# Patient Record
Sex: FEMALE | Race: WHITE | NOT HISPANIC OR LATINO | Employment: FULL TIME | URBAN - METROPOLITAN AREA
[De-identification: names, ages, dates, MRNs, and addresses within clinical notes are randomized per-mention and may not be internally consistent; named-entity substitution may affect disease eponyms.]

---

## 2017-09-25 ENCOUNTER — OFFICE VISIT (OUTPATIENT)
Dept: FAMILY MEDICINE | Facility: CLINIC | Age: 42
End: 2017-09-25
Payer: COMMERCIAL

## 2017-09-25 VITALS
HEART RATE: 80 BPM | DIASTOLIC BLOOD PRESSURE: 78 MMHG | BODY MASS INDEX: 30.48 KG/M2 | WEIGHT: 172 LBS | HEIGHT: 63 IN | TEMPERATURE: 97.5 F | SYSTOLIC BLOOD PRESSURE: 120 MMHG

## 2017-09-25 DIAGNOSIS — R87.810 CERVICAL HIGH RISK HPV (HUMAN PAPILLOMAVIRUS) TEST POSITIVE: ICD-10-CM

## 2017-09-25 DIAGNOSIS — Z12.4 SCREENING FOR CERVICAL CANCER: ICD-10-CM

## 2017-09-25 DIAGNOSIS — Z01.419 ENCOUNTER FOR GYNECOLOGICAL EXAMINATION WITHOUT ABNORMAL FINDING: Primary | ICD-10-CM

## 2017-09-25 DIAGNOSIS — Z23 NEED FOR PROPHYLACTIC VACCINATION AND INOCULATION AGAINST INFLUENZA: ICD-10-CM

## 2017-09-25 DIAGNOSIS — Z98.84 S/P GASTRIC BYPASS: ICD-10-CM

## 2017-09-25 PROCEDURE — 90471 IMMUNIZATION ADMIN: CPT | Performed by: FAMILY MEDICINE

## 2017-09-25 PROCEDURE — 90686 IIV4 VACC NO PRSV 0.5 ML IM: CPT | Performed by: FAMILY MEDICINE

## 2017-09-25 PROCEDURE — 99395 PREV VISIT EST AGE 18-39: CPT | Mod: 25 | Performed by: FAMILY MEDICINE

## 2017-09-25 PROCEDURE — 87624 HPV HI-RISK TYP POOLED RSLT: CPT | Performed by: FAMILY MEDICINE

## 2017-09-25 PROCEDURE — 88175 CYTOPATH C/V AUTO FLUID REDO: CPT | Performed by: FAMILY MEDICINE

## 2017-09-25 RX ORDER — UREA 10 %
500 LOTION (ML) TOPICAL DAILY
COMMUNITY

## 2017-09-25 RX ORDER — FERROUS SULFATE 325(65) MG
325 TABLET ORAL
COMMUNITY

## 2017-09-25 RX ORDER — LANOLIN ALCOHOL/MO/W.PET/CERES
1000 CREAM (GRAM) TOPICAL DAILY
COMMUNITY
End: 2017-09-25

## 2017-09-25 NOTE — PROGRESS NOTES
Injectable Influenza Immunization Documentation    1.  Is the person to be vaccinated sick today?   No    2. Does the person to be vaccinated have an allergy to a component   of the vaccine?   No    3. Has the person to be vaccinated ever had a serious reaction   to influenza vaccine in the past?   No    4. Has the person to be vaccinated ever had Guillain-Barré syndrome?   No    Form completed by Korina Perez [facility]

## 2017-09-25 NOTE — NURSING NOTE
"Initial /78  Pulse 80  Temp 97.5  F (36.4  C) (Tympanic)  Ht 5' 3\" (1.6 m)  Wt 172 lb (78 kg)  Breastfeeding? No  BMI 30.47 kg/m2 Estimated body mass index is 30.47 kg/(m^2) as calculated from the following:    Height as of this encounter: 5' 3\" (1.6 m).    Weight as of this encounter: 172 lb (78 kg). .      "

## 2017-09-25 NOTE — LETTER
[facility]  [address] [place] [id number]  [phone]        October 3, 2018    Bridgette Veras  [address]  [place] [address]              Dear Bridgette Veras    This is to remind you that your fasting lab is due.    You may call our office at [phone] to schedule an appointment.    Please disregard this notice if you have already had your labs drawn or made an appointment.        Sincerely,        Cecy Morillo DO

## 2017-09-25 NOTE — MR AVS SNAPSHOT
After Visit Summary   9/25/2017    Bridgette Veras    MRN: [id number]           Patient Information     Date Of Birth          1975        Visit Information        Provider Department      9/25/2017 10:40 AM Cecy Morillo DO [facility]        Today's Diagnoses     Encounter for gynecological examination without abnormal finding    -  1    Cervical high risk HPV (human papillomavirus) test positive        S/P gastric bypass        Screening for cervical cancer          Care Instructions    I'll let you know your pap results when available.    Please return fasting for your blood work related to your bypass surgery.  The orders are available for you.  You will need to be fasting for 12 hours (nothing but water) prior to your blood work.        Preventive Health Recommendations  Female Ages 26 - 39  Yearly exam:   See your health care provider every year in order to    Review health changes.     Discuss preventive care.      Review your medicines if you your doctor has prescribed any.    Until age 30: Get a Pap test every three years (more often if you have had an abnormal result).    After age 30: Talk to your doctor about whether you should have a Pap test every 3 years or have a Pap test with HPV screening every 5 years.   You do not need a Pap test if your uterus was removed (hysterectomy) and you have not had cancer.  You should be tested each year for STDs (sexually transmitted diseases), if you're at risk.   Talk to your provider about how often to have your cholesterol checked.  If you are at risk for diabetes, you should have a diabetes test (fasting glucose).  Shots: Get a flu shot each year. Get a tetanus shot every 10 years.   Nutrition:     Eat at least 5 servings of fruits and vegetables each day.    Eat whole-grain bread, whole-wheat pasta and brown rice instead of white grains and rice.    Talk to your provider about Calcium and Vitamin D.      Lifestyle    Exercise at least 150 minutes a week (30 minutes a day, 5 days of the week). This will help you control your weight and prevent disease.    Limit alcohol to one drink per day.    No smoking.     Wear sunscreen to prevent skin cancer.    See your dentist every six months for an exam and cleaning.            Follow-ups after your visit        Future tests that were ordered for you today     Open Future Orders        Priority Expected Expires Ordered    CBC with platelets Routine 9/26/2017 9/25/2018 9/25/2017    Comprehensive metabolic panel Routine 9/26/2017 9/25/2018 9/25/2017    Iron and iron binding capacity Routine 9/26/2017 9/25/2018 9/25/2017    Ferritin Routine 9/26/2017 9/25/2018 9/25/2017    Vitamin B12 Routine 9/26/2017 9/25/2018 9/25/2017    Vitamin D Deficiency Routine 9/26/2017 9/25/2018 9/25/2017    Parathyroid Hormone Intact Routine 9/26/2017 9/25/2018 9/25/2017    Vitamin B1 whole blood Routine 9/26/2017 9/25/2018 9/25/2017    Folate Routine 9/26/2017 9/25/2018 9/25/2017            Who to contact     Normal or non-critical lab and imaging results will be communicated to you by WellDochart, letter or phone within 4 business days after the clinic has received the results. If you do not hear from us within 7 days, please contact the clinic through HooftyMatcht or phone. If you have a critical or abnormal lab result, we will notify you by phone as soon as possible.  Submit refill requests through Stellar or call your pharmacy and they will forward the refill request to us. Please allow 3 business days for your refill to be completed.          If you need to speak with a  for additional information , please call: [phone]           Additional Information About Your Visit        Stellar Information     Stellar gives you secure access to your electronic health record. If you see a primary care provider, you can also send messages to your care team and make appointments. If you  "have questions, please call your primary care clinic.  If you do not have a primary care provider, please call [phone] and they will assist you.        Care EveryWhere ID     This is your Care EveryWhere ID. This could be used by other organizations to access your [place] medical records  [id number]        Your Vitals Were     Pulse Temperature Height Breastfeeding? BMI (Body Mass Index)       80 97.5  F (36.4  C) (Tympanic) 5' 3\" (1.6 m) No 30.47 kg/m2        Blood Pressure from Last 3 Encounters:   09/25/17 120/78   09/20/16 115/73   08/04/16 112/76    Weight from Last 3 Encounters:   09/25/17 172 lb (78 kg)   09/20/16 165 lb 6.4 oz (75 kg)   08/04/16 160 lb 8 oz (72.8 kg)              We Performed the Following     HPV High Risk Types DNA Cervical     Pap imaged thin layer diagnostic with HPV (select HPV order below)          Today's Medication Changes          These changes are accurate as of: 9/25/17 11:11 AM.  If you have any questions, ask your nurse or doctor.               These medicines have changed or have updated prescriptions.        Dose/Directions    cyanocolbalamin 500 MCG tablet   Commonly known as:  vitamin  B-12   This may have changed:  Another medication with the same name was removed. Continue taking this medication, and follow the directions you see here.   Changed by:  Cecy Morillo DO        Dose:  500 mcg   Take 500 mcg by mouth daily   Refills:  0                Primary Care Provider Office Phone # Fax #    Cecy Morillo [phone] [address] [place] [facility] [address]        Equal Access to Services     [facility] AH: Hadii andre doty hadasho Soomaali, waaxda luqadaha, qaybta kaalmada adeegyadaphne bateman. So [facility] [phone].    ATENCIÓN: Si habla español, tiene a guerrier disposición servicios gratuitos de asistencia lingüística. Llame al [phone].    We comply with applicable federal civil rights laws and [place] laws. " We do not discriminate on the basis of race, color, national origin, age, disability sex, sexual orientation or gender identity.            Thank you!     Thank you for choosing [facility]  for your care. Our goal is always to provide you with excellent care. Hearing back from our patients is one way we can continue to improve our services. Please take a few minutes to complete the written survey that you may receive in the mail after your visit with us. Thank you!             Your Updated Medication List - Protect others around you: Learn how to safely use, store and throw away your medicines at www.disposemymeds.org.          This list is accurate as of: 9/25/17 11:11 AM.  Always use your most recent med list.                   Brand Name Dispense Instructions for use Diagnosis    cyanocolbalamin 500 MCG tablet    vitamin  B-12     Take 500 mcg by mouth daily        ferrous sulfate 325 (65 FE) MG tablet    IRON     Take 325 mg by mouth daily (with breakfast)

## 2017-09-25 NOTE — PATIENT INSTRUCTIONS
I'll let you know your pap results when available.    Please return fasting for your blood work related to your bypass surgery.  The orders are available for you.  You will need to be fasting for 12 hours (nothing but water) prior to your blood work.        Preventive Health Recommendations  Female Ages 26 - 39  Yearly exam:   See your health care provider every year in order to    Review health changes.     Discuss preventive care.      Review your medicines if you your doctor has prescribed any.    Until age 30: Get a Pap test every three years (more often if you have had an abnormal result).    After age 30: Talk to your doctor about whether you should have a Pap test every 3 years or have a Pap test with HPV screening every 5 years.   You do not need a Pap test if your uterus was removed (hysterectomy) and you have not had cancer.  You should be tested each year for STDs (sexually transmitted diseases), if you're at risk.   Talk to your provider about how often to have your cholesterol checked.  If you are at risk for diabetes, you should have a diabetes test (fasting glucose).  Shots: Get a flu shot each year. Get a tetanus shot every 10 years.   Nutrition:     Eat at least 5 servings of fruits and vegetables each day.    Eat whole-grain bread, whole-wheat pasta and brown rice instead of white grains and rice.    Talk to your provider about Calcium and Vitamin D.     Lifestyle    Exercise at least 150 minutes a week (30 minutes a day, 5 days of the week). This will help you control your weight and prevent disease.    Limit alcohol to one drink per day.    No smoking.     Wear sunscreen to prevent skin cancer.    See your dentist every six months for an exam and cleaning.
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
## 2017-09-27 LAB
COPATH REPORT: NORMAL
PAP: NORMAL

## 2017-09-28 LAB
FINAL DIAGNOSIS: ABNORMAL
HPV HR 12 DNA CVX QL NAA+PROBE: NEGATIVE
HPV16 DNA SPEC QL NAA+PROBE: POSITIVE
HPV18 DNA SPEC QL NAA+PROBE: NEGATIVE
SPECIMEN DESCRIPTION: ABNORMAL

## 2018-01-20 ENCOUNTER — HEALTH MAINTENANCE LETTER (OUTPATIENT)
Age: 43
End: 2018-01-20

## 2018-05-27 ENCOUNTER — HEALTH MAINTENANCE LETTER (OUTPATIENT)
Age: 43
End: 2018-05-27

## 2018-07-23 ENCOUNTER — HEALTH MAINTENANCE LETTER (OUTPATIENT)
Age: 43
End: 2018-07-23

## 2018-10-24 ENCOUNTER — TELEPHONE (OUTPATIENT)
Dept: FAMILY MEDICINE | Facility: CLINIC | Age: 43
End: 2018-10-24

## 2018-10-24 NOTE — TELEPHONE ENCOUNTER
Pt is past due for f/u pap smear if declining recommended colposcopy.  Reminder letter was sent 03/21/18  Spoke with pt, states she will call the clinic to schedule.  If no reply and/or appt within 2 weeks (11/07/18) pt will be considered lost to pap tracking f/u.  Rosita Dale,    Pap Tracking

## 2018-11-27 ENCOUNTER — TELEPHONE (OUTPATIENT)
Dept: LAB | Facility: CLINIC | Age: 43
End: 2018-11-27

## 2018-11-27 NOTE — TELEPHONE ENCOUNTER
Letter sent to patient on 10/3/18 regarding overdue labs.  Please contact patient to come in for blood draw or cancel labs.  Thanks.

## 2018-12-03 ENCOUNTER — HEALTH MAINTENANCE LETTER (OUTPATIENT)
Age: 43
End: 2018-12-03

## 2019-02-15 ENCOUNTER — HEALTH MAINTENANCE LETTER (OUTPATIENT)
Age: 44
End: 2019-02-15

## 2019-11-06 ENCOUNTER — HEALTH MAINTENANCE LETTER (OUTPATIENT)
Age: 44
End: 2019-11-06

## 2020-11-29 ENCOUNTER — HEALTH MAINTENANCE LETTER (OUTPATIENT)
Age: 45
End: 2020-11-29

## 2021-04-20 ENCOUNTER — OFFICE VISIT (OUTPATIENT)
Dept: ORTHOPEDICS | Facility: CLINIC | Age: 46
End: 2021-04-20
Payer: COMMERCIAL

## 2021-04-20 ENCOUNTER — ANCILLARY PROCEDURE (OUTPATIENT)
Dept: GENERAL RADIOLOGY | Facility: CLINIC | Age: 46
End: 2021-04-20
Attending: FAMILY MEDICINE
Payer: COMMERCIAL

## 2021-04-20 VITALS
DIASTOLIC BLOOD PRESSURE: 79 MMHG | SYSTOLIC BLOOD PRESSURE: 110 MMHG | WEIGHT: 189.6 LBS | BODY MASS INDEX: 33.59 KG/M2 | HEART RATE: 87 BPM

## 2021-04-20 DIAGNOSIS — R07.81 RIB PAIN: Primary | ICD-10-CM

## 2021-04-20 DIAGNOSIS — R07.81 RIB PAIN: ICD-10-CM

## 2021-04-20 PROCEDURE — 71101 X-RAY EXAM UNILAT RIBS/CHEST: CPT | Mod: LT | Performed by: RADIOLOGY

## 2021-04-20 PROCEDURE — 99204 OFFICE O/P NEW MOD 45 MIN: CPT | Performed by: FAMILY MEDICINE

## 2021-04-20 RX ORDER — CYCLOBENZAPRINE HCL 10 MG
10 TABLET ORAL
Qty: 30 TABLET | Refills: 0 | Status: SHIPPED | OUTPATIENT
Start: 2021-04-20 | End: 2024-02-05

## 2021-04-20 RX ORDER — NABUMETONE 500 MG/1
500 TABLET, FILM COATED ORAL 2 TIMES DAILY
Qty: 30 TABLET | Refills: 0 | Status: SHIPPED | OUTPATIENT
Start: 2021-04-20 | End: 2022-03-30

## 2021-04-20 NOTE — PROGRESS NOTES
ASSESSMENT & PLAN  Bridgette was seen today for pain.    Diagnoses and all orders for this visit:    Rib pain  -     XR Ribs & Chest LT G/E 3vw; Future  -     cyclobenzaprine (FLEXERIL) 10 MG tablet; Take 1 tablet (10 mg) by mouth nightly as needed for muscle spasms  -     nabumetone (RELAFEN) 500 MG tablet; Take 1 tablet (500 mg) by mouth 2 times daily      Patient is a 45 year old female presenting for evaluation of   Chief Complaint   Patient presents with     Chest - Pain      # Left Axillary Rib Pain: Notable over the past several weeks pain over lateral ribs.  Acute pain on examination today with rib series showing possible lower rib fracture.  No concerning findings on heart and lung exam.  No red flag symptoms/signs on history or examination. Given this plan to treat as below.    Image Findings: possible left lower rib fracture non-displaced  Treatment: Limit coughing with suppressants, hydrated, allergy management, calcium supplementation with Vit D  Job: no restrictions  Medications/Injections: Relafen during the day can supplement with 1000 mg tylenol, Flexeril at night  Follow-up: 1-2 weeks if not improvement    Concerning signs/sx that would warrant urgent evaluation were discussed.  All questions were answered, patient understands and agrees with plan.      Return in about 2 weeks (around 5/4/2021).    -----    SUBJECTIVE  Bridgette Veras is a/an 45 year old Right handed female who is seen as a self referral, [facility] for evaluation of left rib pain. The patient is seen by themselves.    Onset: 3-4 week(s) ago. Reports insidious onset without acute precipitating event. Last night, 4/19/21, had a hard cough and felt a pop in left sided ribs.   Location of Pain: left sided rib   Rating of Pain at worst: 8/10  Rating of Pain Currently: 6/10  Worsened by: coughing, deep breaths  Better with: nothing   Treatments tried: ice and sleeping in recliner   Associated symptoms: no distal numbness or tingling; denies  swelling or warmth  Orthopedic history: NO  Relevant surgical history: NO  Past Medical History:   Diagnosis Date     Cervical high risk HPV (human papillomavirus) test positive 8/4/16    type 16     Chickenpox      Chlamydia trachomatis infection of unspecified site      POONAM 1 1998     Depressive disorder      Depressive disorder, not elsewhere classified      History of colposcopy with cervical biopsy 9/20/16    Bx - POONAM 1, [facility] - negative     Other abnormal heart sounds as child    Murmur - resolved     Polycystic ovaries     prior to gastic bypass in June, 2005, pt carried the diagnosis of PCOS/anovuolation     Streptococcus infection in conditions classified elsewhere and of unspecified site, group B 1998    In pregnancy     Social History     Socioeconomic History     Marital status:      Spouse name: Not on file     Number of children: 2     Years of education: Not on file     Highest education level: Not on file   Occupational History     Employer: UNEMPLOYED   Social Needs     Financial resource strain: Not on file     Food insecurity     Worry: Not on file     Inability: Not on file     Transportation needs     Medical: Not on file     Non-medical: Not on file   Tobacco Use     Smoking status: Current Every Day Smoker     Packs/day: 0.50     Types: Cigarettes     Smokeless tobacco: Never Used     Tobacco comment: smoking 20cig/day- down to 10cig/day with pregnancy   Substance and Sexual Activity     Alcohol use: Yes     Alcohol/week: 0.0 standard drinks     Comment: rarely- quit with pregnancy     Drug use: No     Sexual activity: Yes     Partners: Male     Birth control/protection: Surgical     Comment: Status post tubal ligation   Lifestyle     Physical activity     Days per week: Not on file     Minutes per session: Not on file     Stress: Not on file   Relationships     Social connections     Talks on phone: Not on file     Gets together: Not on file     Attends Rastafari service: Not on file      Active member of club or organization: Not on file     Attends meetings of clubs or organizations: Not on file     Relationship status: Not on file     Intimate partner violence     Fear of current or ex partner: Not on file     Emotionally abused: Not on file     Physically abused: Not on file     Forced sexual activity: Not on file   Other Topics Concern     Parent/sibling w/ CABG, MI or angioplasty before 65F 55M? No   Social History Narrative     Not on file         Patient's past medical, surgical, social, and family histories were reviewed today and no changes are noted.  No family history pertinent to the patient's problem today     REVIEW OF SYSTEMS:  10 point ROS is negative other than symptoms noted above in HPI, Past Medical History or as stated below  Constitutional: NEGATIVE for fever, chills, change in weight  Skin: NEGATIVE for worrisome rashes, moles or lesions  GI/: NEGATIVE for bowel or bladder changes  Neuro: NEGATIVE for weakness, dizziness or paresthesias    OBJECTIVE:  /79   Pulse 87   Wt 86 kg (189 lb 9.6 oz)   BMI 33.59 kg/m     General: healthy, alert and in no distress  HEENT: no scleral icterus or conjunctival erythema  Skin: no suspicious lesions or rash. No jaundice.  CV: regular rhythm no m/r/g  Resp: normal respiratory effort without conversational dyspnea, LCTAB  Psych: normal mood and affect  Gait: normal steady gait with appropriate coordination and balance  Neuro: normal light touch sensory exam of the bilateral upper extremities.    MSK:  LEFT SHOULDER  Inspection:    no swelling, bruising, discoloration, or obvious deformity or asymmetry  Palpation:  Significant TTP over left axilla around ribs 7-10  Active Range of Motion:   Grossly intact   Strength:   grossly intact     Independent visualization of the below image:  Recent Results (from the past 24 hour(s))   XR Ribs & Chest LT G/E 3vw    Narrative    XR RIBS & CHEST LT 3VW 4/20/2021 9:38 AM     HISTORY: Rib pain       Impression    IMPRESSION: No apparent left rib displaced fracture. The lungs appear  clear. No apparent pneumothorax or pleural effusion.    TASIA CÁRDENAS MD       I  ordered, visualized and reviewed these images with the patient  Possible lower rib fracture non-displaced         Stephan Flanagan MD [facility]

## 2021-04-20 NOTE — LETTER
4/20/2021         RE: Bridgette Veras  [address]  [place] [address]        Dear Colleague,    Thank you for referring your patient, Bridgette Veras, to the [facility] [place]. Please see a copy of my visit note below.    ASSESSMENT & PLAN  Bridgette was seen today for pain.    Diagnoses and all orders for this visit:    Rib pain  -     XR Ribs & Chest LT G/E 3vw; Future  -     cyclobenzaprine (FLEXERIL) 10 MG tablet; Take 1 tablet (10 mg) by mouth nightly as needed for muscle spasms  -     nabumetone (RELAFEN) 500 MG tablet; Take 1 tablet (500 mg) by mouth 2 times daily      Patient is a 45 year old female presenting for evaluation of   Chief Complaint   Patient presents with     Chest - Pain      # Left Axillary Rib Pain: Notable over the past several weeks pain over lateral ribs.  Acute pain on examination today with rib series showing possible lower rib fracture.  No concerning findings on heart and lung exam.  No red flag symptoms/signs on history or examination. Given this plan to treat as below.    Image Findings: possible left lower rib fracture non-displaced  Treatment: Limit coughing with suppressants, hydrated, allergy management, calcium supplementation with Vit D  Job: no restrictions  Medications/Injections: Relafen during the day can supplement with 1000 mg tylenol, Flexeril at night  Follow-up: 1-2 weeks if not improvement    Concerning signs/sx that would warrant urgent evaluation were discussed.  All questions were answered, patient understands and agrees with plan.      Return in about 2 weeks (around 5/4/2021).    -----    SUBJECTIVE  Bridgette Veras is a/an 45 year old Right handed female who is seen as a self referral, [facility] for evaluation of left rib pain. The patient is seen by themselves.    Onset: 3-4 week(s) ago. Reports insidious onset without acute precipitating event. Last night, 4/19/21, had a hard cough and felt a pop in left sided ribs.    Location of Pain: left sided rib   Rating of Pain at worst: 8/10  Rating of Pain Currently: 6/10  Worsened by: coughing, deep breaths  Better with: nothing   Treatments tried: ice and sleeping in recliner   Associated symptoms: no distal numbness or tingling; denies swelling or warmth  Orthopedic history: NO  Relevant surgical history: NO  Past Medical History:   Diagnosis Date     Cervical high risk HPV (human papillomavirus) test positive 8/4/16    type 16     Chickenpox      Chlamydia trachomatis infection of unspecified site      POONAM 1 1998     Depressive disorder      Depressive disorder, not elsewhere classified      History of colposcopy with cervical biopsy 9/20/16    Bx - POONAM 1, ECC - negative     Other abnormal heart sounds as child    Murmur - resolved     Polycystic ovaries     prior to gastic bypass in June, 2005, pt carried the diagnosis of PCOS/anovuolation     Streptococcus infection in conditions classified elsewhere and of unspecified site, group B 1998    In pregnancy     Social History     Socioeconomic History     Marital status:      Spouse name: Not on file     Number of children: 2     Years of education: Not on file     Highest education level: Not on file   Occupational History     Employer: UNEMPLOYED   Social Needs     Financial resource strain: Not on file     Food insecurity     Worry: Not on file     Inability: Not on file     Transportation needs     Medical: Not on file     Non-medical: Not on file   Tobacco Use     Smoking status: Current Every Day Smoker     Packs/day: 0.50     Types: Cigarettes     Smokeless tobacco: Never Used     Tobacco comment: smoking 20cig/day- down to 10cig/day with pregnancy   Substance and Sexual Activity     Alcohol use: Yes     Alcohol/week: 0.0 standard drinks     Comment: rarely- quit with pregnancy     Drug use: No     Sexual activity: Yes     Partners: Male     Birth control/protection: Surgical     Comment: Status post tubal ligation    Lifestyle     Physical activity     Days per week: Not on file     Minutes per session: Not on file     Stress: Not on file   Relationships     Social connections     Talks on phone: Not on file     Gets together: Not on file     Attends Spiritism service: Not on file     Active member of club or organization: Not on file     Attends meetings of clubs or organizations: Not on file     Relationship status: Not on file     Intimate partner violence     Fear of current or ex partner: Not on file     Emotionally abused: Not on file     Physically abused: Not on file     Forced sexual activity: Not on file   Other Topics Concern     Parent/sibling w/ CABG, MI or angioplasty before 65F 55M? No   Social History Narrative     Not on file         Patient's past medical, surgical, social, and family histories were reviewed today and no changes are noted.  No family history pertinent to the patient's problem today     REVIEW OF SYSTEMS:  10 point ROS is negative other than symptoms noted above in HPI, Past Medical History or as stated below  Constitutional: NEGATIVE for fever, chills, change in weight  Skin: NEGATIVE for worrisome rashes, moles or lesions  GI/: NEGATIVE for bowel or bladder changes  Neuro: NEGATIVE for weakness, dizziness or paresthesias    OBJECTIVE:  /79   Pulse 87   Wt 86 kg (189 lb 9.6 oz)   BMI 33.59 kg/m     General: healthy, alert and in no distress  HEENT: no scleral icterus or conjunctival erythema  Skin: no suspicious lesions or rash. No jaundice.  CV: regular rhythm no m/r/g  Resp: normal respiratory effort without conversational dyspnea, LCTAB  Psych: normal mood and affect  Gait: normal steady gait with appropriate coordination and balance  Neuro: normal light touch sensory exam of the bilateral upper extremities.    MSK:  LEFT SHOULDER  Inspection:    no swelling, bruising, discoloration, or obvious deformity or asymmetry  Palpation:  Significant TTP over left axilla around ribs  7-10  Active Range of Motion:   Grossly intact   Strength:   grossly intact     Independent visualization of the below image:  Recent Results (from the past 24 hour(s))   XR Ribs & Chest LT G/E 3vw    Narrative    XR RIBS & CHEST LT 3VW 4/20/2021 9:38 AM     HISTORY: Rib pain      Impression    IMPRESSION: No apparent left rib displaced fracture. The lungs appear  clear. No apparent pneumothorax or pleural effusion.    TASIA CÁRDENAS MD       I  ordered, visualized and reviewed these images with the patient  Possible lower rib fracture non-displaced         Stephan Flanagan MD [facility]        Again, thank you for allowing me to participate in the care of your patient.        Sincerely,        Stephan Flanagan MD

## 2021-04-20 NOTE — PATIENT INSTRUCTIONS
# Left Axillary Rib Pain: Notable over the past several weeks pain over lateral ribs.  Acute pain on examination today with rib series showing possible lower rib fracture.  No red flag symptoms/signs on history or examination. Given this plan to treat as below.    Image Findings: possible left lower rib fracture non-displaced  Treatment: Limit coughing with suppressants, hydrated, allergy management, calcium supplementation with Vit D  Job: no restrictions  Medications/Injections: Relafen during the day can supplement with 1000 mg tylenol, Flexeril at night  Follow-up: 1-2 weeks if not improvement    Please call [phone]   Ask for my team if you have any questions or concerns    It was great seeing you today!    Stephan Flanagan MD, CAQSM     Patient Education     Rib Contusion or Minor Fracture    A rib contusion is a bruise to one or more rib bones. It may cause pain, tenderness, swelling, and a purplish color to the skin. There may be a sharp pain with each breath. A rib contusion takes anywhere from a few days to a few weeks to heal. A minor rib fracture or break may cause the same symptoms as a rib contusion. The small crack may not be seen on a regular chest X-ray. Treatment for both problems is basically the same.  Home care    You may use over-the-counter pain medicine to control pain, unless another pain medicine was prescribed. If you have chronic liver or kidney disease or ever had a stomach ulcer or GI (gastrointestinal) bleeding, talk with your healthcare provider before using these medicines.    Rest. Don't lift anything heavy or do any activity that causes pain.    Apply an ice pack over the injured area for 15 to 20 minutes every 1 to 2 hours. You should do this for the first 24 to 48 hours. To make an ice pack, put ice cubes in a plastic bag that seals at the top. Wrap the bag in a clean, thin towel or cloth. Never put ice or an ice pack directly on the skin. Continue with ice packs as needed  for the relief of pain and swelling.    The first 3 to 4 weeks of healing will be the most painful. If your pain is not under control with the treatment given, call your healthcare provider. Sometimes a stronger pain medicine may be needed. A nerve block can be done in case of severe pain. It will numb the nerve between the ribs.  Follow-up care  Follow up with your healthcare provider, or as advised.  If X-rays were taken, you will be told of any new findings that may affect your care.  Call 911  Call 911 if you have:    Dizziness, weakness or fainting    Shortness of breath with or without chest discomfort    New or worsening pain  When to seek medical advice  Call your healthcare provider right away if any of these occur:    Fever of 100.4 F (38 C) or higher, or as directed by your healthcare provider    Chills    Stomach pain, vomiting  Nicole last reviewed this educational content on 6/1/2018 2000-2021 [employer]. All rights reserved. This information is not intended as a substitute for professional medical care. Always follow your healthcare professional's instructions.

## 2021-09-19 ENCOUNTER — HEALTH MAINTENANCE LETTER (OUTPATIENT)
Age: 46
End: 2021-09-19

## 2021-12-31 ENCOUNTER — IMMUNIZATION (OUTPATIENT)
Dept: NURSING | Facility: CLINIC | Age: 46
End: 2021-12-31
Payer: COMMERCIAL

## 2021-12-31 PROCEDURE — 0004A PR COVID VAC PFIZER DIL RECON 30 MCG/0.3 ML IM: CPT

## 2021-12-31 PROCEDURE — 91300 PR COVID VAC PFIZER DIL RECON 30 MCG/0.3 ML IM: CPT

## 2022-01-09 ENCOUNTER — HEALTH MAINTENANCE LETTER (OUTPATIENT)
Age: 47
End: 2022-01-09

## 2022-02-07 ENCOUNTER — OFFICE VISIT (OUTPATIENT)
Dept: FAMILY MEDICINE | Facility: CLINIC | Age: 47
End: 2022-02-07
Payer: COMMERCIAL

## 2022-02-07 VITALS
HEART RATE: 90 BPM | TEMPERATURE: 97.9 F | WEIGHT: 190.6 LBS | RESPIRATION RATE: 16 BRPM | SYSTOLIC BLOOD PRESSURE: 130 MMHG | HEIGHT: 63 IN | BODY MASS INDEX: 33.77 KG/M2 | OXYGEN SATURATION: 99 % | DIASTOLIC BLOOD PRESSURE: 76 MMHG

## 2022-02-07 DIAGNOSIS — G25.81 RESTLESS LEGS SYNDROME (RLS): ICD-10-CM

## 2022-02-07 DIAGNOSIS — R87.810 CERVICAL HIGH RISK HPV (HUMAN PAPILLOMAVIRUS) TEST POSITIVE: ICD-10-CM

## 2022-02-07 DIAGNOSIS — Z23 NEED FOR PROPHYLACTIC VACCINATION AND INOCULATION AGAINST INFLUENZA: ICD-10-CM

## 2022-02-07 DIAGNOSIS — Z12.4 SCREENING FOR CERVICAL CANCER: ICD-10-CM

## 2022-02-07 DIAGNOSIS — Z00.00 ROUTINE GENERAL MEDICAL EXAMINATION AT A HEALTH CARE FACILITY: Primary | ICD-10-CM

## 2022-02-07 DIAGNOSIS — Z72.0 TOBACCO ABUSE: ICD-10-CM

## 2022-02-07 DIAGNOSIS — Z98.84 S/P GASTRIC BYPASS: ICD-10-CM

## 2022-02-07 LAB
ALBUMIN SERPL-MCNC: 3.3 G/DL (ref 3.4–5)
ALP SERPL-CCNC: 112 U/L (ref 40–150)
ALT SERPL W P-5'-P-CCNC: 17 U/L (ref 0–50)
ANION GAP SERPL CALCULATED.3IONS-SCNC: 4 MMOL/L (ref 3–14)
AST SERPL W P-5'-P-CCNC: 14 U/L (ref 0–45)
BILIRUB SERPL-MCNC: 0.3 MG/DL (ref 0.2–1.3)
BUN SERPL-MCNC: 10 MG/DL (ref 7–30)
CALCIUM SERPL-MCNC: 8.5 MG/DL (ref 8.5–10.1)
CHLORIDE BLD-SCNC: 110 MMOL/L (ref 94–109)
CHOLEST SERPL-MCNC: 144 MG/DL
CO2 SERPL-SCNC: 24 MMOL/L (ref 20–32)
CREAT SERPL-MCNC: 0.69 MG/DL (ref 0.52–1.04)
DEPRECATED CALCIDIOL+CALCIFEROL SERPL-MC: 8 UG/L (ref 20–75)
ERYTHROCYTE [DISTWIDTH] IN BLOOD BY AUTOMATED COUNT: 21.8 % (ref 10–15)
FASTING STATUS PATIENT QL REPORTED: YES
FERRITIN SERPL-MCNC: 2 NG/ML (ref 12–150)
FOLATE SERPL-MCNC: 6.5 NG/ML
GFR SERPL CREATININE-BSD FRML MDRD: >90 ML/MIN/1.73M2
GLUCOSE BLD-MCNC: 90 MG/DL (ref 70–99)
HBA1C MFR BLD: 5.8 % (ref 0–5.6)
HCT VFR BLD AUTO: 28.3 % (ref 35–47)
HDLC SERPL-MCNC: 60 MG/DL
HGB BLD-MCNC: 7.4 G/DL (ref 11.7–15.7)
IRON SATN MFR SERPL: 2 % (ref 15–46)
IRON SERPL-MCNC: 10 UG/DL (ref 35–180)
LDLC SERPL CALC-MCNC: 72 MG/DL
MCH RBC QN AUTO: 15.9 PG (ref 26.5–33)
MCHC RBC AUTO-ENTMCNC: 26.1 G/DL (ref 31.5–36.5)
MCV RBC AUTO: 61 FL (ref 78–100)
NONHDLC SERPL-MCNC: 84 MG/DL
PLAT MORPH BLD: NORMAL
PLATELET # BLD AUTO: 364 10E3/UL (ref 150–450)
POTASSIUM BLD-SCNC: 4 MMOL/L (ref 3.4–5.3)
PROT SERPL-MCNC: 6.8 G/DL (ref 6.8–8.8)
PTH-INTACT SERPL-MCNC: 55 PG/ML (ref 18–80)
RBC # BLD AUTO: 4.66 10E6/UL (ref 3.8–5.2)
RBC MORPH BLD: NORMAL
SODIUM SERPL-SCNC: 138 MMOL/L (ref 133–144)
TIBC SERPL-MCNC: 456 UG/DL (ref 240–430)
TRIGL SERPL-MCNC: 59 MG/DL
VIT B12 SERPL-MCNC: 173 PG/ML (ref 193–986)
WBC # BLD AUTO: 6.7 10E3/UL (ref 4–11)

## 2022-02-07 PROCEDURE — 85027 COMPLETE CBC AUTOMATED: CPT | Performed by: FAMILY MEDICINE

## 2022-02-07 PROCEDURE — 82607 VITAMIN B-12: CPT | Performed by: FAMILY MEDICINE

## 2022-02-07 PROCEDURE — 84425 ASSAY OF VITAMIN B-1: CPT | Mod: 90 | Performed by: FAMILY MEDICINE

## 2022-02-07 PROCEDURE — 83036 HEMOGLOBIN GLYCOSYLATED A1C: CPT | Performed by: FAMILY MEDICINE

## 2022-02-07 PROCEDURE — 87624 HPV HI-RISK TYP POOLED RSLT: CPT | Performed by: FAMILY MEDICINE

## 2022-02-07 PROCEDURE — 82728 ASSAY OF FERRITIN: CPT | Performed by: FAMILY MEDICINE

## 2022-02-07 PROCEDURE — 80061 LIPID PANEL: CPT | Performed by: FAMILY MEDICINE

## 2022-02-07 PROCEDURE — 99213 OFFICE O/P EST LOW 20 MIN: CPT | Mod: 25 | Performed by: FAMILY MEDICINE

## 2022-02-07 PROCEDURE — 88175 CYTOPATH C/V AUTO FLUID REDO: CPT | Performed by: FAMILY MEDICINE

## 2022-02-07 PROCEDURE — 83970 ASSAY OF PARATHORMONE: CPT | Performed by: FAMILY MEDICINE

## 2022-02-07 PROCEDURE — 36415 COLL VENOUS BLD VENIPUNCTURE: CPT | Performed by: FAMILY MEDICINE

## 2022-02-07 PROCEDURE — 90686 IIV4 VACC NO PRSV 0.5 ML IM: CPT | Performed by: FAMILY MEDICINE

## 2022-02-07 PROCEDURE — 83550 IRON BINDING TEST: CPT | Performed by: FAMILY MEDICINE

## 2022-02-07 PROCEDURE — 82306 VITAMIN D 25 HYDROXY: CPT | Performed by: FAMILY MEDICINE

## 2022-02-07 PROCEDURE — 99000 SPECIMEN HANDLING OFFICE-LAB: CPT | Performed by: FAMILY MEDICINE

## 2022-02-07 PROCEDURE — 80053 COMPREHEN METABOLIC PANEL: CPT | Performed by: FAMILY MEDICINE

## 2022-02-07 PROCEDURE — 82746 ASSAY OF FOLIC ACID SERUM: CPT | Performed by: FAMILY MEDICINE

## 2022-02-07 PROCEDURE — 99396 PREV VISIT EST AGE 40-64: CPT | Mod: 25 | Performed by: FAMILY MEDICINE

## 2022-02-07 PROCEDURE — 90471 IMMUNIZATION ADMIN: CPT | Performed by: FAMILY MEDICINE

## 2022-02-07 RX ORDER — CYCLOBENZAPRINE HCL 10 MG
10 TABLET ORAL
Qty: 30 TABLET | Refills: 0 | Status: SHIPPED | OUTPATIENT
Start: 2022-02-07 | End: 2022-03-30

## 2022-02-07 ASSESSMENT — ENCOUNTER SYMPTOMS
SORE THROAT: 0
PARESTHESIAS: 1
HEADACHES: 1
COUGH: 0
BREAST MASS: 0
HEARTBURN: 0
DIARRHEA: 0
ARTHRALGIAS: 0
FREQUENCY: 0
EYE PAIN: 0
CONSTIPATION: 0
HEMATOCHEZIA: 0
ABDOMINAL PAIN: 0
DIZZINESS: 0
WEAKNESS: 0
FEVER: 0
SHORTNESS OF BREATH: 0
PALPITATIONS: 0
CHILLS: 0
MYALGIAS: 0
NERVOUS/ANXIOUS: 0
JOINT SWELLING: 0
HEMATURIA: 0
DYSURIA: 0
NAUSEA: 0

## 2022-02-07 ASSESSMENT — PAIN SCALES - GENERAL: PAINLEVEL: NO PAIN (0)

## 2022-02-07 ASSESSMENT — MIFFLIN-ST. JEOR: SCORE: 1483.69

## 2022-02-07 NOTE — PATIENT INSTRUCTIONS
I would recommend restarting an adult multivitamin plus B12 500mcg once daily and ferrous gluconate 325mg once daily    I'll let you know the rest of the results when available.    Preventive Health Recommendations  Female Ages 40 to 49    Yearly exam:     See your health care provider every year in order to  1. Review health changes.   2. Discuss preventive care.    3. Review your medicines if your doctor prescribed any.      Get a Pap test every three years (unless you have an abnormal result and your provider advises testing more often).      If you get Pap tests with HPV test, you only need to test every 5 years, unless you have an abnormal result. You do not need a Pap test if your uterus was removed (hysterectomy) and you have not had cancer.      You should be tested each year for STDs (sexually transmitted diseases), if you're at risk.     Ask your doctor if you should have a mammogram.      Have a colonoscopy (test for colon cancer) if someone in your family has had colon cancer or polyps before age 50.       Have a cholesterol test every 5 years.       Have a diabetes test (fasting glucose) after age 45. If you are at risk for diabetes, you should have this test every 3 years.    Shots: Get a flu shot each year. Get a tetanus shot every 10 years.     Nutrition:     Eat at least 5 servings of fruits and vegetables each day.    Eat whole-grain bread, whole-wheat pasta and brown rice instead of white grains and rice.    Get adequate Calcium and Vitamin D.      Lifestyle    Exercise at least 150 minutes a week (an average of 30 minutes a day, 5 days a week). This will help you control your weight and prevent disease.    Limit alcohol to one drink per day.    No smoking.     Wear sunscreen to prevent skin cancer.    See your dentist every six months for an exam and cleaning.
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
## 2022-02-09 LAB — VIT B1 PYROPHOSHATE BLD-SCNC: 100 NMOL/L

## 2022-02-10 LAB
BKR LAB AP GYN ADEQUACY: NORMAL
BKR LAB AP GYN INTERPRETATION: NORMAL
BKR LAB AP HPV REFLEX: NORMAL
BKR LAB AP LMP: NORMAL
BKR LAB AP PREVIOUS ABNL DX: NORMAL
BKR LAB AP PREVIOUS ABNORMAL: NORMAL
PATH REPORT.COMMENTS IMP SPEC: NORMAL
PATH REPORT.COMMENTS IMP SPEC: NORMAL
PATH REPORT.RELEVANT HX SPEC: NORMAL

## 2022-02-11 ENCOUNTER — PATIENT OUTREACH (OUTPATIENT)
Dept: FAMILY MEDICINE | Facility: CLINIC | Age: 47
End: 2022-02-11
Payer: COMMERCIAL

## 2022-02-11 ENCOUNTER — MYC MEDICAL ADVICE (OUTPATIENT)
Dept: FAMILY MEDICINE | Facility: CLINIC | Age: 47
End: 2022-02-11
Payer: COMMERCIAL

## 2022-02-11 DIAGNOSIS — E55.9 VITAMIN D DEFICIENCY: ICD-10-CM

## 2022-02-11 DIAGNOSIS — R87.810 CERVICAL HIGH RISK HPV (HUMAN PAPILLOMAVIRUS) TEST POSITIVE: ICD-10-CM

## 2022-02-11 DIAGNOSIS — E53.8 VITAMIN B12 DEFICIENCY (NON ANEMIC): ICD-10-CM

## 2022-02-11 DIAGNOSIS — D50.8 IRON DEFICIENCY ANEMIA SECONDARY TO INADEQUATE DIETARY IRON INTAKE: Primary | ICD-10-CM

## 2022-02-11 LAB
HUMAN PAPILLOMA VIRUS 16 DNA: POSITIVE
HUMAN PAPILLOMA VIRUS 18 DNA: NEGATIVE
HUMAN PAPILLOMA VIRUS FINAL DIAGNOSIS: ABNORMAL
HUMAN PAPILLOMA VIRUS OTHER HR: NEGATIVE

## 2022-03-30 ENCOUNTER — LAB (OUTPATIENT)
Dept: LAB | Facility: CLINIC | Age: 47
End: 2022-03-30
Payer: COMMERCIAL

## 2022-03-30 ENCOUNTER — OFFICE VISIT (OUTPATIENT)
Dept: FAMILY MEDICINE | Facility: CLINIC | Age: 47
End: 2022-03-30
Payer: COMMERCIAL

## 2022-03-30 VITALS
HEART RATE: 88 BPM | BODY MASS INDEX: 32.96 KG/M2 | SYSTOLIC BLOOD PRESSURE: 120 MMHG | TEMPERATURE: 98.2 F | OXYGEN SATURATION: 98 % | RESPIRATION RATE: 16 BRPM | WEIGHT: 186 LBS | DIASTOLIC BLOOD PRESSURE: 68 MMHG | HEIGHT: 63 IN

## 2022-03-30 DIAGNOSIS — D50.8 IRON DEFICIENCY ANEMIA SECONDARY TO INADEQUATE DIETARY IRON INTAKE: ICD-10-CM

## 2022-03-30 DIAGNOSIS — E53.8 VITAMIN B12 DEFICIENCY (NON ANEMIC): ICD-10-CM

## 2022-03-30 DIAGNOSIS — E55.9 VITAMIN D DEFICIENCY: ICD-10-CM

## 2022-03-30 DIAGNOSIS — R87.810 CERVICAL HIGH RISK HUMAN PAPILLOMAVIRUS (HPV) DNA TEST POSITIVE: Primary | ICD-10-CM

## 2022-03-30 LAB
ERYTHROCYTE [DISTWIDTH] IN BLOOD BY AUTOMATED COUNT: 23.6 % (ref 10–15)
HCG UR QL: NEGATIVE
HCT VFR BLD AUTO: 34.7 % (ref 35–47)
HGB BLD-MCNC: 9.1 G/DL (ref 11.7–15.7)
MCH RBC QN AUTO: 16.7 PG (ref 26.5–33)
MCHC RBC AUTO-ENTMCNC: 26.2 G/DL (ref 31.5–36.5)
MCV RBC AUTO: 64 FL (ref 78–100)
PLATELET # BLD AUTO: 399 10E3/UL (ref 150–450)
RBC # BLD AUTO: 5.44 10E6/UL (ref 3.8–5.2)
VIT B12 SERPL-MCNC: 292 PG/ML (ref 193–986)
WBC # BLD AUTO: 9.4 10E3/UL (ref 4–11)

## 2022-03-30 PROCEDURE — 36415 COLL VENOUS BLD VENIPUNCTURE: CPT

## 2022-03-30 PROCEDURE — 82607 VITAMIN B-12: CPT

## 2022-03-30 PROCEDURE — 81025 URINE PREGNANCY TEST: CPT | Performed by: FAMILY MEDICINE

## 2022-03-30 PROCEDURE — 88305 TISSUE EXAM BY PATHOLOGIST: CPT | Performed by: PATHOLOGY

## 2022-03-30 PROCEDURE — 57456 ENDOCERV CURETTAGE W/SCOPE: CPT | Performed by: FAMILY MEDICINE

## 2022-03-30 PROCEDURE — 99207 PR DROP WITH A PROCEDURE: CPT | Performed by: FAMILY MEDICINE

## 2022-03-30 PROCEDURE — 85027 COMPLETE CBC AUTOMATED: CPT

## 2022-03-30 PROCEDURE — 82306 VITAMIN D 25 HYDROXY: CPT

## 2022-03-30 RX ORDER — VARENICLINE TARTRATE 0.5 MG/1
TABLET, FILM COATED ORAL
Status: ON HOLD | COMMUNITY
Start: 2022-02-07 | End: 2022-09-08

## 2022-03-30 RX ORDER — VARENICLINE TARTRATE 1 MG/1
TABLET, FILM COATED ORAL
Status: ON HOLD | COMMUNITY
Start: 2022-02-07 | End: 2022-09-08

## 2022-03-31 LAB — DEPRECATED CALCIDIOL+CALCIFEROL SERPL-MC: 27 UG/L (ref 20–75)

## 2022-04-03 LAB
PATH REPORT.COMMENTS IMP SPEC: NORMAL
PATH REPORT.COMMENTS IMP SPEC: NORMAL
PATH REPORT.FINAL DX SPEC: NORMAL
PATH REPORT.GROSS SPEC: NORMAL
PATH REPORT.MICROSCOPIC SPEC OTHER STN: NORMAL
PATH REPORT.RELEVANT HX SPEC: NORMAL
PHOTO IMAGE: NORMAL

## 2022-04-25 ENCOUNTER — PATIENT OUTREACH (OUTPATIENT)
Dept: FAMILY MEDICINE | Facility: CLINIC | Age: 47
End: 2022-04-25
Payer: COMMERCIAL

## 2022-04-25 NOTE — TELEPHONE ENCOUNTER
3/30/22 [facility] E[facility] no endocervical tissue identified, small squamous atypia. Plan: cotest due 2/7/23 per provider

## 2022-09-07 ENCOUNTER — APPOINTMENT (OUTPATIENT)
Dept: CT IMAGING | Facility: CLINIC | Age: 47
End: 2022-09-07
Attending: EMERGENCY MEDICINE
Payer: COMMERCIAL

## 2022-09-07 ENCOUNTER — HOSPITAL ENCOUNTER (EMERGENCY)
Facility: CLINIC | Age: 47
Discharge: ANOTHER HOSPITAL | End: 2022-09-08
Attending: EMERGENCY MEDICINE | Admitting: EMERGENCY MEDICINE
Payer: COMMERCIAL

## 2022-09-07 DIAGNOSIS — K55.9 MESENTERIC ISCHEMIA (H): ICD-10-CM

## 2022-09-07 DIAGNOSIS — R11.2 NAUSEA VOMITING AND DIARRHEA: ICD-10-CM

## 2022-09-07 DIAGNOSIS — K55.069 MESENTERIC THROMBOSIS (H): Primary | ICD-10-CM

## 2022-09-07 DIAGNOSIS — R10.84 ABDOMINAL PAIN, GENERALIZED: ICD-10-CM

## 2022-09-07 DIAGNOSIS — R19.7 NAUSEA VOMITING AND DIARRHEA: ICD-10-CM

## 2022-09-07 LAB
ALBUMIN SERPL BCG-MCNC: 4.1 G/DL (ref 3.5–5.2)
ALBUMIN UR-MCNC: 30 MG/DL
ALP SERPL-CCNC: 135 U/L (ref 35–104)
ALT SERPL W P-5'-P-CCNC: 15 U/L (ref 10–35)
ANION GAP SERPL CALCULATED.3IONS-SCNC: 16 MMOL/L (ref 7–15)
APPEARANCE UR: CLEAR
AST SERPL W P-5'-P-CCNC: 37 U/L (ref 10–35)
BASOPHILS # BLD AUTO: 0.1 10E3/UL (ref 0–0.2)
BASOPHILS NFR BLD AUTO: 0 %
BILIRUB SERPL-MCNC: 0.2 MG/DL
BILIRUB UR QL STRIP: NEGATIVE
BUN SERPL-MCNC: 9.1 MG/DL (ref 6–20)
CALCIUM SERPL-MCNC: 9 MG/DL (ref 8.6–10)
CHLORIDE SERPL-SCNC: 101 MMOL/L (ref 98–107)
COLOR UR AUTO: YELLOW
CREAT SERPL-MCNC: 0.64 MG/DL (ref 0.51–0.95)
DEPRECATED HCO3 PLAS-SCNC: 20 MMOL/L (ref 22–29)
EOSINOPHIL # BLD AUTO: 0 10E3/UL (ref 0–0.7)
EOSINOPHIL NFR BLD AUTO: 0 %
ERYTHROCYTE [DISTWIDTH] IN BLOOD BY AUTOMATED COUNT: 21.6 % (ref 10–15)
GFR SERPL CREATININE-BSD FRML MDRD: >90 ML/MIN/1.73M2
GLUCOSE SERPL-MCNC: 194 MG/DL (ref 70–99)
GLUCOSE UR STRIP-MCNC: 1000 MG/DL
HCT VFR BLD AUTO: 33.1 % (ref 35–47)
HGB BLD-MCNC: 8.8 G/DL (ref 11.7–15.7)
HGB UR QL STRIP: ABNORMAL
HOLD SPECIMEN: NORMAL
IMM GRANULOCYTES # BLD: 0.1 10E3/UL
IMM GRANULOCYTES NFR BLD: 1 %
KETONES UR STRIP-MCNC: 80 MG/DL
LEUKOCYTE ESTERASE UR QL STRIP: NEGATIVE
LIPASE SERPL-CCNC: 20 U/L (ref 13–60)
LYMPHOCYTES # BLD AUTO: 1.5 10E3/UL (ref 0.8–5.3)
LYMPHOCYTES NFR BLD AUTO: 8 %
MCH RBC QN AUTO: 16 PG (ref 26.5–33)
MCHC RBC AUTO-ENTMCNC: 26.6 G/DL (ref 31.5–36.5)
MCV RBC AUTO: 60 FL (ref 78–100)
MONOCYTES # BLD AUTO: 0.6 10E3/UL (ref 0–1.3)
MONOCYTES NFR BLD AUTO: 3 %
MUCOUS THREADS #/AREA URNS LPF: PRESENT /LPF
NEUTROPHILS # BLD AUTO: 17.1 10E3/UL (ref 1.6–8.3)
NEUTROPHILS NFR BLD AUTO: 88 %
NITRATE UR QL: NEGATIVE
NRBC # BLD AUTO: 0 10E3/UL
NRBC BLD AUTO-RTO: 0 /100
PH UR STRIP: 7 [PH] (ref 5–7)
PLATELET # BLD AUTO: 355 10E3/UL (ref 150–450)
POTASSIUM SERPL-SCNC: 3.4 MMOL/L (ref 3.4–5.3)
PROT SERPL-MCNC: 7.2 G/DL (ref 6.4–8.3)
RADIOLOGIST FLAGS: ABNORMAL
RBC # BLD AUTO: 5.49 10E6/UL (ref 3.8–5.2)
RBC URINE: 2 /HPF
SODIUM SERPL-SCNC: 137 MMOL/L (ref 136–145)
SP GR UR STRIP: 1.02 (ref 1–1.03)
SQUAMOUS EPITHELIAL: 3 /HPF
UROBILINOGEN UR STRIP-MCNC: NORMAL MG/DL
WBC # BLD AUTO: 19.3 10E3/UL (ref 4–11)
WBC URINE: 1 /HPF

## 2022-09-07 PROCEDURE — 99291 CRITICAL CARE FIRST HOUR: CPT | Mod: 25 | Performed by: EMERGENCY MEDICINE

## 2022-09-07 PROCEDURE — C9803 HOPD COVID-19 SPEC COLLECT: HCPCS | Performed by: EMERGENCY MEDICINE

## 2022-09-07 PROCEDURE — 85025 COMPLETE CBC W/AUTO DIFF WBC: CPT | Performed by: EMERGENCY MEDICINE

## 2022-09-07 PROCEDURE — 96374 THER/PROPH/DIAG INJ IV PUSH: CPT | Mod: 59 | Performed by: EMERGENCY MEDICINE

## 2022-09-07 PROCEDURE — 36415 COLL VENOUS BLD VENIPUNCTURE: CPT | Performed by: EMERGENCY MEDICINE

## 2022-09-07 PROCEDURE — 250N000011 HC RX IP 250 OP 636: Performed by: EMERGENCY MEDICINE

## 2022-09-07 PROCEDURE — 80053 COMPREHEN METABOLIC PANEL: CPT | Performed by: EMERGENCY MEDICINE

## 2022-09-07 PROCEDURE — 96375 TX/PRO/DX INJ NEW DRUG ADDON: CPT | Performed by: EMERGENCY MEDICINE

## 2022-09-07 PROCEDURE — 99291 CRITICAL CARE FIRST HOUR: CPT | Mod: CS | Performed by: EMERGENCY MEDICINE

## 2022-09-07 PROCEDURE — 74177 CT ABD & PELVIS W/CONTRAST: CPT

## 2022-09-07 PROCEDURE — 86850 RBC ANTIBODY SCREEN: CPT | Performed by: SURGERY

## 2022-09-07 PROCEDURE — 258N000003 HC RX IP 258 OP 636: Performed by: EMERGENCY MEDICINE

## 2022-09-07 PROCEDURE — 83690 ASSAY OF LIPASE: CPT | Performed by: EMERGENCY MEDICINE

## 2022-09-07 PROCEDURE — 81001 URINALYSIS AUTO W/SCOPE: CPT | Performed by: EMERGENCY MEDICINE

## 2022-09-07 PROCEDURE — 250N000009 HC RX 250: Performed by: EMERGENCY MEDICINE

## 2022-09-07 RX ORDER — ONDANSETRON 2 MG/ML
4 INJECTION INTRAMUSCULAR; INTRAVENOUS ONCE
Status: COMPLETED | OUTPATIENT
Start: 2022-09-07 | End: 2022-09-07

## 2022-09-07 RX ORDER — HYDROMORPHONE HYDROCHLORIDE 1 MG/ML
0.3 INJECTION, SOLUTION INTRAMUSCULAR; INTRAVENOUS; SUBCUTANEOUS ONCE
Status: COMPLETED | OUTPATIENT
Start: 2022-09-08 | End: 2022-09-08

## 2022-09-07 RX ORDER — ONDANSETRON 2 MG/ML
4 INJECTION INTRAMUSCULAR; INTRAVENOUS ONCE
Status: COMPLETED | OUTPATIENT
Start: 2022-09-07 | End: 2022-09-08

## 2022-09-07 RX ORDER — SODIUM CHLORIDE 9 MG/ML
INJECTION, SOLUTION INTRAVENOUS CONTINUOUS
Status: DISCONTINUED | OUTPATIENT
Start: 2022-09-07 | End: 2022-09-08 | Stop reason: HOSPADM

## 2022-09-07 RX ORDER — IOPAMIDOL 755 MG/ML
83 INJECTION, SOLUTION INTRAVASCULAR ONCE
Status: COMPLETED | OUTPATIENT
Start: 2022-09-07 | End: 2022-09-07

## 2022-09-07 RX ORDER — HYDROMORPHONE HYDROCHLORIDE 1 MG/ML
0.3 INJECTION, SOLUTION INTRAMUSCULAR; INTRAVENOUS; SUBCUTANEOUS ONCE
Status: COMPLETED | OUTPATIENT
Start: 2022-09-07 | End: 2022-09-07

## 2022-09-07 RX ADMIN — SODIUM CHLORIDE 62 ML: 9 INJECTION, SOLUTION INTRAVENOUS at 22:55

## 2022-09-07 RX ADMIN — SODIUM CHLORIDE 1000 ML: 9 INJECTION, SOLUTION INTRAVENOUS at 21:58

## 2022-09-07 RX ADMIN — SODIUM CHLORIDE: 9 INJECTION, SOLUTION INTRAVENOUS at 22:02

## 2022-09-07 RX ADMIN — HYDROMORPHONE HYDROCHLORIDE 0.3 MG: 1 INJECTION, SOLUTION INTRAMUSCULAR; INTRAVENOUS; SUBCUTANEOUS at 21:59

## 2022-09-07 RX ADMIN — ONDANSETRON 4 MG: 2 INJECTION INTRAMUSCULAR; INTRAVENOUS at 20:19

## 2022-09-07 RX ADMIN — IOPAMIDOL 83 ML: 755 INJECTION, SOLUTION INTRAVENOUS at 22:55

## 2022-09-07 ASSESSMENT — ACTIVITIES OF DAILY LIVING (ADL)
ADLS_ACUITY_SCORE: 35
ADLS_ACUITY_SCORE: 35

## 2022-09-08 ENCOUNTER — ANESTHESIA (OUTPATIENT)
Dept: SURGERY | Facility: CLINIC | Age: 47
End: 2022-09-08
Payer: COMMERCIAL

## 2022-09-08 ENCOUNTER — APPOINTMENT (OUTPATIENT)
Dept: CT IMAGING | Facility: CLINIC | Age: 47
End: 2022-09-08
Attending: EMERGENCY MEDICINE
Payer: COMMERCIAL

## 2022-09-08 ENCOUNTER — HOSPITAL ENCOUNTER (INPATIENT)
Facility: CLINIC | Age: 47
LOS: 8 days | Discharge: HOME | End: 2022-09-16
Attending: SURGERY | Admitting: SURGERY
Payer: COMMERCIAL

## 2022-09-08 ENCOUNTER — APPOINTMENT (OUTPATIENT)
Dept: GENERAL RADIOLOGY | Facility: CLINIC | Age: 47
End: 2022-09-08
Attending: SURGERY
Payer: COMMERCIAL

## 2022-09-08 ENCOUNTER — APPOINTMENT (OUTPATIENT)
Dept: INTERVENTIONAL RADIOLOGY/VASCULAR | Facility: CLINIC | Age: 47
End: 2022-09-08
Attending: SURGERY
Payer: COMMERCIAL

## 2022-09-08 ENCOUNTER — ANESTHESIA EVENT (OUTPATIENT)
Dept: SURGERY | Facility: CLINIC | Age: 47
End: 2022-09-08
Payer: COMMERCIAL

## 2022-09-08 ENCOUNTER — APPOINTMENT (OUTPATIENT)
Dept: CARDIOLOGY | Facility: CLINIC | Age: 47
End: 2022-09-08
Payer: COMMERCIAL

## 2022-09-08 ENCOUNTER — APPOINTMENT (OUTPATIENT)
Dept: CT IMAGING | Facility: CLINIC | Age: 47
End: 2022-09-08
Payer: COMMERCIAL

## 2022-09-08 VITALS
RESPIRATION RATE: 24 BRPM | DIASTOLIC BLOOD PRESSURE: 64 MMHG | SYSTOLIC BLOOD PRESSURE: 142 MMHG | HEART RATE: 65 BPM | BODY MASS INDEX: 32.44 KG/M2 | TEMPERATURE: 97.5 F | OXYGEN SATURATION: 98 % | WEIGHT: 190 LBS | HEIGHT: 64 IN

## 2022-09-08 DIAGNOSIS — I74.10 AORTIC THROMBUS (H): ICD-10-CM

## 2022-09-08 DIAGNOSIS — K55.069 SUPERIOR MESENTERIC ARTERY THROMBOSIS (H): ICD-10-CM

## 2022-09-08 DIAGNOSIS — K55.069 MESENTERIC THROMBOSIS (H): ICD-10-CM

## 2022-09-08 LAB
ABO/RH(D): NORMAL
ALBUMIN SERPL BCG-MCNC: 3 G/DL (ref 3.5–5.2)
ALBUMIN SERPL BCG-MCNC: 3.7 G/DL (ref 3.5–5.2)
ALP SERPL-CCNC: 113 U/L (ref 35–104)
ALP SERPL-CCNC: 81 U/L (ref 35–104)
ALT SERPL W P-5'-P-CCNC: 10 U/L (ref 10–35)
ALT SERPL W P-5'-P-CCNC: 13 U/L (ref 10–35)
ANION GAP SERPL CALCULATED.3IONS-SCNC: 12 MMOL/L (ref 7–15)
ANION GAP SERPL CALCULATED.3IONS-SCNC: 15 MMOL/L (ref 7–15)
ANION GAP SERPL CALCULATED.3IONS-SCNC: 9 MMOL/L (ref 7–15)
ANTIBODY SCREEN: NEGATIVE
AST SERPL W P-5'-P-CCNC: 31 U/L (ref 10–35)
AST SERPL W P-5'-P-CCNC: 34 U/L (ref 10–35)
BASE EXCESS BLDA CALC-SCNC: -4.8 MMOL/L (ref -9.6–2)
BASE EXCESS BLDA CALC-SCNC: -5.3 MMOL/L (ref -9.6–2)
BASOPHILS # BLD AUTO: 0 10E3/UL (ref 0–0.2)
BASOPHILS NFR BLD AUTO: 0 %
BILIRUB SERPL-MCNC: 0.2 MG/DL
BILIRUB SERPL-MCNC: 0.3 MG/DL
BLD PROD TYP BPU: NORMAL
BLOOD COMPONENT TYPE: NORMAL
BUN SERPL-MCNC: 7.6 MG/DL (ref 6–20)
BUN SERPL-MCNC: 8 MG/DL (ref 6–20)
BUN SERPL-MCNC: 8.3 MG/DL (ref 6–20)
BUN SERPL-MCNC: 8.5 MG/DL (ref 6–20)
BUN SERPL-MCNC: 9.1 MG/DL (ref 6–20)
BURR CELLS BLD QL SMEAR: SLIGHT
CA-I BLD-MCNC: 4.5 MG/DL (ref 4.4–5.2)
CA-I BLD-MCNC: 4.5 MG/DL (ref 4.4–5.2)
CALCIUM SERPL-MCNC: 7.8 MG/DL (ref 8.6–10)
CALCIUM SERPL-MCNC: 8.2 MG/DL (ref 8.6–10)
CALCIUM SERPL-MCNC: 8.4 MG/DL (ref 8.6–10)
CHLORIDE SERPL-SCNC: 104 MMOL/L (ref 98–107)
CHLORIDE SERPL-SCNC: 105 MMOL/L (ref 98–107)
CHLORIDE SERPL-SCNC: 106 MMOL/L (ref 98–107)
CHLORIDE SERPL-SCNC: 106 MMOL/L (ref 98–107)
CHLORIDE SERPL-SCNC: 107 MMOL/L (ref 98–107)
CODING SYSTEM: NORMAL
CREAT SERPL-MCNC: 0.57 MG/DL (ref 0.51–0.95)
CREAT SERPL-MCNC: 0.58 MG/DL (ref 0.51–0.95)
CREAT SERPL-MCNC: 0.59 MG/DL (ref 0.51–0.95)
CREAT SERPL-MCNC: 0.6 MG/DL (ref 0.51–0.95)
CREAT SERPL-MCNC: 0.63 MG/DL (ref 0.51–0.95)
CROSSMATCH: NORMAL
DEPRECATED HCO3 PLAS-SCNC: 17 MMOL/L (ref 22–29)
DEPRECATED HCO3 PLAS-SCNC: 19 MMOL/L (ref 22–29)
DEPRECATED HCO3 PLAS-SCNC: 21 MMOL/L (ref 22–29)
DEPRECATED HCO3 PLAS-SCNC: 22 MMOL/L (ref 22–29)
DEPRECATED HCO3 PLAS-SCNC: 22 MMOL/L (ref 22–29)
EOSINOPHIL # BLD AUTO: 0 10E3/UL (ref 0–0.7)
EOSINOPHIL NFR BLD AUTO: 0 %
ERYTHROCYTE [DISTWIDTH] IN BLOOD BY AUTOMATED COUNT: 21.4 % (ref 10–15)
ERYTHROCYTE [DISTWIDTH] IN BLOOD BY AUTOMATED COUNT: 22.9 % (ref 10–15)
ERYTHROCYTE [DISTWIDTH] IN BLOOD BY AUTOMATED COUNT: 23.2 % (ref 10–15)
ERYTHROCYTE [DISTWIDTH] IN BLOOD BY AUTOMATED COUNT: 23.3 % (ref 10–15)
ERYTHROCYTE [DISTWIDTH] IN BLOOD BY AUTOMATED COUNT: 23.7 % (ref 10–15)
FIBRINOGEN PPP-MCNC: 448 MG/DL (ref 170–490)
GFR SERPL CREATININE-BSD FRML MDRD: >90 ML/MIN/1.73M2
GLUCOSE BLD-MCNC: 147 MG/DL (ref 70–99)
GLUCOSE BLD-MCNC: 153 MG/DL (ref 70–99)
GLUCOSE BLDC GLUCOMTR-MCNC: 186 MG/DL (ref 70–99)
GLUCOSE SERPL-MCNC: 138 MG/DL (ref 70–99)
GLUCOSE SERPL-MCNC: 149 MG/DL (ref 70–99)
GLUCOSE SERPL-MCNC: 149 MG/DL (ref 70–99)
GLUCOSE SERPL-MCNC: 165 MG/DL (ref 70–99)
GLUCOSE SERPL-MCNC: 166 MG/DL (ref 70–99)
HCO3 BLDA-SCNC: 21 MMOL/L (ref 21–28)
HCO3 BLDA-SCNC: 21 MMOL/L (ref 21–28)
HCT VFR BLD AUTO: 23.7 % (ref 35–47)
HCT VFR BLD AUTO: 26.7 % (ref 35–47)
HCT VFR BLD AUTO: 28.4 % (ref 35–47)
HCT VFR BLD AUTO: 30 % (ref 35–47)
HCT VFR BLD AUTO: 31.3 % (ref 35–47)
HGB BLD-MCNC: 6.7 G/DL (ref 11.7–15.7)
HGB BLD-MCNC: 7.3 G/DL (ref 11.7–15.7)
HGB BLD-MCNC: 7.6 G/DL (ref 11.7–15.7)
HGB BLD-MCNC: 7.8 G/DL (ref 11.7–15.7)
HGB BLD-MCNC: 8 G/DL (ref 11.7–15.7)
HGB BLD-MCNC: 8.5 G/DL (ref 11.7–15.7)
HGB BLD-MCNC: 8.6 G/DL (ref 11.7–15.7)
HOLD SPECIMEN: NORMAL
HOLD SPECIMEN: NORMAL
IMM GRANULOCYTES # BLD: 0.1 10E3/UL
IMM GRANULOCYTES # BLD: 0.2 10E3/UL
IMM GRANULOCYTES NFR BLD: 1 %
ISSUE DATE AND TIME: NORMAL
ISSUE DATE AND TIME: NORMAL
LACTATE BLD-SCNC: 0.6 MMOL/L
LACTATE BLD-SCNC: 0.8 MMOL/L
LACTATE SERPL-SCNC: 1 MMOL/L (ref 0.7–2)
LACTATE SERPL-SCNC: 1.1 MMOL/L (ref 0.7–2)
LACTATE SERPL-SCNC: 1.2 MMOL/L (ref 0.7–2)
LACTATE SERPL-SCNC: 1.4 MMOL/L (ref 0.7–2)
LACTATE SERPL-SCNC: 1.9 MMOL/L (ref 0.7–2)
LVEF ECHO: NORMAL
LYMPHOCYTES # BLD AUTO: 1.1 10E3/UL (ref 0.8–5.3)
LYMPHOCYTES # BLD AUTO: 1.2 10E3/UL (ref 0.8–5.3)
LYMPHOCYTES # BLD AUTO: 1.4 10E3/UL (ref 0.8–5.3)
LYMPHOCYTES # BLD AUTO: 1.8 10E3/UL (ref 0.8–5.3)
LYMPHOCYTES NFR BLD AUTO: 4 %
LYMPHOCYTES NFR BLD AUTO: 5 %
LYMPHOCYTES NFR BLD AUTO: 5 %
LYMPHOCYTES NFR BLD AUTO: 6 %
MCH RBC QN AUTO: 15.9 PG (ref 26.5–33)
MCH RBC QN AUTO: 16.9 PG (ref 26.5–33)
MCH RBC QN AUTO: 16.9 PG (ref 26.5–33)
MCH RBC QN AUTO: 17.2 PG (ref 26.5–33)
MCH RBC QN AUTO: 17.4 PG (ref 26.5–33)
MCHC RBC AUTO-ENTMCNC: 26 G/DL (ref 31.5–36.5)
MCHC RBC AUTO-ENTMCNC: 27.2 G/DL (ref 31.5–36.5)
MCHC RBC AUTO-ENTMCNC: 27.3 G/DL (ref 31.5–36.5)
MCHC RBC AUTO-ENTMCNC: 28.2 G/DL (ref 31.5–36.5)
MCHC RBC AUTO-ENTMCNC: 28.3 G/DL (ref 31.5–36.5)
MCV RBC AUTO: 61 FL (ref 78–100)
MCV RBC AUTO: 61 FL (ref 78–100)
MCV RBC AUTO: 62 FL (ref 78–100)
MONOCYTES # BLD AUTO: 0.6 10E3/UL (ref 0–1.3)
MONOCYTES # BLD AUTO: 1.1 10E3/UL (ref 0–1.3)
MONOCYTES # BLD AUTO: 1.1 10E3/UL (ref 0–1.3)
MONOCYTES # BLD AUTO: 1.4 10E3/UL (ref 0–1.3)
MONOCYTES NFR BLD AUTO: 3 %
MONOCYTES NFR BLD AUTO: 4 %
MONOCYTES NFR BLD AUTO: 4 %
MONOCYTES NFR BLD AUTO: 5 %
NEUTROPHILS # BLD AUTO: 19.2 10E3/UL (ref 1.6–8.3)
NEUTROPHILS # BLD AUTO: 26.2 10E3/UL (ref 1.6–8.3)
NEUTROPHILS # BLD AUTO: 26.5 10E3/UL (ref 1.6–8.3)
NEUTROPHILS # BLD AUTO: 28.6 10E3/UL (ref 1.6–8.3)
NEUTROPHILS NFR BLD AUTO: 88 %
NEUTROPHILS NFR BLD AUTO: 90 %
NEUTROPHILS NFR BLD AUTO: 91 %
NEUTROPHILS NFR BLD AUTO: 91 %
NRBC # BLD AUTO: 0 10E3/UL
NRBC BLD AUTO-RTO: 0 /100
O2/TOTAL GAS SETTING VFR VENT: 50 %
O2/TOTAL GAS SETTING VFR VENT: 52 %
PCO2 BLDA: 40 MM HG (ref 35–45)
PCO2 BLDA: 41 MM HG (ref 35–45)
PH BLDA: 7.31 [PH] (ref 7.35–7.45)
PH BLDA: 7.32 [PH] (ref 7.35–7.45)
PLAT MORPH BLD: ABNORMAL
PLATELET # BLD AUTO: 252 10E3/UL (ref 150–450)
PLATELET # BLD AUTO: 283 10E3/UL (ref 150–450)
PLATELET # BLD AUTO: 299 10E3/UL (ref 150–450)
PLATELET # BLD AUTO: 316 10E3/UL (ref 150–450)
PLATELET # BLD AUTO: 319 10E3/UL (ref 150–450)
PO2 BLDA: 102 MM HG (ref 80–105)
PO2 BLDA: 85 MM HG (ref 80–105)
POLYCHROMASIA BLD QL SMEAR: SLIGHT
POTASSIUM BLD-SCNC: 3.7 MMOL/L (ref 3.5–5)
POTASSIUM BLD-SCNC: 4 MMOL/L (ref 3.5–5)
POTASSIUM SERPL-SCNC: 3.6 MMOL/L (ref 3.4–5.3)
POTASSIUM SERPL-SCNC: 3.6 MMOL/L (ref 3.4–5.3)
POTASSIUM SERPL-SCNC: 3.7 MMOL/L (ref 3.4–5.3)
POTASSIUM SERPL-SCNC: 3.8 MMOL/L (ref 3.4–5.3)
POTASSIUM SERPL-SCNC: 3.9 MMOL/L (ref 3.4–5.3)
PROT SERPL-MCNC: 5.5 G/DL (ref 6.4–8.3)
PROT SERPL-MCNC: 6.4 G/DL (ref 6.4–8.3)
RADIOLOGIST FLAGS: ABNORMAL
RBC # BLD AUTO: 3.84 10E6/UL (ref 3.8–5.2)
RBC # BLD AUTO: 4.33 10E6/UL (ref 3.8–5.2)
RBC # BLD AUTO: 4.66 10E6/UL (ref 3.8–5.2)
RBC # BLD AUTO: 4.92 10E6/UL (ref 3.8–5.2)
RBC # BLD AUTO: 5.02 10E6/UL (ref 3.8–5.2)
RBC MORPH BLD: ABNORMAL
SARS-COV-2 RNA RESP QL NAA+PROBE: NEGATIVE
SODIUM BLD-SCNC: 138 MMOL/L (ref 133–144)
SODIUM BLD-SCNC: 139 MMOL/L (ref 133–144)
SODIUM SERPL-SCNC: 135 MMOL/L (ref 136–145)
SODIUM SERPL-SCNC: 136 MMOL/L (ref 136–145)
SODIUM SERPL-SCNC: 137 MMOL/L (ref 136–145)
SODIUM SERPL-SCNC: 137 MMOL/L (ref 136–145)
SODIUM SERPL-SCNC: 138 MMOL/L (ref 136–145)
SPECIMEN EXPIRATION DATE: NORMAL
TARGETS BLD QL SMEAR: SLIGHT
UFH PPP CHRO-ACNC: 1.03 IU/ML
UFH PPP CHRO-ACNC: >1.1 IU/ML
UNIT ABO/RH: NORMAL
UNIT NUMBER: NORMAL
UNIT STATUS: NORMAL
UNIT TYPE ISBT: 5100
WBC # BLD AUTO: 21 10E3/UL (ref 4–11)
WBC # BLD AUTO: 29.1 10E3/UL (ref 4–11)
WBC # BLD AUTO: 29.6 10E3/UL (ref 4–11)
WBC # BLD AUTO: 31.3 10E3/UL (ref 4–11)
WBC # BLD AUTO: 35 10E3/UL (ref 4–11)

## 2022-09-08 PROCEDURE — 999N000157 HC STATISTIC RCP TIME EA 10 MIN

## 2022-09-08 PROCEDURE — 84155 ASSAY OF PROTEIN SERUM: CPT

## 2022-09-08 PROCEDURE — 99285 EMERGENCY DEPT VISIT HI MDM: CPT | Performed by: EMERGENCY MEDICINE

## 2022-09-08 PROCEDURE — 255N000002 HC RX 255 OP 636: Performed by: INTERNAL MEDICINE

## 2022-09-08 PROCEDURE — C1887 CATHETER, GUIDING: HCPCS | Performed by: SURGERY

## 2022-09-08 PROCEDURE — 250N000011 HC RX IP 250 OP 636: Performed by: SURGERY

## 2022-09-08 PROCEDURE — 258N000003 HC RX IP 258 OP 636: Performed by: STUDENT IN AN ORGANIZED HEALTH CARE EDUCATION/TRAINING PROGRAM

## 2022-09-08 PROCEDURE — 370N000017 HC ANESTHESIA TECHNICAL FEE, PER MIN: Performed by: SURGERY

## 2022-09-08 PROCEDURE — 250N000009 HC RX 250: Performed by: EMERGENCY MEDICINE

## 2022-09-08 PROCEDURE — 74174 CTA ABD&PLVS W/CONTRAST: CPT | Mod: XE

## 2022-09-08 PROCEDURE — 84132 ASSAY OF SERUM POTASSIUM: CPT

## 2022-09-08 PROCEDURE — 96365 THER/PROPH/DIAG IV INF INIT: CPT | Performed by: EMERGENCY MEDICINE

## 2022-09-08 PROCEDURE — 71275 CT ANGIOGRAPHY CHEST: CPT

## 2022-09-08 PROCEDURE — 250N000011 HC RX IP 250 OP 636

## 2022-09-08 PROCEDURE — 250N000011 HC RX IP 250 OP 636: Performed by: EMERGENCY MEDICINE

## 2022-09-08 PROCEDURE — 86146 BETA-2 GLYCOPROTEIN ANTIBODY: CPT | Performed by: STUDENT IN AN ORGANIZED HEALTH CARE EDUCATION/TRAINING PROGRAM

## 2022-09-08 PROCEDURE — 99285 EMERGENCY DEPT VISIT HI MDM: CPT | Mod: 25 | Performed by: EMERGENCY MEDICINE

## 2022-09-08 PROCEDURE — 99221 1ST HOSP IP/OBS SF/LOW 40: CPT | Mod: GC | Performed by: STUDENT IN AN ORGANIZED HEALTH CARE EDUCATION/TRAINING PROGRAM

## 2022-09-08 PROCEDURE — 86923 COMPATIBILITY TEST ELECTRIC: CPT | Performed by: PHARMACIST

## 2022-09-08 PROCEDURE — 250N000009 HC RX 250: Performed by: STUDENT IN AN ORGANIZED HEALTH CARE EDUCATION/TRAINING PROGRAM

## 2022-09-08 PROCEDURE — 85027 COMPLETE CBC AUTOMATED: CPT | Performed by: STUDENT IN AN ORGANIZED HEALTH CARE EDUCATION/TRAINING PROGRAM

## 2022-09-08 PROCEDURE — 85520 HEPARIN ASSAY: CPT | Performed by: STUDENT IN AN ORGANIZED HEALTH CARE EDUCATION/TRAINING PROGRAM

## 2022-09-08 PROCEDURE — 74174 CTA ABD&PLVS W/CONTRAST: CPT

## 2022-09-08 PROCEDURE — 250N000011 HC RX IP 250 OP 636: Performed by: STUDENT IN AN ORGANIZED HEALTH CARE EDUCATION/TRAINING PROGRAM

## 2022-09-08 PROCEDURE — 87635 SARS-COV-2 COVID-19 AMP PRB: CPT | Performed by: EMERGENCY MEDICINE

## 2022-09-08 PROCEDURE — 85384 FIBRINOGEN ACTIVITY: CPT | Performed by: STUDENT IN AN ORGANIZED HEALTH CARE EDUCATION/TRAINING PROGRAM

## 2022-09-08 PROCEDURE — 250N000025 HC SEVOFLURANE, PER MIN: Performed by: SURGERY

## 2022-09-08 PROCEDURE — 360N000076 HC SURGERY LEVEL 3, PER MIN: Performed by: SURGERY

## 2022-09-08 PROCEDURE — 36415 COLL VENOUS BLD VENIPUNCTURE: CPT | Performed by: EMERGENCY MEDICINE

## 2022-09-08 PROCEDURE — C1769 GUIDE WIRE: HCPCS | Performed by: SURGERY

## 2022-09-08 PROCEDURE — 710N000009 HC RECOVERY PHASE 1, LEVEL 1, PER MIN: Performed by: SURGERY

## 2022-09-08 PROCEDURE — 272N000001 HC OR GENERAL SUPPLY STERILE: Performed by: SURGERY

## 2022-09-08 PROCEDURE — 85027 COMPLETE CBC AUTOMATED: CPT

## 2022-09-08 PROCEDURE — 96376 TX/PRO/DX INJ SAME DRUG ADON: CPT | Mod: 59 | Performed by: EMERGENCY MEDICINE

## 2022-09-08 PROCEDURE — 04C50ZZ EXTIRPATION OF MATTER FROM SUPERIOR MESENTERIC ARTERY, OPEN APPROACH: ICD-10-PCS | Performed by: SURGERY

## 2022-09-08 PROCEDURE — 99223 1ST HOSP IP/OBS HIGH 75: CPT | Performed by: SURGERY

## 2022-09-08 PROCEDURE — 37236 OPEN/PERQ PLACE STENT 1ST: CPT | Performed by: SURGERY

## 2022-09-08 PROCEDURE — 04V53DZ RESTRICTION OF SUPERIOR MESENTERIC ARTERY WITH INTRALUMINAL DEVICE, PERCUTANEOUS APPROACH: ICD-10-PCS | Performed by: SURGERY

## 2022-09-08 PROCEDURE — 85390 FIBRINOLYSINS SCREEN I&R: CPT | Mod: 26 | Performed by: PATHOLOGY

## 2022-09-08 PROCEDURE — 86147 CARDIOLIPIN ANTIBODY EA IG: CPT | Performed by: STUDENT IN AN ORGANIZED HEALTH CARE EDUCATION/TRAINING PROGRAM

## 2022-09-08 PROCEDURE — 86923 COMPATIBILITY TEST ELECTRIC: CPT

## 2022-09-08 PROCEDURE — 82330 ASSAY OF CALCIUM: CPT

## 2022-09-08 PROCEDURE — 34151 REMOVAL OF ARTERY CLOT: CPT | Mod: 51 | Performed by: SURGERY

## 2022-09-08 PROCEDURE — 86901 BLOOD TYPING SEROLOGIC RH(D): CPT | Performed by: SURGERY

## 2022-09-08 PROCEDURE — 85613 RUSSELL VIPER VENOM DILUTED: CPT | Performed by: STUDENT IN AN ORGANIZED HEALTH CARE EDUCATION/TRAINING PROGRAM

## 2022-09-08 PROCEDURE — 86923 COMPATIBILITY TEST ELECTRIC: CPT | Performed by: EMERGENCY MEDICINE

## 2022-09-08 PROCEDURE — 93306 TTE W/DOPPLER COMPLETE: CPT | Mod: 26 | Performed by: INTERNAL MEDICINE

## 2022-09-08 PROCEDURE — C1874 STENT, COATED/COV W/DEL SYS: HCPCS | Performed by: SURGERY

## 2022-09-08 PROCEDURE — 99254 IP/OBS CNSLTJ NEW/EST MOD 60: CPT | Mod: 57 | Performed by: SURGERY

## 2022-09-08 PROCEDURE — 82803 BLOOD GASES ANY COMBINATION: CPT

## 2022-09-08 PROCEDURE — 74174 CTA ABD&PLVS W/CONTRAST: CPT | Mod: 26 | Performed by: RADIOLOGY

## 2022-09-08 PROCEDURE — P9016 RBC LEUKOCYTES REDUCED: HCPCS | Performed by: EMERGENCY MEDICINE

## 2022-09-08 PROCEDURE — 96375 TX/PRO/DX INJ NEW DRUG ADDON: CPT | Mod: 59 | Performed by: EMERGENCY MEDICINE

## 2022-09-08 PROCEDURE — C1725 CATH, TRANSLUMIN NON-LASER: HCPCS | Performed by: SURGERY

## 2022-09-08 PROCEDURE — 999N000181 XR SURGERY CARM FLUORO GREATER THAN 5 MIN W STILLS: Mod: TC

## 2022-09-08 PROCEDURE — 84132 ASSAY OF SERUM POTASSIUM: CPT | Performed by: STUDENT IN AN ORGANIZED HEALTH CARE EDUCATION/TRAINING PROGRAM

## 2022-09-08 PROCEDURE — 83605 ASSAY OF LACTIC ACID: CPT | Performed by: EMERGENCY MEDICINE

## 2022-09-08 PROCEDURE — 250N000009 HC RX 250: Performed by: SURGERY

## 2022-09-08 PROCEDURE — C1894 INTRO/SHEATH, NON-LASER: HCPCS | Performed by: SURGERY

## 2022-09-08 PROCEDURE — 250N000013 HC RX MED GY IP 250 OP 250 PS 637: Performed by: STUDENT IN AN ORGANIZED HEALTH CARE EDUCATION/TRAINING PROGRAM

## 2022-09-08 PROCEDURE — 86923 COMPATIBILITY TEST ELECTRIC: CPT | Performed by: STUDENT IN AN ORGANIZED HEALTH CARE EDUCATION/TRAINING PROGRAM

## 2022-09-08 PROCEDURE — 200N000002 HC R&B ICU UMMC

## 2022-09-08 PROCEDURE — 85520 HEPARIN ASSAY: CPT | Performed by: SURGERY

## 2022-09-08 PROCEDURE — 84132 ASSAY OF SERUM POTASSIUM: CPT | Performed by: EMERGENCY MEDICINE

## 2022-09-08 PROCEDURE — 85025 COMPLETE CBC W/AUTO DIFF WBC: CPT | Performed by: EMERGENCY MEDICINE

## 2022-09-08 PROCEDURE — 85730 THROMBOPLASTIN TIME PARTIAL: CPT | Performed by: STUDENT IN AN ORGANIZED HEALTH CARE EDUCATION/TRAINING PROGRAM

## 2022-09-08 PROCEDURE — 94660 CPAP INITIATION&MGMT: CPT

## 2022-09-08 PROCEDURE — 83605 ASSAY OF LACTIC ACID: CPT | Performed by: STUDENT IN AN ORGANIZED HEALTH CARE EDUCATION/TRAINING PROGRAM

## 2022-09-08 PROCEDURE — 999N000208 ECHOCARDIOGRAM COMPLETE

## 2022-09-08 PROCEDURE — C1757 CATH, THROMBECTOMY/EMBOLECT: HCPCS | Performed by: SURGERY

## 2022-09-08 DEVICE — ICAST COVERED STENT, 9MMX38MMX120CM
Type: IMPLANTABLE DEVICE | Site: ABDOMEN | Status: FUNCTIONAL
Brand: ICAST

## 2022-09-08 RX ORDER — SODIUM CHLORIDE, SODIUM LACTATE, POTASSIUM CHLORIDE, CALCIUM CHLORIDE 600; 310; 30; 20 MG/100ML; MG/100ML; MG/100ML; MG/100ML
INJECTION, SOLUTION INTRAVENOUS CONTINUOUS PRN
Status: DISCONTINUED | OUTPATIENT
Start: 2022-09-08 | End: 2022-09-08

## 2022-09-08 RX ORDER — HEPARIN SODIUM 10000 [USP'U]/100ML
0-5000 INJECTION, SOLUTION INTRAVENOUS CONTINUOUS
Status: DISCONTINUED | OUTPATIENT
Start: 2022-09-08 | End: 2022-09-08 | Stop reason: HOSPADM

## 2022-09-08 RX ORDER — ONDANSETRON 2 MG/ML
4 INJECTION INTRAMUSCULAR; INTRAVENOUS EVERY 6 HOURS PRN
Status: DISCONTINUED | OUTPATIENT
Start: 2022-09-08 | End: 2022-09-16 | Stop reason: HOSPADM

## 2022-09-08 RX ORDER — HEPARIN SODIUM 10000 [USP'U]/100ML
0-5000 INJECTION, SOLUTION INTRAVENOUS CONTINUOUS
Status: DISCONTINUED | OUTPATIENT
Start: 2022-09-08 | End: 2022-09-15

## 2022-09-08 RX ORDER — SODIUM CHLORIDE, SODIUM LACTATE, POTASSIUM CHLORIDE, CALCIUM CHLORIDE 600; 310; 30; 20 MG/100ML; MG/100ML; MG/100ML; MG/100ML
INJECTION, SOLUTION INTRAVENOUS CONTINUOUS
Status: DISCONTINUED | OUTPATIENT
Start: 2022-09-08 | End: 2022-09-08 | Stop reason: HOSPADM

## 2022-09-08 RX ORDER — NALOXONE HYDROCHLORIDE 0.4 MG/ML
0.2 INJECTION, SOLUTION INTRAMUSCULAR; INTRAVENOUS; SUBCUTANEOUS
Status: DISCONTINUED | OUTPATIENT
Start: 2022-09-08 | End: 2022-09-16 | Stop reason: HOSPADM

## 2022-09-08 RX ORDER — PIPERACILLIN SODIUM, TAZOBACTAM SODIUM 3; .375 G/15ML; G/15ML
3.38 INJECTION, POWDER, LYOPHILIZED, FOR SOLUTION INTRAVENOUS EVERY 6 HOURS
Status: DISCONTINUED | OUTPATIENT
Start: 2022-09-09 | End: 2022-09-14

## 2022-09-08 RX ORDER — ONDANSETRON 2 MG/ML
INJECTION INTRAMUSCULAR; INTRAVENOUS PRN
Status: DISCONTINUED | OUTPATIENT
Start: 2022-09-08 | End: 2022-09-08

## 2022-09-08 RX ORDER — ONDANSETRON 4 MG/1
4 TABLET, ORALLY DISINTEGRATING ORAL EVERY 6 HOURS PRN
Status: DISCONTINUED | OUTPATIENT
Start: 2022-09-08 | End: 2022-09-16 | Stop reason: HOSPADM

## 2022-09-08 RX ORDER — OXYCODONE HYDROCHLORIDE 5 MG/1
5 TABLET ORAL EVERY 4 HOURS PRN
Status: DISCONTINUED | OUTPATIENT
Start: 2022-09-08 | End: 2022-09-08 | Stop reason: HOSPADM

## 2022-09-08 RX ORDER — ONDANSETRON 4 MG/1
4 TABLET, ORALLY DISINTEGRATING ORAL EVERY 30 MIN PRN
Status: DISCONTINUED | OUTPATIENT
Start: 2022-09-08 | End: 2022-09-08 | Stop reason: HOSPADM

## 2022-09-08 RX ORDER — ACETAMINOPHEN 325 MG/1
975 TABLET ORAL EVERY 8 HOURS
Status: DISCONTINUED | OUTPATIENT
Start: 2022-09-08 | End: 2022-09-08

## 2022-09-08 RX ORDER — AMOXICILLIN 250 MG
1 CAPSULE ORAL 2 TIMES DAILY
Status: DISCONTINUED | OUTPATIENT
Start: 2022-09-08 | End: 2022-09-11

## 2022-09-08 RX ORDER — METHYLPREDNISOLONE SODIUM SUCCINATE 125 MG/2ML
125 INJECTION, POWDER, LYOPHILIZED, FOR SOLUTION INTRAMUSCULAR; INTRAVENOUS
Status: DISCONTINUED | OUTPATIENT
Start: 2022-09-08 | End: 2022-09-10

## 2022-09-08 RX ORDER — LIDOCAINE HYDROCHLORIDE 20 MG/ML
INJECTION, SOLUTION INFILTRATION; PERINEURAL PRN
Status: DISCONTINUED | OUTPATIENT
Start: 2022-09-08 | End: 2022-09-08

## 2022-09-08 RX ORDER — FAMOTIDINE 20 MG/1
20 TABLET, FILM COATED ORAL 2 TIMES DAILY
Status: DISCONTINUED | OUTPATIENT
Start: 2022-09-08 | End: 2022-09-09

## 2022-09-08 RX ORDER — ONDANSETRON 2 MG/ML
4 INJECTION INTRAMUSCULAR; INTRAVENOUS EVERY 30 MIN PRN
Status: DISCONTINUED | OUTPATIENT
Start: 2022-09-08 | End: 2022-09-08 | Stop reason: HOSPADM

## 2022-09-08 RX ORDER — AMOXICILLIN 250 MG
2 CAPSULE ORAL 2 TIMES DAILY
Status: DISCONTINUED | OUTPATIENT
Start: 2022-09-08 | End: 2022-09-11

## 2022-09-08 RX ORDER — HEPARIN SODIUM 1000 [USP'U]/ML
INJECTION, SOLUTION INTRAVENOUS; SUBCUTANEOUS PRN
Status: DISCONTINUED | OUTPATIENT
Start: 2022-09-08 | End: 2022-09-08

## 2022-09-08 RX ORDER — HYDROMORPHONE HCL IN WATER/PF 6 MG/30 ML
0.2 PATIENT CONTROLLED ANALGESIA SYRINGE INTRAVENOUS
Status: DISCONTINUED | OUTPATIENT
Start: 2022-09-08 | End: 2022-09-08

## 2022-09-08 RX ORDER — FENTANYL CITRATE 50 UG/ML
INJECTION, SOLUTION INTRAMUSCULAR; INTRAVENOUS PRN
Status: DISCONTINUED | OUTPATIENT
Start: 2022-09-08 | End: 2022-09-08

## 2022-09-08 RX ORDER — NALOXONE HYDROCHLORIDE 0.4 MG/ML
0.4 INJECTION, SOLUTION INTRAMUSCULAR; INTRAVENOUS; SUBCUTANEOUS
Status: DISCONTINUED | OUTPATIENT
Start: 2022-09-08 | End: 2022-09-16 | Stop reason: HOSPADM

## 2022-09-08 RX ORDER — FENTANYL CITRATE 50 UG/ML
25 INJECTION, SOLUTION INTRAMUSCULAR; INTRAVENOUS EVERY 5 MIN PRN
Status: DISCONTINUED | OUTPATIENT
Start: 2022-09-08 | End: 2022-09-08 | Stop reason: HOSPADM

## 2022-09-08 RX ORDER — HYDROMORPHONE HCL IN WATER/PF 6 MG/30 ML
0.4 PATIENT CONTROLLED ANALGESIA SYRINGE INTRAVENOUS
Status: DISCONTINUED | OUTPATIENT
Start: 2022-09-08 | End: 2022-09-08

## 2022-09-08 RX ORDER — PROCHLORPERAZINE MALEATE 10 MG
10 TABLET ORAL EVERY 6 HOURS PRN
Status: DISCONTINUED | OUTPATIENT
Start: 2022-09-08 | End: 2022-09-16 | Stop reason: HOSPADM

## 2022-09-08 RX ORDER — DIPHENHYDRAMINE HYDROCHLORIDE 50 MG/ML
50 INJECTION INTRAMUSCULAR; INTRAVENOUS
Status: DISCONTINUED | OUTPATIENT
Start: 2022-09-08 | End: 2022-09-10

## 2022-09-08 RX ORDER — CEFAZOLIN SODIUM 1 G/3ML
INJECTION, POWDER, FOR SOLUTION INTRAMUSCULAR; INTRAVENOUS PRN
Status: DISCONTINUED | OUTPATIENT
Start: 2022-09-08 | End: 2022-09-08

## 2022-09-08 RX ORDER — LIDOCAINE 40 MG/G
CREAM TOPICAL
Status: DISCONTINUED | OUTPATIENT
Start: 2022-09-08 | End: 2022-09-16 | Stop reason: HOSPADM

## 2022-09-08 RX ORDER — METHOCARBAMOL 500 MG/1
500 TABLET, FILM COATED ORAL EVERY 6 HOURS PRN
Status: DISCONTINUED | OUTPATIENT
Start: 2022-09-08 | End: 2022-09-16 | Stop reason: HOSPADM

## 2022-09-08 RX ORDER — IOPAMIDOL 755 MG/ML
80 INJECTION, SOLUTION INTRAVASCULAR ONCE
Status: COMPLETED | OUTPATIENT
Start: 2022-09-08 | End: 2022-09-08

## 2022-09-08 RX ORDER — IOPAMIDOL 755 MG/ML
119 INJECTION, SOLUTION INTRAVASCULAR ONCE
Status: COMPLETED | OUTPATIENT
Start: 2022-09-08 | End: 2022-09-08

## 2022-09-08 RX ORDER — POLYETHYLENE GLYCOL 3350 17 G/17G
17 POWDER, FOR SOLUTION ORAL DAILY
Status: DISCONTINUED | OUTPATIENT
Start: 2022-09-08 | End: 2022-09-12

## 2022-09-08 RX ORDER — IBUPROFEN 200 MG
200-400 TABLET ORAL EVERY 4 HOURS PRN
Status: ON HOLD | COMMUNITY
End: 2022-09-16

## 2022-09-08 RX ORDER — SODIUM CHLORIDE, SODIUM LACTATE, POTASSIUM CHLORIDE, CALCIUM CHLORIDE 600; 310; 30; 20 MG/100ML; MG/100ML; MG/100ML; MG/100ML
INJECTION, SOLUTION INTRAVENOUS CONTINUOUS
Status: DISCONTINUED | OUTPATIENT
Start: 2022-09-08 | End: 2022-09-10

## 2022-09-08 RX ORDER — HYDROMORPHONE HCL IN WATER/PF 6 MG/30 ML
0.2 PATIENT CONTROLLED ANALGESIA SYRINGE INTRAVENOUS EVERY 5 MIN PRN
Status: DISCONTINUED | OUTPATIENT
Start: 2022-09-08 | End: 2022-09-08 | Stop reason: HOSPADM

## 2022-09-08 RX ORDER — DEXAMETHASONE SODIUM PHOSPHATE 4 MG/ML
INJECTION, SOLUTION INTRA-ARTICULAR; INTRALESIONAL; INTRAMUSCULAR; INTRAVENOUS; SOFT TISSUE PRN
Status: DISCONTINUED | OUTPATIENT
Start: 2022-09-08 | End: 2022-09-08

## 2022-09-08 RX ORDER — PROPOFOL 10 MG/ML
INJECTION, EMULSION INTRAVENOUS PRN
Status: DISCONTINUED | OUTPATIENT
Start: 2022-09-08 | End: 2022-09-08

## 2022-09-08 RX ORDER — PIPERACILLIN SODIUM, TAZOBACTAM SODIUM 3; .375 G/15ML; G/15ML
3.38 INJECTION, POWDER, LYOPHILIZED, FOR SOLUTION INTRAVENOUS EVERY 6 HOURS
Status: DISCONTINUED | OUTPATIENT
Start: 2022-09-08 | End: 2022-09-08

## 2022-09-08 RX ADMIN — DEXAMETHASONE SODIUM PHOSPHATE 10 MG: 4 INJECTION, SOLUTION INTRA-ARTICULAR; INTRALESIONAL; INTRAMUSCULAR; INTRAVENOUS; SOFT TISSUE at 03:32

## 2022-09-08 RX ADMIN — IOPAMIDOL 119 ML: 755 INJECTION, SOLUTION INTRAVENOUS at 17:23

## 2022-09-08 RX ADMIN — HYDROMORPHONE HYDROCHLORIDE 0.3 MG: 1 INJECTION, SOLUTION INTRAMUSCULAR; INTRAVENOUS; SUBCUTANEOUS at 04:41

## 2022-09-08 RX ADMIN — HUMAN ALBUMIN MICROSPHERES AND PERFLUTREN 5 ML: 10; .22 INJECTION, SOLUTION INTRAVENOUS at 15:51

## 2022-09-08 RX ADMIN — SODIUM CHLORIDE, POTASSIUM CHLORIDE, SODIUM LACTATE AND CALCIUM CHLORIDE 1000 ML: 600; 310; 30; 20 INJECTION, SOLUTION INTRAVENOUS at 19:00

## 2022-09-08 RX ADMIN — FENTANYL CITRATE 50 MCG: 50 INJECTION, SOLUTION INTRAMUSCULAR; INTRAVENOUS at 06:24

## 2022-09-08 RX ADMIN — HEPARIN SODIUM 1250 UNITS/HR: 10000 INJECTION, SOLUTION INTRAVENOUS at 14:07

## 2022-09-08 RX ADMIN — CEFAZOLIN 2 G: 1 INJECTION, POWDER, FOR SOLUTION INTRAMUSCULAR; INTRAVENOUS at 03:20

## 2022-09-08 RX ADMIN — SUGAMMADEX 200 MG: 100 INJECTION, SOLUTION INTRAVENOUS at 06:10

## 2022-09-08 RX ADMIN — FENTANYL CITRATE 100 MCG: 50 INJECTION, SOLUTION INTRAMUSCULAR; INTRAVENOUS at 03:42

## 2022-09-08 RX ADMIN — SODIUM CHLORIDE, POTASSIUM CHLORIDE, SODIUM LACTATE AND CALCIUM CHLORIDE: 600; 310; 30; 20 INJECTION, SOLUTION INTRAVENOUS at 16:57

## 2022-09-08 RX ADMIN — TAZOBACTAM SODIUM AND PIPERACILLIN SODIUM 3.38 G: 375; 3 INJECTION, SOLUTION INTRAVENOUS at 00:18

## 2022-09-08 RX ADMIN — HYDROMORPHONE HYDROCHLORIDE 0.4 MG: 0.2 INJECTION, SOLUTION INTRAMUSCULAR; INTRAVENOUS; SUBCUTANEOUS at 09:22

## 2022-09-08 RX ADMIN — IOPAMIDOL 80 ML: 755 INJECTION, SOLUTION INTRAVENOUS at 00:43

## 2022-09-08 RX ADMIN — ONDANSETRON 4 MG: 2 INJECTION INTRAMUSCULAR; INTRAVENOUS at 06:01

## 2022-09-08 RX ADMIN — PROPOFOL 160 MG: 10 INJECTION, EMULSION INTRAVENOUS at 03:09

## 2022-09-08 RX ADMIN — ONDANSETRON 4 MG: 2 INJECTION INTRAMUSCULAR; INTRAVENOUS at 00:06

## 2022-09-08 RX ADMIN — SODIUM CHLORIDE, SODIUM LACTATE, POTASSIUM CHLORIDE, CALCIUM CHLORIDE: 600; 310; 30; 20 INJECTION, SOLUTION INTRAVENOUS at 03:57

## 2022-09-08 RX ADMIN — FENTANYL CITRATE 100 MCG: 50 INJECTION, SOLUTION INTRAMUSCULAR; INTRAVENOUS at 03:09

## 2022-09-08 RX ADMIN — HEPARIN SODIUM 6000 UNITS: 1000 INJECTION INTRAVENOUS; SUBCUTANEOUS at 04:15

## 2022-09-08 RX ADMIN — SUCCINYLCHOLINE CHLORIDE 80 MG: 20 INJECTION, SOLUTION INTRAMUSCULAR; INTRAVENOUS; PARENTERAL at 03:09

## 2022-09-08 RX ADMIN — PHENYLEPHRINE HYDROCHLORIDE 100 MCG: 10 INJECTION INTRAVENOUS at 03:57

## 2022-09-08 RX ADMIN — IRON SUCROSE 200 MG: 20 INJECTION, SOLUTION INTRAVENOUS at 16:39

## 2022-09-08 RX ADMIN — SODIUM CHLORIDE, POTASSIUM CHLORIDE, SODIUM LACTATE AND CALCIUM CHLORIDE: 600; 310; 30; 20 INJECTION, SOLUTION INTRAVENOUS at 08:42

## 2022-09-08 RX ADMIN — PHENYLEPHRINE HYDROCHLORIDE 0.2 MCG/KG/MIN: 10 INJECTION INTRAVENOUS at 03:57

## 2022-09-08 RX ADMIN — HYDROMORPHONE HYDROCHLORIDE 0.2 MG: 0.2 INJECTION, SOLUTION INTRAMUSCULAR; INTRAVENOUS; SUBCUTANEOUS at 07:18

## 2022-09-08 RX ADMIN — Medication: at 16:53

## 2022-09-08 RX ADMIN — Medication 20 MG: at 04:22

## 2022-09-08 RX ADMIN — FENTANYL CITRATE 50 MCG: 50 INJECTION, SOLUTION INTRAMUSCULAR; INTRAVENOUS at 06:33

## 2022-09-08 RX ADMIN — Medication 30 MG: at 05:32

## 2022-09-08 RX ADMIN — FAMOTIDINE 20 MG: 20 TABLET ORAL at 19:46

## 2022-09-08 RX ADMIN — HYDROMORPHONE HYDROCHLORIDE 0.3 MG: 1 INJECTION, SOLUTION INTRAMUSCULAR; INTRAVENOUS; SUBCUTANEOUS at 00:05

## 2022-09-08 RX ADMIN — PIPERACILLIN AND TAZOBACTAM 3.38 G: 3; .375 INJECTION, POWDER, LYOPHILIZED, FOR SOLUTION INTRAVENOUS at 14:27

## 2022-09-08 RX ADMIN — FAMOTIDINE 20 MG: 10 INJECTION INTRAVENOUS at 08:50

## 2022-09-08 RX ADMIN — SODIUM CHLORIDE, POTASSIUM CHLORIDE, SODIUM LACTATE AND CALCIUM CHLORIDE: 600; 310; 30; 20 INJECTION, SOLUTION INTRAVENOUS at 03:05

## 2022-09-08 RX ADMIN — HYDROMORPHONE HYDROCHLORIDE 0.5 MG: 1 INJECTION, SOLUTION INTRAMUSCULAR; INTRAVENOUS; SUBCUTANEOUS at 06:00

## 2022-09-08 RX ADMIN — Medication: at 13:56

## 2022-09-08 RX ADMIN — MIDAZOLAM 2 MG: 1 INJECTION INTRAMUSCULAR; INTRAVENOUS at 03:09

## 2022-09-08 RX ADMIN — LIDOCAINE HYDROCHLORIDE 80 MG: 20 INJECTION, SOLUTION INFILTRATION; PERINEURAL at 03:09

## 2022-09-08 RX ADMIN — Medication 20 MG: at 03:39

## 2022-09-08 RX ADMIN — Medication 20 MG: at 04:01

## 2022-09-08 RX ADMIN — METHOCARBAMOL 500 MG: 500 TABLET ORAL at 09:51

## 2022-09-08 RX ADMIN — HEPARIN SODIUM 1550 UNITS/HR: 10000 INJECTION, SOLUTION INTRAVENOUS at 01:11

## 2022-09-08 RX ADMIN — HYDROMORPHONE HYDROCHLORIDE 0.2 MG: 1 INJECTION, SOLUTION INTRAMUSCULAR; INTRAVENOUS; SUBCUTANEOUS at 05:26

## 2022-09-08 RX ADMIN — SODIUM CHLORIDE 100 ML: 9 INJECTION, SOLUTION INTRAVENOUS at 00:43

## 2022-09-08 RX ADMIN — PIPERACILLIN AND TAZOBACTAM 3.38 G: 3; .375 INJECTION, POWDER, LYOPHILIZED, FOR SOLUTION INTRAVENOUS at 19:46

## 2022-09-08 RX ADMIN — POLYETHYLENE GLYCOL 3350 17 G: 17 POWDER, FOR SOLUTION ORAL at 14:27

## 2022-09-08 RX ADMIN — PIPERACILLIN AND TAZOBACTAM 3.38 G: 3; .375 INJECTION, POWDER, LYOPHILIZED, FOR SOLUTION INTRAVENOUS at 08:47

## 2022-09-08 RX ADMIN — HYDROMORPHONE HYDROCHLORIDE 0.4 MG: 0.2 INJECTION, SOLUTION INTRAMUSCULAR; INTRAVENOUS; SUBCUTANEOUS at 06:51

## 2022-09-08 RX ADMIN — Medication 20 MG: at 05:00

## 2022-09-08 RX ADMIN — Medication 30 MG: at 03:14

## 2022-09-08 ASSESSMENT — ACTIVITIES OF DAILY LIVING (ADL)
ADLS_ACUITY_SCORE: 26
ADLS_ACUITY_SCORE: 35
ADLS_ACUITY_SCORE: 35
DIFFICULTY_COMMUNICATING: NO
VISION_MANAGEMENT: GLASSES
CHANGE_IN_FUNCTIONAL_STATUS_SINCE_ONSET_OF_CURRENT_ILLNESS/INJURY: NO
ADLS_ACUITY_SCORE: 22
WEAR_GLASSES_OR_BLIND: YES
TOILETING_ISSUES: NO
DOING_ERRANDS_INDEPENDENTLY_DIFFICULTY: NO
ADLS_ACUITY_SCORE: 23
ADLS_ACUITY_SCORE: 35
CONCENTRATING,_REMEMBERING_OR_MAKING_DECISIONS_DIFFICULTY: NO
WALKING_OR_CLIMBING_STAIRS_DIFFICULTY: NO
FALL_HISTORY_WITHIN_LAST_SIX_MONTHS: NO
DIFFICULTY_EATING/SWALLOWING: NO
ADLS_ACUITY_SCORE: 26
ADLS_ACUITY_SCORE: 26
ADLS_ACUITY_SCORE: 22
ADLS_ACUITY_SCORE: 23
ADLS_ACUITY_SCORE: 37
HEARING_DIFFICULTY_OR_DEAF: NO
DRESSING/BATHING_DIFFICULTY: NO
ADLS_ACUITY_SCORE: 25

## 2022-09-08 ASSESSMENT — VISUAL ACUITY
OU: GLASSES

## 2022-09-08 ASSESSMENT — LIFESTYLE VARIABLES: TOBACCO_USE: 1

## 2022-09-08 NOTE — BRIEF OP NOTE
[facility]    Brief Operative Note    Pre-operative diagnosis: Mesenteric thrombosis (H) [K55.069]  Post-operative diagnosis Same as pre-operative diagnosis    Procedure: Procedure(s):  Exploratory LAPAROTOMY, Superior Mesenteric Artery Thrombectomy, Retrograde Superior Mesenteric Artery Stenting  ANGIOGRAM  Surgeon: Surgeon(s) and Role:  Panel 1:     * Balta Ernst MD - Primary  Panel 2:     * Balta Ernst MD - Primary     * Raquel Aly MD - Fellow - Assisting  Anesthesia: General   Estimated Blood Loss: 200 ml     Drains: None  Specimens: * No specimens in log *  Findings:   SMA thrombectomy, SMA stent placed to prevent embolization of clot from proximal aorta into SMA origin .  Complications: None.  Implants:   Implant Name Type Inv. Item Serial No.  Lot No. LRB No. Used Action   STENT ENDOPROSTH PTFE ICAST 7FR [id number] - [id number] Stent STENT ENDOPROSTH PTFE ICAST 7FR [id number] [employer]  N/A 1 Implanted         Continue high intensity heparin gtt  Hematology consult   NPO, MIVF  Continue IV zosyn x 24h  Preop and postop vascular exam: palpable bilateral femoral pulses, L palpable PT and DP. Monophasic DP ,PT and AT on R

## 2022-09-08 NOTE — ED TRIAGE NOTES
Arrives from [facility]. Mesenteric clot on CT. Had NVD today and abdo pain. Surgery at bedside.      Triage Assessment     Row Name 09/08/22 0213       Triage Assessment (Adult)    Airway WDL WDL       Respiratory WDL    Respiratory WDL WDL       Skin Circulation/Temperature WDL    Skin Circulation/Temperature WDL WDL       Cardiac WDL    Cardiac WDL WDL       Peripheral/Neurovascular WDL    Peripheral Neurovascular WDL WDL       Cognitive/Neuro/Behavioral WDL    Cognitive/Neuro/Behavioral WDL WDL

## 2022-09-08 NOTE — CONSULTS
Hematology  Consult Note   Date of Service: 09/08/2022    Patient: Bridgette Veras  MRN: [id number]  Admission Date: 9/8/2022  Hospital Day # 0    Reason for Consult: Aortic thrombus of unknown origin, hypercoagulability work-up and anticoagulant plan    Assessment:   Bridgette Veras is a 46 year old female with PMHx of pepper-en-Y gastric bypass surgery, depression, and RLS. Admitted to SICU (9/8/2022) post-operative (emergent ex-lap, SMA thrombectomy and retrograde SMA stenting) for aortic/SMA thrombus. Hematology consulted for hypercoagulability work-up and anticoagulant plan.    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 Has no history of cardiac disease (ASCVD or arrhythmia) although significant smoking history can be a risk factor. Her scenarion represent nonatherosclerotic and noncardioembolic arterial thrombosis.    There is no history suggestive of MPN or NPH, elevated WBC with neutrophil predominant likely acute stress response. No concern for DIC with normal platelet and fibrinogen. No suspicion for HIT given the timeline with heparin exposure. APS is in the differential and will send APS panel for evaluation, although no history of other thrombotic event to suggest.    Microcytic anemia  B12 deficiency  Patient has chronic microcytic anemia with iron panel (2/7/22) suggestive of iron-deficiency anemia (also has history of B12 deficiency) on supplements PTA. Recently with worsening anemia and microcytosis suggestive of in adequate iron storage.     Recommendations:  - APS panel (ordered for you)  - Venofer 200 mg IV daily for 5 days (ordered for you)  - Recommend to resume B12 supplement  - Discussed with patient on the importance of smoking cessation  - Will discuss with radiology to see if any primary vascular pathology seen on images that might explain this aortic thrombus  event    We will continue to follow this patient while hospitalized. Please do not hesitate to page with any questions or concerns.    Patient was seen and plan of care was discussed with attending physician Dr. Cogan.    Carrillo Pretty MD  Hematology/Medical Oncology (PGY-4)  P: [phone]  ---------------------------------------------------------------------------------------------------------------------------------------------------    History of Present Illness:    Bridgette Veras is a 46 year old female with PMHx of pepper-en-Y gastric bypass surgery, depression, and RLS. Admitted to SICU (9/8/2022) post-operative (emergent ex-lap, SMA thrombectomy and retrograde SMA stenting) for aortic/SMA thrombus. Hematology consulted for hypercoagulability work-up and anticoagulant plan.    Patient reports history of acute onset abdominal pain with nausea, vomiting, and diarrhea that led to this hospitalization. Currently complains of some diffuse abdominal pain and cramps. Denies leg pain but reports tinging in both feet sometimes. Denies personal or family history of bleeding or clotting disorders. Denies history of heart disease, arrhythmia or palpitation. Denies fever, night sweats, or unexplained weight loss. Has 2 children, denies history of miscarriages or other pregnancy-related complications besides gestational DM. Denies family history of cancer and blood disorders that she can recall. She is currently an active smoker with 15 pack-year (1 pack a day for 15 years) smoking history. No recent hospitalization or procedure.    Review of Systems:  A comprehensive ROS was performed and found to be negative or non-contributory with the exception of that noted in the HPI above.    Past Medical History:  Past Medical History:   Diagnosis Date     Cervical high risk HPV (human papillomavirus) test positive 8/4/16    type 16     Chickenpox      Chlamydia trachomatis infection of unspecified site      POONAM 1 1998      Depressive disorder      Depressive disorder, not elsewhere classified      History of colposcopy with cervical biopsy 16    Bx - POONAM 1, ECC - negative     Other abnormal heart sounds as child    Murmur - resolved     Polycystic ovaries     prior to gastic bypass in , pt carried the diagnosis of PCOS/anovuolation     Streptococcus infection in conditions classified elsewhere and of unspecified site, group B 1998    In pregnancy     Past Surgical History:  Past Surgical History:   Procedure Laterality Date      SECTION  ,       SECTION, TUBAL LIGATION, COMBINED  2013    Procedure: COMBINED  SECTION, TUBAL LIGATION;   Section, Tubal Ligation;  Surgeon: Paul Allen MD;  Location: [facility] OR     GASTRIC BYPASS  2005     GASTRIC BYPASS       HC REMOVAL GALLBLADDER  3/16/06    Dr. Inman @ [facility]     HC REPAIR ING HERNIA,5+Y/O,REDUCIBL  age 20 ()    [facility]     SURGICAL HISTORY OF -       PE tubes     SURGICAL HISTORY OF -   10/06    removal of pannus ans breast lift     TONSILLECTOMY       Z  DELIVERY ONLY  98    Failure to progress     Z NONSPECIFIC PROCEDURE  3/9/06    Laser ablation genital condyloma/ Bx labial lesion     [facility] RAD RESEC TONSIL/PILLARS       Social History:  Social History     Socioeconomic History     Marital status:      Number of children: 2   Occupational History     Employer: UNEMPLOYED   Tobacco Use     Smoking status: Current Every Day Smoker     Packs/day: 0.50     Types: Cigarettes     Smokeless tobacco: Never Used     Tobacco comment: smoking 20cig/day- down to 10cig/day with pregnancy   Vaping Use     Vaping Use: Never used   Substance and Sexual Activity     Alcohol use: Yes     Alcohol/week: 0.0 standard drinks     Comment: rarely- quit with pregnancy     Drug use: No     Sexual activity: Yes     Partners: Male     Birth control/protection: Surgical     Comment: Status post tubal  ligation   Other Topics Concern     Parent/sibling w/ CABG, MI or angioplasty before 65F 55M? No      Family History:  Family History   Problem Relation Age of Onset     Cerebrovascular Disease Paternal Grandfather      Diabetes Other      Cancer Maternal Grandmother         Lung     Depression Maternal Grandmother      Asthma Father      Gastrointestinal Disease Father         PSC     Alcohol/Drug Maternal Grandfather         alcohol     Cardiovascular Maternal Grandfather         MI     Respiratory Paternal Grandmother      Hypertension Mother      Depression Mother      Hypertension Brother      Depression Brother      C.A.D. No family hx of      Breast Cancer No family hx of      Cancer - colorectal No family hx of      Medications and allergies reviewed.    Physical Exam:    Blood pressure 134/69, pulse 83, temperature 98.6  F (37  C), temperature source Oral, resp. rate 18, weight 88.5 kg (195 lb 1.7 oz), SpO2 92 %, not currently breastfeeding.  General: Alert and cooperative, lying in bed, appears discomfort but not in acute distress  CV: RRR, no murmurs  Resp: CTAB, normal respiratory effort on ambient air  GI: Midline surgical site dressed with some serosanguinous drainage. Soft, diffuse mild tenderness to palpation, non-distended.  MSK: Warm and well-perfused, no edema  Skin: No rashes or petechiae  Neuro: Alert and oriented, interact appropriately throughout the conversation    Labs & Studies: I personally reviewed the following studies:  ROUTINE LABS (Last four results):  CBC  Recent Labs   Lab 09/08/22  0828 09/08/22  0550 09/08/22  0428 09/08/22  0221 09/07/22 2014   WBC 29.1*  --   --  21.0* 19.3*   RBC 5.02  --   --  4.92 5.49*   HGB 8.5* 8.6* 7.6* 7.8* 8.8*   HCT 31.3*  --   --  30.0* 33.1*   MCV 62*  --   --  61* 60*   MCH 16.9*  --   --  15.9* 16.0*   MCHC 27.2*  --   --  26.0* 26.6*   RDW 23.7*  --   --  21.4* 21.6*     --   --  319 355     INRNo lab results found in last 7 days.    Labs  and images are reviewed and discussed as pertinent in assessment and plan.

## 2022-09-08 NOTE — ANESTHESIA PROCEDURE NOTES
Airway         Procedure Start/Stop Times: 9/8/2022 3:11 AM  Staff -        Anesthesiologist:  Latesha Lopez MD       Performed By: anesthesiologistIndications and Patient Condition       Indications for airway management: jojo-procedural       Induction type:RSI       Mask difficulty assessment: 0 - not attempted    Final Airway Details       Final airway type: endotracheal airway       Successful airway: ETT - single  Endotracheal Airway Details        ETT size (mm): 8.0       Cuffed: yes       Successful intubation technique: direct laryngoscopy       DL Blade Type: MAC 4       Grade View of Cords: 1       Adjucts: stylet       Position: Right       Measured from: gums/teeth       Secured at (cm): 23       Bite block used: Soft    Post intubation assessment        Placement verified by: capnometry, equal breath sounds and chest rise        Number of attempts at approach: 1       Secured with: silk tape       Ease of procedure: easy       Dentition: Intact    Medication(s) Administered   Medication Administration Time: 9/8/2022 3:11 AM

## 2022-09-08 NOTE — ANESTHESIA POSTPROCEDURE EVALUATION
Patient: Bridgette Veras    Procedure: Procedure(s):  Exploratory LAPAROTOMY, Superior Mesenteric Artery Thrombectomy, Retrograde Superior Mesenteric Artery Stenting  ANGIOGRAM       Anesthesia Type:  General    Note:  Disposition: Admission; Inpatient   Postop Pain Control: Uneventful            Sign Out: Well controlled pain   PONV: No   Neuro/Psych: Uneventful            Sign Out: Acceptable/Baseline neuro status   Airway/Respiratory: Uneventful            Sign Out: Acceptable/Baseline resp. status   CV/Hemodynamics: Uneventful            Sign Out: Acceptable CV status; No obvious hypovolemia; No obvious fluid overload   Other NRE: NONE   DID A NON-ROUTINE EVENT OCCUR? No           Last vitals:  Vitals Value Taken Time   BP     Temp     Pulse     Resp     SpO2         Electronically Signed By: Latesha Lopez MD  September 8, 2022  6:31 AM

## 2022-09-08 NOTE — ED PROVIDER NOTES
History     Chief Complaint   Patient presents with     GI Problem     N V & loose stools     HPI  Bridgette Veras is a 46 year old female with past medical history significant for gastric bypass abdominal wall adhesions calculus of the gallbladder esophageal reflux PCOS who presents to the emergency department complaining of nausea vomiting diarrhea and abdominal pain.  States she ate some watermelon pretzels for breakfast and had sudden onset of diarrhea and vomiting around 3:00 she has vomited numerous times and had numerous bouts of loose watery stools does not think there is any blood in them.  Reports feeling lightheaded and dizzy and having severe cramping abdominal pain.  She does not think she has had a fever denies any chills not had any headache or visual changes.  She denies any neck pain.  She has not had any chest pain or shortness of breath.  She has not had bowel or bladder dysfunction.  She denies any focal numbness weakness any extremity.  Has not had any leg swelling.  No history of recent antibiotics.    Allergies:  Allergies   Allergen Reactions     Codeine Nausea and Vomiting     Darvocet [Acetaminophen] Nausea and Vomiting     Propoxyphene Nausea     Tylenol Nausea and Vomiting       Problem List:    Patient Active Problem List    Diagnosis Date Noted     S/P gastric bypass 09/25/2017     Priority: Medium     Cervical high risk HPV (human papillomavirus) test positive 08/04/2016     Priority: Medium     8/4/16 NIL Pap, + HR HPV 16. Plan colp  9/20/16 Energy Bx - POONAM 1, ECC - Negative. Plan cotest in 1 year.   9/25/17 NIL Pap, + HR HPV 16. Plan colp  11/8/18 Lost to follow-up for pap tracking  2/7/22 NIL pap, +HR HPV 16. Plan: colposcopy due before 5/7/22  3/30/22 Colpo ECC no endocervical tissue identified, small squamous atypia. Plan: cotest due 2/7/23 per provider       Encounter for sterilization 08/02/2013     Priority: Medium     Problem list name updated by automated process. Provider  to review       Adhesion of abdominal wall 01/20/2013     Priority: Medium     suspected by position of uterus in the first trimester       Health Care Home 01/08/2013     Priority: Medium     EMERGENCY CARE PLAN  April 16, 2013: No current Care Coordination follow up planned. Please refer if Care Coordination services are needed.    Presenting Problem Signs and Symptoms Treatment Plan   Questions or concerns   during clinic hours   I will call my clinic directly:  [address] [address]  [phone].   Questions or concerns outside clinic hours   I will call the 24 hour nurse line at   [phone] or [facility].   Need to schedule an appointment   I will call the 24 hour scheduling team at [phone] or my clinic directly at [phone].    Same day treatment     I will call my clinic first, nurse line if after hours, urgent care and express care if needed.   Clinic care coordination services (regular clinic hours)     I will call a clinic care coordinator directly:     Ty Liang RN  Mon, Tues, Fri - [phone]  Wed, Thurs - [phone]    Kath Montoya :    [phone]    Or call my clinic at [phone] and ask to speak with care coordination.   Crisis Services: Behavioral or Mental Health  [facility] 24 Hour Phone Line  [phone]    [facility] 24 Hour Crisis Services  [phone]    [facility] (Behavioral Health Providers) Network [phone]    [facility]   [phone]       Emergency treatment -- Immediately    [facility]All 911            [place] 08/22/2011     Priority: Medium     3/10/11-Pa Aranda MD, [facility] Department of Otolaryngology [phone].  Otalgia, suspect temporomandibular joint (TMJ).  Referral to TMJ clinic.  Maxillofacial CT scan for sinuses and TMJ is ordered at this visit.  Recommended trial of steroid due to severe discomfort. Rx for medrol dose pack.         CARDIOVASCULAR SCREENING; LDL GOAL  "LESS THAN 160 10/31/2010     Priority: Medium     Family history of liver disease 07/11/2010     Priority: Medium     PCOS (polycystic ovarian syndrome) 08/27/2008     Priority: Medium     Hypertrophic and atrophic condition of skin 10/18/2006     Priority: Medium     October 18, 2006  Gastric bypass June 8, 2005 at 257 pounds, Height 5' 4\" (BMI 44).  Now at BMI 25, 150 pounds. Stable at current weight for 9 months  Secondary pannus, and breast sagging post-surgery - scheduled for abdominoplasty and breast lift by plastic surgery  Problem list name updated by automated process. Provider to review       Esophageal reflux 07/17/2006     Priority: Medium     Viral warts 03/20/2006     Priority: Medium     3/2/06 Dr. Hamilton [facility]: First appeared 2005. Tried Aldara and cryotherapy- ineffective. 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 Plan: surgical laser therapy, possible biopsy.  3/9/06 Dr. Hamilton: Procedure: laser ablation to vulva and perianal condyloma/ Bx of rt labial lesion/condyloma.   Problem list name updated by automated process. Provider to review       Calculus of gallbladder 03/02/2006     Priority: Medium     3/29/06 Dr. Inman @ [facility] ([phone]): Pt doing well after cholecystectomy of 3/16/06.   5/24/06 Dr. Inman: Has had some recurrent epigastric discomfort. CT in ER was unremarkable. Imp: Likely irritation in stomach pouch. Plan: d/c Advil, start PPI.  If not better, consider EGD.   Problem list name updated by automated process. Provider to review       Headache 03/01/2005     Priority: Medium     Problem list name updated by automated process. Provider to review       HISTORY OF SMOKING 06/10/2003     Priority: Medium        Past Medical History:    Past Medical History:   Diagnosis Date     Cervical high risk HPV (human papillomavirus) test positive 8/4/16     Chickenpox      Chlamydia trachomatis infection of unspecified site      POONAM 1 1998 "     Depressive disorder      Depressive disorder, not elsewhere classified      History of colposcopy with cervical biopsy 16     Other abnormal heart sounds as child     Polycystic ovaries      Streptococcus infection in conditions classified elsewhere and of unspecified site, group B 1998       Past Surgical History:    Past Surgical History:   Procedure Laterality Date      SECTION  ,       SECTION, TUBAL LIGATION, COMBINED  2013    Procedure: COMBINED  SECTION, TUBAL LIGATION;   Section, Tubal Ligation;  Surgeon: Paul Allen MD;  Location: [facility] OR     GASTRIC BYPASS  2005     GASTRIC BYPASS       HC REMOVAL GALLBLADDER  3/16/06    Dr. Inman @ [facility]     HC REPAIR ING HERNIA,5+Y/O,REDUCIBL  age 20 ()    LIH     SURGICAL HISTORY OF -       PE tubes     SURGICAL HISTORY OF -   10/06    removal of pannus ans breast lift     TONSILLECTOMY       ZZC  DELIVERY ONLY  98    Failure to progress     ZZC NONSPECIFIC PROCEDURE  3/9/06    Laser ablation genital condyloma/ Bx labial lesion     ZZC RAD RESEC TONSIL/PILLARS         Family History:    Family History   Problem Relation Age of Onset     Cerebrovascular Disease Paternal Grandfather      Diabetes Other      Cancer Maternal Grandmother         Lung     Depression Maternal Grandmother      Asthma Father      Gastrointestinal Disease Father         PSC     Alcohol/Drug Maternal Grandfather         alcohol     Cardiovascular Maternal Grandfather         MI     Respiratory Paternal Grandmother      Hypertension Mother      Depression Mother      Hypertension Brother      Depression Brother      C.A.D. No family hx of      Breast Cancer No family hx of      Cancer - colorectal No family hx of        Social History:  Marital Status:   [2]  Social History     Tobacco Use     Smoking status: Current Every Day Smoker     Packs/day: 0.50     Types: Cigarettes     Smokeless  "tobacco: Never Used     Tobacco comment: smoking 20cig/day- down to 10cig/day with pregnancy   Vaping Use     Vaping Use: Never used   Substance Use Topics     Alcohol use: Yes     Alcohol/week: 0.0 standard drinks     Comment: rarely- quit with pregnancy     Drug use: No        Medications:    cyanocolbalamin (VITAMIN  B-12) 500 MCG tablet  cyclobenzaprine (FLEXERIL) 10 MG tablet  Ergocalciferol (VITAMIN D2 PO)  ferrous sulfate (IRON) 325 (65 FE) MG tablet  Multiple Vitamin (MULTIVITAMIN PO)  varenicline (CHANTIX) 0.5 MG tablet  varenicline (CHANTIX) 1 MG tablet          Review of Systems  All systems reviewed and other than pertinent positives negatives in HPI all other systems negative.  Physical Exam   BP: (!) 146/76  Pulse: 68  Temp: 97.5  F (36.4  C)  Resp: 20  Height: 162.6 cm (5' 4\")  Weight: 86.2 kg (190 lb)  SpO2: 100 %  Lying Orthostatic BP: 147/71 (Patient stated stomach hurts more when laind her flat)  Lying Orthostatic Pulse: 57 bpm  Standing Orthostatic BP: 143/72  Standing Orthostatic Pulse: 62 bpm      Physical Exam  Vitals and nursing note reviewed.   Constitutional:       Appearance: Normal appearance. She is ill-appearing. She is not toxic-appearing or diaphoretic.      Comments: Mild GI distress.   HENT:      Head: Normocephalic and atraumatic.      Nose: Nose normal.      Mouth/Throat:      Mouth: Mucous membranes are moist.      Pharynx: Oropharynx is clear.   Eyes:      Conjunctiva/sclera: Conjunctivae normal.   Cardiovascular:      Rate and Rhythm: Normal rate and regular rhythm.      Pulses: Normal pulses.      Heart sounds: Normal heart sounds. No murmur heard.  Pulmonary:      Effort: Pulmonary effort is normal.      Breath sounds: Normal breath sounds. No stridor. No wheezing or rhonchi.   Abdominal:      General: Abdomen is flat.      Palpations: Abdomen is soft.      Comments: Soft nondistended tender to palpation lower abdomen.  No guarding or rebound present bowel sounds slightly " hyperactive.  No flank pain present.   Musculoskeletal:         General: No tenderness, deformity or signs of injury. Normal range of motion.      Cervical back: Normal range of motion and neck supple.      Right lower leg: No edema.      Left lower leg: No edema.   Skin:     General: Skin is warm and dry.      Capillary Refill: Capillary refill takes less than 2 seconds.      Findings: No bruising or erythema.   Neurological:      Mental Status: She is alert and oriented to person, place, and time.      Sensory: No sensory deficit.      Motor: No weakness.      Coordination: Coordination normal.   Psychiatric:         Mood and Affect: Mood normal.         ED Course                 Procedures              Critical Care time:  was 30 minutes for this patient excluding procedures.  For management of mesenteric ischemia               Results for orders placed or performed during the hospital encounter of 09/07/22 (from the past 24 hour(s))   [place] Draw    Narrative    The following orders were created for panel order [place] Draw.  Procedure                               Abnormality         Status                     ---------                               -----------         ------                     Extra Blue Top Tube[[id number]]                              Final result               Extra Red Top Tube[[id number]]                               Final result               Extra Green Top (Lithium...[[id number]]                      Final result               Extra Purple Top Tube[[id number]]                            Final result               Extra Blood Bank Purple ...[[id number]]                                                   Please view results for these tests on the individual orders.   Extra Blue Top Tube   Result Value Ref Range    Hold Specimen JIC    Extra Red Top Tube   Result Value Ref Range    Hold Specimen JIC    Extra Green Top (Lithium Heparin) Tube   Result Value Ref Range    Hold Specimen JIC     Extra Purple Top Tube   Result Value Ref Range    Hold Specimen [facility]    CBC with platelets differential    Narrative    The following orders were created for panel order CBC with platelets differential.  Procedure                               Abnormality         Status                     ---------                               -----------         ------                     CBC with platelets and d...[[id number]]  Abnormal            Final result                 Please view results for these tests on the individual orders.   Comprehensive metabolic panel   Result Value Ref Range    Sodium 137 136 - 145 mmol/L    Potassium 3.4 3.4 - 5.3 mmol/L    Creatinine 0.64 0.51 - 0.95 mg/dL    Urea Nitrogen 9.1 6.0 - 20.0 mg/dL    Chloride 101 98 - 107 mmol/L    Carbon Dioxide (CO2) 20 (L) 22 - 29 mmol/L    Anion Gap 16 (H) 7 - 15 mmol/L    Glucose 194 (H) 70 - 99 mg/dL    Calcium 9.0 8.6 - 10.0 mg/dL    Protein Total 7.2 6.4 - 8.3 g/dL    Albumin 4.1 3.5 - 5.2 g/dL    Bilirubin Total 0.2 <=1.2 mg/dL    Alkaline Phosphatase 135 (H) 35 - 104 U/L    AST 37 (H) 10 - 35 U/L    ALT 15 10 - 35 U/L    GFR Estimate >90 >60 mL/min/1.73m2   Lipase   Result Value Ref Range    Lipase 20 13 - 60 U/L   CBC with platelets and differential   Result Value Ref Range    WBC Count 19.3 (H) 4.0 - 11.0 10e3/uL    RBC Count 5.49 (H) 3.80 - 5.20 10e6/uL    Hemoglobin 8.8 (L) 11.7 - 15.7 g/dL    Hematocrit 33.1 (L) 35.0 - 47.0 %    MCV 60 (L) 78 - 100 fL    MCH 16.0 (L) 26.5 - 33.0 pg    MCHC 26.6 (L) 31.5 - 36.5 g/dL    RDW 21.6 (H) 10.0 - 15.0 %    Platelet Count 355 150 - 450 10e3/uL    % Neutrophils 88 %    % Lymphocytes 8 %    % Monocytes 3 %    % Eosinophils 0 %    % Basophils 0 %    % Immature Granulocytes 1 %    NRBCs per 100 WBC 0 <1 /100    Absolute Neutrophils 17.1 (H) 1.6 - 8.3 10e3/uL    Absolute Lymphocytes 1.5 0.8 - 5.3 10e3/uL    Absolute Monocytes 0.6 0.0 - 1.3 10e3/uL    Absolute Eosinophils 0.0 0.0 - 0.7 10e3/uL    Absolute  Basophils 0.1 0.0 - 0.2 10e3/uL    Absolute Immature Granulocytes 0.1 <=0.4 10e3/uL    Absolute NRBCs 0.0 10e3/uL   UA with Microscopic reflex to Culture    Specimen: Urine, Clean Catch   Result Value Ref Range    Color Urine Yellow Colorless, Straw, Light Yellow, Yellow    Appearance Urine Clear Clear    Glucose Urine 1000  (A) Negative mg/dL    Bilirubin Urine Negative Negative    Ketones Urine 80  (A) Negative mg/dL    Specific Gravity Urine 1.020 1.003 - 1.035    Blood Urine Trace (A) Negative    pH Urine 7.0 5.0 - 7.0    Protein Albumin Urine 30  (A) Negative mg/dL    Urobilinogen Urine Normal Normal, 2.0 mg/dL    Nitrite Urine Negative Negative    Leukocyte Esterase Urine Negative Negative    Mucus Urine Present (A) None Seen /LPF    RBC Urine 2 <=2 /HPF    WBC Urine 1 <=5 /HPF    Squamous Epithelials Urine 3 (H) <=1 /HPF    Narrative    Urine Culture not indicated       Medications   ondansetron (ZOFRAN) injection 4 mg (has no administration in time range)   0.9% sodium chloride BOLUS (1,000 mLs Intravenous New Bag 9/7/22 2158)     Followed by   sodium chloride 0.9% infusion ( Intravenous New Bag 9/7/22 2202)   ondansetron (ZOFRAN) injection 4 mg (4 mg Intravenous Given 9/7/22 2019)   HYDROmorphone (PF) (DILAUDID) injection 0.3 mg (0.3 mg Intravenous Given 9/7/22 2159)   iopamidol (ISOVUE-370) solution 83 mL (83 mLs Intravenous Given 9/7/22 2255)   sodium chloride 0.9 % bag 500mL for CT scan flush use (62 mLs Intravenous Given 9/7/22 2255)     Results for orders placed or performed during the hospital encounter of 09/07/22   CT Abdomen Pelvis w Contrast     Value    Radiologist flags (Urgent)     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      Narrative    EXAM:  CT ABDOMEN PELVIS W CONTRAST  LOCATION: [facility]  DATE/TIME: 9/7/2022 11:07 PM    INDICATION: Epigastric pain nausea vomiting and diarrhea.  COMPARISON: None.  TECHNIQUE: CT scan of the abdomen and pelvis was performed following injection of IV contrast. Multiplanar reformats were obtained. Dose reduction techniques were used.  CONTRAST: 83mL isovue 370    FINDINGS:   LOWER CHEST: Scattered emphysematous changes are seen in the lung bases bilaterally.    HEPATOBILIARY: No significant mass or bile duct dilatation. Gallbladder is surgically absent. Scattered subcentimeter benign appearing cystic lesions are seen scattered throughout the liver, too small to characterize, favored to reflect benign hepatic   cyst    PANCREAS: No significant mass, duct dilatation, or inflammatory change.    SPLEEN: There are patchy areas of diminished enhancement seen scattered throughout the kidney, predominantly within the midportion and anterior tip. (Image 82, series 2).    ADRENAL GLANDS: No significant nodules.    KIDNEYS/BLADDER: No significant mass, stone, or hydronephrosis.    BOWEL: No obstruction or inflammatory change. Postsurgical changes from prior gastric bypass are again noted.The appendix is normal. No significant bowel wall thickening is noted. There is slightly decreased enhancement of a few loops of small bowel   within the right mid abdomen and pelvis.    LYMPH NODES: Increased number of subcentimeter lymph nodes are seen in the root of the mesentery inferiorly.    VASCULATURE: Irregular soft plaque is seen along the anterior aspect of the descending thoracic aorta and proximal ascending aorta with occlusion of the celiac artery at its origin. (Image 80, series 2; image 70, series 4)The distal branches of the   celiac artery are well opacified.  Additionally there is thrombus seen floating within the SMA proximally extending inferiorly from the celiac artery including thrombus. (Image 87,  series 2; image 71, series 4). The distal branches of the SMA are not   well opacified (image 144, series 2) with adjacent mesenteric stranding and subtle increased number of subcentimeter lymph nodes. DERICK is normal in appearance.    PELVIC ORGANS: No pelvic masses.    MUSCULOSKELETAL: Unremarkable.      Impression    IMPRESSION:   1.  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 There is irregular peripheral clot seen anteriorly within the descending thoracic aorta and superior aspect of   the abdominal aorta, this may reflect soft plaque although there is very little atherosclerotic disease seen throughout the rest of the aorta..  2.  Free floating thrombus seen in the SMA proximally which appears to extend inferiorly from the celiac artery. There is nonopacification of the distal branches of the SMA distally with adjacent mesenteric stranding concerning for occlusion given the   presence of thrombus proximally, although the study was not obtained as a CTA so evaluation is limited. There are a few loops of small bowel which have slightly decreased enhancement without bowel wall thickening seen in the right mid and lower abdomen,   which could reflect early changes of mesenteric ischemia.        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     Finding was identified on 9/7/2022 11:18 PM.     Dr Gongora was contacted by me on 9/7/2022 11:45 PM and verbalized understanding of the critical result.           Assessments & Plan (with Medical Decision Making) records were reviewed.  Labs were obtained.  Patient was given Zofran IV fluids and Dilaudid.  His white count  elevated 19.3 hemoglobin 8.8 platelet count 355 there is a left shift.  Comprehensive metabolic panel significant for carbon dioxide 20 anion gap is 16 glucose 194 alk phos rest 135 AST 37.  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  There is free-floating thrombus seen in the SMA proximally which appears to be extending inferiorly from the celiac artery.  There is nonopacification of the distal branches of the the SMA distally with adjacent mesenteric stranding concerning for occlusion given the presence of thrombus proximally dilated a few loops of small bowel which is slightly decreased in enhancement without some bowel wall thickening seen in the right mid and lower abdomen which could reflect early changes of mesenteric ischemia..  Due to these findings I discussed the case with Dr. Hamilton who recommended to talk to vascular surgery and get the patient transferred as soon as possible.  Discussed case with aortic team/ Dr. Ernst with vascular surgery at the [facility].  He recommended heparinizing patient and transferring down to the emergency department for evaluation.  He wanted a CTA chest abdomen pelvis prior to transfer.  Findings and plan were discussed with the patient and they are in agreement with transfer.  Discussed case with Dr. Connell at [facility] ER and although there are full he is in agreement with excepting the patient for transfer due to the emergent nature of this case.     I have reviewed the nursing notes.    I have reviewed the findings, diagnosis, plan and need for follow up  with the patient.       New Prescriptions    No medications on file       Final diagnoses:   Nausea vomiting and diarrhea   Abdominal pain, generalized   Mesenteric ischemia (H) - possible       9/7/2022   [facility] EMERGENCY DEPT     Herminio Gongora MD  09/08/22 1100

## 2022-09-08 NOTE — PLAN OF CARE
Admitted/transferred from: PACU  Reason for admission/transfer: Post-operative management post-thrombectomy  2 RN skin assessment: completed by Araceli Wan RN and April Sebastian RN   Result of skin assessment and interventions/actions: Abdominal incisional (primapore); coccyx (mepi); repositions self   Height, weight, drug calc weight: Complete  Patient belongings (see Flowsheet)  MDRO education added to care plan: NA  ?

## 2022-09-08 NOTE — PHARMACY-ADMISSION MEDICATION HISTORY
Admission Medication History Completed by Pharmacy    See [facility] Admission Navigator for allergy information, preferred outpatient pharmacy, prior to admission medications and immunization status.     Medication History Sources:     Patient, Surescripts     Changes made to PTA medication list (reason):    Added: Ibuprofen, Advil Cold & Sinus     Deleted: Chantix (has not started yet)     Changed: None    Prior to Admission medications    Medication Sig Last Dose Taking? Auth Provider Long Term End Date   cyanocolbalamin (VITAMIN  B-12) 500 MCG tablet Take 500 mcg by mouth daily   Yes Reported, Patient     cyclobenzaprine (FLEXERIL) 10 MG tablet Take 1 tablet (10 mg) by mouth nightly as needed for muscle spasms  at PRN, infrequent (still has tablets from February 2022) Yes Stephan Flanagan MD     Ergocalciferol (VITAMIN D2 PO)   Yes Reported, Patient     ferrous sulfate (IRON) 325 (65 FE) MG tablet Take 325 mg by mouth daily (with breakfast)   Yes Reported, Patient     ibuprofen (ADVIL/MOTRIN) 200 MG tablet Take 200-400 mg by mouth every 4 hours as needed (headache)  at PRN Yes Unknown, Entered By History     Multiple Vitamin (MULTIVITAMIN PO) Take 1 tablet by mouth daily  Yes Reported, Patient     Pseudoephedrine-Ibuprofen  MG TABS Take 1 capsule by mouth every 4 hours as needed  at PRN Yes Unknown, Entered By History         Date completed: 09/08/22    Medication history completed by: ROMELIA CANELA RPH

## 2022-09-08 NOTE — OP NOTE
Preoperative diagnosis: Acute mesenteric ischemia with suspected intestinal ischemia.    Postoperative diagnosis: Same.    Procedure:  1.  Exploratory laparotomy.  2.  Superior mesenteric artery thrombectomy.  3.  Superior mesenteric artery stent grafting using 9 mm x 38 mm [employer] iCast stent graft.    Surgeon: Balta Ernst M.D.   Assistant: Raquel Aly M.D.     Anesthesia: General.  Estimated blood loss: About 250 mL.  Complication: None.    Indication for procedure: This is a 46-year-old female who presented to [facility] with acute onset of abdominal pain.  This was associated with diarrhea as well as vomiting.  Imaging showed that there was thrombus in the descending thoracic aorta which is going into the celiac axis as well as the region of the superior mesenteric artery with free-floating thrombus in the superior mesenteric artery.  She also had leukocytosis and reported abdominal pain with movement.  In light of these findings we have decided to proceed with exploratory laparotomy to evaluate the small bowel as well as proceed with thrombectomy and to place a stent graft across the orifice of the superior mesenteric artery to prevent further thrombus extension.    Procedure details: Patient was identified and then taken to the operating room and placed in supine position.  General anesthesia was induced.  Preoperative intravenous antibiotics were administered.  The anterior abdominal wall was prepped and a sterile surgical field was created.  Preprocedure timeout was conducted.    A generous midline incision was created with a #10 blade going from xiphoid to just below the umbilicus.  It was deepened with electrocautery.  Fascia was divided.  Peritoneum was divided sharply and entered.  There were minimal adhesions from a previous operative intervention.  We first evaluated the abdominal viscera.  The entire length of the small bowel as well as large bowel appeared viable and healthy.  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  Stomach itself also appeared viable.  We reflected the root of the transverse mesocolon cephalad and identified the superior mesenteric artery.  It was dissected out for a length of 6 cm.  All the side branches and the inflow and outflow were placed in Vesseloops.  It did have pulsation in it.  5000 units of additional heparin was given.  Patient had been running on a drip of heparin as well.  After 5 minutes of circulation time the side branches as well as the proximal and distal Vesseloops were tightened.  A transverse arteriotomy was created with a #11 blade.  A #4 Inés was passed and we pulled back a combination of old and fresh thrombus and created robust inflow.  We made 1 additional pass with a #4 Inés and did not retrieve any further thrombus.  Other side branches and the major outflow had good backbleeding.  The arteriotomy was hence repaired with interrupted 6-0 Prolene sutures.  Suture line was hemostatic.  Adequate backbleeding and for bleeding were performed prior to completion of the suture line.    Then a stay suture of 6-0 Prolene was placed distal to the arteriotomy site.  This was done with 6-0 Prolene suture.  A micropuncture needle was placed in retrograde fashion followed by placement of a microwire under fluoroscopic guidance..  This was then converted to a 0.035 inch wire system.  A short 8 Lithuan[place] sheath was placed.  Retrograde arteriogram was performed which showed that there was still luminal irregularity at the orifice of the superior mesenteric artery.  We also noted that through collaterals from the superior mesenteric artery there was robust filling of the branches of the celiac axis.  A 0.035 Glidewire and glide catheter were advanced into the aorta and then directed more caudally.  A 9 mm x 38 mm iCast stent graft was deployed across the  superior mesenteric artery and going into the aorta.  Balloon was then inflated to 12 sybil.  Balloon was deflated and removed.  There was good apposition of the stent graft.  Retrograde arteriogram was performed and good flow was identified in the branches of the celiac axis as well as retrograde into the aorta.  Procedure was concluded.  We again examined the entire small or large bowel and found that to be healthy and without any ischemia.  The retroperitoneum over the exposure of the superior mesenteric artery was closed with running 3-0 Vicryl suture.    Abdominal fascia was approximated with oh looped PDS and skin was approximated with staples.    Counts of instruments, sponges and needle was noted to be correct.    Patient was awakened and extubated and taken to the intensive care unit in critical but stable condition.    I called her  and updated him on our progress.

## 2022-09-08 NOTE — ED NOTES
"Patient ate watermelon and pretzels for breakfast, sudden onset diarrhea and vomiting at 1500, has vomited numerous times with numerous bouts of diarrhea; patient states \"too many to count.\"  Patient reports being lightheaded and dizziness when getting up.  "

## 2022-09-08 NOTE — ANESTHESIA PROCEDURE NOTES
Arterial Line Procedure Note    Pre-Procedure   Staff -        Anesthesiologist:  Latesha Lopez MD       Resident/Fellow: Michael Fernández MD       Performed By: resident       Location: OR       Pre-Anesthestic Checklist: patient identified, IV checked, risks and benefits discussed, informed consent, monitors and equipment checked, pre-op evaluation and at physician/surgeon's request  Timeout:       Correct Patient: Yes        Correct Procedure: Yes        Correct Site: Yes        Correct Position: Yes   Line Placement:   This line was placed Post Induction  Procedure   Procedure: arterial line and new line       Laterality: right       Insertion Site: radial.  Sterile Prep        Standard elements of sterile barrier followed       Skin prep: Chloraprep  Insertion/Injection        Technique: Seldinger Technique and ultrasound guided        1. Ultrasound was used to evaluate the access site.       2. Artery evaluated via ultrasound for patency/adequacy.       3. Using real-time ultrasound the needle/catheter was observed entering the artery/vein.       5. The visualized structures were anatomically normal.       6. There were no apparent abnormal pathologic findings.       Catheter Type/Size: 20 G, 12 cm  Narrative         Secured by: suture       Tegaderm dressing used.       Complications: None apparent,        Arterial waveform: Yes        IBP within 10% of NIBP: Yes

## 2022-09-08 NOTE — ED PROVIDER NOTES
ED Provider Note  [facility]      History     Chief Complaint   Patient presents with     Abdominal Pain     HPI  Bridgette Veras is a 46 year old female who has a past medical history of gastric bypass, abdominal wall adhesions, GERD presenting with abdominal pain.  Started around 3 PM yesterday.  She experienced diarrhea and vomiting.  No black or blood in her vomit or stools.  She had multiple watery stools.  Denies fevers.  Denies chest pain or shortness of breath.  Denies leg pain or swelling.  Patient was transferred here emergently for vascular surgery consultation and likely trip to the operating room for an SMA and aortic thrombus.  Currently the patient's pain is 4 out of 10 after morphine.    Past Medical History  Past Medical History:   Diagnosis Date     Cervical high risk HPV (human papillomavirus) test positive 16    type 16     Chickenpox      Chlamydia trachomatis infection of unspecified site      POONAM 1      Depressive disorder      Depressive disorder, not elsewhere classified      History of colposcopy with cervical biopsy 16    Bx - POONAM 1, ECC - negative     Other abnormal heart sounds as child    Murmur - resolved     Polycystic ovaries     prior to gastic bypass in , pt carried the diagnosis of PCOS/anovuolation     Streptococcus infection in conditions classified elsewhere and of unspecified site, group B 1998    In pregnancy     Past Surgical History:   Procedure Laterality Date      SECTION  1998      SECTION, TUBAL LIGATION, COMBINED  2013    Procedure: COMBINED  SECTION, TUBAL LIGATION;   Section, Tubal Ligation;  Surgeon: Paul Allen MD;  Location: [place] OR     GASTRIC BYPASS  2005     GASTRIC BYPASS       HC REMOVAL GALLBLADDER  3/16/06    Dr. Inman @ [facility]     HC REPAIR ING HERNIA,5+Y/O,REDUCIBL  age 20 ()    [facility]     SURGICAL HISTORY OF -       PE tubes      SURGICAL HISTORY OF -   10/06    removal of pannus ans breast lift     TONSILLECTOMY       [facility]  DELIVERY ONLY  98    Failure to progress     [facility] NONSPECIFIC PROCEDURE  3/9/06    Laser ablation genital condyloma/ Bx labial lesion     [facility] RAD RESEC TONSIL/PILLARS       cyanocolbalamin (VITAMIN  B-12) 500 MCG tablet  cyclobenzaprine (FLEXERIL) 10 MG tablet  Ergocalciferol (VITAMIN D2 PO)  ferrous sulfate (IRON) 325 (65 FE) MG tablet  Multiple Vitamin (MULTIVITAMIN PO)  varenicline (CHANTIX) 0.5 MG tablet  varenicline (CHANTIX) 1 MG tablet      Allergies   Allergen Reactions     Codeine Nausea and Vomiting     Darvocet [Acetaminophen] Nausea and Vomiting     Propoxyphene Nausea     Tylenol Nausea and Vomiting     Family History  Family History   Problem Relation Age of Onset     Cerebrovascular Disease Paternal Grandfather      Diabetes Other      Cancer Maternal Grandmother         Lung     Depression Maternal Grandmother      Asthma Father      Gastrointestinal Disease Father         PSC     Alcohol/Drug Maternal Grandfather         alcohol     Cardiovascular Maternal Grandfather         MI     Respiratory Paternal Grandmother      Hypertension Mother      Depression Mother      Hypertension Brother      Depression Brother      C.A.D. No family hx of      Breast Cancer No family hx of      Cancer - colorectal No family hx of      Social History   Social History     Tobacco Use     Smoking status: Current Every Day Smoker     Packs/day: 0.50     Types: Cigarettes     Smokeless tobacco: Never Used     Tobacco comment: smoking 20cig/day- down to 10cig/day with pregnancy   Vaping Use     Vaping Use: Never used   Substance Use Topics     Alcohol use: Yes     Alcohol/week: 0.0 standard drinks     Comment: rarely- quit with pregnancy     Drug use: No      Past medical history, past surgical history, medications, allergies, family history, and social history were reviewed with the patient. No additional  pertinent items.       Review of Systems  A complete review of systems was performed with pertinent positives and negatives noted in the HPI, and all other systems negative.    Physical Exam   BP: (!) 148/74  Pulse: 60  Resp: 18  SpO2: 99 %  Physical Exam  Physical Exam   Constitutional: oriented to person, place, and time. appears well-developed and well-nourished.   HENT:   Head: Normocephalic and atraumatic.   Neck: Normal range of motion.   Pulmonary/Chest: Effort normal. No respiratory distress.   Cardiac: No murmurs, rubs, gallops. RRR.  Abdominal: Abdomen soft, mild tenderness palpation in the abdomen, nondistended. No rebound tenderness.  No peritonitis.  MSK: Long bones without deformity or evidence of trauma.  Radial pulses 2+ and symmetric.  Right pedal pulses difficult to palpate, they are able to get a Doppler signal.  Left pedal pulses normal.  Sensation intact over the extremities.  Neurological: alert and oriented to person, place, and time.   Skin: Skin is warm and dry.   Psychiatric:  normal mood and affect.  behavior is normal. Thought content normal.     ED Course      Procedures            Results for orders placed or performed during the hospital encounter of 09/08/22   [place] Draw     Status: None ()    Narrative    The following orders were created for panel order [place] Draw.  Procedure                               Abnormality         Status                     ---------                               -----------         ------                     Extra Red Top Tube[[id number]]                                                          Extra Green Top (Lithium...[[id number]]                                                 Extra Purple Top Tube[[id number]]                                                       Extra Blood Bank Purple ...[[id number]]                                                 Extra Green Top (Lithium...[[id number]]                                                   Please view  results for these tests on the individual orders.   [place] Draw     Status: None ()    Narrative    The following orders were created for panel order [place] Draw.  Procedure                               Abnormality         Status                     ---------                               -----------         ------                     Extra Red Top Tube[[id number]]                                                          Extra Green Top (Lithium...[[id number]]                                                 Extra Purple Top Tube[[id number]]                                                       Extra Blood Bank Purple ...[[id number]]                                                 Extra Green Top (Lithium...[[id number]]                                                   Please view results for these tests on the individual orders.   [place] Draw     Status: None ()    Narrative    The following orders were created for panel order [place] Draw.  Procedure                               Abnormality         Status                     ---------                               -----------         ------                     Extra Blue Top Tube[[id number]]                                                           Please view results for these tests on the individual orders.   [place] Draw     Status: None ()    Narrative    The following orders were created for panel order [place] Draw.  Procedure                               Abnormality         Status                     ---------                               -----------         ------                     Extra Blue Top Tube[[id number]]                                                           Please view results for these tests on the individual orders.   CBC with platelets differential     Status: None ()    Narrative    The following orders were created for panel order CBC with platelets differential.  Procedure                               Abnormality         Status                      ---------                               -----------         ------                     CBC with platelets and d...[[id number]]                                                   Please view results for these tests on the individual orders.   ABO/Rh type and screen     Status: None ()    Narrative    The following orders were created for panel order ABO/Rh type and screen.  Procedure                               Abnormality         Status                     ---------                               -----------         ------                     Adult Type and Screen[[id number]]                                                         Please view results for these tests on the individual orders.   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      Narrative    EXAM: CT ABDOMEN PELVIS W CONTRAST  LOCATION: [facility]  DATE/TIME: 9/7/2022 11:07 PM    INDICATION: Epigastric pain nausea vomiting and diarrhea.  COMPARISON: None.  TECHNIQUE: CT scan of the abdomen and pelvis was performed following injection of IV contrast. Multiplanar reformats were obtained. Dose reduction techniques were used.  CONTRAST: 83mL isovue 370    FINDINGS:   LOWER CHEST: Scattered emphysematous changes are seen in the lung bases bilaterally.    HEPATOBILIARY: No significant mass or bile duct dilatation. Gallbladder is surgically absent. Scattered subcentimeter benign appearing cystic lesions are seen scattered throughout the liver, too small to characterize, favored to reflect  benign hepatic   cyst    PANCREAS: No significant mass, duct dilatation, or inflammatory change.    SPLEEN: There are patchy areas of diminished enhancement seen scattered throughout the kidney, predominantly within the midportion and anterior tip. (Image 82, series 2).    ADRENAL GLANDS: No significant nodules.    KIDNEYS/BLADDER: No significant mass, stone, or hydronephrosis.    BOWEL: No obstruction or inflammatory change. Postsurgical changes from prior gastric bypass are again noted.The appendix is normal. No significant bowel wall thickening is noted. There is slightly decreased enhancement of a few loops of small bowel   within the right mid abdomen and pelvis.    LYMPH NODES: Increased number of subcentimeter lymph nodes are seen in the root of the mesentery inferiorly.    VASCULATURE: Irregular soft plaque is seen along the anterior aspect of the descending thoracic aorta and proximal ascending aorta with occlusion of the celiac artery at its origin. (Image 80, series 2; image 70, series 4)The distal branches of the   celiac artery are well opacified.  Additionally there is thrombus seen floating within the SMA proximally extending inferiorly from the celiac artery including thrombus. (Image 87, series 2; image 71, series 4). The distal branches of the SMA are not   well opacified (image 144, series 2) with adjacent mesenteric stranding and subtle increased number of subcentimeter lymph nodes. DERICK is normal in appearance.    PELVIC ORGANS: No pelvic masses.    MUSCULOSKELETAL: Unremarkable.      Impression    IMPRESSION:   1.  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 There is irregular peripheral clot seen anteriorly within the descending thoracic aorta and superior aspect of   the abdominal aorta, this may reflect soft plaque although there is very little atherosclerotic disease seen throughout the rest of  the aorta..  2.  Free floating thrombus seen in the SMA proximally which appears to extend inferiorly from the celiac artery. There is nonopacification of the distal branches of the SMA distally with adjacent mesenteric stranding concerning for occlusion given the   presence of thrombus proximally, although the study was not obtained as a CTA so evaluation is limited. There are a few loops of small bowel which have slightly decreased enhancement without bowel wall thickening seen in the right mid and lower abdomen,   which could reflect early changes of mesenteric ischemia.        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     Finding was identified on 9/7/2022 11:18 PM.     Dr Gongora was contacted by me on 9/7/2022 11:45 PM and verbalized understanding of the critical result.     CTA Chest Abdomen Pelvis w Contrast     Status: None    Narrative    EXAM: CTA CHEST ABDOMEN PELVIS W CONTRAST  LOCATION: [facility]  DATE/TIME: 9/8/2022 12:55 AM    INDICATION: Possible mesenteric ischemia  COMPARISON: CT from 09/07/2022.  TECHNIQUE: CT angiogram chest abdomen pelvis during arterial phase of injection of IV contrast. 2D and 3D MIP reconstructions were performed by the CT technologist. Dose reduction techniques were used.   CONTRAST: 80 mL Isovue 370    FINDINGS:   CT ANGIOGRAM CHEST, ABDOMEN, AND PELVIS: Again noted is irregular wall adherent thrombus along the ventral aspect of the descending thoracic aorta extending beyond the diaphragmatic hiatus and into the origins of the celiac and superior mesenteric   arteries. This occludes the origin of the celiac artery (though the common hepatic and splenic arteries enhance) and is  partially occlusive of the proximal SMA. There is also occlusion of multiple distal branch vessels of the superior mesenteric artery   involving both the jejunal and ileocolic territories as seen on coronal image 129. The bilateral renal arteries are patent. The inferior mesenteric artery is patent.    LUNGS AND PLEURA: Mild centrilobular emphysema. No focal airspace infiltrate.    MEDIASTINUM/AXILLAE: Heart size is normal. No pericardial effusion. No visualized ventricular thrombus. No central pulmonary embolism.    CORONARY ARTERY CALCIFICATION: None.    HEPATOBILIARY: Absent gallbladder. Redemonstrated subcentimeter hypodensities, incompletely characterized.    PANCREAS: Normal.    SPLEEN: Heterogeneous enhancement of the spleen may be partially attributable to arterial phase imaging, though a similar abnormality noted on the prior scan is more consistent with the presence of infarction.    ADRENAL GLANDS: Normal.    KIDNEYS/BLADDER: Normal.    BOWEL: Prior gastric bypass surgery. No mechanical bowel obstruction or free air. There is edema in the central mesentery.    LYMPH NODES: Normal.    PELVIC ORGANS: Normal.    MUSCULOSKELETAL: Normal.      Impression    IMPRESSION:  1.  Redemonstration of wall adherent thrombus along the ventral aspect of the mid and distal descending thoracic aorta and the proximal aspect of the abdominal aorta resulting in occlusion of the celiac trunk and nonocclusive thrombus in the proximal   superior mesenteric artery trunk.    2.  Occlusion of multiple jejunal and ileocolic branches of the SMA. Wall enhancement characteristics of the distal small bowel are better demonstrated on the prior CT which was obtained in portal venous phase of contrast, with appearance on that exam   remaining concerning for hypoperfusion/ischemia.    3.  The inferior mesenteric artery is patent.    4.  Findings consistent with splenic infarction.     [place] Draw     Status: None    Narrative    The  following orders were created for panel order [place] Draw.  Procedure                               Abnormality         Status                     ---------                               -----------         ------                     Extra Blue Top Tube[[id number]]                              Final result               Extra Red Top Tube[[id number]]                               Final result               Extra Green Top (Lithium...[[id number]]                      Final result               Extra Purple Top Tube[[id number]]                            Final result               Extra Blood Bank Purple ...[[id number]]                                                   Please view results for these tests on the individual orders.   Extra Blue Top Tube     Status: None   Result Value Ref Range    Hold Specimen JIC    Extra Red Top Tube     Status: None   Result Value Ref Range    Hold Specimen JIC    Extra Green Top (Lithium Heparin) Tube     Status: None   Result Value Ref Range    Hold Specimen JIC    Extra Purple Top Tube     Status: None   Result Value Ref Range    Hold Specimen JIC    Comprehensive metabolic panel     Status: Abnormal   Result Value Ref Range    Sodium 137 136 - 145 mmol/L    Potassium 3.4 3.4 - 5.3 mmol/L    Creatinine 0.64 0.51 - 0.95 mg/dL    Urea Nitrogen 9.1 6.0 - 20.0 mg/dL    Chloride 101 98 - 107 mmol/L    Carbon Dioxide (CO2) 20 (L) 22 - 29 mmol/L    Anion Gap 16 (H) 7 - 15 mmol/L    Glucose 194 (H) 70 - 99 mg/dL    Calcium 9.0 8.6 - 10.0 mg/dL    Protein Total 7.2 6.4 - 8.3 g/dL    Albumin 4.1 3.5 - 5.2 g/dL    Bilirubin Total 0.2 <=1.2 mg/dL    Alkaline Phosphatase 135 (H) 35 - 104 U/L    AST 37 (H) 10 - 35 U/L    ALT 15 10 - 35 U/L    GFR Estimate >90 >60 mL/min/1.73m2   Lipase     Status: Normal   Result Value Ref Range    Lipase 20 13 - 60 U/L   UA with Microscopic reflex to Culture     Status: Abnormal    Specimen: Urine, Clean Catch   Result Value Ref Range    Color Urine Yellow  Colorless, Straw, Light Yellow, Yellow    Appearance Urine Clear Clear    Glucose Urine 1000  (A) Negative mg/dL    Bilirubin Urine Negative Negative    Ketones Urine 80  (A) Negative mg/dL    Specific Gravity Urine 1.020 1.003 - 1.035    Blood Urine Trace (A) Negative    pH Urine 7.0 5.0 - 7.0    Protein Albumin Urine 30  (A) Negative mg/dL    Urobilinogen Urine Normal Normal, 2.0 mg/dL    Nitrite Urine Negative Negative    Leukocyte Esterase Urine Negative Negative    Mucus Urine Present (A) None Seen /LPF    RBC Urine 2 <=2 /HPF    WBC Urine 1 <=5 /HPF    Squamous Epithelials Urine 3 (H) <=1 /HPF    Narrative    Urine Culture not indicated   CBC with platelets and differential     Status: Abnormal   Result Value Ref Range    WBC Count 19.3 (H) 4.0 - 11.0 10e3/uL    RBC Count 5.49 (H) 3.80 - 5.20 10e6/uL    Hemoglobin 8.8 (L) 11.7 - 15.7 g/dL    Hematocrit 33.1 (L) 35.0 - 47.0 %    MCV 60 (L) 78 - 100 fL    MCH 16.0 (L) 26.5 - 33.0 pg    MCHC 26.6 (L) 31.5 - 36.5 g/dL    RDW 21.6 (H) 10.0 - 15.0 %    Platelet Count 355 150 - 450 10e3/uL    % Neutrophils 88 %    % Lymphocytes 8 %    % Monocytes 3 %    % Eosinophils 0 %    % Basophils 0 %    % Immature Granulocytes 1 %    NRBCs per 100 WBC 0 <1 /100    Absolute Neutrophils 17.1 (H) 1.6 - 8.3 10e3/uL    Absolute Lymphocytes 1.5 0.8 - 5.3 10e3/uL    Absolute Monocytes 0.6 0.0 - 1.3 10e3/uL    Absolute Eosinophils 0.0 0.0 - 0.7 10e3/uL    Absolute Basophils 0.1 0.0 - 0.2 10e3/uL    Absolute Immature Granulocytes 0.1 <=0.4 10e3/uL    Absolute NRBCs 0.0 10e3/uL   Asymptomatic COVID-19 Virus (Coronavirus) by PCR Nasopharyngeal     Status: Normal    Specimen: Nasopharyngeal; Swab   Result Value Ref Range    SARS CoV2 PCR Negative Negative    Narrative    Testing was performed using the abril  SARS-CoV-2 & Influenza A/B Assay on the abril  [employer]  System.  This test should be ordered for the detection of SARS-COV-2 in individuals who meet SARS-CoV-2 clinical and/or  epidemiological criteria. Test performance is unknown in asymptomatic patients.  This test is for in vitro diagnostic use under the FDA EUA for laboratories certified under CLIA to perform moderate and/or high complexity testing. This test has not been FDA cleared or approved.  A negative test does not rule out the presence of PCR inhibitors in the specimen or target RNA in concentration below the limit of detection for the assay. The possibility of a false negative should be considered if the patient's recent exposure or clinical presentation suggests COVID-19.  [facility] are certified under the Clinical Laboratory Improvement Amendments of 1988 (CLIA-88) as qualified to perform moderate and/or high complexity laboratory testing.   CBC with platelets differential     Status: Abnormal    Narrative    The following orders were created for panel order CBC with platelets differential.  Procedure                               Abnormality         Status                     ---------                               -----------         ------                     CBC with platelets and d...[[id number]]  Abnormal            Final result                 Please view results for these tests on the individual orders.   ABO/Rh type and screen     Status: None (In process)    Narrative    The following orders were created for panel order ABO/Rh type and screen.  Procedure                               Abnormality         Status                     ---------                               -----------         ------                     Adult Type and Screen[[id number]]                            In process                   Please view results for these tests on the individual orders.     Medications   heparin infusion 25,000 units in D5W 250 mL ANTICOAGULANT (has no administration in time range)        Assessments & Plan (with Medical Decision Making)   MDM  Patient presenting from outside hospital with SMA and  aortic thrombus.  Vascular surgery was present on arrival.  Patient is continued on heparin, shortly thereafter went to the operating room with vascular surgery for procedure.  Vitals remained stable here in the emergency department.    I have reviewed the nursing notes. I have reviewed the findings, diagnosis, plan and need for follow up with the patient.    New Prescriptions    No medications on file       Final diagnoses:   Superior mesenteric artery thrombosis (H)   Aortic thrombus (H)       --  Theodore Leiva  [facility] EMERGENCY DEPARTMENT  9/8/2022     Theodore Leiva MD  09/08/22 [id number]

## 2022-09-08 NOTE — CONSULTS
Vascular Surgery Consult    Bridgette Veras MRN# [id number]   YOB: 1975 Age: 46 year old      Date of Admission:  9/8/2022        Consult for:    Consulting physician/team: ER        Assessment:     46 year old female with PMH of depression and PSH of laparoscopic raj en y gastric bypass ( retrocolic/retrogastric Raj limb) and C section who presents with acute mesenteric ischemia . Adherent thrombus in descending /proximal abdominal aorta with occlusion of celiac artery with reconstitution of splenic and DEREK arteries and free floating thrombus in SMA. Abdominal exam with diffuse abdominal pain.         Plan:        OR emergently for ex lap, SMA embolectomy, SMA stenting, possible bowel resection, possible open abdomen    Continue high intensity heparin     Continue zosyn          History of Present Illness:    Bridgette Veras is a 46 year old female with PMH of depression and PSH of laparoscopic raj en y gastric bypass ( retrocolic/retrogastric Raj limb) and C section who presents with acute abdominal pain. Reports noticing abdominal pain last week but was manageable. Now over last 24 hrs has had severe abdominal pain, diarrhea and nausea/vomiting. Also reports noticing cramping in right leg over last month. Recent history of back pain but no chest pain. No h/o clotting disorders in self or family     CTA shows mural thrombus in mid descending thoracic aota. significant thrombus with occlusion of celiac origin and extension of thrombus into SMA. SMA clot is free floating with opacification of MCA and IPDA but distally ileocolic branches not opacified. Celiac and DEREK artery patent , backfilling from pancreaticoduodenal arteries via GDA. Wall enhancement concerning of bowel ischemia     Past Medical History:  Past Medical History:   Diagnosis Date     Cervical high risk HPV (human papillomavirus) test positive 8/4/16    type 16     Chickenpox      Chlamydia trachomatis infection of unspecified  site      POONAM 1      Depressive disorder      Depressive disorder, not elsewhere classified      History of colposcopy with cervical biopsy 16    Bx - POONAM 1, ECC - negative     Other abnormal heart sounds as child    Murmur - resolved     Polycystic ovaries     prior to gastic bypass in , pt carried the diagnosis of PCOS/anovuolation     Streptococcus infection in conditions classified elsewhere and of unspecified site, group B 1998    In pregnancy       Past Surgical History:  Past Surgical History:   Procedure Laterality Date      SECTION  ,       SECTION, TUBAL LIGATION, COMBINED  2013    Procedure: COMBINED  SECTION, TUBAL LIGATION;   Section, Tubal Ligation;  Surgeon: Paul Allen MD;  Location: [facility] OR     GASTRIC BYPASS  2005     GASTRIC BYPASS       HC REMOVAL GALLBLADDER  3/16/06    Dr. Inman @ [facility]     HC REPAIR ING HERNIA,5+Y/O,REDUCIBL  age 20 ()    LIH     SURGICAL HISTORY OF -       PE tubes     SURGICAL HISTORY OF -   10/06    removal of pannus ans breast lift     TONSILLECTOMY       [facility]  DELIVERY ONLY  98    Failure to progress     [facility] NONSPECIFIC PROCEDURE  3/9/06    Laser ablation genital condyloma/ Bx labial lesion     [facility] RAD RESEC TONSIL/PILLARS         Allergies:     Allergies   Allergen Reactions     Codeine Nausea and Vomiting     Darvocet [Acetaminophen] Nausea and Vomiting     Propoxyphene Nausea     Tylenol Nausea and Vomiting       Medications:  [COMPLETED] 0.9% sodium chloride BOLUS  [COMPLETED] heparin ANTICOAGULANT Loading dose for HIGH INTENSITY TREATMENT * Give BEFORE starting heparin infusion  [COMPLETED] HYDROmorphone (PF) (DILAUDID) injection 0.3 mg  [COMPLETED] HYDROmorphone (PF) (DILAUDID) injection 0.3 mg  [COMPLETED] iopamidol (ISOVUE-370) solution 80 mL  [COMPLETED] iopamidol (ISOVUE-370) solution 83 mL  [COMPLETED] ondansetron (ZOFRAN) injection 4 mg  [COMPLETED]  ondansetron (ZOFRAN) injection 4 mg  [COMPLETED] piperacillin-tazobactam (ZOSYN) infusion 3.375 g  [COMPLETED] sodium chloride 0.9 % bag 500mL for CT scan flush use  [COMPLETED] sodium chloride 0.9 % bag 500mL for CT scan flush use    cyanocolbalamin (VITAMIN  B-12) 500 MCG tablet, Take 500 mcg by mouth daily   cyclobenzaprine (FLEXERIL) 10 MG tablet, Take 1 tablet (10 mg) by mouth nightly as needed for muscle spasms (Patient not taking: Reported on 2/7/2022)  Ergocalciferol (VITAMIN D2 PO),   ferrous sulfate (IRON) 325 (65 FE) MG tablet, Take 325 mg by mouth daily (with breakfast)   Multiple Vitamin (MULTIVITAMIN PO),   varenicline (CHANTIX) 0.5 MG tablet,   varenicline (CHANTIX) 1 MG tablet,         Social History:  Social History     Socioeconomic History     Marital status:      Spouse name: Not on file     Number of children: 2     Years of education: Not on file     Highest education level: Not on file   Occupational History     Employer: UNEMPLOYED   Tobacco Use     Smoking status: Current Every Day Smoker     Packs/day: 0.50     Types: Cigarettes     Smokeless tobacco: Never Used     Tobacco comment: smoking 20cig/day- down to 10cig/day with pregnancy   Vaping Use     Vaping Use: Never used   Substance and Sexual Activity     Alcohol use: Yes     Alcohol/week: 0.0 standard drinks     Comment: rarely- quit with pregnancy     Drug use: No     Sexual activity: Yes     Partners: Male     Birth control/protection: Surgical     Comment: Status post tubal ligation   Other Topics Concern     Parent/sibling w/ CABG, MI or angioplasty before 65F 55M? No   Social History Narrative     Not on file     Social Determinants of Health     Financial Resource Strain: Not on file   Food Insecurity: Not on file   Transportation Needs: Not on file   Physical Activity: Not on file   Stress: Not on file   Social Connections: Not on file   Intimate Partner Violence: Not on file   Housing Stability: Not on file       Family  History:  Family History   Problem Relation Age of Onset     Cerebrovascular Disease Paternal Grandfather      Diabetes Other      Cancer Maternal Grandmother         Lung     Depression Maternal Grandmother      Asthma Father      Gastrointestinal Disease Father         PSC     Alcohol/Drug Maternal Grandfather         alcohol     Cardiovascular Maternal Grandfather         MI     Respiratory Paternal Grandmother      Hypertension Mother      Depression Mother      Hypertension Brother      Depression Brother      C.A.D. No family hx of      Breast Cancer No family hx of      Cancer - colorectal No family hx of        ROS:  The remainder of the complete ROS was negative unless noted in the HPI.    Exam:  BP (!) 148/74   Pulse 60   Resp 18   SpO2 99%   General:  age female Alert and oriented with appropriate responses to questions, in NAD.  HEENT: NC/AT, sclera anicteric, PERRL, EOMI, OP clear with MMM  Neck: Supple, no JVD or cervical LAD, full aROM.  Resp: clear to auscultation bilaterally, no crackles or wheezes  Cardiac: regular rate and rhythm, no murmur.  Abdomen: Soft, tender to palpation in all 4 quadrants .  Extremities: No LE edema, 5/5 strength, 2+ radial and dp pulses. On LLE. Monophasic AT and pT on RLE. 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      Narrative    EXAM: CT ABDOMEN PELVIS W CONTRAST  LOCATION: [facility]  DATE/TIME: 9/7/2022 11:07 PM    INDICATION: Epigastric pain nausea vomiting and diarrhea.  COMPARISON: None.  TECHNIQUE: CT scan of the abdomen and pelvis was performed following injection of IV contrast. Multiplanar reformats were obtained. Dose reduction techniques were used.  CONTRAST: 83mL isovue 370    FINDINGS:   LOWER CHEST: Scattered emphysematous changes are seen in the lung bases bilaterally.    HEPATOBILIARY: No significant mass or bile duct dilatation. Gallbladder is surgically absent. Scattered subcentimeter benign appearing cystic lesions are seen scattered throughout the liver, too small to characterize, favored to reflect benign hepatic   cyst    PANCREAS: No significant mass, duct dilatation, or inflammatory change.    SPLEEN: There are patchy areas of diminished enhancement seen scattered throughout the kidney, predominantly within the midportion and anterior tip. (Image 82, series 2).    ADRENAL GLANDS: No significant nodules.    KIDNEYS/BLADDER: No significant mass, stone, or hydronephrosis.    BOWEL: No obstruction or inflammatory change. Postsurgical changes from prior gastric bypass are again noted.The appendix is normal. No significant bowel wall thickening is noted. There is slightly decreased enhancement of a few loops of small bowel   within the right mid abdomen and pelvis.    LYMPH NODES: Increased number of subcentimeter lymph nodes are seen in the root of the mesentery inferiorly.    VASCULATURE: Irregular soft plaque is seen along the anterior aspect of the descending thoracic aorta and proximal ascending aorta with occlusion of the celiac artery at its origin. (Image 80, series 2; image 70, series 4)The distal branches of the    celiac artery are well opacified.  Additionally there is thrombus seen floating within the SMA proximally extending inferiorly from the celiac artery including thrombus. (Image 87, series 2; image 71, series 4). The distal branches of the SMA are not   well opacified (image 144, series 2) with adjacent mesenteric stranding and subtle increased number of subcentimeter lymph nodes. DERICK is normal in appearance.    PELVIC ORGANS: No pelvic masses.    MUSCULOSKELETAL: Unremarkable.      Impression    IMPRESSION:   1.  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 There is irregular peripheral clot seen anteriorly within the descending thoracic aorta and superior aspect of   the abdominal aorta, this may reflect soft plaque although there is very little atherosclerotic disease seen throughout the rest of the aorta..  2.  Free floating thrombus seen in the SMA proximally which appears to extend inferiorly from the celiac artery. There is nonopacification of the distal branches of the SMA distally with adjacent mesenteric stranding concerning for occlusion given the   presence of thrombus proximally, although the study was not obtained as a CTA so evaluation is limited. There are a few loops of small bowel which have slightly decreased enhancement without bowel wall thickening seen in the right mid and lower abdomen,   which could reflect early changes of mesenteric ischemia.        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     Finding was identified on 9/7/2022 11:18 PM.     Dr Gongora was contacted by me on 9/7/2022 11:45 PM and verbalized understanding of the  critical result.     CTA Chest Abdomen Pelvis w Contrast    Narrative    EXAM: CTA CHEST ABDOMEN PELVIS W CONTRAST  LOCATION: [facility]  DATE/TIME: 9/8/2022 12:55 AM    INDICATION: Possible mesenteric ischemia  COMPARISON: CT from 09/07/2022.  TECHNIQUE: CT angiogram chest abdomen pelvis during arterial phase of injection of IV contrast. 2D and 3D MIP reconstructions were performed by the CT technologist. Dose reduction techniques were used.   CONTRAST: 80 mL Isovue 370    FINDINGS:   CT ANGIOGRAM CHEST, ABDOMEN, AND PELVIS: Again noted is irregular wall adherent thrombus along the ventral aspect of the descending thoracic aorta extending beyond the diaphragmatic hiatus and into the origins of the celiac and superior mesenteric   arteries. This occludes the origin of the celiac artery (though the common hepatic and splenic arteries enhance) and is partially occlusive of the proximal SMA. There is also occlusion of multiple distal branch vessels of the superior mesenteric artery   involving both the jejunal and ileocolic territories as seen on coronal image 129. The bilateral renal arteries are patent. The inferior mesenteric artery is patent.    LUNGS AND PLEURA: Mild centrilobular emphysema. No focal airspace infiltrate.    MEDIASTINUM/AXILLAE: Heart size is normal. No pericardial effusion. No visualized ventricular thrombus. No central pulmonary embolism.    CORONARY ARTERY CALCIFICATION: None.    HEPATOBILIARY: Absent gallbladder. Redemonstrated subcentimeter hypodensities, incompletely characterized.    PANCREAS: Normal.    SPLEEN: Heterogeneous enhancement of the spleen may be partially attributable to arterial phase imaging, though a similar abnormality noted on the prior scan is more consistent with the presence of infarction.    ADRENAL GLANDS: Normal.    KIDNEYS/BLADDER: Normal.    BOWEL: Prior gastric bypass surgery. No mechanical bowel obstruction or free air. There is edema  in the central mesentery.    LYMPH NODES: Normal.    PELVIC ORGANS: Normal.    MUSCULOSKELETAL: Normal.      Impression    IMPRESSION:  1.  Redemonstration of wall adherent thrombus along the ventral aspect of the mid and distal descending thoracic aorta and the proximal aspect of the abdominal aorta resulting in occlusion of the celiac trunk and nonocclusive thrombus in the proximal   superior mesenteric artery trunk.    2.  Occlusion of multiple jejunal and ileocolic branches of the SMA. Wall enhancement characteristics of the distal small bowel are better demonstrated on the prior CT which was obtained in portal venous phase of contrast, with appearance on that exam   remaining concerning for hypoperfusion/ischemia.    3.  The inferior mesenteric artery is patent.    4.  Findings consistent with splenic infarction.         Assessment/ Plan: See above.     Raquel Aly MD     Fellow  Pager: [phone]    Pt reviewed with Dr. Ernst      Vascular surgery attending staff note: I have seen and examined the patient myself in the emergency department.  I have reviewed the imaging.  I agree with the documentation by our fellow, Dr. Aly. I have also discussed the case with the referring physician from [facility].  This is a 46-year-old female who presented with abdominal pain, emesis and diarrhea.  CT scan is concerning for thrombus in the descending thoracic aorta which is occluding the orifice of the celiac axis and there is free-floating thrombus in the origin of the superior mesenteric artery.  She also has leukocytosis and bicarbonate of 20.  I am quite clearly concern for thrombus progression into the superior mesenteric artery as well as bowel ischemia.  Patient has been heparinized and is being taken emergently to the operating room for superior mesenteric artery thrombectomy possible small bowel resection and stent grafting of the origin of the superior mesenteric artery.  I discussed  this in detail with the patient and explained all the risks to her.    MIRANDA HUYNH M.D.

## 2022-09-08 NOTE — ANESTHESIA PREPROCEDURE EVALUATION
Anesthesia Pre-Procedure Evaluation    Patient: Bridgette Veras   MRN: [id number] : 1975        Procedure : Procedure(s):  LAPAROTOMY  ANGIOGRAM          Past Medical History:   Diagnosis Date     Cervical high risk HPV (human papillomavirus) test positive 16    type 16     Chickenpox      Chlamydia trachomatis infection of unspecified site      POONAM 1      Depressive disorder      Depressive disorder, not elsewhere classified      History of colposcopy with cervical biopsy 16    Bx - POONAM 1, ECC - negative     Other abnormal heart sounds as child    Murmur - resolved     Polycystic ovaries     prior to gastic bypass in , pt carried the diagnosis of PCOS/anovuolation     Streptococcus infection in conditions classified elsewhere and of unspecified site, group B 1998    In pregnancy      Past Surgical History:   Procedure Laterality Date      SECTION  ,       SECTION, TUBAL LIGATION, COMBINED  2013    Procedure: COMBINED  SECTION, TUBAL LIGATION;   Section, Tubal Ligation;  Surgeon: Paul Allen MD;  Location: [facility] OR     GASTRIC BYPASS  2005     GASTRIC BYPASS       HC REMOVAL GALLBLADDER  3/16/06    Dr. Inman @ [facility]     HC REPAIR ING HERNIA,5+Y/O,REDUCIBL  age 20 ()    LIH     SURGICAL HISTORY OF -       PE tubes     SURGICAL HISTORY OF -   10/06    removal of pannus ans breast lift     TONSILLECTOMY       [facility]  DELIVERY ONLY  98    Failure to progress     [facility] NONSPECIFIC PROCEDURE  3/9/06    Laser ablation genital condyloma/ Bx labial lesion     [facility] RAD RESEC TONSIL/PILLARS        Allergies   Allergen Reactions     Codeine Nausea and Vomiting     Darvocet [Acetaminophen] Nausea and Vomiting     Propoxyphene Nausea     Tylenol Nausea and Vomiting      Social History     Tobacco Use     Smoking status: Current Every Day Smoker     Packs/day: 0.50     Types: Cigarettes     Smokeless tobacco:  Never Used     Tobacco comment: smoking 20cig/day- down to 10cig/day with pregnancy   Substance Use Topics     Alcohol use: Yes     Alcohol/week: 0.0 standard drinks     Comment: rarely- quit with pregnancy      Wt Readings from Last 1 Encounters:   09/07/22 86.2 kg (190 lb)        Anesthesia Evaluation   Pt has had prior anesthetic. Type: General.    No history of anesthetic complications       ROS/MED HX  ENT/Pulmonary:     (+) tobacco use (0.5 PPD), Current use,     Neurologic: Comment: RLS on cyclobenzaprine      Cardiovascular:  - neg cardiovascular ROS     METS/Exercise Tolerance:     Hematologic:       Musculoskeletal:  - neg musculoskeletal ROS     GI/Hepatic: Comment: S/p gastric bypass    (+) GERD,     Renal/Genitourinary:  - neg Renal ROS     Endo:     (+) Obesity,     Psychiatric/Substance Use:     (+) psychiatric history depression     Infectious Disease: Comment: COVID negative (9/8/22) - neg infectious disease ROS     Malignancy:  - neg malignancy ROS     Other:            Physical Exam    Airway        Mallampati: II   TM distance: > 3 FB   Neck ROM: full   Mouth opening: > 3 cm    Respiratory Devices and Support         Dental  no notable dental history         Cardiovascular             Pulmonary                   OUTSIDE LABS:  CBC:   Lab Results   Component Value Date    WBC 19.3 (H) 09/07/2022    WBC 9.4 03/30/2022    HGB 8.8 (L) 09/07/2022    HGB 9.1 (L) 03/30/2022    HCT 33.1 (L) 09/07/2022    HCT 34.7 (L) 03/30/2022     09/07/2022     03/30/2022     BMP:   Lab Results   Component Value Date     09/07/2022     02/07/2022    POTASSIUM 3.4 09/07/2022    POTASSIUM 4.0 02/07/2022    CHLORIDE 101 09/07/2022    CHLORIDE 110 (H) 02/07/2022    CO2 20 (L) 09/07/2022    CO2 24 02/07/2022    BUN 9.1 09/07/2022    BUN 10 02/07/2022    CR 0.64 09/07/2022    CR 0.69 02/07/2022     (H) 09/07/2022    GLC 90 02/07/2022     COAGS:   Lab Results   Component Value Date    INR 1.03  03/29/2005     POC:   Lab Results   Component Value Date    HCG Negative 03/30/2022     HEPATIC:   Lab Results   Component Value Date    ALBUMIN 4.1 09/07/2022    PROTTOTAL 7.2 09/07/2022    ALT 15 09/07/2022    AST 37 (H) 09/07/2022    ALKPHOS 135 (H) 09/07/2022    BILITOTAL 0.2 09/07/2022     OTHER:   Lab Results   Component Value Date    A1C 5.8 (H) 02/07/2022    MANNY 9.0 09/07/2022    LIPASE 20 09/07/2022    TSH 1.30 11/02/2010    SED 11 03/07/2011       Anesthesia Plan    ASA Status:  2, emergent    NPO Status:  ELEVATED Aspiration Risk/Unknown    Anesthesia Type: General.     - Airway: ETT   Induction: Intravenous, Propofol, RSI.   Maintenance: Balanced.   Techniques and Equipment:     - Lines/Monitors: 2nd IV, Arterial Line     - Blood: Blood in Room, T&C     Consents    Anesthesia Plan(s) and associated risks, benefits, and realistic alternatives discussed. Questions answered and patient/representative(s) expressed understanding.    - Discussed:     - Discussed with:  Patient      - Extended Intubation/Ventilatory Support Discussed: Yes.      - Patient is DNR/DNI Status: No    Use of blood products discussed: Yes.     - Discussed with: Patient.     - Consented: consented to blood products            Reason for refusal: other.     Postoperative Care    Pain management: IV analgesics.   PONV prophylaxis: Ondansetron (or other 5HT-3), Dexamethasone or Solumedrol     Comments:    Other Comments: 46F with thoracic aortic thrombus with total occlusion of the celiac trunk, partial occlusion of the SMA trunk with total occlusion of multiple of its branches. PCOS, s/p gastric bypass, GERD, MDD, active smoker (0.5 PPD), discussed general anesthesia, discussed blood product administration, discussed placement of arterial line and possibly central line, discussed potential for post-operative mechanical ventilation, questions sought and answered, patient agreeable to proceed.            Michael Fernández MD

## 2022-09-08 NOTE — ED TRIAGE NOTES
Sudden onset nausea vomiting and diarrhea about 1500 today was fine before that..     Triage Assessment     Row Name 09/07/22 1936       Triage Assessment (Adult)    Airway WDL WDL       Respiratory WDL    Respiratory WDL WDL       Skin Circulation/Temperature WDL    Skin Circulation/Temperature WDL WDL       Cardiac WDL    Cardiac WDL WDL       Peripheral/Neurovascular WDL    Peripheral Neurovascular WDL WDL       Cognitive/Neuro/Behavioral WDL    Cognitive/Neuro/Behavioral WDL WDL

## 2022-09-08 NOTE — SIGNIFICANT EVENT
SPIRITUAL HEALTH SERVICES Significant Event  [employer] [employer] [facility] [employer]OINTING  [facility] 4a    Pt anointed by Father Rommel Reed   Pager [phone]

## 2022-09-08 NOTE — ANESTHESIA CARE TRANSFER NOTE
Patient: Bridgette Veras    Procedure: Procedure(s):  Exploratory LAPAROTOMY, Superior Mesenteric Artery Thrombectomy, Retrograde Superior Mesenteric Artery Stenting       Diagnosis: Mesenteric thrombosis (H) [K55.069]  Diagnosis Additional Information: No value filed.    Anesthesia Type:   General     Note:    Oropharynx: oropharynx clear of all foreign objects  Level of Consciousness: awake  Oxygen Supplementation: face mask  Level of Supplemental Oxygen (L/min / FiO2): 6  Independent Airway: airway patency satisfactory and stable  Dentition: dentition unchanged  Vital Signs Stable: post-procedure vital signs reviewed and stable  Report to RN Given: handoff report given  Patient transferred to: PACU  Comments: VSS. Breathing spontaneously at a regular rate with adequate tidal volumes and maintaining O2 sats. Denies nausea, given 50 mcg fentanyl x2 for pain. No apparent complications from anesthesia.     Jalen Magdaleno MD   Anesthesia CA-2  Handoff Report: Identifed the Patient, Identified the Reponsible Provider, Reviewed the pertinent medical history, Discussed the surgical course, Reviewed Intra-OP anesthesia mangement and issues during anesthesia, Set expectations for post-procedure period and Allowed opportunity for questions and acknowledgement of understanding      Vitals:  Vitals Value Taken Time   BP     Temp     Pulse     Resp     SpO2         Electronically Signed By: Jalen Magdaleno MD  September 8, 2022  6:39 AM

## 2022-09-08 NOTE — H&P
SURGICAL ICU ADMISSION NOTE  09/08/2022      PRIMARY TEAM: Vascular Surgery  PRIMARY PHYSICIAN: Dr. Klever MD  REASON FOR CRITICAL CARE ADMISSION: Postoperative observation and management   ADMITTING PHYSICIAN: Dr. Slime DO  Date of Service (when I saw the patient): 09/08/2022    ASSESSMENT:  Bridgette eVras is a 46-year old woman with PMHx significant for GERD, RNY-gastric bypass, previous C-sections, and abdominal wall adhesions who was transferred from OSH d/2 SMA thrombus and concern for AMI. She is POD-0 s/p emergent ex-lap, SMA thrombectomy, retrograde SMA stenting 2/2. Postoperatively she was transferred to SICU for serial abdominal exams and close monitoring due to moderate residual thrombus and concern for re-occlusion.    PLAN:      Neurological:  # Acute postoperative pain   - Monitor neurological status. Delirium preventions and precautions  - Pain: PRN IV Dilaudid, PO Oxycodone, Robaxin     Pulmonary:   # Current, everyday smoker  - PRN nicotine patch   S/p extubation in PACU  - Supplemental O2, goal SpO2 > 92%.  - IS q15-30 minutes when awake.    Cardiovascular:    # Aortic/SMA thrombus s/p above procedure  - HI heparin gtt (see heme)    Gastroenterology/Nutrition:  # s/p ExLap  # pmh RYGB (2005)  # B12, iron deficiency d/2 RYBG  - NPO, ok for meds, no indication for parenteral nutrition  - No NG d/2 hx RYGB    Renal/Fluids/Electrolytes:   -  mIVF, q8h BMP per primary team  - Will cont to monitor I/Os  - Soto in, remove today    Endocrine:  # Stress hyperglycemia   - Sliding scale PRN for glucose management. Goal to keep BG< 180 for optimal wound healing     ID:  # Leukocytosis  - plan empiric zosyn x24h per primary team  - trend WBC     Heme:     # SMA Thrombus, s/p thrombectomy   Coagulopathy   - HI Heparin gtt, trend Heparin Xa levels   - Heme consult for etiology of hypercoagulable state  # Chronic iron-deficient anemia   - Hemoglobin 8.6, q8h Hgb and LA per primary team  -  Transfuse if hgb <7.0 or signs/symptoms of hypoperfusion.       MSK:   - encourage ambulation     General Cares/Prophylaxis:    DVT Prophylaxis: High-intensity Heparin gtt, SCDs  GI Prophylaxis: H2 Blocker  Restraints: Restraints for medical healing needed: NO    LDAs:  - PIVx2  - a-line - remove today  - Soto - remove today    Disposition:  - reassess for floor this afternoon    Patient seen, findings and plan discussed with surgical ICU staff, Dr. Moreno-DO Edgar.    Kasie Meeks MD  Gynecology Oncology Fellow  September 8, 2022 , 7:48 AM    - - - - - - - - - - - - - - - - - - - - - - - - - - - - - - - - - - - - - - - - - - - - - -   HISTORY PRESENTING ILLNESS: Bridgette Veras is a 46 year old female with PMH of depression and PSH of laparoscopic pepper en y gastric bypass (retrocolic/retrogastric Pepper limb) and C section who presents with acute abdominal pain. Reports noticing abdominal pain last week but was manageable. Now over last 24 hrs has had severe abdominal pain, diarrhea and nausea/vomiting. Also reports noticing cramping in right leg over last month. Recent history of back pain but no chest pain. No h/o clotting disorders in self or family      CTA shows mural thrombus in mid descending thoracic aota. significant thrombus with occlusion of celiac origin and extension of thrombus into SMA. SMA clot is free floating with opacification of MCA and IPDA but distally ileocolic branches not opacified. Celiac and DEREK artery patent , backfilling from pancreaticoduodenal arteries via GDA. Wall enhancement concerning of bowel ischemia.    She went to the OR for an exlap w/ SMA embolectomy and stenting possible bowel resection. She is admitted to the SICU post-op for serial monitoring d/2 concern for possible re-occlusion of SMA.      REVIEW OF SYSTEMS: 10 point ROS neg other than the symptoms noted above in the HPI.    PAST MEDICAL HISTORY:   Past Medical History:   Diagnosis Date     Cervical  high risk HPV (human papillomavirus) test positive 16    type 16     Chickenpox      Chlamydia trachomatis infection of unspecified site      POONAM 1      Depressive disorder      Depressive disorder, not elsewhere classified      History of colposcopy with cervical biopsy 16    Bx - POONAM 1, ECC - negative     Other abnormal heart sounds as child    Murmur - resolved     Polycystic ovaries     prior to gastic bypass in , pt carried the diagnosis of PCOS/anovuolation     Streptococcus infection in conditions classified elsewhere and of unspecified site, group B 1998    In pregnancy       SURGICAL HISTORY:   Past Surgical History:   Procedure Laterality Date      SECTION  1998      SECTION, TUBAL LIGATION, COMBINED  2013    Procedure: COMBINED  SECTION, TUBAL LIGATION;   Section, Tubal Ligation;  Surgeon: Paul Allen MD;  Location: [facility] OR     GASTRIC BYPASS  2005     GASTRIC BYPASS       HC REMOVAL GALLBLADDER  3/16/06    Dr. Inman @ [facility]     HC REPAIR ING HERNIA,5+Y/O,REDUCIBL  age 20 ()    [facility]     SURGICAL HISTORY OF -       PE tubes     SURGICAL HISTORY OF -   10/06    removal of pannus ans breast lift     TONSILLECTOMY       ZZC  DELIVERY ONLY  98    Failure to progress     ZZC NONSPECIFIC PROCEDURE  3/9/06    Laser ablation genital condyloma/ Bx labial lesion     ZZ RAD RESEC TONSIL/PILLARS         SOCIAL HISTORY:   Social History     Tobacco Use     Smoking status: Current Every Day Smoker     Packs/day: 0.50     Types: Cigarettes     Smokeless tobacco: Never Used     Tobacco comment: smoking 20cig/day- down to 10cig/day with pregnancy   Vaping Use     Vaping Use: Never used   Substance Use Topics     Alcohol use: Yes     Alcohol/week: 0.0 standard drinks     Comment: rarely- quit with pregnancy     Drug use: No       FAMILY HISTORY:   Family History   Problem Relation Age of Onset      Cerebrovascular Disease Paternal Grandfather      Diabetes Other      Cancer Maternal Grandmother         Lung     Depression Maternal Grandmother      Asthma Father      Gastrointestinal Disease Father         PSC     Alcohol/Drug Maternal Grandfather         alcohol     Cardiovascular Maternal Grandfather         MI     Respiratory Paternal Grandmother      Hypertension Mother      Depression Mother      Hypertension Brother      Depression Brother      C.A.D. No family hx of      Breast Cancer No family hx of      Cancer - colorectal No family hx of        ALLERGIES:   Allergies   Allergen Reactions     Codeine Nausea and Vomiting     Darvocet [Acetaminophen] Nausea and Vomiting     Propoxyphene Nausea     Tylenol Nausea and Vomiting       MEDICATIONS:  [COMPLETED] 0.9% sodium chloride BOLUS  [COMPLETED] heparin ANTICOAGULANT Loading dose for HIGH INTENSITY TREATMENT * Give BEFORE starting heparin infusion  [COMPLETED] HYDROmorphone (PF) (DILAUDID) injection 0.3 mg  [COMPLETED] HYDROmorphone (PF) (DILAUDID) injection 0.3 mg  [COMPLETED] iopamidol (ISOVUE-370) solution 80 mL  [COMPLETED] iopamidol (ISOVUE-370) solution 83 mL  [COMPLETED] ondansetron (ZOFRAN) injection 4 mg  [COMPLETED] ondansetron (ZOFRAN) injection 4 mg  [COMPLETED] piperacillin-tazobactam (ZOSYN) infusion 3.375 g  [COMPLETED] sodium chloride 0.9 % bag 500mL for CT scan flush use  [COMPLETED] sodium chloride 0.9 % bag 500mL for CT scan flush use    cyanocolbalamin (VITAMIN  B-12) 500 MCG tablet, Take 500 mcg by mouth daily   cyclobenzaprine (FLEXERIL) 10 MG tablet, Take 1 tablet (10 mg) by mouth nightly as needed for muscle spasms (Patient not taking: Reported on 2/7/2022)  Ergocalciferol (VITAMIN D2 PO),   ferrous sulfate (IRON) 325 (65 FE) MG tablet, Take 325 mg by mouth daily (with breakfast)   Multiple Vitamin (MULTIVITAMIN PO),   varenicline (CHANTIX) 0.5 MG tablet,   varenicline (CHANTIX) 1 MG tablet,         PHYSICAL EXAMINATION:  Temp:   [97.5  F (36.4  C)-99  F (37.2  C)] 98.7  F (37.1  C)  Pulse:  [56-92] 84  Resp:  [16-24] 16  BP: (137-176)/(64-97) 143/73  MAP:  [95 mmHg-107 mmHg] 95 mmHg  Arterial Line BP: (145-165)/(68-80) 145/68  SpO2:  [95 %-100 %] 95 %    General: sleepy, awakens to questions, nontoxic  HEENT: NC/Capno in place  Neck: trachea midline   Neuro: Oriented  Pulm/Resp: nonlabored breathing, clear lung sounds in anterior lung fields  CV: RRR on monitor, bilateral pedal pulses palpated  Abdomen: Soft, nondistended, diffusely tender with palpation, no rebound or guarding - supraumbilical VML incision with serosanguinous saturation of dressing, removed - staples in place, gauze mele along length of incision are saturated.  :  carrasco catheter in place with clear yellow urine  MSK/Extremities: no peripheral edema    LABS: Reviewed.   Arterial Blood Gases   Recent Labs   Lab 09/08/22 0550 09/08/22 0428   PH 7.32* 7.31*   PCO2 41 40   PO2 102 85   HCO3 21 21     Complete Blood Count   Recent Labs   Lab 09/08/22 0550 09/08/22 0428 09/08/22 0221 09/07/22 2014   WBC  --   --  21.0* 19.3*   HGB 8.6* 7.6* 7.8* 8.8*   PLT  --   --  319 355     Basic Metabolic Panel  Recent Labs   Lab 09/08/22  0809 09/08/22  0550 09/08/22 0428 09/08/22 0221 09/07/22 2014   NA  --  138 139 135* 137   POTASSIUM  --  4.0 3.7 3.9 3.4   CHLORIDE  --   --   --  104 101   CO2  --   --   --  19* 20*   BUN  --   --   --  8.5 9.1   CR  --   --   --  0.59 0.64   * 147* 153* 149* 194*     Liver Function Tests  Recent Labs   Lab 09/08/22 0221 09/07/22 2014   AST 34 37*   ALT 13 15   ALKPHOS 113* 135*   BILITOTAL 0.2 0.2   ALBUMIN 3.7 4.1     Pancreatic Enzymes  Recent Labs   Lab 09/07/22 2014   LIPASE 20     Coagulation Profile  No lab results found in last 7 days.    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      Narrative    EXAM: CT ABDOMEN PELVIS W CONTRAST  LOCATION: [facility]  DATE/TIME: 9/7/2022 11:07 PM    INDICATION: Epigastric pain nausea vomiting and diarrhea.  COMPARISON: None.  TECHNIQUE: CT scan of the abdomen and pelvis was performed following injection of IV contrast. Multiplanar reformats were obtained. Dose reduction techniques were used.  CONTRAST: 83mL isovue 370    FINDINGS:   LOWER CHEST: Scattered emphysematous changes are seen in the lung bases bilaterally.    HEPATOBILIARY: No significant mass or bile duct dilatation. Gallbladder is surgically absent. Scattered subcentimeter benign appearing cystic lesions are seen scattered throughout the liver, too small to characterize, favored to reflect benign hepatic   cyst    PANCREAS: No significant mass, duct dilatation, or inflammatory change.    SPLEEN: There are patchy areas of diminished enhancement seen scattered throughout the kidney, predominantly within the midportion and anterior tip. (Image 82, series 2).    ADRENAL GLANDS: No significant nodules.    KIDNEYS/BLADDER: No significant mass, stone, or hydronephrosis.    BOWEL: No obstruction or inflammatory change. Postsurgical changes from prior gastric bypass are again noted.The appendix is normal. No significant bowel wall thickening is noted. There is slightly decreased enhancement of a few loops of small bowel   within the right mid abdomen and pelvis.    LYMPH NODES: Increased number of subcentimeter lymph nodes are seen in the root of the mesentery inferiorly.    VASCULATURE: Irregular soft plaque is seen along the anterior aspect of the descending thoracic aorta and proximal ascending aorta with occlusion of the celiac artery  at its origin. (Image 80, series 2; image 70, series 4)The distal branches of the   celiac artery are well opacified.  Additionally there is thrombus seen floating within the SMA proximally extending inferiorly from the celiac artery including thrombus. (Image 87, series 2; image 71, series 4). The distal branches of the SMA are not   well opacified (image 144, series 2) with adjacent mesenteric stranding and subtle increased number of subcentimeter lymph nodes. DERICK is normal in appearance.    PELVIC ORGANS: No pelvic masses.    MUSCULOSKELETAL: Unremarkable.      Impression    IMPRESSION:   1.  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 There is irregular peripheral clot seen anteriorly within the descending thoracic aorta and superior aspect of   the abdominal aorta, this may reflect soft plaque although there is very little atherosclerotic disease seen throughout the rest of the aorta..  2.  Free floating thrombus seen in the SMA proximally which appears to extend inferiorly from the celiac artery. There is nonopacification of the distal branches of the SMA distally with adjacent mesenteric stranding concerning for occlusion given the   presence of thrombus proximally, although the study was not obtained as a CTA so evaluation is limited. There are a few loops of small bowel which have slightly decreased enhancement without bowel wall thickening seen in the right mid and lower abdomen,   which could reflect early changes of mesenteric ischemia.        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     Finding was identified on 9/7/2022 11:18 PM.     Dr Gongora  was contacted by me on 9/7/2022 11:45 PM and verbalized understanding of the critical result.     CTA Chest Abdomen Pelvis w Contrast    Narrative    EXAM: CTA CHEST ABDOMEN PELVIS W CONTRAST  LOCATION: [facility]  DATE/TIME: 9/8/2022 12:55 AM    INDICATION: Possible mesenteric ischemia  COMPARISON: CT from 09/07/2022.  TECHNIQUE: CT angiogram chest abdomen pelvis during arterial phase of injection of IV contrast. 2D and 3D MIP reconstructions were performed by the CT technologist. Dose reduction techniques were used.   CONTRAST: 80 mL Isovue 370    FINDINGS:   CT ANGIOGRAM CHEST, ABDOMEN, AND PELVIS: Again noted is irregular wall adherent thrombus along the ventral aspect of the descending thoracic aorta extending beyond the diaphragmatic hiatus and into the origins of the celiac and superior mesenteric   arteries. This occludes the origin of the celiac artery (though the common hepatic and splenic arteries enhance) and is partially occlusive of the proximal SMA. There is also occlusion of multiple distal branch vessels of the superior mesenteric artery   involving both the jejunal and ileocolic territories as seen on coronal image 129. The bilateral renal arteries are patent. The inferior mesenteric artery is patent.    LUNGS AND PLEURA: Mild centrilobular emphysema. No focal airspace infiltrate.    MEDIASTINUM/AXILLAE: Heart size is normal. No pericardial effusion. No visualized ventricular thrombus. No central pulmonary embolism.    CORONARY ARTERY CALCIFICATION: None.    HEPATOBILIARY: Absent gallbladder. Redemonstrated subcentimeter hypodensities, incompletely characterized.    PANCREAS: Normal.    SPLEEN: Heterogeneous enhancement of the spleen may be partially attributable to arterial phase imaging, though a similar abnormality noted on the prior scan is more consistent with the presence of infarction.    ADRENAL GLANDS: Normal.    KIDNEYS/BLADDER: Normal.    BOWEL: Prior gastric  bypass surgery. No mechanical bowel obstruction or free air. There is edema in the central mesentery.    LYMPH NODES: Normal.    PELVIC ORGANS: Normal.    MUSCULOSKELETAL: Normal.      Impression    IMPRESSION:  1.  Redemonstration of wall adherent thrombus along the ventral aspect of the mid and distal descending thoracic aorta and the proximal aspect of the abdominal aorta resulting in occlusion of the celiac trunk and nonocclusive thrombus in the proximal   superior mesenteric artery trunk.    2.  Occlusion of multiple jejunal and ileocolic branches of the SMA. Wall enhancement characteristics of the distal small bowel are better demonstrated on the prior CT which was obtained in portal venous phase of contrast, with appearance on that exam   remaining concerning for hypoperfusion/ischemia.    3.  The inferior mesenteric artery is patent.    4.  Findings consistent with splenic infarction.     XR Surgery ROGER G/T 5 Min Fluoro w Stills    Narrative    This exam was marked as non-reportable because it will not be read by a   radiologist or a [facility] non-radiologist provider.

## 2022-09-08 NOTE — PROGRESS NOTES
Vascular Surgery Attending Progress Note    I have seen and examined this patient with the housestaff and reviewed the preoperative and intraoperative imaging studies. Pain fairly well controlled, will benefit from PCA. Decision not to proceed with a second look procedure was made at the time of operation this morning. The aortic thrombus is extensive and should be treated with anticoagulation for at least 8 weeks; coverage with TEVAR would be extensive and risk spinal ischemia, in any event might not be a good choice in this 47 YO patient. I agree with plans for high-intensity anticoagulation, request echocardiogram, and close observation.    Nathan Barber MD

## 2022-09-08 NOTE — PLAN OF CARE
Major Shift Events: Patient remained stable throughout the shift after arrival from PACU and has floor orders. Patient is neurologically intact. HR sinus rhythm to tach 70-120s with stable BP throughout the day. Remains on heparin drip at 1050 with non-therapeutic anti-Xa throughout the day which dose was stopped and adjusted per protocol. Remained on NC @2L throughout the day. Patient remains NPO with sips with meds. No complaints of nausea. Voiding without complication, no gas or bowel movement yet. Patient complained of pain rated 7-8/10 throughout the day which was not managed with Dilaudid IV push or PCA. Changed to Fentanyl PCA this evening and CTA ordered to determine cause of uncontrolled pain. Hematoma forming requiring abdominal binder and close monitoring. Abdominal incision oozing throughout the day requiring frequent dressing changes. Provider packed quick clot in incision without further complications this afternoon.     Plan: Monitor closely for increased abdominal pain, change in labs or vitals, and possibly return to OR if change in status.     For vital signs and complete assessments, please see documentation flowsheets.

## 2022-09-08 NOTE — ED NOTES
Patient  aware patient will be transferring to [facility] for surgery tonight.  Heparin infusion initiated, report given to ems.  Reports called to Abigail STOVER at [facility] emergency Department.

## 2022-09-09 ENCOUNTER — ANESTHESIA EVENT (OUTPATIENT)
Dept: SURGERY | Facility: CLINIC | Age: 47
End: 2022-09-09
Payer: COMMERCIAL

## 2022-09-09 ENCOUNTER — ANESTHESIA (OUTPATIENT)
Dept: SURGERY | Facility: CLINIC | Age: 47
End: 2022-09-09
Payer: COMMERCIAL

## 2022-09-09 LAB
ABO/RH(D): NORMAL
ANION GAP SERPL CALCULATED.3IONS-SCNC: 14 MMOL/L (ref 7–15)
ANTIBODY SCREEN: NEGATIVE
APTT PPP: 60 SECONDS (ref 22–38)
B2 GLYCOPROT1 IGG SERPL IA-ACNC: 1 U/ML
B2 GLYCOPROT1 IGM SERPL IA-ACNC: 6 U/ML
BASE EXCESS BLDA CALC-SCNC: 1 MMOL/L (ref -9–1.8)
BASOPHILS # BLD AUTO: 0 10E3/UL (ref 0–0.2)
BASOPHILS NFR BLD AUTO: 0 %
BLD PROD TYP BPU: NORMAL
BLOOD COMPONENT TYPE: NORMAL
BUN SERPL-MCNC: 7.3 MG/DL (ref 6–20)
CALCIUM SERPL-MCNC: 8.4 MG/DL (ref 8.6–10)
CARDIOLIPIN IGG SER IA-ACNC: <2 GPL-U/ML
CARDIOLIPIN IGG SER IA-ACNC: NEGATIVE
CARDIOLIPIN IGM SER IA-ACNC: 5.8 MPL-U/ML
CARDIOLIPIN IGM SER IA-ACNC: NEGATIVE
CHLORIDE SERPL-SCNC: 106 MMOL/L (ref 98–107)
CODING SYSTEM: NORMAL
CREAT SERPL-MCNC: 0.6 MG/DL (ref 0.51–0.95)
CROSSMATCH: NORMAL
DEPRECATED HCO3 PLAS-SCNC: 20 MMOL/L (ref 22–29)
DRVVT CONFIRM NORMALIZED RATIO: 0.87
DRVVT SCREEN MIX RATIO: 0.95
DRVVT SCREEN RATIO: 1.09
EOSINOPHIL # BLD AUTO: 0 10E3/UL (ref 0–0.7)
EOSINOPHIL NFR BLD AUTO: 0 %
ERYTHROCYTE [DISTWIDTH] IN BLOOD BY AUTOMATED COUNT: 25.4 % (ref 10–15)
ERYTHROCYTE [DISTWIDTH] IN BLOOD BY AUTOMATED COUNT: 26.5 % (ref 10–15)
ERYTHROCYTE [DISTWIDTH] IN BLOOD BY AUTOMATED COUNT: 26.6 % (ref 10–15)
FIBRINOGEN PPP-MCNC: 501 MG/DL (ref 170–490)
GFR SERPL CREATININE-BSD FRML MDRD: >90 ML/MIN/1.73M2
GLUCOSE SERPL-MCNC: 120 MG/DL (ref 70–99)
HCO3 BLD-SCNC: 25 MMOL/L (ref 21–28)
HCT VFR BLD AUTO: 23.2 % (ref 35–47)
HCT VFR BLD AUTO: 25.9 % (ref 35–47)
HCT VFR BLD AUTO: 34.2 % (ref 35–47)
HEPZYMED PTT 1:2 MIX: 43 SECONDS (ref 31–45)
HEPZYMED PTT RATIO: 1.22
HEPZYMED PTT-LA: 45 SECONDS (ref 31–45)
HEPZYMED THROMBIN TIME: 16.4 SECONDS (ref 15.7–21.7)
HGB BLD-MCNC: 6.6 G/DL (ref 11.7–15.7)
HGB BLD-MCNC: 7.6 G/DL (ref 11.7–15.7)
HGB BLD-MCNC: 9.6 G/DL (ref 11.7–15.7)
IMM GRANULOCYTES # BLD: 0.2 10E3/UL
IMM GRANULOCYTES NFR BLD: 1 %
INR PPP: 1.49 (ref 0.85–1.15)
INR PPP: 1.7 (ref 0.85–1.15)
ISSUE DATE AND TIME: NORMAL
LA PPP-IMP: NEGATIVE
LACTATE SERPL-SCNC: 0.9 MMOL/L (ref 0.7–2)
LACTATE SERPL-SCNC: 1.1 MMOL/L (ref 0.7–2)
LACTATE SERPL-SCNC: 2.1 MMOL/L (ref 0.7–2)
LUPUS INTERPRETATION: ABNORMAL
LYMPHOCYTES # BLD AUTO: 2.1 10E3/UL (ref 0.8–5.3)
LYMPHOCYTES NFR BLD AUTO: 8 %
MCH RBC QN AUTO: 18.5 PG (ref 26.5–33)
MCH RBC QN AUTO: 18.5 PG (ref 26.5–33)
MCH RBC QN AUTO: 19.8 PG (ref 26.5–33)
MCHC RBC AUTO-ENTMCNC: 28.1 G/DL (ref 31.5–36.5)
MCHC RBC AUTO-ENTMCNC: 28.4 G/DL (ref 31.5–36.5)
MCHC RBC AUTO-ENTMCNC: 29.3 G/DL (ref 31.5–36.5)
MCV RBC AUTO: 65 FL (ref 78–100)
MCV RBC AUTO: 66 FL (ref 78–100)
MCV RBC AUTO: 68 FL (ref 78–100)
MONOCYTES # BLD AUTO: 1.5 10E3/UL (ref 0–1.3)
MONOCYTES NFR BLD AUTO: 5 %
NEUTROPHILS # BLD AUTO: 24.3 10E3/UL (ref 1.6–8.3)
NEUTROPHILS NFR BLD AUTO: 86 %
NRBC # BLD AUTO: 0.1 10E3/UL
NRBC BLD AUTO-RTO: 0 /100
O2/TOTAL GAS SETTING VFR VENT: 80 %
PATIENT PTT-LA: 149 SECONDS (ref 31–45)
PCO2 BLD: 37 MM HG (ref 35–45)
PH BLD: 7.44 [PH] (ref 7.35–7.45)
PLATELET # BLD AUTO: 185 10E3/UL (ref 150–450)
PLATELET # BLD AUTO: 205 10E3/UL (ref 150–450)
PLATELET # BLD AUTO: 228 10E3/UL (ref 150–450)
PLATELET NEUTRALIZATION: -5 SECONDS
PO2 BLD: 65 MM HG (ref 80–105)
POTASSIUM SERPL-SCNC: 3.8 MMOL/L (ref 3.4–5.3)
RBC # BLD AUTO: 3.57 10E6/UL (ref 3.8–5.2)
RBC # BLD AUTO: 3.83 10E6/UL (ref 3.8–5.2)
RBC # BLD AUTO: 5.19 10E6/UL (ref 3.8–5.2)
SODIUM SERPL-SCNC: 140 MMOL/L (ref 136–145)
SPECIMEN EXPIRATION DATE: NORMAL
THROMBIN TIME: >60 SECONDS (ref 13–19)
UFH PPP CHRO-ACNC: 0.12 IU/ML
UFH PPP CHRO-ACNC: 0.68 IU/ML
UFH PPP CHRO-ACNC: <0.1 IU/ML
UNIT ABO/RH: NORMAL
UNIT NUMBER: NORMAL
UNIT STATUS: NORMAL
UNIT TYPE ISBT: 5100
UNIT TYPE ISBT: 9500
WBC # BLD AUTO: 24 10E3/UL (ref 4–11)
WBC # BLD AUTO: 28.2 10E3/UL (ref 4–11)
WBC # BLD AUTO: 28.2 10E3/UL (ref 4–11)

## 2022-09-09 PROCEDURE — 82947 ASSAY GLUCOSE BLOOD QUANT: CPT | Performed by: STUDENT IN AN ORGANIZED HEALTH CARE EDUCATION/TRAINING PROGRAM

## 2022-09-09 PROCEDURE — 85025 COMPLETE CBC W/AUTO DIFF WBC: CPT | Performed by: STUDENT IN AN ORGANIZED HEALTH CARE EDUCATION/TRAINING PROGRAM

## 2022-09-09 PROCEDURE — 83605 ASSAY OF LACTIC ACID: CPT | Performed by: STUDENT IN AN ORGANIZED HEALTH CARE EDUCATION/TRAINING PROGRAM

## 2022-09-09 PROCEDURE — 0WCG0ZZ EXTIRPATION OF MATTER FROM PERITONEAL CAVITY, OPEN APPROACH: ICD-10-PCS | Performed by: SURGERY

## 2022-09-09 PROCEDURE — 258N000003 HC RX IP 258 OP 636: Performed by: STUDENT IN AN ORGANIZED HEALTH CARE EDUCATION/TRAINING PROGRAM

## 2022-09-09 PROCEDURE — 85384 FIBRINOGEN ACTIVITY: CPT | Performed by: STUDENT IN AN ORGANIZED HEALTH CARE EDUCATION/TRAINING PROGRAM

## 2022-09-09 PROCEDURE — 250N000025 HC SEVOFLURANE, PER MIN: Performed by: SURGERY

## 2022-09-09 PROCEDURE — 370N000017 HC ANESTHESIA TECHNICAL FEE, PER MIN: Performed by: SURGERY

## 2022-09-09 PROCEDURE — 85610 PROTHROMBIN TIME: CPT | Performed by: STUDENT IN AN ORGANIZED HEALTH CARE EDUCATION/TRAINING PROGRAM

## 2022-09-09 PROCEDURE — 250N000013 HC RX MED GY IP 250 OP 250 PS 637: Performed by: PHARMACIST

## 2022-09-09 PROCEDURE — 99233 SBSQ HOSP IP/OBS HIGH 50: CPT | Performed by: SURGERY

## 2022-09-09 PROCEDURE — 2894A VOIDCORRECT: CPT | Mod: 57 | Performed by: SURGERY

## 2022-09-09 PROCEDURE — 250N000009 HC RX 250: Performed by: ANESTHESIOLOGY

## 2022-09-09 PROCEDURE — 999N000157 HC STATISTIC RCP TIME EA 10 MIN

## 2022-09-09 PROCEDURE — 250N000011 HC RX IP 250 OP 636: Performed by: SURGERY

## 2022-09-09 PROCEDURE — 360N000076 HC SURGERY LEVEL 3, PER MIN: Performed by: SURGERY

## 2022-09-09 PROCEDURE — 85520 HEPARIN ASSAY: CPT | Performed by: STUDENT IN AN ORGANIZED HEALTH CARE EDUCATION/TRAINING PROGRAM

## 2022-09-09 PROCEDURE — 85730 THROMBOPLASTIN TIME PARTIAL: CPT | Performed by: STUDENT IN AN ORGANIZED HEALTH CARE EDUCATION/TRAINING PROGRAM

## 2022-09-09 PROCEDURE — 36415 COLL VENOUS BLD VENIPUNCTURE: CPT | Performed by: STUDENT IN AN ORGANIZED HEALTH CARE EDUCATION/TRAINING PROGRAM

## 2022-09-09 PROCEDURE — 999N000248 HC STATISTIC IV INSERT WITH US BY RN

## 2022-09-09 PROCEDURE — 250N000011 HC RX IP 250 OP 636: Performed by: ANESTHESIOLOGY

## 2022-09-09 PROCEDURE — 250N000009 HC RX 250: Performed by: STUDENT IN AN ORGANIZED HEALTH CARE EDUCATION/TRAINING PROGRAM

## 2022-09-09 PROCEDURE — 250N000011 HC RX IP 250 OP 636: Performed by: STUDENT IN AN ORGANIZED HEALTH CARE EDUCATION/TRAINING PROGRAM

## 2022-09-09 PROCEDURE — 85027 COMPLETE CBC AUTOMATED: CPT | Performed by: STUDENT IN AN ORGANIZED HEALTH CARE EDUCATION/TRAINING PROGRAM

## 2022-09-09 PROCEDURE — 200N000002 HC R&B ICU UMMC

## 2022-09-09 PROCEDURE — 94660 CPAP INITIATION&MGMT: CPT

## 2022-09-09 PROCEDURE — P9016 RBC LEUKOCYTES REDUCED: HCPCS | Performed by: STUDENT IN AN ORGANIZED HEALTH CARE EDUCATION/TRAINING PROGRAM

## 2022-09-09 PROCEDURE — 85520 HEPARIN ASSAY: CPT | Performed by: SURGERY

## 2022-09-09 PROCEDURE — 258N000003 HC RX IP 258 OP 636: Performed by: ANESTHESIOLOGY

## 2022-09-09 PROCEDURE — 272N000001 HC OR GENERAL SUPPLY STERILE: Performed by: SURGERY

## 2022-09-09 PROCEDURE — P9016 RBC LEUKOCYTES REDUCED: HCPCS

## 2022-09-09 PROCEDURE — 35840 EXPLORE ABDOMINAL VESSELS: CPT | Mod: 62 | Performed by: SURGERY

## 2022-09-09 PROCEDURE — 82803 BLOOD GASES ANY COMBINATION: CPT | Performed by: STUDENT IN AN ORGANIZED HEALTH CARE EDUCATION/TRAINING PROGRAM

## 2022-09-09 PROCEDURE — 80051 ELECTROLYTE PANEL: CPT | Performed by: STUDENT IN AN ORGANIZED HEALTH CARE EDUCATION/TRAINING PROGRAM

## 2022-09-09 PROCEDURE — 999N000015 HC STATISTIC ARTERIAL MONITORING DAILY

## 2022-09-09 RX ORDER — HYDROMORPHONE HYDROCHLORIDE 1 MG/ML
0.2 INJECTION, SOLUTION INTRAMUSCULAR; INTRAVENOUS; SUBCUTANEOUS EVERY 5 MIN PRN
Status: CANCELLED | OUTPATIENT
Start: 2022-09-09

## 2022-09-09 RX ORDER — SODIUM CHLORIDE, SODIUM LACTATE, POTASSIUM CHLORIDE, CALCIUM CHLORIDE 600; 310; 30; 20 MG/100ML; MG/100ML; MG/100ML; MG/100ML
INJECTION, SOLUTION INTRAVENOUS CONTINUOUS PRN
Status: DISCONTINUED | OUTPATIENT
Start: 2022-09-09 | End: 2022-09-09

## 2022-09-09 RX ORDER — POTASSIUM CHLORIDE 7.45 MG/ML
10 INJECTION INTRAVENOUS
Status: DISCONTINUED | OUTPATIENT
Start: 2022-09-09 | End: 2022-09-09

## 2022-09-09 RX ORDER — FENTANYL CITRATE 50 UG/ML
25 INJECTION, SOLUTION INTRAMUSCULAR; INTRAVENOUS EVERY 5 MIN PRN
Status: CANCELLED | OUTPATIENT
Start: 2022-09-09

## 2022-09-09 RX ORDER — METHOCARBAMOL 100 MG/ML
500 INJECTION, SOLUTION INTRAMUSCULAR; INTRAVENOUS EVERY 8 HOURS
Status: DISPENSED | OUTPATIENT
Start: 2022-09-09 | End: 2022-09-12

## 2022-09-09 RX ORDER — FENTANYL CITRATE 50 UG/ML
INJECTION, SOLUTION INTRAMUSCULAR; INTRAVENOUS PRN
Status: DISCONTINUED | OUTPATIENT
Start: 2022-09-09 | End: 2022-09-09

## 2022-09-09 RX ORDER — ACETAMINOPHEN 325 MG/1
975 TABLET ORAL EVERY 8 HOURS
Status: DISCONTINUED | OUTPATIENT
Start: 2022-09-09 | End: 2022-09-11

## 2022-09-09 RX ORDER — OXYCODONE HYDROCHLORIDE 5 MG/1
5 TABLET ORAL EVERY 4 HOURS PRN
Status: CANCELLED | OUTPATIENT
Start: 2022-09-09

## 2022-09-09 RX ORDER — ONDANSETRON 4 MG/1
4 TABLET, ORALLY DISINTEGRATING ORAL EVERY 30 MIN PRN
Status: CANCELLED | OUTPATIENT
Start: 2022-09-09

## 2022-09-09 RX ORDER — LIDOCAINE HYDROCHLORIDE 20 MG/ML
INJECTION, SOLUTION INFILTRATION; PERINEURAL PRN
Status: DISCONTINUED | OUTPATIENT
Start: 2022-09-09 | End: 2022-09-09

## 2022-09-09 RX ORDER — ONDANSETRON 2 MG/ML
4 INJECTION INTRAMUSCULAR; INTRAVENOUS EVERY 30 MIN PRN
Status: CANCELLED | OUTPATIENT
Start: 2022-09-09

## 2022-09-09 RX ORDER — POTASSIUM CHLORIDE 750 MG/1
20 TABLET, EXTENDED RELEASE ORAL ONCE
Status: COMPLETED | OUTPATIENT
Start: 2022-09-09 | End: 2022-09-09

## 2022-09-09 RX ORDER — DEXAMETHASONE SODIUM PHOSPHATE 4 MG/ML
INJECTION, SOLUTION INTRA-ARTICULAR; INTRALESIONAL; INTRAMUSCULAR; INTRAVENOUS; SOFT TISSUE PRN
Status: DISCONTINUED | OUTPATIENT
Start: 2022-09-09 | End: 2022-09-09

## 2022-09-09 RX ORDER — PROPOFOL 10 MG/ML
INJECTION, EMULSION INTRAVENOUS PRN
Status: DISCONTINUED | OUTPATIENT
Start: 2022-09-09 | End: 2022-09-09

## 2022-09-09 RX ORDER — SODIUM CHLORIDE, SODIUM LACTATE, POTASSIUM CHLORIDE, CALCIUM CHLORIDE 600; 310; 30; 20 MG/100ML; MG/100ML; MG/100ML; MG/100ML
INJECTION, SOLUTION INTRAVENOUS CONTINUOUS
Status: CANCELLED | OUTPATIENT
Start: 2022-09-09

## 2022-09-09 RX ORDER — ACETAMINOPHEN 325 MG/1
975 TABLET ORAL EVERY 8 HOURS PRN
Status: DISCONTINUED | OUTPATIENT
Start: 2022-09-09 | End: 2022-09-09

## 2022-09-09 RX ADMIN — Medication 50 MG: at 09:05

## 2022-09-09 RX ADMIN — IRON SUCROSE 200 MG: 20 INJECTION, SOLUTION INTRAVENOUS at 16:24

## 2022-09-09 RX ADMIN — Medication 10 MG: at 09:28

## 2022-09-09 RX ADMIN — FENTANYL CITRATE 50 MCG: 50 INJECTION, SOLUTION INTRAMUSCULAR; INTRAVENOUS at 10:58

## 2022-09-09 RX ADMIN — FENTANYL CITRATE 50 MCG: 50 INJECTION, SOLUTION INTRAMUSCULAR; INTRAVENOUS at 10:39

## 2022-09-09 RX ADMIN — Medication 10 MG: at 09:51

## 2022-09-09 RX ADMIN — DEXAMETHASONE SODIUM PHOSPHATE 4 MG: 4 INJECTION, SOLUTION INTRA-ARTICULAR; INTRALESIONAL; INTRAMUSCULAR; INTRAVENOUS; SOFT TISSUE at 10:32

## 2022-09-09 RX ADMIN — KETAMINE HYDROCHLORIDE 15 MG/HR: 100 INJECTION, SOLUTION, CONCENTRATE INTRAMUSCULAR; INTRAVENOUS at 16:51

## 2022-09-09 RX ADMIN — SODIUM CHLORIDE, POTASSIUM CHLORIDE, SODIUM LACTATE AND CALCIUM CHLORIDE: 600; 310; 30; 20 INJECTION, SOLUTION INTRAVENOUS at 00:20

## 2022-09-09 RX ADMIN — PIPERACILLIN AND TAZOBACTAM 3.38 G: 3; .375 INJECTION, POWDER, LYOPHILIZED, FOR SOLUTION INTRAVENOUS at 08:35

## 2022-09-09 RX ADMIN — PHENYLEPHRINE HYDROCHLORIDE 100 MCG: 10 INJECTION INTRAVENOUS at 09:18

## 2022-09-09 RX ADMIN — DEXAMETHASONE SODIUM PHOSPHATE 4 MG: 4 INJECTION, SOLUTION INTRA-ARTICULAR; INTRALESIONAL; INTRAMUSCULAR; INTRAVENOUS; SOFT TISSUE at 10:11

## 2022-09-09 RX ADMIN — PROPOFOL 120 MG: 10 INJECTION, EMULSION INTRAVENOUS at 09:05

## 2022-09-09 RX ADMIN — SODIUM CHLORIDE, POTASSIUM CHLORIDE, SODIUM LACTATE AND CALCIUM CHLORIDE: 600; 310; 30; 20 INJECTION, SOLUTION INTRAVENOUS at 20:13

## 2022-09-09 RX ADMIN — FENTANYL CITRATE 50 MCG: 50 INJECTION, SOLUTION INTRAMUSCULAR; INTRAVENOUS at 09:42

## 2022-09-09 RX ADMIN — POTASSIUM CHLORIDE 20 MEQ: 750 TABLET, EXTENDED RELEASE ORAL at 03:29

## 2022-09-09 RX ADMIN — HYDROMORPHONE HYDROCHLORIDE 0.5 MG: 1 INJECTION, SOLUTION INTRAMUSCULAR; INTRAVENOUS; SUBCUTANEOUS at 10:11

## 2022-09-09 RX ADMIN — Medication 10 MG: at 10:15

## 2022-09-09 RX ADMIN — PIPERACILLIN AND TAZOBACTAM 3.38 G: 3; .375 INJECTION, POWDER, LYOPHILIZED, FOR SOLUTION INTRAVENOUS at 01:50

## 2022-09-09 RX ADMIN — LIDOCAINE HYDROCHLORIDE 100 MG: 20 INJECTION, SOLUTION INFILTRATION; PERINEURAL at 09:05

## 2022-09-09 RX ADMIN — PIPERACILLIN AND TAZOBACTAM 3.38 G: 3; .375 INJECTION, POWDER, LYOPHILIZED, FOR SOLUTION INTRAVENOUS at 20:13

## 2022-09-09 RX ADMIN — FENTANYL CITRATE 50 MCG: 50 INJECTION, SOLUTION INTRAMUSCULAR; INTRAVENOUS at 09:05

## 2022-09-09 RX ADMIN — METHOCARBAMOL 500 MG: 100 INJECTION, SOLUTION INTRAMUSCULAR; INTRAVENOUS at 16:39

## 2022-09-09 RX ADMIN — FAMOTIDINE 20 MG: 10 INJECTION INTRAVENOUS at 08:35

## 2022-09-09 RX ADMIN — PIPERACILLIN AND TAZOBACTAM 3.38 G: 3; .375 INJECTION, POWDER, LYOPHILIZED, FOR SOLUTION INTRAVENOUS at 15:10

## 2022-09-09 RX ADMIN — FENTANYL CITRATE 50 MCG: 50 INJECTION, SOLUTION INTRAMUSCULAR; INTRAVENOUS at 10:43

## 2022-09-09 RX ADMIN — SODIUM CHLORIDE, POTASSIUM CHLORIDE, SODIUM LACTATE AND CALCIUM CHLORIDE: 600; 310; 30; 20 INJECTION, SOLUTION INTRAVENOUS at 08:55

## 2022-09-09 RX ADMIN — FENTANYL CITRATE 50 MCG: 50 INJECTION, SOLUTION INTRAMUSCULAR; INTRAVENOUS at 09:00

## 2022-09-09 RX ADMIN — SUGAMMADEX 200 MG: 100 INJECTION, SOLUTION INTRAVENOUS at 10:35

## 2022-09-09 RX ADMIN — PHENYLEPHRINE HYDROCHLORIDE 100 MCG: 10 INJECTION INTRAVENOUS at 09:04

## 2022-09-09 ASSESSMENT — ACTIVITIES OF DAILY LIVING (ADL)
ADLS_ACUITY_SCORE: 25

## 2022-09-09 NOTE — PLAN OF CARE
ICU End of Shift Summary. See flowsheets for vital signs and detailed assessment.    Changes this shift: A&O x4. Experiencing ongoing abdominal pain and cramping managed with fentanyl PCA. Up to commode x2 but too painful, limiting movement at this time d/t abdominal incision pain. Sinus/ST, normo/hypertensive. Increased O2 needs this shift likely d/t shallow respirations r/t pain. Back and forth between oxymask 12L and BIPAP 60-70% FiO2. No BM this shift, hypoactive BS. Voided x3 this shift. 1 unit PRBCs given for Hgb 6.7. Some extra drainage noted from abdominal incision. Abdominal binder remains in place. Heparin gtt stopped until further notice.     K+ replaced this shift.     Plan: Continue to monitor for bleeding.

## 2022-09-09 NOTE — ANESTHESIA CARE TRANSFER NOTE
Patient: Bridgette Veras    Procedure: Procedure(s):  EXPLORATORY LAPAROTOMY, EVACUATION OF RECTUS SHEATH HEMATOMA, ABDOMINAL WASHOUT       Diagnosis: Abdominal pain [R10.9]  Diagnosis Additional Information: No value filed.    Anesthesia Type:   No value filed.     Note:    Oropharynx: oropharynx clear of all foreign objects and spontaneously breathing  Level of Consciousness: awake  Oxygen Supplementation: face mask  Level of Supplemental Oxygen (L/min / FiO2): 10  Independent Airway: airway patency satisfactory and stable  Dentition: dentition unchanged  Vital Signs Stable: post-procedure vital signs reviewed and stable  Report to RN Given: handoff report given  Patient transferred to: ICU    ICU Handoff: Call for PAUSE to initiate/utilize ICU HANDOFF, Identified Patient, Identified Responsible Provider, Reviewed the Pertinent Medical History, Discussed Surgical Course, Reviewed Intra-OP Anesthesia Management and Issues during Anesthesia, Set Expectations for Post Procedure Period and Allowed Opportunity for Questions and Acknowledgement of Understanding      Vitals:  Vitals Value Taken Time   /52    Temp     Pulse 107    Resp 12    SpO2 93        Electronically Signed By: LOUIE Reyes CRNA  September 9, 2022  11:03 AM

## 2022-09-09 NOTE — OP NOTE
"Date of procedure: September 9, 2022    Preop Dx: Abdominal pain, rectus sheath heamtom    Postop Dx: 1. Rectus sheath hematoma; 2. Ischemic segment of ileum      Procedure: Reopening of recent laparotomy incision, exploratory laparotomy, abdominal washout, evacuation of rectus sheath hematoma     Surgeon: MARY GRACE Barber MD     Assistant: Raymond Pan MD    Intraoperative consultant: Tariq Lancaster MD     Anesthesia: general     Complications: None     EBL: 50 cc    Specimens: none     Indications: This 46 year old female underwent SMA thrombectomy and stent placement approximately 30 hours ago. The patient was anticoagulated postoperatively due to extensive associated thrombus in the descending and upper abdominal aorta. The patient developed worsening abdominal pain in the ICU; a CTA demonstrated a widely patent stent but a large right rectus hematoma with blush. The anticoagulation was stopped and the patient was observed overnight with improved symptoms. This morning the patient had worsening pain and a slightly elevated lactate level despite aggressive volume repletion. The ileocolic branch of the SMA was known to be occluded. Due to concern for ischemic bowel, the patient was scheduled for an emergency \"second look\" operation.    Findings:  1. Extensive intraperitoneal clots due to ruptured rectus sheath hematoma required extensive washout.  2. Inferior epigastric artery and branches were ligated or clipped  3. A 10 cm segment of mid-ileum was thickened, non-peristaltic and appeared slightly dusky. No Doppler signals were obtained on the mesenteric border of this area. Dr. Lancaster of emergency general surgery evaluated the segment and agreed that the bowel could be observed. Will plan for another laparotomy to evaluate the bowel segment tomorrow.     Description of operation: The patient was brought to the operating room and placed in the supine position. After a satisfactory level of general anesthesia, the " patient's abdomen and groins were prepped and draped in the usual sterile fashion. A time out was called. The midline abdominal incision was re-opened, and a large amount of clot and old blood were removed from the abdominal cavity. The right rectus sheath hematoma had ruptured through the posterior fascia. The inferior epigastric artery was isolated and occluded with 3-0 silk ties. Several additional branches were occluded with hemoclips. Large adherent clots were evacuated from the peritoneal cavity and the abdomen was irrigated copiously with saline. The small bowel and colon were then inspected. There was a 10 cm segment iof mid-ileum that was somewhat dusky in appearance, thickened and non-peristaltic. No Doppler signals were audible on the mesenteric border or in the mesentery of this segment. The involved bowel was wrapped in moist towels and appeared less dusky after several minutes of observation. Dr. Lancaster from emergency general surgery was kind enough to come to the OR to evaluate the bowel segment, and we agreed with the plan for another exploratory laparotomy to evaluate the bowel in about 24 hours. The remaining small intestine, colon, and Raj-Y segments appeared viable. At this time, a temporary abdominal closure wa performed with an AbThera device. The patient was awakened and extubated in the OR, then transported to the ICU in satisfactory condition. I was present, scrubbed and supervised all key and critical portions of this case.      MARY GRACE Barber MD  Professor of Surgery  Division of Vascular Surgery

## 2022-09-09 NOTE — PROGRESS NOTES
SURGERY CROSS-COVER NOTE  09/08/2022 9:03 PM     Patient: Bridgette Veras  MRN: [id number]    Evaluating patient with serial abdominal exams. Patient is s/p embolectomy and stenting of SMA. Has a right rectus sheath hematoma on CTA.   Pain is unchanged from previous. Patient now on CPAP.    Temp:  [98.1  F (36.7  C)-99  F (37.2  C)] 98.1  F (36.7  C)  Pulse:  [] 101  Resp:  [16-24] 18  BP: (134-155)/(64-85) 134/69  MAP:  [83 mmHg-107 mmHg] 90 mmHg  Arterial Line BP: (121-165)/(63-84) 126/70  SpO2:  [90 %-100 %] 94 %     General: NAD, lying in bed, appears comfortable  Abd: tender to palaption over RUQ and LUQ with guarding. No guarding over LLQ. 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     Abdominal exam unchanged from prior. Continue to monitor. Plan to re-examine in 2 hrs.    Azra Leahy MD, PharmD  General Surgery, PGY1  Pager [phone]

## 2022-09-09 NOTE — ANESTHESIA PREPROCEDURE EVALUATION
Anesthesia Pre-Procedure Evaluation    Patient: Bridgette Veras   MRN: [id number] : 1975        Procedure : Procedure(s):  LAPAROTOMY, SMALL BOWEL RESECTION          Past Medical History:   Diagnosis Date     Cervical high risk HPV (human papillomavirus) test positive 16    type 16     Chickenpox      Chlamydia trachomatis infection of unspecified site      POONAM 1      Depressive disorder      Depressive disorder, not elsewhere classified      History of colposcopy with cervical biopsy 16    Bx - POONAM 1, ECC - negative     Other abnormal heart sounds as child    Murmur - resolved     Polycystic ovaries     prior to gastic bypass in , pt carried the diagnosis of PCOS/anovuolation     Streptococcus infection in conditions classified elsewhere and of unspecified site, group B 1998    In pregnancy      Past Surgical History:   Procedure Laterality Date      SECTION  ,       SECTION, TUBAL LIGATION, COMBINED  2013    Procedure: COMBINED  SECTION, TUBAL LIGATION;   Section, Tubal Ligation;  Surgeon: Paul Allen MD;  Location: [facility] OR     GASTRIC BYPASS  2005     GASTRIC BYPASS       HC REMOVAL GALLBLADDER  3/16/06    Dr. Inman @ [facility]     HC REPAIR ING HERNIA,5+Y/O,REDUCIBL  age 20 ()    LIH     SURGICAL HISTORY OF -       PE tubes     SURGICAL HISTORY OF -   10/06    removal of pannus ans breast lift     TONSILLECTOMY       Z  DELIVERY ONLY  98    Failure to progress     [facility] NONSPECIFIC PROCEDURE  3/9/06    Laser ablation genital condyloma/ Bx labial lesion     Z RAD RESEC TONSIL/PILLARS        Allergies   Allergen Reactions     Codeine Nausea and Vomiting     Darvocet [Acetaminophen] Nausea and Vomiting     Propoxyphene Nausea     Tylenol Nausea and Vomiting      Social History     Tobacco Use     Smoking status: Current Every Day Smoker     Packs/day: 0.50     Types: Cigarettes     Smokeless  tobacco: Never Used     Tobacco comment: smoking 20cig/day- down to 10cig/day with pregnancy   Substance Use Topics     Alcohol use: Yes     Alcohol/week: 0.0 standard drinks     Comment: rarely- quit with pregnancy      Wt Readings from Last 1 Encounters:   09/09/22 92.4 kg (203 lb 11.3 oz)              OUTSIDE LABS:  CBC:   Lab Results   Component Value Date    WBC 28.2 (H) 09/09/2022    WBC 29.6 (H) 09/08/2022    HGB 9.6 (L) 09/09/2022    HGB 6.7 (LL) 09/08/2022    HCT 34.2 (L) 09/09/2022    HCT 23.7 (L) 09/08/2022     09/09/2022     09/08/2022     BMP:   Lab Results   Component Value Date     09/09/2022     09/08/2022    POTASSIUM 3.8 09/09/2022    POTASSIUM 3.6 09/08/2022    CHLORIDE 106 09/09/2022    CHLORIDE 105 09/08/2022    CO2 20 (L) 09/09/2022    CO2 22 09/08/2022    BUN 7.3 09/09/2022    BUN 8.3 09/08/2022    CR 0.60 09/09/2022    CR 0.60 09/08/2022     (H) 09/09/2022     (H) 09/08/2022     COAGS:   Lab Results   Component Value Date    INR 1.03 03/29/2005    FIBR 448 09/08/2022     POC:   Lab Results   Component Value Date    HCG Negative 03/30/2022     HEPATIC:   Lab Results   Component Value Date    ALBUMIN 3.0 (L) 09/08/2022    PROTTOTAL 5.5 (L) 09/08/2022    ALT 10 09/08/2022    AST 31 09/08/2022    ALKPHOS 81 09/08/2022    BILITOTAL 0.3 09/08/2022     OTHER:   Lab Results   Component Value Date    PH 7.32 (L) 09/08/2022    LACT 2.1 (H) 09/09/2022    A1C 5.8 (H) 02/07/2022    MANNY 8.4 (L) 09/09/2022    LIPASE 20 09/07/2022    TSH 1.30 11/02/2010    SED 11 03/07/2011       Anesthesia Plan    ASA Status:  4, emergent       Anesthesia Type: General.     - Airway: ETT   Induction: Intravenous.   Maintenance: Balanced.   Techniques and Equipment:     - Lines/Monitors: 2nd IV, Arterial Line     Consents            Postoperative Care    Pain management: IV analgesics.   PONV prophylaxis: Ondansetron (or other 5HT-3), Dexamethasone or Solumedrol     Comments:                 Mike Rosario MD

## 2022-09-09 NOTE — PROGRESS NOTES
Vascular Surgery Note    Reports continued abdominal pain. Hb 6.6 , 1 U pRBCS given Continue heparin gtt     Seen with Dr. Elisabeth Aly MD  Fellow

## 2022-09-09 NOTE — PROGRESS NOTES
SURGICAL ICU PROGRESS NOTE  09/09/2022      CO-MORBIDITIES:   Mesenteric thrombosis (H)  Superior mesenteric artery thrombosis (H)  Aortic thrombus (H)    ASSESSMENT: Bridgette Veras is a 46-year old woman with PMHx significant for GERD, RNY-gastric bypass, previous C-sections, and abdominal wall adhesions who was transferred from OSH d/2 SMA thrombus and concern for AMI. She is POD-1 s/p emergent ex-lap, SMA thrombectomy, retrograde SMA stenting 2/2. Postoperatively she was transferred to SICU for serial abdominal exams and close monitoring due to moderate residual thrombus and concern for re-occlusion. CTA with right rectus sheath hematoma and ischemic enteritis/colitis due to persistent thrombus in ileocolic artery. Now returned from OR (9/9) for exploratory laparotomy, exploration of abdomen, washout and evacuation of rectus sheath hematoma.    TODAY'S PROGRESS/PLANS:   - s/p above procedure, open abdomen with Abthera in place  - restart HI heparin gtt   - trend CBC, LA    PLAN:  Neurological:  # Acute postoperative pain   - Monitor neurological status. Delirium preventions and precautions  - s/p extubation post-procedure, no ongoing sedation  - Pain: Fentanyl PCA (10mcg/q10min), Robaxin                 Pulmonary:   # Current, everyday smoker  - PRN nicotine patch, pt declining as of now   S/p extubation in PACU  - HF vs BiPaP overnight for increasing O2 requirement, goal SpO2 >92%  - PRN blood gas if continues to require BiPaP after immediate postop period  - IS q15-30 minutes when awake.     Cardiovascular:    Heme:  # Aortic/SMA thrombus s/p above initial procedure (9/8) and takeback (9/9)  # Chronic iron-deficient anemia   # Acute blood loss anemia  - Hemoglobin trending down 8.6>>6.7 > 1u pRBC> 9.6, d/2 known right rectus sheath hematoma, now s/p vessel ligation and evacuation of clot  - Heparin gtt restarted postoperatively per primary team  - LA 2.1, will continue to trend  - trend Heparin Xa  levels  - Transfuse if hgb <7.0 or signs/symptoms of hypoperfusion.   - s/p Heme consult for hypocoagulable state    Gastroenterology/Nutrition:  # persistent distal SMA occlusion with enteritis/colitis  # s/p ExLap (9/8), reexploration (9/9) and open abdomen with abthera in place  # [facility] RYGB (2005)  # B12, iron deficiency d/2 RYBG  - NPO  - Portion of distal ileum with questionable viability, abdomen left open, trend LA and abdominal exams  - Plan OR takeback with ACS 9/10  - No NG d/2 hx RYGB     Renal/Fluids/Electrolytes:   -  mIVF  - Will cont to monitor I/Os, adequate UOP last shift  - Carrasco in place     Endocrine:  # Risk for stress hyperglycemia   - Sliding scale PRN for glucose management, no need since surgery. Goal to keep BG< 180 for optimal wound healing      ID:  # Leukocytosis  - Continue empiric zosyn per primary team, readdress postprocedure 9/10  - trend WBC      MSK:   - Right rectus sheath hematoma, s/p evacuation.   - Plan PT/OT postop     General Cares/Prophylaxis:    DVT Prophylaxis: High-intensity Heparin gtt, SCDs  GI Prophylaxis: H2 Blocker  Restraints: Restraints for medical healing needed: NO     LDAs:  - PIVx3  - carrasco  - abthera     Disposition:  - To remain in SICU for continued monitoring in the setting of ischemic enteritis and open abdomen.       ====================================    SUBJECTIVE:   Patient is sleep since returning to the OR this morning, just received fentanyl bolus from Anesthesia. Abdominal pain controlled at this time.    OBJECTIVE:   1. VITAL SIGNS:   Temp:  [98.1  F (36.7  C)-99.3  F (37.4  C)] 99.2  F (37.3  C)  Pulse:  [] 111  Resp:  [16-30] 22  BP: (123-156)/() 128/57  MAP:  [83 mmHg-293 mmHg] 86 mmHg  Arterial Line BP: (121-294)/() 131/61  FiO2 (%):  [60 %-70 %] 70 %  SpO2:  [90 %-100 %] 90 %  FiO2 (%): (S) 70 %  Resp: 22      2. INTAKE/ OUTPUT:   I/O last 3 completed shifts:  In: 4262.12 [I.V.:3962.12]  Out: 1325 [Urine:1325]      0.74-0.80cc/kg/hr for last two shifts  Abthera output minimal since return to OR    3. PHYSICAL EXAMINATION:   General: no acute distress, resting comfortably  Neuro: A&Ox3  Resp: nonlabored breathing, mildly tachypnic, lungs clear to auscultation biltarally  CV: Tachycardia, regular rhythm  Abdomen: Soft, diffusely tender to palpation, voluntary guarding. Abthera wound vac in place, minimal serosanguinous output in tubing.   Incisions: Abthera in place.  Extremities: warm and well perfused    4. INVESTIGATIONS:   Arterial Blood Gases   Recent Labs   Lab 22  0550 22  0428   PH 7.32* 7.31*   PCO2 41 40   PO2 102 85   HCO3 21 21     Complete Blood Count   Recent Labs   Lab 22  0622  1404   WBC 28.2* 29.6* 35.0* 31.3*   HGB 9.6* 6.7* 7.3* 8.0*    252 316 299     Basic Metabolic Panel  Recent Labs   Lab 22  0622  1404    136 138 137   POTASSIUM 3.8 3.6 3.6 3.7   CHLORIDE 106 105 106 107   CO2 20* 22 17* 21*   BUN 7.3 8.3 9.1 7.6   CR 0.60 0.60 0.63 0.58   * 138* 165* 149*     Liver Function Tests  Recent Labs   Lab 22  0221 22   AST 31 34 37*   ALT 10 13 15   ALKPHOS 81 113* 135*   BILITOTAL 0.3 0.2 0.2   ALBUMIN 3.0* 3.7 4.1     Pancreatic Enzymes  Recent Labs   Lab 22   LIPASE 20     Coagulation Profile  No lab results found in last 7 days.  Lactate  Invalid input(s): LACTATE    5. RADIOLOGY:   Recent Results (from the past 24 hour(s))   Echo Complete   Result Value    LVEF  60-65%    Narrative    [id number]  [id number]  [id number]  [id number]^POOL^[facility],[place]  Echocardiography Laboratory  [address] [place] [id number]     Name: ABDON DRISCOLL  MRN: [id number]  : 1975  Study Date: 2022 01:32 PM  Age: 46 yrs  Gender: Female  Patient [facility]: [facility]  Reason For Study: Arterial  Embolism and Thrombosis  Ordering Physician: IRIS LANZA  Referring Physician: KAL VALDOVINOS  Performed By: Milvia Hanks     BSA: 2.0 m2  Height: 67 in  Weight: 195 lb  HR: 95  ______________________________________________________________________________  Procedure  Complete Portable Echo Adult. Contrast Lumason. Patient was given 5 ml mixture  of 3 ml Optison and 6 ml saline. 4 ml wasted.  ______________________________________________________________________________  Interpretation Summary  No intracardiac thrombus identified.  Left ventricular size, wall motion and function are normal. The ejection  fraction is 60-65%.  Global right ventricular function is normal.  There is no prior study for direct comparison.  ______________________________________________________________________________  Left Ventricle  Left ventricular size, wall motion and function are normal. The ejection  fraction is 60-65%. Left ventricular diastolic function is normal.     Right Ventricle  The right ventricle is normal size. Global right ventricular function is  normal.     Atria  The left atrium appears normal.     Mitral Valve  The mitral valve is normal.     Aortic Valve  Aortic valve is normal in structure and function. The aortic valve is  tricuspid.     Tricuspid Valve  The tricuspid valve is normal. The peak velocity of the tricuspid regurgitant  jet is not obtainable. Pulmonary artery systolic pressure cannot be assessed.     Pulmonic Valve  The pulmonic valve is normal.     Vessels  Normal diameter aortic root and proximal ascending aorta. The inferior vena  cava cannot be assessed.     Pericardium  No pericardial effusion is present.     Compared to Previous Study  There is no prior study for direct comparison.  ______________________________________________________________________________  MMode/2D Measurements & Calculations     IVSd: 0.90 cm  LVIDd: 4.4 cm  LVIDs: 2.7 cm  LVPWd: 1.1 cm  FS: 39.6 %  LV  mass(C)d: 143.5 grams  LV mass(C)dI: 71.8 grams/m2  asc Aorta Diam: 2.7 cm  LVOT diam: 1.9 cm  LVOT area: 2.9 cm2  LA Volume Index (BP): 11.7 ml/m2  RWT: 0.48     Doppler Measurements & Calculations  MV E max matias: 66.1 cm/sec  MV A max matias: 68.6 cm/sec  MV E/A: 0.96  MV dec slope: 378.2 cm/sec2  MV dec time: 0.17 sec  Ao V2 max: 157.2 cm/sec  Ao max P.9 mmHg  Ao V2 mean: 95.5 cm/sec  Ao mean P.4 mmHg  Ao V2 VTI: 21.8 cm  RONAL(I,D): 2.9 cm2  RONAL(V,D): 2.5 cm2  LV V1 max P.2 mmHg  LV V1 max: 134.3 cm/sec  LV V1 VTI: 21.5 cm  SV(LVOT): 62.3 ml  SI(LVOT): 31.1 ml/m2  AV Matias Ratio (DI): 0.85  RONAL Index (cm2/m2): 1.4  E/E' av.6  Lateral E/e': 10.5  Medial E/e': 12.7     ______________________________________________________________________________  Report approved by: Keira Mora 2022 04:16 PM         CTA Abdomen Pelvis with Contrast   Result Value    Radiologist flags See impression #1 and #2 (Urgent)    Narrative    Exam: Computed tomographic angiography of the abdomen and pelvis  without and with contrast, including 3D reformations dated 2022  5:36 PM    Clinical information:  s/p SMA embolectomy, please eval patnecy  mesenteric vessels    Technique: Helical scans through the abdomen and pelvis obtained  before the administration of intravenous contrast media and following  the injection of contrast media  in the arterial phase. Source images  reviewed as well as 3D and multi-planar reconstructions.    Contrast: iopamidol (ISOVUE-370) solution 119 mL    DLP: 1358 mGy*cm    Comparison study: Chest abdomen and pelvis CTA 2022    Findings:      Partially occlusive noncalcified thrombus within the descending  thoracic aorta extending through the diaphragmatic hiatus into the  proximal abdominal aorta and extending into the occluded celiac artery  with distal reconstitution at the bifurcation of the splenic and  common hepatic arteries which appear patent. The proper hepatic,  left,  and right hepatic arteries appear widely patent. The  pancreaticoduodenal artery is patent. Superior mesenteric arterial  stent is widely patent. Occlusion of the ileocolic branch of the  superior mesenteric artery (series 5, image 359) with occlusion  extending distally into multiple branch vessels. Additional occlusion  involving a superior mesenteric artery branch and the mid left  hemiabdomen (series 5, image 390). Diminished arterial enhancement  involving multiple loops of he distal small bowel in the right lower  quadrant with associated bowel wall thickening. Questionable  hypoenhancement of the cecum and ascending colon to the hepatic  flexure without significant colonic wall thickening. No definitive  evidence for perforation although there is scattered intra-abdominal  free air which is favored to be postoperative rather than due to bowel  wall perforation. The inferior mesenteric artery is patent at the  origin with diminutive opacification distally, likely due to contrast  bolus timing.    Right rectus sheath hematoma with evidence for active bleeding  measuring 4.3 x 5.3 cm (series 5, image 438). Additional loculated  fluid collection adjacent to a loop of small bowel in the mid abdomen  measuring 2.5 x 4.3 cm with layering hyperdense fluid possibly  representing a mesenteric hematoma without evidence for active  bleeding (series 5, image 409).    No suspicious hepatic lesion. Multiple subcentimeter hypodensities  throughout the liver likely representing benign hepatic cysts.  Unchanged multifocal splenic infarcts. No evidence for active  hemorrhage. Pancreas is within normal limits. No focal adrenal nodule.  The renal arteries are widely patent without intraluminal thrombus or  significant stenosis. No suspicious renal lesion. No evidence for  renal infarct. No renal stone. No hydronephrosis. Layering contrast  within the urinary bladder consistent with the earlier same day  angiogram. The  uterus and both ovaries are within normal limits. No  abdominal or pelvic lymphadenopathy. Small volume of layering simple  attenuating fluid in the pelvis.     No suspicious sclerotic or lucent osseous lesion.      Impression    Impression:    1. Right rectus sheath hematoma with evidence for active bleeding as  detailed above.  2. New widely patent superior mesenteric arterial origin stent with  distal occlusive thrombus in the ileocolic artery and occlusive distal  branch vessel off of the superior mesenteric artery in the left  hemiabdomen as detailed above. These occlusions result in decreased  enhancement with associated bowel wall thickening of multiple loops of  distal small bowel and proximal colon concerning for bowel ischemia.  No definitive evidence for perforation although this is difficult to  entirely exclude given the multifocal foci of intra-abdominal free air  (given history of patient's recent surgery).  3. Possible mesenteric hematoma adjacent to a loop of small bowel in  the mid abdomen without evidence for active bleeding.  4. Unchanged splenic infarct.  5. Unchanged noncalcified thrombus within the descending thoracic  aorta and abdominal aorta as detailed above.    [Urgent Result: See impression #1 and #2]    Finding was identified on 9/8/2022 5:58 PM.     Dr. Pan was contacted by Dr. Villanueva at 9/8/2022 6:00 PM and  verbalized understanding of the urgent finding.      I have personally reviewed the examination and initial interpretation  and I agree with the findings.    RYNE DUARTE MD         SYSTEM ID:  [id number]       =========================================      Patient seen, findings and plan discussed with surgical ICU staff Dr. Palencia  - - - - - - - - - - - - - - - - - -  Kasie Meeks MD   Gynecology Oncology Fellow  September 9, 2022 , 7:40 AM      SICU [facility] *[id number]

## 2022-09-09 NOTE — PROGRESS NOTES
SURGERY CROSS-COVER NOTE  09/08/2022 11:14 PM     Patient: Bridgette Veras  MRN: [id number]    Evaluating patient with serial abdominal exams. Patient is s/p embolectomy and stenting of SMA. Has a right rectus sheath hematoma on CTA.   Pain is unchanged from previous. Remains on CPAP. Abdominal binder now in place.     Temp:  [98.1  F (36.7  C)-99  F (37.2  C)] 98.5  F (36.9  C)  Pulse:  [] 96  Resp:  [16-27] 27  BP: (132-149)/(64-94) 132/94  MAP:  [83 mmHg-132 mmHg] 132 mmHg  Arterial Line BP: (121-165)/() 142/123  FiO2 (%):  [60 %] 60 %  SpO2:  [90 %-100 %] 95 %     General: NAD, lying in bed, appears comfortable  Abd: tender to palaption over RUQ and LUQ with guarding. No guarding over LLQ. 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     Hgb 6.7: Will hold heparin gtt for now. Will transfuse 1unit PRBC. Consent on file.   Abdominal exam unchanged from prior. Continue to monitor. Plan to re-examine in 2 hrs.    Azra Leahy MD, PharmD  General Surgery, PGY1  Pager [phone]

## 2022-09-09 NOTE — PROGRESS NOTES
SURGERY CROSS-COVER NOTE  09/09/2022 3:02 AM     Patient: Bridgette Veras  MRN: [id number]    Evaluating patient with serial abdominal exams. Patient is s/p embolectomy and stenting of SMA. Has a right rectus sheath hematoma on CTA.   Pain is unchanged from previous Now on oxymask. Abdominal binder remains in place.     Temp:  [98.1  F (36.7  C)-99.3  F (37.4  C)] 99.3  F (37.4  C)  Pulse:  [] 90  Resp:  [16-27] 23  BP: (132-156)/(67-94) 154/80  MAP:  [83 mmHg-293 mmHg] 293 mmHg  Arterial Line BP: (121-294)/() 294/294  FiO2 (%):  [60 %-70 %] 70 %  SpO2:  [90 %-100 %] 96 %     General: NAD, sleeping in bed, appears comfortable  Abd: tender to palaption over RUQ and LUQ with guarding. No guarding over LLQ. 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     Hgb 6.7: Continue to hold heparin gtt for now. Blood transfusion currently infusing. Will plan to recheck hgb with AM labs 1-2 hr after infusion complete. If hgb >7, will plan to resume heparin gtt.   Abdominal exam unchanged from prior. Continue to monitor. Plan to re-examine in 2 hrs.    Azra Leahy MD, PharmD  General Surgery, PGY1  Pager [phone]

## 2022-09-09 NOTE — PROGRESS NOTES
Vascular Surgery Note    Reviewed CTA performed for continued intermittent cramping pain. HR upto 120s now down to 100s. Pain well controlled with fentanyl PCA. CTA shows right rectus tabatha hematoma with small extrav in muscle likely from fascial closure. Unchanged thickening of remnant stomach/excluded stomach. No pneumatosis or PV gas . Decreased enhancement in ileocolic distribution (ascending colon and distal small bowel). Distal ileocolic branches has residual thrombus but SMA and SMA stent widely patent.     On exam, she in tender to palaption over RUQ and LUQ with guarding. No guarding over LLQ. 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     - Hold heparin for 1 hr, restart at same rate   - Hb 7.3 from 8, HR low 100s. Will plan on abdominal binder and serial abdominal exams. Recheck lactate   - Will notify general surgery in case patient needs to go back for exploration.     D/w staff on call Dr. Radha Aly MD  Fellow

## 2022-09-09 NOTE — PROGRESS NOTES
SURGERY CROSS-COVER NOTE  09/09/2022 5:02 AM     Patient: Bridgette Veras  MRN: [id number]    Evaluating patient with serial abdominal exams. Patient is s/p embolectomy and stenting of SMA. Has a right rectus sheath hematoma on CTA.   Pain is unchanged from previous. Remains on oxymask. Abdominal binder remains in place.     Temp:  [98.1  F (36.7  C)-99.3  F (37.4  C)] 98.7  F (37.1  C)  Pulse:  [] 87  Resp:  [16-28] 22  BP: (132-156)/(67-94) 132/69  MAP:  [83 mmHg-293 mmHg] 293 mmHg  Arterial Line BP: (121-294)/() 294/294  FiO2 (%):  [60 %-70 %] 70 %  SpO2:  [90 %-100 %] 94 %     General: NAD, sleeping in bed, appears comfortable  Abd: tender to palaption over RUQ and LUQ with guarding. No guarding over LLQ. 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     Acute anemia: Blood transfusion complete. Awaiting recheck of hgb. RN called lab to have them come draw now. If hgb >7, will plan to resume heparin gtt.   Abdominal exam unchanged from prior. Continue to monitor. Will hand off to vascular team at 0600 and they will continue abdominal exams.     Azra Leahy MD, PharmD  General Surgery, PGY1  Pager [phone]

## 2022-09-09 NOTE — PROGRESS NOTES
SURGERY CROSS-COVER NOTE  09/09/2022 1:00 AM     Patient: Bridgette Veras  MRN: [id number]    Evaluating patient with serial abdominal exams. Patient is s/p embolectomy and stenting of SMA. Has a right rectus sheath hematoma on CTA.   Pain is unchanged from previous. Remains on CPAP. Abdominal binder remains in place.     Temp:  [98.1  F (36.7  C)-99  F (37.2  C)] 98.2  F (36.8  C)  Pulse:  [] 83  Resp:  [16-27] 24  BP: (132-149)/(64-94) 144/82  MAP:  [83 mmHg-140 mmHg] 140 mmHg  Arterial Line BP: (121-173)/() 173/119  FiO2 (%):  [60 %-70 %] 70 %  SpO2:  [90 %-100 %] 100 %     General: NAD, sleeping in bed, appears comfortable  Abd: tender to palaption over RUQ and LUQ with guarding (though less guarding from previous exam). No guarding over LLQ. 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     Hgb 6.7: Continue to hold heparin gtt for now. Still waiting on blood to come up as patient  Did not have a T&S on file.   Abdominal exam unchanged from prior. Continue to monitor. Plan to re-examine in 2 hrs.    Azra Leahy MD, PharmD  General Surgery, PGY1  Pager [phone]

## 2022-09-09 NOTE — OP NOTE
Procedure Date: 09/09/2022    PREOPERATIVE DIAGNOSIS:  Ischemic bowel.    POSTOPERATIVE DIAGNOSIS:  Ischemic bowel.    PROCEDURE:  Laparotomy and exploration of the abdomen.  (NB this was an intra-operative consultation and conducted in combination with the Vascular Surgery Service.)    STAFF SURGEON:  Tariq Lancaster MD, PhD    RESIDENT SURGEONS:  Gabriel Schrader MD    CLINICAL HISTORY:  Bridgette Veras, a 46-year-old female, presents with vascular compromise of the GI tract.  The patient underwent previous vascular surgical reconstitution. At this point, the patient had a right rectus sheath hematoma and concern regarding ongoing ischemia of the small bowel and right colon.  Due to this, the General Surgery Service was consulted for intraoperative consultation regarding the viability of the small bowel and ascending colon.  Open abdominal surgery was underway.  The Vascular Surgery Service was scrubbed in.      PROCEDURE: I scrubbed in at that point with our General Surgery resident and examined the small intestine.  An approximately 100 cm segment of small intestine was particularly concerning and moderately edematous.  The remainder of the bowel was peristaltic; however, this approximately 100 cm segment of small bowel was not peristaltic and there was some difficulty in obtaining Doppler signals at the mesenteric border of this segment small intestine and the distal ileum no. Necrosis was noted, and the bowel was pink.  There were no serosal abnormalities noted.  At this point, because of the concern and compromise of the bowel ischemia, a decision was made to place a temporary abdominal dressing, which was performed.  The omentum was laid on top of the bowel to protect it  An ABThera dressing was applied. The plan at this point is for repeat exploration and inspection of the small intestine for viability within the next 24 hours.  The Vascular Surgery Service completed the abdominal exploration and  closure.  General Surgery Service scrubbed out. Patient will be cared for intensive care and return to the operating room in the next 24 hours for reexamination and inspection of the small intestine prior to closure of fascia.    Tariq Lancaster MD, PhD      D: 2022   T: 2022   MT: [id number]    Name:     ABDON DRISCOLL  MRN:      [id number]        Account:        [id number]   :      1975           Procedure Date: 2022     Document: [id number]

## 2022-09-09 NOTE — PROGRESS NOTES
Vascular Surgery Note    S: Reports improvement in cramping pain overnight. Overall feels a little better. ABle to ambulate to urinate. Hb 6.7 , 1 U pRBCs given. At rest HR 90s, 100s on exertion     O  BP (!) 129/102   Pulse 101   Temp 98.7  F (37.1  C) (Axillary)   Resp 25   Wt 92.4 kg (203 lb 11.3 oz)   SpO2 95%   BMI 34.97 kg/m    Gen: Aox 3  Resp: NLB  Extremities: L DP and PT palpable and monophasic AT and PT   Abd: soft andf mildl tender to palpation in LLQ and LUQ (improved than yest), bruisng over R portion of incision ( tender to palpation with voluntary guarding)    Labs  Lact 1.5-2.1 overnight    ml overnight   Hb pending       A/P    POD 1 s/p embolectomy and stenting of SMA. 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 CLose surveillance with low threshold to RTOR     Will re-evaluate in a couple of hours. Keep NPO    Raquel Aly MD  Fellow

## 2022-09-09 NOTE — PLAN OF CARE
Neuro: A&Ox4, PERRLA, equal strength in all extremities  Cardiac: SR-Sinus tachy. HR from 80s-120's (w/ pain/movement/coughing). SBP between 120s-140s. Tmax: 99.3  Respiratory: Increased O2 needs (15 LPM via oxymask) after return from OR, improving this evening, will wean O2 as able. Respirations shallow d/t pain. LS diminished with expiratory wheezes d/t pt having pain when attempting to cough.  GI: Hypoactive bowel sounds, NPO. Wound vac applied to open abdominal incision, approx 300 mL serosanguinous output after return from OR.   : Carrasco in place. Adequate UOP  LDAs: PIVx3, wound vac, carrasco, arterial line  Integ: abdominal incision  Labs: Hgb: 6.6 (1 unit PRBCs given--> recheck at 2000)  Tests/Procedures: OR  Gtts: LR at 100 mL/hr; heparin gtt at 1350 units/hr, fentanyl PCA pump, ketamine gtt at 15 mg/hr,     For vital signs and complete assessments, please see documentation flowsheets    Plan: Continue heparin gtt, return to OR tomorrow to have abdominal incision closed, pain control    Problem: Bleeding (Surgery Nonspecified)  Goal: Absence of Bleeding  Outcome: Ongoing, Progressing  Intervention: Monitor and Manage Bleeding  Flowsheets  Taken 9/9/2022 1754  Bleeding Management:    blood products administered    dressing monitored    Problem: Pain (Surgery Nonspecified)  Goal: Acceptable Pain Control  Outcome: Ongoing, Not Progressing  Intervention: Prevent or Manage Pain  Flowsheets  Taken 9/9/2022 1754  Pain Management Interventions: (abdominal binder per pt request)    medication (see MAR)    MD notified (comment)    emotional support    care clustered    quiet environment facilitated    pillow support provided    rest    other (see comments)  Diversional Activities: (visitors)    smartphone    other (see comments)    Goal Outcome Evaluation: Reviewed with patient, spouse, and patients mother

## 2022-09-09 NOTE — ANESTHESIA PROCEDURE NOTES
Airway       Patient location during procedure: OR       Procedure Start/Stop Times: 9/9/2022 9:08 AM  Staff -        CRNA: Lori Mcneil APRN CRNA       Performed By: CRNA  Consent for Airway        Urgency: elective  Indications and Patient Condition       Indications for airway management: jojo-procedural       Induction type:intravenous       Mask difficulty assessment: 1 - vent by mask    Final Airway Details       Final airway type: endotracheal airway       Successful airway: ETT - single  Endotracheal Airway Details        ETT size (mm): 7.0       Cuffed: yes       Successful intubation technique: direct laryngoscopy       DL Blade Type: Wang 2       Grade View of Cords: 1       Adjucts: stylet       Measured from: gums/teeth       Secured at (cm): 22    Post intubation assessment        Placement verified by: capnometry, equal breath sounds and chest rise        Number of attempts at approach: 1       Ease of procedure: easy       Dentition: Intact    Medication(s) Administered   Medication Administration Time: 9/9/2022 9:08 AM

## 2022-09-09 NOTE — PRE-PROCEDURE
Vascular Surgery Attending [place] Note    I have seen and examined this patient this morning. The abdominal examination shows increased tenderness on the left and tenderness with a mass effect in the right lower quadrant. The patient has had an increasing O2 requirement and a slightly elevated lactate level this morning. WBC is persistently elevated, and the last Hgb is 9.6. Considering the worsening abdominal exam, the right rectus sheath hematoma with blush, and the known occluded ileocolic artery, I feel that we should proceed with a second-look operation. Dr. Lancaster's acute care surgery team has been notified that possible bowel resection may be necessary. Risks and procedure discussed by Dr. Pan and informed consent has been obtained.    Nathan Barber MD

## 2022-09-09 NOTE — PROGRESS NOTES
"Brief SICU note    Notified by RN of new, growing mass on right of incision. Saw patient, who is complaining of severe pain, attempts twice as high as allowed for PCA in past two hours. There is a large, 5x15cm firm mass on the right side of the incision consistent with hematoma. Vascular team to continue heparin gtt due to arterial clot burden and enteric ischemia as risk of ongoing ischemia outweighs risk of bleeding from known rectus sheath hematoma. Abthera with minimal output. Vitals wnl at this time, UOP adequate.    Hgb 9.7 > 6.6, receiving 1u pRBC currently, will end ~1630, plan repeat CBC/LA after transfusion    Will get AntiXa, coags, LA now  Start ketamine for additional pain control  Notified primary team, await call    O2 requirements have improved since return from OR this morning, on 15L oxymask, with O2 94-96% while I was in the room, no need for high flow or Bipap at this time. Will continue to monitor.    Vital signs:  Temp: 98.8  F (37.1  C) Temp src: Oral BP: 120/67 Pulse: 86   Resp: 22 SpO2: 99 % O2 Device: Oxymask Oxygen Delivery: 15 LPM   Weight: 92.4 kg (203 lb 11.3 oz)  Estimated body mass index is 34.97 kg/m  as calculated from the following:    Height as of 9/7/22: 1.626 m (5' 4\").    Weight as of this encounter: 92.4 kg (203 lb 11.3 oz).    Discussed with Dr Tiffanie Hernández.    Kasie Meeks MD  Gynecology Oncology Fellow  September 9, 2022 , 3:25 PM      Discussed with Vascular at bedside, their exam from OR this morning is unchanged. Plan to continue heparin gtt at this time, will reevaluate labs post transfusion.    Kasie Meeks MD  Gynecology Oncology Fellow  September 9, 2022 , 3:53 PM    "

## 2022-09-09 NOTE — PROGRESS NOTES
Vascular Surgery Note    Seen around 350 pm. HRs 90s while sleeping, low 100s when talking. Abthera with some mild swelling over R lateral aspect, minimally changed since OR. Hb 6.6, 1 U pRBCS given. Continue hep gtt. Discussed with patient and family that we wull continue heparin gtt despite a a drop in Hb  given thrombus burden in aorta and that fact that she most likely did not have a 3 point drop in Hb (AM Hb was 6.7, got 1 U and repeat Hb was 9.6, likely erroneus). Moreover hemodynamically has been the same (HRs 90s when sleeping, low 100s when awake) and SBPs in 140s. Continue current cares. Plan for RTOR in AM with GS    D/w Dr. Elisabeth Aly MD  Fellow

## 2022-09-09 NOTE — PROVIDER NOTIFICATION
SICU notified about increased R sided abdominal swelling and firmness upon palpation. SICU came to bedside to assess. Pt reporting inadequate pain control, IV robaxin and Ketamine gtt ordered.

## 2022-09-10 ENCOUNTER — APPOINTMENT (OUTPATIENT)
Dept: GENERAL RADIOLOGY | Facility: CLINIC | Age: 47
End: 2022-09-10
Attending: SURGERY
Payer: COMMERCIAL

## 2022-09-10 ENCOUNTER — ANESTHESIA (OUTPATIENT)
Dept: SURGERY | Facility: CLINIC | Age: 47
End: 2022-09-10
Payer: COMMERCIAL

## 2022-09-10 LAB
ANION GAP SERPL CALCULATED.3IONS-SCNC: 9 MMOL/L (ref 7–15)
BUN SERPL-MCNC: 4.6 MG/DL (ref 6–20)
CALCIUM SERPL-MCNC: 7.3 MG/DL (ref 8.6–10)
CHLORIDE SERPL-SCNC: 112 MMOL/L (ref 98–107)
CREAT SERPL-MCNC: 0.47 MG/DL (ref 0.51–0.95)
DEPRECATED HCO3 PLAS-SCNC: 20 MMOL/L (ref 22–29)
ERYTHROCYTE [DISTWIDTH] IN BLOOD BY AUTOMATED COUNT: 26.9 % (ref 10–15)
GFR SERPL CREATININE-BSD FRML MDRD: >90 ML/MIN/1.73M2
GLUCOSE BLDC GLUCOMTR-MCNC: 106 MG/DL (ref 70–99)
GLUCOSE SERPL-MCNC: 85 MG/DL (ref 70–99)
HCT VFR BLD AUTO: 27.1 % (ref 35–47)
HGB BLD-MCNC: 7.8 G/DL (ref 11.7–15.7)
MAGNESIUM SERPL-MCNC: 1.8 MG/DL (ref 1.7–2.3)
MCH RBC QN AUTO: 19.8 PG (ref 26.5–33)
MCHC RBC AUTO-ENTMCNC: 28.8 G/DL (ref 31.5–36.5)
MCV RBC AUTO: 69 FL (ref 78–100)
PHOSPHATE SERPL-MCNC: 2.1 MG/DL (ref 2.5–4.5)
PLATELET # BLD AUTO: 219 10E3/UL (ref 150–450)
POTASSIUM SERPL-SCNC: 3.3 MMOL/L (ref 3.4–5.3)
POTASSIUM SERPL-SCNC: 3.3 MMOL/L (ref 3.4–5.3)
POTASSIUM SERPL-SCNC: 3.8 MMOL/L (ref 3.4–5.3)
RBC # BLD AUTO: 3.94 10E6/UL (ref 3.8–5.2)
SODIUM SERPL-SCNC: 141 MMOL/L (ref 136–145)
TRIGL SERPL-MCNC: 125 MG/DL
UFH PPP CHRO-ACNC: 0.31 IU/ML
UFH PPP CHRO-ACNC: 0.59 IU/ML
WBC # BLD AUTO: 27.3 10E3/UL (ref 4–11)

## 2022-09-10 PROCEDURE — 250N000011 HC RX IP 250 OP 636: Performed by: SURGERY

## 2022-09-10 PROCEDURE — 84132 ASSAY OF SERUM POTASSIUM: CPT | Performed by: SURGERY

## 2022-09-10 PROCEDURE — 250N000011 HC RX IP 250 OP 636: Performed by: STUDENT IN AN ORGANIZED HEALTH CARE EDUCATION/TRAINING PROGRAM

## 2022-09-10 PROCEDURE — 36415 COLL VENOUS BLD VENIPUNCTURE: CPT | Performed by: PHYSICIAN ASSISTANT

## 2022-09-10 PROCEDURE — 3E0436Z INTRODUCTION OF NUTRITIONAL SUBSTANCE INTO CENTRAL VEIN, PERCUTANEOUS APPROACH: ICD-10-PCS | Performed by: SURGERY

## 2022-09-10 PROCEDURE — 370N000017 HC ANESTHESIA TECHNICAL FEE, PER MIN: Performed by: SURGERY

## 2022-09-10 PROCEDURE — 36569 INSJ PICC 5 YR+ W/O IMAGING: CPT

## 2022-09-10 PROCEDURE — 258N000003 HC RX IP 258 OP 636: Performed by: STUDENT IN AN ORGANIZED HEALTH CARE EDUCATION/TRAINING PROGRAM

## 2022-09-10 PROCEDURE — 250N000011 HC RX IP 250 OP 636: Performed by: NURSE ANESTHETIST, CERTIFIED REGISTERED

## 2022-09-10 PROCEDURE — 360N000076 HC SURGERY LEVEL 3, PER MIN: Performed by: SURGERY

## 2022-09-10 PROCEDURE — 0DBB0ZZ EXCISION OF ILEUM, OPEN APPROACH: ICD-10-PCS | Performed by: SURGERY

## 2022-09-10 PROCEDURE — 250N000009 HC RX 250: Performed by: NURSE ANESTHETIST, CERTIFIED REGISTERED

## 2022-09-10 PROCEDURE — P9016 RBC LEUKOCYTES REDUCED: HCPCS | Performed by: STUDENT IN AN ORGANIZED HEALTH CARE EDUCATION/TRAINING PROGRAM

## 2022-09-10 PROCEDURE — 250N000009 HC RX 250: Performed by: SURGERY

## 2022-09-10 PROCEDURE — 258N000003 HC RX IP 258 OP 636: Performed by: NURSE ANESTHETIST, CERTIFIED REGISTERED

## 2022-09-10 PROCEDURE — 85027 COMPLETE CBC AUTOMATED: CPT | Performed by: PHYSICIAN ASSISTANT

## 2022-09-10 PROCEDURE — 272N000001 HC OR GENERAL SUPPLY STERILE: Performed by: SURGERY

## 2022-09-10 PROCEDURE — 74018 RADEX ABDOMEN 1 VIEW: CPT | Mod: 26 | Performed by: STUDENT IN AN ORGANIZED HEALTH CARE EDUCATION/TRAINING PROGRAM

## 2022-09-10 PROCEDURE — 99024 POSTOP FOLLOW-UP VISIT: CPT | Mod: GC | Performed by: SURGERY

## 2022-09-10 PROCEDURE — 85520 HEPARIN ASSAY: CPT | Performed by: SURGERY

## 2022-09-10 PROCEDURE — 99291 CRITICAL CARE FIRST HOUR: CPT | Mod: 24 | Performed by: PHYSICIAN ASSISTANT

## 2022-09-10 PROCEDURE — 83735 ASSAY OF MAGNESIUM: CPT | Performed by: SURGERY

## 2022-09-10 PROCEDURE — 250N000009 HC RX 250: Performed by: STUDENT IN AN ORGANIZED HEALTH CARE EDUCATION/TRAINING PROGRAM

## 2022-09-10 PROCEDURE — 999N000015 HC STATISTIC ARTERIAL MONITORING DAILY

## 2022-09-10 PROCEDURE — 84478 ASSAY OF TRIGLYCERIDES: CPT

## 2022-09-10 PROCEDURE — 250N000013 HC RX MED GY IP 250 OP 250 PS 637: Performed by: STUDENT IN AN ORGANIZED HEALTH CARE EDUCATION/TRAINING PROGRAM

## 2022-09-10 PROCEDURE — 44120 REMOVAL OF SMALL INTESTINE: CPT | Mod: 58 | Performed by: SURGERY

## 2022-09-10 PROCEDURE — 999N000063 XR ABDOMEN PORT 1 VIEW

## 2022-09-10 PROCEDURE — 999N000128 HC STATISTIC PERIPHERAL IV START W/O US GUIDANCE

## 2022-09-10 PROCEDURE — 250N000013 HC RX MED GY IP 250 OP 250 PS 637: Performed by: NURSE ANESTHETIST, CERTIFIED REGISTERED

## 2022-09-10 PROCEDURE — 250N000025 HC SEVOFLURANE, PER MIN: Performed by: SURGERY

## 2022-09-10 PROCEDURE — 80048 BASIC METABOLIC PNL TOTAL CA: CPT | Performed by: PHYSICIAN ASSISTANT

## 2022-09-10 PROCEDURE — 272N000451 HC KIT SHRLOCK 5FR POWER PICC DOUBLE LUMEN

## 2022-09-10 PROCEDURE — 88307 TISSUE EXAM BY PATHOLOGIST: CPT | Mod: 26 | Performed by: PATHOLOGY

## 2022-09-10 PROCEDURE — 250N000013 HC RX MED GY IP 250 OP 250 PS 637: Performed by: SURGERY

## 2022-09-10 PROCEDURE — 88307 TISSUE EXAM BY PATHOLOGIST: CPT | Mod: TC | Performed by: SURGERY

## 2022-09-10 PROCEDURE — 250N000011 HC RX IP 250 OP 636: Performed by: PHYSICIAN ASSISTANT

## 2022-09-10 PROCEDURE — 85520 HEPARIN ASSAY: CPT | Performed by: PHYSICIAN ASSISTANT

## 2022-09-10 PROCEDURE — 258N000003 HC RX IP 258 OP 636: Performed by: SURGERY

## 2022-09-10 PROCEDURE — 200N000002 HC R&B ICU UMMC

## 2022-09-10 PROCEDURE — 250N000009 HC RX 250

## 2022-09-10 PROCEDURE — 84100 ASSAY OF PHOSPHORUS: CPT | Performed by: SURGERY

## 2022-09-10 PROCEDURE — 999N000157 HC STATISTIC RCP TIME EA 10 MIN

## 2022-09-10 RX ORDER — DEXAMETHASONE SODIUM PHOSPHATE 4 MG/ML
INJECTION, SOLUTION INTRA-ARTICULAR; INTRALESIONAL; INTRAMUSCULAR; INTRAVENOUS; SOFT TISSUE PRN
Status: DISCONTINUED | OUTPATIENT
Start: 2022-09-10 | End: 2022-09-10

## 2022-09-10 RX ORDER — LIDOCAINE HYDROCHLORIDE 20 MG/ML
INJECTION, SOLUTION INFILTRATION; PERINEURAL PRN
Status: DISCONTINUED | OUTPATIENT
Start: 2022-09-10 | End: 2022-09-10

## 2022-09-10 RX ORDER — LIDOCAINE 40 MG/G
CREAM TOPICAL
Status: ACTIVE | OUTPATIENT
Start: 2022-09-10 | End: 2022-09-13

## 2022-09-10 RX ORDER — SODIUM CHLORIDE, SODIUM LACTATE, POTASSIUM CHLORIDE, CALCIUM CHLORIDE 600; 310; 30; 20 MG/100ML; MG/100ML; MG/100ML; MG/100ML
INJECTION, SOLUTION INTRAVENOUS CONTINUOUS
Status: CANCELLED | OUTPATIENT
Start: 2022-09-10

## 2022-09-10 RX ORDER — HEPARIN SODIUM,PORCINE 10 UNIT/ML
5-20 VIAL (ML) INTRAVENOUS
Status: DISCONTINUED | OUTPATIENT
Start: 2022-09-10 | End: 2022-09-16 | Stop reason: HOSPADM

## 2022-09-10 RX ORDER — DEXTROSE MONOHYDRATE 100 MG/ML
INJECTION, SOLUTION INTRAVENOUS CONTINUOUS PRN
Status: DISCONTINUED | OUTPATIENT
Start: 2022-09-10 | End: 2022-09-16 | Stop reason: HOSPADM

## 2022-09-10 RX ORDER — HYDROMORPHONE HYDROCHLORIDE 1 MG/ML
0.2 INJECTION, SOLUTION INTRAMUSCULAR; INTRAVENOUS; SUBCUTANEOUS EVERY 5 MIN PRN
Status: CANCELLED | OUTPATIENT
Start: 2022-09-10

## 2022-09-10 RX ORDER — FENTANYL CITRATE 50 UG/ML
INJECTION, SOLUTION INTRAMUSCULAR; INTRAVENOUS PRN
Status: DISCONTINUED | OUTPATIENT
Start: 2022-09-10 | End: 2022-09-10

## 2022-09-10 RX ORDER — POTASSIUM CHLORIDE 750 MG/1
40 TABLET, EXTENDED RELEASE ORAL ONCE
Status: COMPLETED | OUTPATIENT
Start: 2022-09-10 | End: 2022-09-10

## 2022-09-10 RX ORDER — FENTANYL CITRATE 50 UG/ML
25 INJECTION, SOLUTION INTRAMUSCULAR; INTRAVENOUS EVERY 5 MIN PRN
Status: CANCELLED | OUTPATIENT
Start: 2022-09-10

## 2022-09-10 RX ORDER — PROPOFOL 10 MG/ML
INJECTION, EMULSION INTRAVENOUS CONTINUOUS PRN
Status: DISCONTINUED | OUTPATIENT
Start: 2022-09-10 | End: 2022-09-10

## 2022-09-10 RX ORDER — LIDOCAINE 40 MG/G
CREAM TOPICAL
Status: DISCONTINUED | OUTPATIENT
Start: 2022-09-10 | End: 2022-09-16 | Stop reason: HOSPADM

## 2022-09-10 RX ORDER — INDOCYANINE GREEN AND WATER 25 MG
KIT INJECTION PRN
Status: DISCONTINUED | OUTPATIENT
Start: 2022-09-10 | End: 2022-09-10 | Stop reason: HOSPADM

## 2022-09-10 RX ORDER — HEPARIN SODIUM,PORCINE 10 UNIT/ML
5-20 VIAL (ML) INTRAVENOUS EVERY 24 HOURS
Status: DISCONTINUED | OUTPATIENT
Start: 2022-09-10 | End: 2022-09-16 | Stop reason: HOSPADM

## 2022-09-10 RX ORDER — KETAMINE HYDROCHLORIDE 10 MG/ML
INJECTION INTRAMUSCULAR; INTRAVENOUS PRN
Status: DISCONTINUED | OUTPATIENT
Start: 2022-09-10 | End: 2022-09-10

## 2022-09-10 RX ORDER — SODIUM CHLORIDE, SODIUM LACTATE, POTASSIUM CHLORIDE, CALCIUM CHLORIDE 600; 310; 30; 20 MG/100ML; MG/100ML; MG/100ML; MG/100ML
INJECTION, SOLUTION INTRAVENOUS CONTINUOUS PRN
Status: DISCONTINUED | OUTPATIENT
Start: 2022-09-10 | End: 2022-09-10

## 2022-09-10 RX ORDER — ONDANSETRON 2 MG/ML
4 INJECTION INTRAMUSCULAR; INTRAVENOUS EVERY 30 MIN PRN
Status: CANCELLED | OUTPATIENT
Start: 2022-09-10

## 2022-09-10 RX ORDER — INDOCYANINE GREEN AND WATER 25 MG
KIT INJECTION PRN
Status: DISCONTINUED | OUTPATIENT
Start: 2022-09-10 | End: 2022-09-10

## 2022-09-10 RX ORDER — POTASSIUM CHLORIDE 7.45 MG/ML
10 INJECTION INTRAVENOUS
Status: COMPLETED | OUTPATIENT
Start: 2022-09-10 | End: 2022-09-10

## 2022-09-10 RX ORDER — OXYCODONE HYDROCHLORIDE 5 MG/1
5 TABLET ORAL EVERY 4 HOURS PRN
Status: CANCELLED | OUTPATIENT
Start: 2022-09-10

## 2022-09-10 RX ORDER — PROPOFOL 10 MG/ML
INJECTION, EMULSION INTRAVENOUS PRN
Status: DISCONTINUED | OUTPATIENT
Start: 2022-09-10 | End: 2022-09-10

## 2022-09-10 RX ORDER — ALBUTEROL SULFATE 90 UG/1
AEROSOL, METERED RESPIRATORY (INHALATION) PRN
Status: DISCONTINUED | OUTPATIENT
Start: 2022-09-10 | End: 2022-09-10

## 2022-09-10 RX ORDER — ONDANSETRON 4 MG/1
4 TABLET, ORALLY DISINTEGRATING ORAL EVERY 30 MIN PRN
Status: CANCELLED | OUTPATIENT
Start: 2022-09-10

## 2022-09-10 RX ORDER — INDOCYANINE GREEN AND WATER 25 MG
2.5 KIT INJECTION ONCE
Status: CANCELLED | OUTPATIENT
Start: 2022-09-10 | End: 2022-09-10

## 2022-09-10 RX ORDER — ONDANSETRON 2 MG/ML
INJECTION INTRAMUSCULAR; INTRAVENOUS PRN
Status: DISCONTINUED | OUTPATIENT
Start: 2022-09-10 | End: 2022-09-10

## 2022-09-10 RX ADMIN — DEXAMETHASONE SODIUM PHOSPHATE 4 MG: 4 INJECTION, SOLUTION INTRA-ARTICULAR; INTRALESIONAL; INTRAMUSCULAR; INTRAVENOUS; SOFT TISSUE at 11:15

## 2022-09-10 RX ADMIN — MIDAZOLAM 1 MG: 1 INJECTION INTRAMUSCULAR; INTRAVENOUS at 11:38

## 2022-09-10 RX ADMIN — POTASSIUM CHLORIDE 10 MEQ: 7.46 INJECTION, SOLUTION INTRAVENOUS at 16:59

## 2022-09-10 RX ADMIN — LIDOCAINE HYDROCHLORIDE 100 MG: 20 INJECTION, SOLUTION INFILTRATION; PERINEURAL at 10:59

## 2022-09-10 RX ADMIN — HYDROMORPHONE HYDROCHLORIDE 0.5 MG: 1 INJECTION, SOLUTION INTRAMUSCULAR; INTRAVENOUS; SUBCUTANEOUS at 12:41

## 2022-09-10 RX ADMIN — PROPOFOL 50 MCG/KG/MIN: 10 INJECTION, EMULSION INTRAVENOUS at 12:42

## 2022-09-10 RX ADMIN — PHENYLEPHRINE HYDROCHLORIDE 100 MCG: 10 INJECTION INTRAVENOUS at 12:25

## 2022-09-10 RX ADMIN — ALBUTEROL SULFATE 6 PUFF: 108 INHALANT RESPIRATORY (INHALATION) at 12:27

## 2022-09-10 RX ADMIN — KETAMINE HYDROCHLORIDE 15 MG/HR: 100 INJECTION, SOLUTION, CONCENTRATE INTRAMUSCULAR; INTRAVENOUS at 16:23

## 2022-09-10 RX ADMIN — I.V. FAT EMULSION 250 ML: 20 EMULSION INTRAVENOUS at 19:51

## 2022-09-10 RX ADMIN — Medication 20 MG: at 11:36

## 2022-09-10 RX ADMIN — METHOCARBAMOL 500 MG: 100 INJECTION, SOLUTION INTRAMUSCULAR; INTRAVENOUS at 00:01

## 2022-09-10 RX ADMIN — MAGNESIUM SULFATE HEPTAHYDRATE: 500 INJECTION, SOLUTION INTRAMUSCULAR; INTRAVENOUS at 19:51

## 2022-09-10 RX ADMIN — Medication 20 MG: at 12:04

## 2022-09-10 RX ADMIN — PIPERACILLIN AND TAZOBACTAM 3.38 G: 3; .375 INJECTION, POWDER, LYOPHILIZED, FOR SOLUTION INTRAVENOUS at 22:22

## 2022-09-10 RX ADMIN — HEPARIN SODIUM 1350 UNITS/HR: 10000 INJECTION, SOLUTION INTRAVENOUS at 00:02

## 2022-09-10 RX ADMIN — SUGAMMADEX 200 MG: 100 INJECTION, SOLUTION INTRAVENOUS at 13:00

## 2022-09-10 RX ADMIN — FENTANYL CITRATE 50 MCG: 50 INJECTION, SOLUTION INTRAMUSCULAR; INTRAVENOUS at 10:59

## 2022-09-10 RX ADMIN — Medication 10 MG: at 12:26

## 2022-09-10 RX ADMIN — Medication 10 MG: at 11:38

## 2022-09-10 RX ADMIN — PIPERACILLIN AND TAZOBACTAM 3.38 G: 3; .375 INJECTION, POWDER, LYOPHILIZED, FOR SOLUTION INTRAVENOUS at 15:31

## 2022-09-10 RX ADMIN — ONDANSETRON 4 MG: 2 INJECTION INTRAMUSCULAR; INTRAVENOUS at 08:07

## 2022-09-10 RX ADMIN — METHOCARBAMOL 500 MG: 100 INJECTION, SOLUTION INTRAMUSCULAR; INTRAVENOUS at 15:32

## 2022-09-10 RX ADMIN — MIDAZOLAM 1 MG: 1 INJECTION INTRAMUSCULAR; INTRAVENOUS at 12:26

## 2022-09-10 RX ADMIN — SODIUM CHLORIDE, POTASSIUM CHLORIDE, SODIUM LACTATE AND CALCIUM CHLORIDE: 600; 310; 30; 20 INJECTION, SOLUTION INTRAVENOUS at 20:29

## 2022-09-10 RX ADMIN — Medication 50 MG: at 10:59

## 2022-09-10 RX ADMIN — PHENYLEPHRINE HYDROCHLORIDE 100 MCG: 10 INJECTION INTRAVENOUS at 11:04

## 2022-09-10 RX ADMIN — INDOCYANINE GREEN AND WATER 25 MG: KIT at 12:07

## 2022-09-10 RX ADMIN — FENTANYL CITRATE 50 MCG: 50 INJECTION, SOLUTION INTRAMUSCULAR; INTRAVENOUS at 11:31

## 2022-09-10 RX ADMIN — PIPERACILLIN AND TAZOBACTAM 3.38 G: 3; .375 INJECTION, POWDER, LYOPHILIZED, FOR SOLUTION INTRAVENOUS at 08:12

## 2022-09-10 RX ADMIN — METHOCARBAMOL 500 MG: 100 INJECTION, SOLUTION INTRAMUSCULAR; INTRAVENOUS at 07:59

## 2022-09-10 RX ADMIN — SODIUM CHLORIDE, POTASSIUM CHLORIDE, SODIUM LACTATE AND CALCIUM CHLORIDE: 600; 310; 30; 20 INJECTION, SOLUTION INTRAVENOUS at 06:23

## 2022-09-10 RX ADMIN — SODIUM CHLORIDE, POTASSIUM CHLORIDE, SODIUM LACTATE AND CALCIUM CHLORIDE: 600; 310; 30; 20 INJECTION, SOLUTION INTRAVENOUS at 10:47

## 2022-09-10 RX ADMIN — HYDROMORPHONE HYDROCHLORIDE 0.5 MG: 1 INJECTION, SOLUTION INTRAMUSCULAR; INTRAVENOUS; SUBCUTANEOUS at 11:37

## 2022-09-10 RX ADMIN — POTASSIUM CHLORIDE 10 MEQ: 7.46 INJECTION, SOLUTION INTRAVENOUS at 15:51

## 2022-09-10 RX ADMIN — ONDANSETRON 4 MG: 2 INJECTION INTRAMUSCULAR; INTRAVENOUS at 13:00

## 2022-09-10 RX ADMIN — SENNOSIDES AND DOCUSATE SODIUM 2 TABLET: 8.6; 5 TABLET ORAL at 07:59

## 2022-09-10 RX ADMIN — KETAMINE HYDROCHLORIDE 15 MG/HR: 100 INJECTION, SOLUTION, CONCENTRATE INTRAMUSCULAR; INTRAVENOUS at 06:37

## 2022-09-10 RX ADMIN — KETAMINE HYDROCHLORIDE 15 MG/HR: 100 INJECTION, SOLUTION, CONCENTRATE INTRAMUSCULAR; INTRAVENOUS at 22:22

## 2022-09-10 RX ADMIN — PROPOFOL 200 MG: 10 INJECTION, EMULSION INTRAVENOUS at 10:59

## 2022-09-10 RX ADMIN — KETAMINE HYDROCHLORIDE 15 MG/HR: 100 INJECTION, SOLUTION, CONCENTRATE INTRAMUSCULAR; INTRAVENOUS at 00:03

## 2022-09-10 RX ADMIN — LIDOCAINE HYDROCHLORIDE ANHYDROUS 3 ML: 10 INJECTION, SOLUTION INFILTRATION at 15:18

## 2022-09-10 RX ADMIN — FENTANYL CITRATE 100 MCG: 50 INJECTION, SOLUTION INTRAMUSCULAR; INTRAVENOUS at 10:54

## 2022-09-10 RX ADMIN — POTASSIUM CHLORIDE 40 MEQ: 750 TABLET, EXTENDED RELEASE ORAL at 05:59

## 2022-09-10 RX ADMIN — PIPERACILLIN AND TAZOBACTAM 3.38 G: 3; .375 INJECTION, POWDER, LYOPHILIZED, FOR SOLUTION INTRAVENOUS at 03:34

## 2022-09-10 RX ADMIN — HEPARIN SODIUM 1350 UNITS/HR: 10000 INJECTION, SOLUTION INTRAVENOUS at 20:29

## 2022-09-10 ASSESSMENT — ACTIVITIES OF DAILY LIVING (ADL)
ADLS_ACUITY_SCORE: 25

## 2022-09-10 ASSESSMENT — LIFESTYLE VARIABLES: TOBACCO_USE: 1

## 2022-09-10 NOTE — OP NOTE
Procedure Date: 09/10/2022    PREOPERATIVE DIAGNOSIS:  Ischemic bowel.    POSTOPERATIVE DIAGNOSIS:  Ischemic bowel.    PROCEDURE:  Segmental small bowel resection.    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  The patient has been cared for by the Vascular Surgery Service and all revascularization that was possible has been achieved.  A stent has been placed and the patient has been watched with an open abdomen.  The patient and the patient's family understood the risks and benefits of surgery including the potential for bowel resection, potential for hernia recurrence, hernia issues ongoing bowel ischemia as well as all the complications of the anastomosis including leakage, bleeding and infection.  The patient and the family also understood the risk of the general anesthetic, including myocardial infarction, pulmonary emboli, stroke, even death in the operating room.  The patient understood these risks and wished to proceed.    DESCRIPTION OF PROCEDURE:  Bridgette Traore was taken to the main operating room under general endotracheal anesthesia.  The temporary abdominal dressing was removed.  The patient was prepped and draped in sterile fashion.  Following appropriate timeout procedure to assure patient identification and procedure, the abdominal laparotomy was carefully explored.  The small intestine was noted to have no arterial signal and approximately 50 cm segment of small bowel.  The vascular surgery staff and residents as well as the residents on the General Surgery Service and myself were present to observe decision was made for resection due to the ischemic nature of the small intestine.  Following the decision, the WILLIAM stapler was used to divide and staple the small intestine proximally and distally.  The mesentery was taken down  with the Impact LigaSure.  The bowel was passed off to Pathology.  Attention was then directed to bringing the loops of bowel next to each with no tension.  Enterotomies were made at the staple lines and a common channel with a 75 WILLIAM stapler was made.  This was inspected carefully to eliminate the potential for bleeding as the patient requires heparinization after surgery.  The common channel was carefully inspected.  There was no bleeding present.  At this point, following creation of the common channel with the Endo-WILLIAM stapler, a TA stapler was used to close the enterotomies.  Excellent staple line was in place.  A small amount of bleeding and oozing were present at the staple line and the staple lines were then oversewn with interrupted 3-0 silk as well as 4-0 PDS suture.  The bowel anastomosis was widely patent.  The mesenteric defect was closed with running 4-0 PDS suture.  Abdominal cavity was irrigated with sterile normal saline.  The omentum was laid over the anastomosis and the area of the right rectus sheath was closed at the site of the previous rectus sheath hematoma.  Excellent hemostasis was also noted at the rectus sheath.  The fascia at the midline was then closed with running 0 PDS suture and interrupted and the subcutaneous tissue was irrigated with sterile normal saline, and the skin edges were then reapproximated with surgical clips and a sterile dry dressing was applied.  The patient tolerated the procedure well.  Needle and sponge counts were correct x 2.  The patient was returned to postanesthesia recovery in good condition.    Tariq Lancaster MD        D: 09/10/2022   T: 09/10/2022   MT: [id number]    Name:     ABDON DRISCOLL  MRN:      [id number]        Account:        [id number]   :      1975           Procedure Date: 09/10/2022     Document: [id number]

## 2022-09-10 NOTE — ANESTHESIA CARE TRANSFER NOTE
Patient: Bridgette Veras    Procedure: Procedure(s):  LAPAROTOMY, FACIAL CLOSURE       Diagnosis: Open wound of abdomen [S31.109A]  Diagnosis Additional Information: No value filed.    Anesthesia Type:   No value filed.     Note:    Oropharynx: spontaneously breathing  Level of Consciousness: awake and drowsy  Oxygen Supplementation: face mask    Independent Airway: airway patency satisfactory and stable  Dentition: dentition unchanged  Vital Signs Stable: post-procedure vital signs reviewed and stable  Report to RN Given: handoff report given  Patient transferred to: PACU    Handoff Report: Identifed the Patient, Identified the Reponsible Provider, Reviewed the pertinent medical history, Discussed the surgical course, Reviewed Intra-OP anesthesia mangement and issues during anesthesia, Set expectations for post-procedure period and Allowed opportunity for questions and acknowledgement of understanding      Vitals:  Vitals Value Taken Time   BP     Temp     Pulse     Resp     SpO2         Electronically Signed By: LOUIE Rascon CRNA  September 10, 2022  1:18 PM

## 2022-09-10 NOTE — PROCEDURES
[facility]    Double Lumen PICC Placement    Date/Time: 9/10/2022 3:11 PM  Performed by: Dahlia Rankin RN  Authorized by: Winnie Ball MD   Indications: TPN.      UNIVERSAL PROTOCOL   Site Marked: Yes  Prior Images Obtained and Reviewed:  Yes  Required items: Required blood products, implants, devices and special equipment available    Patient identity confirmed:  Verbally with patient and arm band  NA - No sedation, light sedation, or local anesthesia  Confirmation Checklist:  Patient's identity using two indicators, relevant allergies, procedure was appropriate and matched the consent or emergent situation and correct equipment/implants were available  Time out: Immediately prior to the procedure a time out was called    Universal Protocol: the Joint Commission Universal Protocol was followed    Preparation: Patient was prepped and draped in usual sterile fashion       ANESTHESIA    Anesthesia: See MAR for details  Local Anesthetic:  Lidocaine 1% without epinephrine  Anesthetic Total (mL):  3      SEDATION    Patient Sedated: No        Preparation: skin prepped with ChloraPrep  Skin prep agent: skin prep agent completely dried prior to procedure  Sterile barriers: maximum sterile barriers were used: cap, mask, sterile gown, sterile gloves, and large sterile sheet  Hand hygiene: hand hygiene performed prior to central venous catheter insertion  Type of line used: Power PICC  Catheter type: double lumen  Lumen type: non-valved  Catheter size: 5 Fr  Brand: Bard  Lot number: [id number]  Placement method: venipuncture, MST, ultrasound and tip navigation system  Number of attempts: 1  Difficulty threading catheter: no  Successful placement: yes  Orientation: right    Location: basilic vein (vein diameter- 0.43cm)  Arm circumference: adults 10 cm  Extremity circumference: 32  Visible catheter length: 1  Total catheter length: 39  Dressing and securement: alcohol  impregnated caps, blood cleaned with CHG, chlorhexidine disc applied, site cleansed, statlock, sterile dressing applied and transparent dressing  Post procedure assessment: blood return through all ports, free fluid flow and placement verified by 3CG technology  PROCEDURE   Patient Tolerance:  Patient tolerated the procedure well with no immediate complicationsDescribe Procedure: PICC placement verified by Mirego 3CG tip confirmation system. PICC okay to use.

## 2022-09-10 NOTE — OR NURSING
Patient came down with an open belly and wound vac. Dr. Marlow, Radiologist confirmed that there is no retained item/foreign body inside her belly through an Xray image after closure.

## 2022-09-10 NOTE — PLAN OF CARE
Major Shift Events:      Continues to have pain with the open abd; fentanyl PCA and ketamine infusion. Refuses positioning d/t pain. Heparin held this AM per SICU team for scheduled return to OR for washout and potential closure of abd.     For vital signs and complete assessments, please see documentation flowsheets.     Problem: Pain Acute  Goal: Acceptable Pain Control and Functional Ability  Intervention: Develop Pain Management Plan  Recent Flowsheet Documentation  Taken 9/10/2022 0400 by Maurilio De La Torre, RN  Pain Management Interventions:   medication (see MAR)   MD notified (comment)   emotional support   care clustered   quiet environment facilitated   pillow support provided   rest   other (see comments)  Taken 9/10/2022 0000 by Maurilio De La Torre, RN  Pain Management Interventions:   medication (see MAR)   MD notified (comment)   emotional support   care clustered   quiet environment facilitated   pillow support provided   rest   other (see comments)  Taken 9/9/2022 2000 by Maurilio De La Torre, RN  Pain Management Interventions:   medication (see MAR)   MD notified (comment)   emotional support   care clustered   quiet environment facilitated   pillow support provided   rest   other (see comments)

## 2022-09-10 NOTE — PROGRESS NOTES
SURGICAL ICU PROGRESS NOTE  09/10/2022      Date of Service (when I saw the patient): 09/10/2022    ASSESSMENT:  Bridgette Veras is a 46-year old woman with PMHx significant for GERD, RNY-gastric bypass, previous C-sections, and abdominal wall adhesions who was transferred from OSH d/2 SMA thrombus and concern for Acute mesenteric ischemia  Now s/p   s/p emergent ex-lap, SMA thrombectomy, retrograde SMA stenting on 9/8/22/ Postoperatively she was transferred to SICU for serial abdominal exams and close monitoring due to moderate residual thrombus and concern for re-occlusion. CTA 9/8 with right rectus sheath hematoma and ischemic enteritis/colitis due to persistent thrombus in ileocolic artery. Now returned from OR (9/9) for exploratory laparotomy, exploration of abdomen, washout and evacuation of rectus sheath hematoma.    CHANGES and MAJOR THINGS TODAY:   OR with GVS for abdominal closures-- OR @ 10 am   Continue with heparin gtt   Reassess post OR   Bedrest for now while with open abdomen   Stop remaining IV Venofer doses per discussion with Heme team.       PLAN:  Neurological:  # Acute postoperative pain   # Depression-- no meds on PTA list   - Delirium preventions and precautions  - s/p extubation post-procedure, no ongoing sedation  - Pain:    - Fentanyl PCA (10mcg/q10min)   - Ketamine 15 mg/hr    - Robaxin IV q 8 hours   - scheduled tylenol for pain              Pulmonary:   # Current, everyday smoker  # Hypoxia respirator failure,  S/p extubation in PACU  - PRN nicotine patch, pt declining as of now   - BiPaP overnight for increasing O2 requirement, goal SpO2 >92%. Weaned to oxy plus 15LPM   - IS q15-30 minutes when awake.     Cardiovascular:    # no acute issues   -  Remove non-functional arterial line     Renal/fluid electrolytes   # hypokalemia   - electrolyte replacement in place   - cont with MIVF for now while PO intake     Gastroenterology/Nutrition:  # persistent distal SMA occlusion with  enteritis/colitis  # s/p ExLap (9/8), reexploration (9/9) and open abdomen with abthera in place  # s/p RYGB (2005)  # B12, iron deficiency d/2 RYBG  # elevated liver enzymes, now resolved   - NPO for now, diet when approved by ACS team   - OR 9/9 Portion of distal ileum with questionable viability, abdomen left open, trend LA and abdominal exams  - Plan OR takeback with ACS 9/10  - No NG d/2 hx RYGB  - Bowel regimen      Renal/Fluids/Electrolytes:   # Hypokalemia   - replacement protocol of potassium   -  mIVF  - Carrasco in place for now, remove when able to get OOB.      Endocrine:  # Concern for  stress hyperglycemia   - PRN for glucose checks and management, Goal to keep BG< 180 for optimal wound healing   - sliding scale insulin if needed      ID:  # Leukocytosis  - Continue empiric zosyn per primary team, readdress postprocedure 9/10  - trend WBC, currently afebrile. Monitor .     Heme:  # Aortic/SMA thrombus s/p above initial procedure (9/8) and takeback (9/9)  # Acute blood loss anemia   # rectus sheath hematoma   - Transfuse if hgb <7.0 or signs/symptoms of hypoperfusion.   - Heparin gtt restarted postoperatively per primary team  - trend Heparin Xa levels      # Chronic iron-deficient anemia   # microcytic anemia   # B12 deficiency   - appreciate hematology consult and recommendations    - will defer long term AC to  Heme and Vascular surgery. hypercoag eval in place at this time.   - continue with B12 and IV iron supplementation      MSK:   #  Right rectus sheath hematoma s/p Evacuation of hematoma 9/9 now s/p vessel ligation   #  S/p weakness and deconditioning of critical illness   -  PT/OT consult post OR   - abdominal binder for comfort      General Cares/Prophylaxis:    DVT Prophylaxis: High-intensity Heparin gtt, SCDs  GI Prophylaxis: H2 Blocker  Restraints: Restraints for medical healing needed: NO     LDAs:  - PIVx3  - carrasco  - abthera     Disposition:  - SICU for now     Time spent on this  Encounter   Billing:  I spent 35 minutes bedside and on the inpatient unit today managing the critical care of Bridgette Veras in relation to the issues listed in this note.      Jonathon Yanes PA-C    ====================================  INTERVAL HISTORY:  Course reviewed. No acute events overnight. Pain controlled improved. Denies visual or auditory hallucinations. Denies chest pain or SOB or fevers. Plan for OR for takeback at 10am.     OBJECTIVE:   1. VITAL SIGNS:   Temp:  [98.3  F (36.8  C)-99.3  F (37.4  C)] 98.5  F (36.9  C)  Pulse:  [] 76  Resp:  [14-22] 14  BP: (120-144)/(57-82) 140/66  MAP:  [82 mmHg-161 mmHg] 149 mmHg  Arterial Line BP: (109-171)/() 159/145  SpO2:  [90 %-100 %] 97 %  FiO2 (%): (S) 70 %  Resp: 14      2. INTAKE/ OUTPUT:   I/O last 3 completed shifts:  In: 3870 [I.V.:3595]  Out: 4580 [Urine:4180; Drains:350; Blood:50]    3. PHYSICAL EXAMINATION:  General: laying in bed, NAD.  HEENT: pupils 3mm and reactive. OP clear   Neuro: A&Ox3- able to tell me place/location/reason for hospitalization appropriately, NAD. Follow simple commands.   Pulm/Resp: Clear breath sounds bilaterally without rhonchi, crackles or wheezes, some splinting noted due to abdominal pain.   CV: RRR, S1/S2   Abdomen: abdominal binder present, Abthera suction intact, thin serosanguinous drainage in tubing reservoir abdomen appropriately TTP.   : (+) carrasco catheter in place, urine yellow and clear  MSK/Extremities: no peripheral edema, moving all extremities, peripheral pulses intact, calves soft and compressible, extremities well perfused, 2+     4. INVESTIGATIONS:   Arterial Blood Gases   Recent Labs   Lab 09/09/22  1246 09/08/22  0550 09/08/22  0428   PH 7.44 7.32* 7.31*   PCO2 37 41 40   PO2 65* 102 85   HCO3 25 21 21     Complete Blood Count   Recent Labs   Lab 09/09/22  2151 09/09/22  1239 09/09/22  0626 09/08/22  2155   WBC 28.2* 24.0* 28.2* 29.6*   HGB 7.6* 6.6* 9.6* 6.7*    228 185 252      Basic Metabolic Panel  Recent Labs   Lab 09/10/22  0336 09/09/22  0626 09/08/22  2155 09/08/22  1825 09/08/22  1404   NA  --  140 136 138 137   POTASSIUM 3.3* 3.8 3.6 3.6 3.7   CHLORIDE  --  106 105 106 107   CO2  --  20* 22 17* 21*   BUN  --  7.3 8.3 9.1 7.6   CR  --  0.60 0.60 0.63 0.58   GLC  --  120* 138* 165* 149*     Liver Function Tests  Recent Labs   Lab 09/09/22  1524 09/08/22  1825 09/08/22  1404 09/08/22  0221 09/07/22 2014   AST  --  31  --  34 37*   ALT  --  10  --  13 15   ALKPHOS  --  81  --  113* 135*   BILITOTAL  --  0.3  --  0.2 0.2   ALBUMIN  --  3.0*  --  3.7 4.1   INR 1.70*  --  1.49*  --   --      Pancreatic Enzymes  Recent Labs   Lab 09/07/22 2014   LIPASE 20     Coagulation Profile  Recent Labs   Lab 09/09/22  1524 09/08/22  1404   INR 1.70* 1.49*   PTT 60*  --          5. RADIOLOGY:   No results found for this or any previous visit (from the past 24 hour(s)).    =========================================

## 2022-09-10 NOTE — ANESTHESIA POSTPROCEDURE EVALUATION
Patient: Bridgette Veras    Procedure: Procedure(s):  LAPAROTOMY, SMALL BOWEL RESECTION, INCISIONAL WOUND CLOSURE       Anesthesia Type:  General    Note:  Disposition: Admission; ICU            ICU Sign Out: Anesthesiologist/ICU physician sign out WAS performed   Postop Pain Control: Uneventful            Sign Out: Well controlled pain   PONV: No   Neuro/Psych: Uneventful            Sign Out: Acceptable/Baseline neuro status   Airway/Respiratory: Uneventful            Sign Out: Acceptable/Baseline resp. status   CV/Hemodynamics: Uneventful            Sign Out: Acceptable CV status   Other NRE: NONE   DID A NON-ROUTINE EVENT OCCUR? No           Last vitals:  Vitals Value Taken Time   /74 09/10/22 1520   Temp 36.7  C (98.1  F) 09/10/22 1330   Pulse 79 09/10/22 1527   Resp 15 09/10/22 1330   SpO2 96 % 09/10/22 1527   Vitals shown include unvalidated device data.    Electronically Signed By: Christopher J. Behrens, MD  September 10, 2022  3:28 PM

## 2022-09-10 NOTE — PROGRESS NOTES
Vascular Surgery Note    Please continue heparin gtt . Ok to stop when patient being transferred to OR. Hb 7.7 after 1 U pRBCS (lucia appropriately ), HR 70s-80s and SBPs 150s. AM labs pending       Raquel Aly MD  Fellow

## 2022-09-10 NOTE — BRIEF OP NOTE
[facility]    Brief Operative Note    Pre-operative diagnosis: Open wound of abdomen [S31.109A]  Post-operative diagnosis Small bowel ischemia    Procedure: Procedure(s):  LAPAROTOMY, FACIAL CLOSURE  Surgeon: Surgeon(s) and Role:     * Tariq Lancaster MD - Primary     * Lino Tyler MD - Resident - Assisting     * Raquel Aly MD - Fellow - Assisting  Anesthesia: General   Estimated Blood Loss: Less than 50 ml    Drains: None  Specimens:   ID Type Source Tests Collected by Time Destination   1 : small bowel Tissue Other SURGICAL PATHOLOGY EXAM Tariq Lancaster MD 9/10/2022 12:41 PM      Findings:   50cm ileum resected. Primary anastomosis..  Complications: None.  Implants: * No implants in log *

## 2022-09-10 NOTE — PLAN OF CARE
Neuro: A&Ox4, PERRLA, equal strength in all extremities  Cardiac: NSR. HR between 70s-low 100's. SBP between 110s-140s. Tmax: 98.3  Respiratory: Respirations shallow d/t pain. LS diminished with intermittent expiratory wheezes d/t pt having pain when attempting to cough. Desaturated to high 80s when trying to wean O2 down from 15 LPM via oxymask. Pt educated on use of IS & using q1h  GI: Hypoactive bowel sounds. Abdominal binder in place to help w/ pain. Soft but tender upon palpation.  : Carrasco remains in place. Adequate UOP.  LDAs: PIV x3, double lumen PICC, carrasco  Integ: Abdominal incision- small amount of dried drainage- marked  Labs: Potassium recheck at 3.8, replaced w/ 20 mEq KCl this afternoon.   Tests/Procedures: Went to OR for abdominal closure, returned to 4A around 1315  Gtts: LR at 100 mL/hr; heparin gtt at 1350 units/hr, fentanyl PCA pump, ketamine gtt at 15 mg/hr    For vital signs and complete assessments, please see documentation flowsheets    Plan: Start TPN, wean O2, pain management    Problem: Respiratory Compromise (Surgery Nonspecified)  Goal: Effective Oxygenation and Ventilation  Outcome: Ongoing, Not Progressing  Intervention: Optimize Oxygenation and Ventilation  Flowsheets  Taken 9/10/2022 1726  Airway/Ventilation Management:    airway patency maintained    pulmonary hygiene promoted  Head of Bed (HOB) Positioning: HOB at 30 degrees    Problem: Pain (Surgery Nonspecified)  Goal: Acceptable Pain Control  Outcome: Ongoing, Progressing  Intervention: Prevent or Manage Pain  Recent Flowsheet Documentation  Taken 9/10/2022 1600 by Gila Groves, RN  Pain Management Interventions:    care clustered    distraction    emotional support    pain pump in use    rest  Diversional Activities: (visitors)    smartphone    other (see comments)     Goal Outcome Evaluation: Reviewed with patient and spouse (Dennys)

## 2022-09-10 NOTE — PLAN OF CARE
Goal Outcome Evaluation:  Problem: Oral Intake Inadequate  Goal: Improved Oral Intake  Outcome: Unable to Meet, Plan Revised - initiate PN

## 2022-09-10 NOTE — PROGRESS NOTES
Vascular Surgery Note    S: Reports improvement in cramping pain overnight. Hb stable 7.8 vs 7.7.     O  /67   Pulse 75   Temp 98.6  F (37  C) (Oral)   Resp 16   Wt 91.2 kg (201 lb)   SpO2 96%   BMI 34.50 kg/m    Gen: Aox 3  Resp: NLB  Extremities: L DP and PT palpable and monophasic AT and PT   Abd: soft andf mildl tender to palpation in LLQ and LUQ (improved than yest), bruisng over R portion of incision ( tender to palpation with voluntary guarding)    Labs  Lact WNL   ml last shift   Hb stable       A/P    POD 2  s/p embolectomy and stenting of SMA. 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 RTOR on 9/9 for ex lap, drainage of rectus sheath hematoma with findings of possible ischemia of  one segment of ileum , Abthera placed.     - Plan for RTOR today for evaluation of bowel and possible fascial closure   - IVF@100  - Continue zosyn   - Please continue heparin gtt. Ok to hold when on way to OR        Raquel Aly MD  Fellow

## 2022-09-10 NOTE — ANESTHESIA PREPROCEDURE EVALUATION
Anesthesia Pre-Procedure Evaluation    Patient: Bridgette Veras   MRN: [id number] : 1975        Procedure : Procedure(s):  LAPAROTOMY, FACIAL CLOSURE          Past Medical History:   Diagnosis Date     Cervical high risk HPV (human papillomavirus) test positive 16    type 16     Chickenpox      Chlamydia trachomatis infection of unspecified site      POONAM 1      Depressive disorder      Depressive disorder, not elsewhere classified      History of colposcopy with cervical biopsy 16    Bx - POONAM 1, ECC - negative     Other abnormal heart sounds as child    Murmur - resolved     Polycystic ovaries     prior to gastic bypass in , pt carried the diagnosis of PCOS/anovuolation     Streptococcus infection in conditions classified elsewhere and of unspecified site, group B 1998    In pregnancy      Past Surgical History:   Procedure Laterality Date      SECTION  ,       SECTION, TUBAL LIGATION, COMBINED  2013    Procedure: COMBINED  SECTION, TUBAL LIGATION;   Section, Tubal Ligation;  Surgeon: Paul Allen MD;  Location: [facility] OR     GASTRIC BYPASS  2005     GASTRIC BYPASS       HC REMOVAL GALLBLADDER  3/16/06    Dr. Inman @ [facility]     HC REPAIR ING HERNIA,5+Y/O,REDUCIBL  age 20 ()    LIH     SURGICAL HISTORY OF -       PE tubes     SURGICAL HISTORY OF -   10/06    removal of pannus ans breast lift     TONSILLECTOMY       [facility]  DELIVERY ONLY  98    Failure to progress     [facility] NONSPECIFIC PROCEDURE  3/9/06    Laser ablation genital condyloma/ Bx labial lesion     Z RAD RESEC TONSIL/PILLARS        Allergies   Allergen Reactions     Codeine Nausea and Vomiting     Darvocet [Acetaminophen] Nausea and Vomiting     Propoxyphene Nausea     Tylenol Nausea and Vomiting      Social History     Tobacco Use     Smoking status: Current Every Day Smoker     Packs/day: 0.50     Types: Cigarettes     Smokeless  tobacco: Never Used     Tobacco comment: smoking 20cig/day- down to 10cig/day with pregnancy   Substance Use Topics     Alcohol use: Yes     Alcohol/week: 0.0 standard drinks     Comment: rarely- quit with pregnancy      Wt Readings from Last 1 Encounters:   09/10/22 91.2 kg (201 lb)        Anesthesia Evaluation   Pt has had prior anesthetic. Type: General.    No history of anesthetic complications       ROS/MED HX  ENT/Pulmonary:     (+) tobacco use (0.5 PPD), Current use,     Neurologic: Comment: RLS on cyclobenzaprine      Cardiovascular:  - neg cardiovascular ROS     METS/Exercise Tolerance:     Hematologic: Comments: Acute anemia in current hospitalizatyion     (+) anemia,     Musculoskeletal:  - neg musculoskeletal ROS     GI/Hepatic: Comment: S/p gastric bypass    (+) GERD,     Renal/Genitourinary:  - neg Renal ROS     Endo:     (+) Obesity,     Psychiatric/Substance Use:     (+) psychiatric history depression     Infectious Disease: Comment: COVID negative (9/8/22) - neg infectious disease ROS     Malignancy:  - neg malignancy ROS     Other:               OUTSIDE LABS:  CBC:   Lab Results   Component Value Date    WBC 28.2 (H) 09/09/2022    WBC 24.0 (H) 09/09/2022    HGB 7.6 (L) 09/09/2022    HGB 6.6 (LL) 09/09/2022    HCT 25.9 (L) 09/09/2022    HCT 23.2 (L) 09/09/2022     09/09/2022     09/09/2022     BMP:   Lab Results   Component Value Date     09/09/2022     09/08/2022    POTASSIUM 3.3 (L) 09/10/2022    POTASSIUM 3.8 09/09/2022    CHLORIDE 106 09/09/2022    CHLORIDE 105 09/08/2022    CO2 20 (L) 09/09/2022    CO2 22 09/08/2022    BUN 7.3 09/09/2022    BUN 8.3 09/08/2022    CR 0.60 09/09/2022    CR 0.60 09/08/2022     (H) 09/09/2022     (H) 09/08/2022     COAGS:   Lab Results   Component Value Date    PTT 60 (H) 09/09/2022    INR 1.70 (H) 09/09/2022    FIBR 501 (H) 09/09/2022     POC:   Lab Results   Component Value Date    HCG Negative 03/30/2022     HEPATIC:   Lab  Results   Component Value Date    ALBUMIN 3.0 (L) 09/08/2022    PROTTOTAL 5.5 (L) 09/08/2022    ALT 10 09/08/2022    AST 31 09/08/2022    ALKPHOS 81 09/08/2022    BILITOTAL 0.3 09/08/2022     OTHER:   Lab Results   Component Value Date    PH 7.44 09/09/2022    LACT 0.9 09/09/2022    A1C 5.8 (H) 02/07/2022    MANNY 8.4 (L) 09/09/2022    LIPASE 20 09/07/2022    TSH 1.30 11/02/2010    SED 11 03/07/2011       Anesthesia Plan    ASA Status:  3   NPO Status:  NPO Appropriate    Anesthesia Type: General.     - Airway: ETT   Induction: Intravenous.   Maintenance: Inhalation.        Consents    Anesthesia Plan(s) and associated risks, benefits, and realistic alternatives discussed. Questions answered and patient/representative(s) expressed understanding.    - Discussed:     - Discussed with:  Patient      - Extended Intubation/Ventilatory Support Discussed: Yes.      - Patient is DNR/DNI Status: No    Use of blood products discussed: Yes.     - Discussed with: Patient.     Postoperative Care    Pain management: IV analgesics.   PONV prophylaxis: Ondansetron (or other 5HT-3), Dexamethasone or Solumedrol     Comments:                Sharon Lay MD

## 2022-09-10 NOTE — PROGRESS NOTES
Vascular Surgery Note    Postoperative plan     - discontinue IVF  - Extubate as able   - Continue heparin gtt (high intensity), restart at whatever rate it was at   - Zosyn x 4 days from now   - Plan for PICC and TPN   - NPO except ice chips for comfort       Raquel Aly MD  Fellow

## 2022-09-10 NOTE — PROGRESS NOTES
CLINICAL NUTRITION SERVICES - ASSESSMENT NOTE     Nutrition Prescription    RECOMMENDATIONS FOR MDs/PROVIDERS TO ORDER:  - Total daily fluids/adjustments to PN total volume per MD   - Electrolyte replacement per protocol as indicated    - Rec B12 as highlighted below in the setting of h/o gastric bypass, deficiency, and resection involving ileum     Micronutrient recs with Raj-en-y Gastric Bypass in long-term/post-discharge or per bariatrics:    Multivitamin/minerals: adult dose 2 times daily     Iron: 45-60 mg elemental daily (18-36 mg daily if low risk) - may partly or fully be covered in multivitamin     Calcium Citrate containing vitamin D: 500 mg 3 times daily or 600 mg 2 times daily    Vitamin B12: sublingual form of at least 500 mcg daily or injection of 1000 mcg monthly    Malnutrition Status:    - Patient does not meet two of the established criteria necessary for diagnosing malnutrition but is at risk for malnutrition    Recommendations already ordered by Registered Dietitian (RD)  - Plan for PICC/central line placement   - CPN, goal volume 1440 ml/day with 175 gm GOAL Dex daily (595 kcal),120 g AA (480 kcal) and 250 mL of 20% IV lipids daily = 1575 kcals/day (25 kcal/kg/day), 1.9 g PRO/kg/day, GIR with 32% kcals from fat. Micro/Rx: Per PharmD   - INITIATE @ 125 gm DEX and advance to goal as tolerated   - Total daily volume per MD/PharmD - PharmD may adjust volume as indicated   - Baseline TG --> monitor TG and LFTs as indicated    Future/Additional Recommendations:  - PN initiation via central line; lytes/advancement; TG/LFT monitoring   - GI status   - Weight trends      REASON FOR ASSESSMENT  Bridgette Veras is a/an 46 year old female assessed by the dietitian for Pharmacy/Nutrition to Start and Manage PN    Medical History: Acute mesenteric ischemia with suspected intestinal ischemia. PMHx significant for GERD, RNY-gastric bypass, previous C-sections, and abdominal wall adhesions who was transferred  "from OSH d/2 SMA thrombus and concern for AMI. She s/p emergent ex-lap, SMA thrombectomy, retrograde SMA stenting 2/2. Postoperatively she was transferred to SICU for serial abdominal exams and close monitoring due to moderate residual thrombus and concern for re-occlusion.    NUTRITION HISTORY  She was experienced diarrhea and vomiting. Pt with h/o bypass in 2005.  Per pt, decreased PO intake PTA with decreased appetite \"I was eating a lot of watermelon\"   Pt denies any significant weight loss -- had gained some of the weight she lost recently.   Pt takes B12 and MVI, iron, and D at home.   Usual meal pattern is small, frequent meals     On home med list:  cyanocolbalamin (VITAMIN  B-12) 500 MCG tablet  Ergocalciferol (VITAMIN D2 PO)  ferrous sulfate (IRON) 325 (65 FE) MG tablet  Multiple Vitamin (MULTIVITAMIN PO)    CURRENT NUTRITION ORDERS  Diet: NPO    LABS  Labs reviewed  K+ 3.3 (L)  Creatinine: 0.47 (L)  Glucose: 153, 147, 186  Positive urinary ketones   Lactic Acid: WNL  LFTs: WNL    Thiamine: 100 (WNL) 2/7/22  B12: 292 (WNL) 3/30/22  Vitamin D: 27 (low normal) 3/30/22  Folate: 6.5 (WNL) 2/7/22    MEDICATIONS  Medications reviewed  Miralax  Senokot  Lactated Ringers IVF @ 125 ml (130 mEq Na+) @ 100 ml/hr     ANTHROPOMETRICS  Height: 0 cm (Data Unavailable)   Ht Readings from Last 2 Encounters:   09/07/22 1.626 m (5' 4\")   03/30/22 1.6 m (5' 3\")   64\"   Most Recent Weight: 91.2 kg (201 lb)    IBW: 55 kg  BMI: Obesity Grade I BMI 30-34.9  Weight History:   Wt Readings from Last 8 Encounters:   09/10/22 91.2 kg (201 lb)   09/07/22 86.2 kg (190 lb)   03/30/22 84.4 kg (186 lb)   02/07/22 86.5 kg (190 lb 9.6 oz)   04/20/21 86 kg (189 lb 9.6 oz)   09/25/17 78 kg (172 lb)   09/20/16 75 kg (165 lb 6.4 oz)   08/04/16 72.8 kg (160 lb 8 oz)   No recent weight loss noted per history above     Dosing Weight: 64 kg (adjusted weight admit/driest weight of 89 kg and IBW of 55 kg)     ASSESSED NUTRITION NEEDS  Estimated Energy " Needs: 5997-4169 kcals/day (25 - 30 kcals/kg)  Justification: Maintenance vs lower end for post-bypass   Estimated Protein Needs:  grams protein/day (1.5 - 2 grams of pro/kg)  Justification: Increased needs  Estimated Fluid Needs: (1 mL/kcal)   Justification: Maintenance and Per provider pending fluid status    PHYSICAL FINDINGS  See malnutrition section below.   No obvious s/s of malnutrition or micronutrient deficiency     MALNUTRITION  % Intake: < 75% for > 7 days (moderate)  % Weight Loss: None noted  Subcutaneous Fat Loss: None observed  Muscle Loss: None observed  Open abd. Did not assess abd region   Fluid Accumulation/Edema: None noted  Malnutrition Diagnosis: Patient does not meet two of the established criteria necessary for diagnosing malnutrition but is at risk for malnutrition    NUTRITION DIAGNOSIS  Inadequate oral intake related to inability to take oral PO/GI status as evidenced by NPO, currently meeting 0% nutrition needs, and requires nutrition support to meet 100% nutrition needs.       INTERVENTIONS  Implementation  Nutrition Education: Provided education on role of RD; NFPE; nutrition POC/PN    Parenteral Nutrition/IV Fluids - recs made and change order sent to PharmD; monitor accordingly    Goals  Total avg nutritional intake to meet a minimum of 25 kcal/kg and 1.5 g PRO/kg daily (per dosing wt 64 kg).     Monitoring/Evaluation  Progress toward goals will be monitored and evaluated per protocol.    Ben Ram RD, LD, [facility]  Neuro ICU  Pager: [phone]    [address] SICU RD pager: [phone]  Ascom: [id number]  Weekend/Holiday RD pager: [phone]

## 2022-09-10 NOTE — PROGRESS NOTES
Hematology note    Chart reviewed.  No transfusion need overnight. OR today, 50 cm ileum resected due to ischemia.    APS panel negative.    - Continue heparin per primary team. Likely will finalize the plan for anticoagulant in the next 2-3 days once no further procedure is anticipated.  - Discontinue IV iron (patient received 2 doses and 2 pRBC transfusion)    Hematology will continue to follow.    Carrillo Pretty MD  Hematology/Medical Oncology (PGY-4)  P: [phone]

## 2022-09-11 ENCOUNTER — APPOINTMENT (OUTPATIENT)
Dept: OCCUPATIONAL THERAPY | Facility: CLINIC | Age: 47
End: 2022-09-11
Attending: PHYSICIAN ASSISTANT
Payer: COMMERCIAL

## 2022-09-11 ENCOUNTER — APPOINTMENT (OUTPATIENT)
Dept: GENERAL RADIOLOGY | Facility: CLINIC | Age: 47
End: 2022-09-11
Payer: COMMERCIAL

## 2022-09-11 LAB
ALBUMIN SERPL BCG-MCNC: 2.6 G/DL (ref 3.5–5.2)
ALP SERPL-CCNC: 62 U/L (ref 35–104)
ALT SERPL W P-5'-P-CCNC: 10 U/L (ref 10–35)
ANION GAP SERPL CALCULATED.3IONS-SCNC: 5 MMOL/L (ref 7–15)
AST SERPL W P-5'-P-CCNC: 25 U/L (ref 10–35)
BILIRUB DIRECT SERPL-MCNC: <0.2 MG/DL (ref 0–0.3)
BILIRUB SERPL-MCNC: 0.2 MG/DL
BUN SERPL-MCNC: 10.2 MG/DL (ref 6–20)
CALCIUM SERPL-MCNC: 8.3 MG/DL (ref 8.6–10)
CHLORIDE SERPL-SCNC: 105 MMOL/L (ref 98–107)
CREAT SERPL-MCNC: 0.53 MG/DL (ref 0.51–0.95)
DEPRECATED HCO3 PLAS-SCNC: 29 MMOL/L (ref 22–29)
ERYTHROCYTE [DISTWIDTH] IN BLOOD BY AUTOMATED COUNT: 27.9 % (ref 10–15)
GFR SERPL CREATININE-BSD FRML MDRD: >90 ML/MIN/1.73M2
GLUCOSE BLDC GLUCOMTR-MCNC: 118 MG/DL (ref 70–99)
GLUCOSE BLDC GLUCOMTR-MCNC: 133 MG/DL (ref 70–99)
GLUCOSE BLDC GLUCOMTR-MCNC: 143 MG/DL (ref 70–99)
GLUCOSE SERPL-MCNC: 120 MG/DL (ref 70–99)
HCT VFR BLD AUTO: 25.7 % (ref 35–47)
HGB BLD-MCNC: 7.2 G/DL (ref 11.7–15.7)
INR PPP: 1.25 (ref 0.85–1.15)
MAGNESIUM SERPL-MCNC: 1.8 MG/DL (ref 1.7–2.3)
MCH RBC QN AUTO: 19.9 PG (ref 26.5–33)
MCHC RBC AUTO-ENTMCNC: 28 G/DL (ref 31.5–36.5)
MCV RBC AUTO: 71 FL (ref 78–100)
PHOSPHATE SERPL-MCNC: 2.1 MG/DL (ref 2.5–4.5)
PLATELET # BLD AUTO: 212 10E3/UL (ref 150–450)
POTASSIUM SERPL-SCNC: 3.7 MMOL/L (ref 3.4–5.3)
PROT SERPL-MCNC: 5.2 G/DL (ref 6.4–8.3)
RBC # BLD AUTO: 3.61 10E6/UL (ref 3.8–5.2)
SODIUM SERPL-SCNC: 139 MMOL/L (ref 136–145)
UFH PPP CHRO-ACNC: 0.28 IU/ML
UFH PPP CHRO-ACNC: 0.38 IU/ML
UFH PPP CHRO-ACNC: 0.48 IU/ML
WBC # BLD AUTO: 21.7 10E3/UL (ref 4–11)

## 2022-09-11 PROCEDURE — 97165 OT EVAL LOW COMPLEX 30 MIN: CPT | Mod: GO | Performed by: OCCUPATIONAL THERAPIST

## 2022-09-11 PROCEDURE — 84134 ASSAY OF PREALBUMIN: CPT | Performed by: SURGERY

## 2022-09-11 PROCEDURE — 250N000011 HC RX IP 250 OP 636: Performed by: STUDENT IN AN ORGANIZED HEALTH CARE EDUCATION/TRAINING PROGRAM

## 2022-09-11 PROCEDURE — 97530 THERAPEUTIC ACTIVITIES: CPT | Mod: GO | Performed by: OCCUPATIONAL THERAPIST

## 2022-09-11 PROCEDURE — 85610 PROTHROMBIN TIME: CPT | Performed by: SURGERY

## 2022-09-11 PROCEDURE — 85520 HEPARIN ASSAY: CPT | Performed by: SURGERY

## 2022-09-11 PROCEDURE — 84100 ASSAY OF PHOSPHORUS: CPT | Performed by: PHYSICIAN ASSISTANT

## 2022-09-11 PROCEDURE — 250N000013 HC RX MED GY IP 250 OP 250 PS 637: Performed by: STUDENT IN AN ORGANIZED HEALTH CARE EDUCATION/TRAINING PROGRAM

## 2022-09-11 PROCEDURE — 999N000147 HC STATISTIC PT IP EVAL DEFER: Performed by: PHYSICAL THERAPIST

## 2022-09-11 PROCEDURE — 71045 X-RAY EXAM CHEST 1 VIEW: CPT

## 2022-09-11 PROCEDURE — 250N000011 HC RX IP 250 OP 636: Performed by: SURGERY

## 2022-09-11 PROCEDURE — 85520 HEPARIN ASSAY: CPT | Performed by: PHYSICIAN ASSISTANT

## 2022-09-11 PROCEDURE — 85014 HEMATOCRIT: CPT | Performed by: PHYSICIAN ASSISTANT

## 2022-09-11 PROCEDURE — 250N000009 HC RX 250: Performed by: SURGERY

## 2022-09-11 PROCEDURE — 83735 ASSAY OF MAGNESIUM: CPT | Performed by: PHYSICIAN ASSISTANT

## 2022-09-11 PROCEDURE — 71045 X-RAY EXAM CHEST 1 VIEW: CPT | Mod: 26 | Performed by: RADIOLOGY

## 2022-09-11 PROCEDURE — 82248 BILIRUBIN DIRECT: CPT | Performed by: SURGERY

## 2022-09-11 PROCEDURE — 97110 THERAPEUTIC EXERCISES: CPT | Mod: GO | Performed by: OCCUPATIONAL THERAPIST

## 2022-09-11 PROCEDURE — 80053 COMPREHEN METABOLIC PANEL: CPT | Performed by: SURGERY

## 2022-09-11 PROCEDURE — 97535 SELF CARE MNGMENT TRAINING: CPT | Mod: GO | Performed by: OCCUPATIONAL THERAPIST

## 2022-09-11 PROCEDURE — C9113 INJ PANTOPRAZOLE SODIUM, VIA: HCPCS | Performed by: STUDENT IN AN ORGANIZED HEALTH CARE EDUCATION/TRAINING PROGRAM

## 2022-09-11 PROCEDURE — 258N000003 HC RX IP 258 OP 636: Performed by: SURGERY

## 2022-09-11 PROCEDURE — 200N000002 HC R&B ICU UMMC

## 2022-09-11 RX ORDER — CLOPIDOGREL BISULFATE 75 MG/1
75 TABLET ORAL DAILY
Status: DISCONTINUED | OUTPATIENT
Start: 2022-09-11 | End: 2022-09-16 | Stop reason: HOSPADM

## 2022-09-11 RX ORDER — ACETAMINOPHEN 325 MG/1
975 TABLET ORAL EVERY 8 HOURS PRN
Status: DISCONTINUED | OUTPATIENT
Start: 2022-09-11 | End: 2022-09-16 | Stop reason: HOSPADM

## 2022-09-11 RX ORDER — POTASSIUM CHLORIDE 29.8 MG/ML
20 INJECTION INTRAVENOUS ONCE
Status: COMPLETED | OUTPATIENT
Start: 2022-09-11 | End: 2022-09-11

## 2022-09-11 RX ADMIN — POTASSIUM CHLORIDE 20 MEQ: 29.8 INJECTION, SOLUTION INTRAVENOUS at 06:06

## 2022-09-11 RX ADMIN — I.V. FAT EMULSION 250 ML: 20 EMULSION INTRAVENOUS at 20:23

## 2022-09-11 RX ADMIN — PIPERACILLIN AND TAZOBACTAM 3.38 G: 3; .375 INJECTION, POWDER, LYOPHILIZED, FOR SOLUTION INTRAVENOUS at 15:33

## 2022-09-11 RX ADMIN — KETAMINE HYDROCHLORIDE 15 MG/HR: 100 INJECTION, SOLUTION, CONCENTRATE INTRAMUSCULAR; INTRAVENOUS at 23:44

## 2022-09-11 RX ADMIN — KETAMINE HYDROCHLORIDE 15 MG/HR: 100 INJECTION, SOLUTION, CONCENTRATE INTRAMUSCULAR; INTRAVENOUS at 11:49

## 2022-09-11 RX ADMIN — PIPERACILLIN AND TAZOBACTAM 3.38 G: 3; .375 INJECTION, POWDER, LYOPHILIZED, FOR SOLUTION INTRAVENOUS at 10:33

## 2022-09-11 RX ADMIN — HEPARIN SODIUM 1500 UNITS/HR: 10000 INJECTION, SOLUTION INTRAVENOUS at 12:35

## 2022-09-11 RX ADMIN — PIPERACILLIN AND TAZOBACTAM 3.38 G: 3; .375 INJECTION, POWDER, LYOPHILIZED, FOR SOLUTION INTRAVENOUS at 22:55

## 2022-09-11 RX ADMIN — PANTOPRAZOLE SODIUM 40 MG: 40 INJECTION, POWDER, FOR SOLUTION INTRAVENOUS at 10:33

## 2022-09-11 RX ADMIN — PIPERACILLIN AND TAZOBACTAM 3.38 G: 3; .375 INJECTION, POWDER, LYOPHILIZED, FOR SOLUTION INTRAVENOUS at 04:35

## 2022-09-11 RX ADMIN — METHOCARBAMOL 500 MG: 100 INJECTION, SOLUTION INTRAMUSCULAR; INTRAVENOUS at 15:48

## 2022-09-11 RX ADMIN — METHOCARBAMOL 500 MG: 100 INJECTION, SOLUTION INTRAMUSCULAR; INTRAVENOUS at 23:24

## 2022-09-11 RX ADMIN — POTASSIUM PHOSPHATE, MONOBASIC POTASSIUM PHOSPHATE, DIBASIC 15 MMOL: 224; 236 INJECTION, SOLUTION, CONCENTRATE INTRAVENOUS at 06:49

## 2022-09-11 RX ADMIN — CLOPIDOGREL BISULFATE 75 MG: 75 TABLET ORAL at 10:33

## 2022-09-11 RX ADMIN — METHOCARBAMOL 500 MG: 100 INJECTION, SOLUTION INTRAMUSCULAR; INTRAVENOUS at 07:46

## 2022-09-11 RX ADMIN — MAGNESIUM SULFATE HEPTAHYDRATE: 500 INJECTION, SOLUTION INTRAMUSCULAR; INTRAVENOUS at 20:23

## 2022-09-11 ASSESSMENT — ACTIVITIES OF DAILY LIVING (ADL)
ADLS_ACUITY_SCORE: 25

## 2022-09-11 NOTE — PROGRESS NOTES
SURGICAL ICU PROGRESS NOTE  09/11/2022      Date of Service (when I saw the patient): 09/11/2022    ASSESSMENT:  Bridgette Veras is a 46-year old woman with PMHx significant for GERD, RNY-gastric bypass, previous C-sections, and abdominal wall adhesions who was transferred from OSH d/2 SMA thrombus and concern for Acute mesenteric ischemia  Now s/p emergent ex-lap, SMA thrombectomy, retrograde SMA stenting on 9/8/22/ and ex-lap exploration of abdomen, washout and evacuation of rectus sheath hematoma on 9/9/2022. Postoperatively she was transferred to SICU for serial abdominal exams and close monitoring due to moderate residual thrombus and concern for re-occlusion. CTA 9/8 with right rectus sheath hematoma and ischemic enteritis/colitis due to persistent thrombus in ileocolic artery. Now returned from OR (9/10) for segmental small bowel resection.      CHANGES and MAJOR THINGS TODAY:   - Transition to Pure Wick.      PLAN:  Neurological:  # Acute postoperative pain   # Depression-- no meds on PTA list   - Delirium preventions and precautions  - s/p extubation post-procedure, no ongoing sedation  - Pain:    - Fentanyl PCA (10mcg/q10min) - 240 mcg   - Ketamine 15 mg/hr - x 4    - Robaxin IV q 8 hours   - scheduled tylenol for pain           - Currently complaining of pain 4/10     Pulmonary:   # Current, everyday smoker  # Hypoxia respirator failure,   - PRN nicotine patch, pt declining as of now   - Oxygen mask @ 15LP. Saturating @100%   - IS q15-30 minutes when awake.     Cardiovascular:    # no acute issues   -  Remove non-functional arterial line \  - -133, DBP 57-67    Renal/fluid electrolytes   # hypokalemia   - electrolyte high replacement protocol in place   - Na 139, K 3.7, Phosphorus 2.1, Mg 1.8  - Yesterday: In 3663.2, UOP 3580 cc, Drains 300, blood 50. I/O -266.8  - Overnight: UOP: 1450 ml; In 1109. I/O -340.87  - Transition to Pure Wick.    Gastroenterology/Nutrition:  # persistent distal SMA  occlusion with enteritis/colitis  # s/p ExLap (9/8), reexploration (9/9) and open abdomen with abthera in place previously   # s/p RYGB (2005)  # B12, iron deficiency d/2 RYBG  # elevated liver enzymes, now resolved   - NPO for now, diet when approved by ACS team   - TPN  - POD 1 from small bowel resection on 9/10  - No NG d/2 hx RYGB        Endocrine:  # Concern for  stress hyperglycemia   - PRN for glucose checks and management, Goal to keep BG< 180 for optimal wound healing   - sliding scale insulin if needed   - Glucose 120 (no insulin needed)     ID:  # Leukocytosis  - Continue empiric zosyn per primary team  - Temp 98-98.2  - WBC (9/11 - 28.2 -> 27.3 -> 21.7), currently afebrile.    Heme:  # Aortic/SMA thrombus s/p above initial procedure (9/8) and takeback (9/9)  # Acute blood loss anemia   # rectus sheath hematoma   - Transfuse if hgb <7.0 or signs/symptoms of hypoperfusion.   - Heparin gtt restarted postoperatively per primary team. Bolus dose @ 0605 2750 units  - trend Heparin Xa levels  - Hb 7.2, Plt 212,000.  - INR 1.25, Unfractionated Anti Xa 0.59->0.31-> 0.28 (0.25-0.5 for low intensity dosing)      # Chronic iron-deficient anemia   # microcytic anemia   # B12 deficiency   - appreciate hematology consult and recommendations    - will defer long term AC to  Heme and Vascular surgery. hypercoag eval in place at this time.   - continue with B12 and IV iron supplementation      MSK:   #  Right rectus sheath hematoma s/p Evacuation of hematoma 9/9 now s/p vessel ligation   #  S/p weakness and deconditioning of critical illness   -  PT/OT consult post OR   - abdominal binder for comfort      General Cares/Prophylaxis:    DVT Prophylaxis: High-intensity Heparin gtt, SCDs  GI Prophylaxis: H2 Blocker  Restraints: Restraints for medical healing needed: NO     LDAs:  - PIVx3  - Double lumen PICC  - carrasco     Disposition:  - SICU for now       Montrell Guerrero MD  [facility]OD[facility] Preliminary Resident  Department  of Surgery  ====================================  INTERVAL HISTORY:  Course reviewed. No acute events overnight. Pain controlled improved. Denies visual or auditory hallucinations. Denies chest pain or SOB or fevers. Plan for OR for takeback at 10am.     OBJECTIVE:   1. VITAL SIGNS:   Temp:  [98  F (36.7  C)-98.6  F (37  C)] 98.2  F (36.8  C)  Pulse:  [] 76  Resp:  [14-18] 16  BP: (111-147)/() 129/69  SpO2:  [85 %-100 %] 100 %  Resp: 16      2. INTAKE/ OUTPUT:   I/O last 3 completed shifts:  In: 3763.2 [I.V.:3589.48]  Out: 3930 [Urine:3580; Drains:300; Blood:50]    3. PHYSICAL EXAMINATION:  General: laying in bed, NAD.  HEENT: pupils 3mm and reactive. OP clear   Neuro: A&Ox3- able to tell me place/location/reason for hospitalization appropriately, NAD. Follow simple commands.   Pulm/Resp: Clear breath sounds bilaterally without rhonchi, crackles or wheezes, some splinting noted due to abdominal pain.   CV: RRR, S1/S2   Abdomen: abdominal binder present, Abthera suction intact, thin serosanguinous drainage in tubing reservoir abdomen appropriately TTP.   : (+) carrasco catheter in place, urine yellow and clear  MSK/Extremities: no peripheral edema, moving all extremities, peripheral pulses intact, calves soft and compressible, extremities well perfused, 2+     4. INVESTIGATIONS:   Arterial Blood Gases   Recent Labs   Lab 09/09/22  1246 09/08/22  0550 09/08/22  0428   PH 7.44 7.32* 7.31*   PCO2 37 41 40   PO2 65* 102 85   HCO3 25 21 21     Complete Blood Count   Recent Labs   Lab 09/11/22  0418 09/10/22  0935 09/09/22  2151 09/09/22  1239   WBC 21.7* 27.3* 28.2* 24.0*   HGB 7.2* 7.8* 7.6* 6.6*    219 205 228     Basic Metabolic Panel  Recent Labs   Lab 09/11/22  0418 09/10/22  1856 09/10/22  0935 09/10/22  0336 09/09/22  0626 09/08/22  2155     --   --  141 140 136   POTASSIUM 3.7  --  3.8 3.3*  3.3* 3.8 3.6   CHLORIDE 105  --   --  112* 106 105   CO2 29  --   --  20* 20* 22   BUN 10.2  --    --  4.6* 7.3 8.3   CR 0.53  --   --  0.47* 0.60 0.60   * 106*  --  85 120* 138*     Liver Function Tests  Recent Labs   Lab 09/11/22  0418 09/09/22  1524 09/08/22  1825 09/08/22  1404 09/08/22  0221 09/07/22 2014   AST 25  --  31  --  34 37*   ALT 10  --  10  --  13 15   ALKPHOS 62  --  81  --  113* 135*   BILITOTAL 0.2  --  0.3  --  0.2 0.2   ALBUMIN 2.6*  --  3.0*  --  3.7 4.1   INR 1.25* 1.70*  --  1.49*  --   --      Pancreatic Enzymes  Recent Labs   Lab 09/07/22 2014   LIPASE 20     Coagulation Profile  Recent Labs   Lab 09/11/22 0418 09/09/22  1524 09/08/22  1404   INR 1.25* 1.70* 1.49*   PTT  --  60*  --          5. RADIOLOGY:   Recent Results (from the past 24 hour(s))   XR Abdomen Port 1 View    Narrative    Exam: XR ABDOMEN PORT 1 VIEW, 9/10/2022 12:58 PM    Indication: lost/missing item    Comparison: 9/8/2022    Findings: 3 portable supine radiographs of the abdomen were reviewed.  Left abdomen has not been completely included in the field-of-view.  Multiple presumed surgical clips project over the right upper, left  upper and right lower quadrant. Partial visualization of the  endotracheal tube. Vascular stent projecting over the central upper  abdomen also seen on CT 9/8/2022. No radiopaque instrument  demonstrated urinary catheter projects over the pelvis.      Impression    Impression: No radiopaque instrument identified in the field of view  of provided 3 frontal projection radiographs.    Findings were communicated with operating room staff Gerry STOVER via  telephone at 1:05 PM on 9/10/2022 by MARY GRACE Marlow DO.      I have personally reviewed the examination and initial interpretation  and I agree with the findings.    DC ROUSE MD         SYSTEM ID:  [id number]   XR Chest Port 1 View    Impression    RESIDENT PRELIMINARY INTERPRETATION  IMPRESSION:   1. New right arm PICC tip in the low SVC.  2. Low lung volumes with similar perihilar and bibasilar opacities  most consistent with  atelectasis. Superimposed infection is not  excluded.       =========================================

## 2022-09-11 NOTE — PLAN OF CARE
PT evaluation cx and deferred. OT completed evaluation, functional mobility limited primarily by pain, patient demonstrated ambulation with SBA with no acute PT intervention indicated to address balance/gait. OT will continue to follow and address ambulation from endurance standpoint.  Will complete PT order.

## 2022-09-11 NOTE — PROGRESS NOTES
Vascular Surgery Note    S: Cramping pain has improved, pain controleld with PCA and ketamine. Hb 7.2 this AM. Adequate UOP. O2 requirements decreasing     O  /64   Pulse 87   Temp 98.7  F (37.1  C) (Oral)   Resp 16   Wt 91.4 kg (201 lb 9.6 oz)   SpO2 92%   BMI 34.60 kg/m    Gen: Aox 3  Resp: NLB  Extremities warm and well perfused   Abd: soft and mildly tender aroudn incisions, no guarding . Dressing intact     Labs  Lact WNL  UOP 1.45 L last shift  Hb 7.3 vs 7.6 yest  WBC 21.7 (trending down)  Plts 212       A/P    POD3  s/p embolectomy and stenting of SMA. 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 RTOR on 9/9 for ex lap, drainage of rectus sheath hematoma with findings of possible ischemia of  one segment of ileum , Abthera placed. 9/10 ex lap, SB resection and primary anastomosis and closure of fascia    - NPO, TPN  - TKO IVF  - Continue zosyn x 4days postop   - Please continue heparin gtt.   - PPI  - PCA and ketamine for pain control   - Will start plavix   - No bowel meds   - Ok to transfer to floor  - Soto out if able to get upt to urinal       Raquel Aly MD  Fellow

## 2022-09-11 NOTE — PROGRESS NOTES
"   09/11/22 0900   Quick Adds   Type of Visit Initial Occupational Therapy Evaluation   Living Environment   People in Home significant other;child(smooth), dependent;parent(s)   Current Living Arrangements house   Home Accessibility stairs to enter home;stairs within home   Number of Stairs, Main Entrance 4   Stair Railings, Main Entrance none   Number of Stairs, Within Home, Primary   (8 up to bedoom doesnt needs to use 8 stairs down to basement)   Stair Railings, Within Home, Primary railings safe and in good condition  (one side)   Transportation Anticipated car, drives self;family or friend will provide   Self-Care   Usual Activity Tolerance good   Current Activity Tolerance moderate   Regular Exercise Yes   Activity/Exercise Type walking;swimming   Exercise Amount/Frequency   (\"maybe once a week, before COVID 4-5 times a week\")   Equipment Currently Used at Home none   Fall history within last six months no   Instrumental Activities of Daily Living (IADL)   IADL Comments pt was ind, works in finance   General Information   Onset of Illness/Injury or Date of Surgery 09/10/22   Referring Physician Her-Jonathon Barakat PA-C   Additional Occupational Profile Info/Pertinent History of Current Problem Bridgette Veras is a 46-year old woman with PMHx significant for GERD, RNY-gastric bypass, previous C-sections, and abdominal wall adhesions who was transferred from OSH d/2 SMA thrombus and concern for Acute mesenteric ischemia  Now s/p emergent ex-lap, SMA thrombectomy, retrograde SMA stenting on 9/8/22/ and ex-lap exploration of abdomen, washout and evacuation of rectus sheath hematoma on 9/9/2022. Postoperatively she was transferred to SICU for serial abdominal exams and close monitoring due to moderate residual thrombus and concern for re-occlusion. CTA 9/8 with right rectus sheath hematoma and ischemic enteritis/colitis due to persistent thrombus in ileocolic artery. Now returned from OR (9/10) for segmental small " bowel resection.   Existing Precautions/Restrictions abdominal;oxygen therapy device and L/min  (10L oxymask)   Left Upper Extremity (Weight-bearing Status)   (10#)   Right Upper Extremity (Weight-bearing Status)   (10#)   Cognitive Status Examination   Orientation Status orientation to person, place and time   Cognitive Status Comments no acute changes noted   Visual Perception   Impact of Vision Impairment on Function (Vision) wears glasses, no acute changes noted   Range of Motion Comprehensive   Comment, General Range of Motion WFL   Strength Comprehensive (MMT)   Comment, General Manual Muscle Testing (MMT) Assessment overall deconditioned   Bed Mobility   Comment (Bed Mobility) edcuated required to adhere to precautions and complete safely   Transfers   Transfer Comments mod A   Balance   Balance Comments CGA STS   Lower Body Dressing Assessment/Training   Comment, (Lower Body Dressing) total A donning socks EOB 2/2 pain   Clinical Impression   Criteria for Skilled Therapeutic Interventions Met (OT) Yes, treatment indicated   OT Diagnosis decreased ind in I/ADLS   OT Problem List-Impairments impacting ADL problems related to;activity tolerance impaired;mobility;strength;pain;post-surgical precautions   ADL comments/analysis decreased ind in I/ADLS   Assessment of Occupational Performance 1-3 Performance Deficits   Planned Therapy Interventions (OT) ADL retraining;IADL retraining;bed mobility training;strengthening;transfer training;home program guidelines;progressive activity/exercise   Clinical Decision Making Complexity (OT) low complexity   Risk & Benefits of therapy have been explained evaluation/treatment results reviewed;care plan/treatment goals reviewed;patient;mother   OT Discharge Planning   OT Discharge Recommendation (DC Rec) home with assist   OT Rationale for DC Rec anticipate that once pt is medically appropraite for discharge will be ok to discharge home w/ assist prn from family   Total  Evaluation Time (Minutes)   Total Evaluation Time (Minutes) 10   OT Goals   Therapy Frequency (OT) Daily   OT Predicted Duration/Target Date for Goal Attainment 09/25/22   OT Goals Lower Body Dressing;Bed Mobility;Transfers;Toilet Transfer/Toileting;OT Goal 1   OT: Lower Body Dressing Modified independent   OT: Bed Mobility Modified independent   OT: Transfer Modified independent   OT: Toilet Transfer/Toileting Modified independent   OT: Goal 1 pt will ascend/descend 8 stairs mod I to increase ind in ADLS

## 2022-09-11 NOTE — PLAN OF CARE
Major Shift Events:      Started TPN and lipids; unable to wean O2 d/t pain with respirations. Rested comfortably between cares and is able to shift weight in bed more easily d/t better pain management.    For vital signs and complete assessments, please see documentation flowsheets.     Problem: Pain Acute  Goal: Acceptable Pain Control and Functional Ability  Outcome: Ongoing, Progressing  Intervention: Develop Pain Management Plan  Recent Flowsheet Documentation  Taken 9/11/2022 0400 by Maurilio De La Torre, RN  Pain Management Interventions:   care clustered   distraction   emotional support   pain pump in use   rest

## 2022-09-11 NOTE — PLAN OF CARE
Neuro: A&Ox4. PERRLA. Equal strength in all extremities.  Cardiac: NSR. HR 70s-90s. SBP 110s-130s. Afebrile.  Respiratory: Weak (d/t pain) but productive cough. Oxygen weaned down to 3-5 LPM via oxymask, per pt request, d/t not liking the way the NC feels in her nose. Frequently using IS on own.  GI: Faint bowel sounds in LUQ. Remains strict NPO w/ exception for plavix medication and ice chips for comfort. TPN infusing. Abdomen tender, but soft.   : Soto removed at 1100, voiding spontaneously and frequently (q1h) on own. Adequate UOP.  LDAs: PIVx1, R double lumen PICC  Integ: Abdominal incision remains covered w/ primapore. Small amount of drainage towards bottom of dressing. Surgery to do first dressing change on 9/12.  Labs: 1130 Antix-xa therapeutic at .48, 1800 antix-xa therapeutic at .38, next recheck tomorrow AM  Gtts: Heparin at 1500 units/hr, Ketamine at 15mg/hr, Fentanyl PCA 10 mcg q10 min, TPN at 60 mL/hr  Misc: Mom at bedside for majority of day, then son visited this evening.     For vital signs and complete assessments, please see documentation flowsheets    Plan: Transfer to floor when bed becomes available, pain control, pulmonary hygiene, improve mobility.    Problem: Respiratory Compromise (Surgery Nonspecified)  Goal: Effective Oxygenation and Ventilation  Outcome: Ongoing, Progressing  Intervention: Optimize Oxygenation and Ventilation  Flowsheets  Taken 9/11/2022 1627  Airway/Ventilation Management:    airway patency maintained    pulmonary hygiene promoted  Head of Bed (HOB) Positioning: (Up in chair from [id number], frequently getting up to commode and weight shifting)    HOB at 30-45 degrees    other (see comments)    Problem: Pain (Surgery Nonspecified)  Goal: Acceptable Pain Control  Outcome: Ongoing, Progressing  Intervention: Prevent or Manage Pain  Recent Flowsheet Documentation  Taken 9/11/2022 1600 by Gila Groves, RN  Pain Management Interventions:    medication (see MAR)     emotional support    distraction    pain pump in use    repositioned    rest  Diversional Activities: (visitors)    smartphone    other (see comments)      Goal Outcome Evaluation: Reviewed with patient and patients mother

## 2022-09-11 NOTE — PROGRESS NOTES
General Surgery Note    Uneventful past night, pain improving. No flatus or stool yet.    /59 (BP Location: Left arm)   Pulse 85   Temp 98.3  F (36.8  C) (Oral)   Resp 15   Wt 91.4 kg (201 lb 9.6 oz)   SpO2 96%   BMI 34.60 kg/m    NAD, NLB, WWP  Abd soft, nondistended. Appropriately tender. Incision dressing CDI, mild staining.     Labs reviewed.     A/P:  46F presenting with acute mesenteric ischemia s/p the followin/8 ex lap SMA thrombectomy and stent graft (Dr. [facility])   ex lap evacuation of rectus sheath hematoma, Abthera (Dr. [facility], Dr. Lopez)  9/10 ex lap, small bowel resection, abd closure (Dr. Lopez)    - Continue NPO until bowel function returning  - Agree with TPN  - OK for anticoagulation per Vascular Surgery team  - No bowel meds until stool function  - Agree with TTF per Vascular    Discussed with Dr. Lancaster.    Lino Tyler MD  PGY7 General Surgery

## 2022-09-12 ENCOUNTER — APPOINTMENT (OUTPATIENT)
Dept: OCCUPATIONAL THERAPY | Facility: CLINIC | Age: 47
End: 2022-09-12
Payer: COMMERCIAL

## 2022-09-12 LAB
ABO/RH(D): NORMAL
ALBUMIN SERPL BCG-MCNC: 2.7 G/DL (ref 3.5–5.2)
ALP SERPL-CCNC: 76 U/L (ref 35–104)
ALT SERPL W P-5'-P-CCNC: 22 U/L (ref 10–35)
ANION GAP SERPL CALCULATED.3IONS-SCNC: 8 MMOL/L (ref 7–15)
ANTIBODY SCREEN: NEGATIVE
AST SERPL W P-5'-P-CCNC: 40 U/L (ref 10–35)
BILIRUB SERPL-MCNC: 0.3 MG/DL
BUN SERPL-MCNC: 14.1 MG/DL (ref 6–20)
CALCIUM SERPL-MCNC: 8.5 MG/DL (ref 8.6–10)
CHLORIDE SERPL-SCNC: 107 MMOL/L (ref 98–107)
CREAT SERPL-MCNC: 0.52 MG/DL (ref 0.51–0.95)
DEPRECATED HCO3 PLAS-SCNC: 25 MMOL/L (ref 22–29)
ERYTHROCYTE [DISTWIDTH] IN BLOOD BY AUTOMATED COUNT: 30.3 % (ref 10–15)
GFR SERPL CREATININE-BSD FRML MDRD: >90 ML/MIN/1.73M2
GLUCOSE BLDC GLUCOMTR-MCNC: 115 MG/DL (ref 70–99)
GLUCOSE BLDC GLUCOMTR-MCNC: 116 MG/DL (ref 70–99)
GLUCOSE BLDC GLUCOMTR-MCNC: 120 MG/DL (ref 70–99)
GLUCOSE SERPL-MCNC: 114 MG/DL (ref 70–99)
HCT VFR BLD AUTO: 26.8 % (ref 35–47)
HGB BLD-MCNC: 7.6 G/DL (ref 11.7–15.7)
INR PPP: 1.13 (ref 0.85–1.15)
MAGNESIUM SERPL-MCNC: 1.7 MG/DL (ref 1.7–2.3)
MCH RBC QN AUTO: 20.4 PG (ref 26.5–33)
MCHC RBC AUTO-ENTMCNC: 28.4 G/DL (ref 31.5–36.5)
MCV RBC AUTO: 72 FL (ref 78–100)
PHOSPHATE SERPL-MCNC: 2.4 MG/DL (ref 2.5–4.5)
PLATELET # BLD AUTO: 248 10E3/UL (ref 150–450)
POTASSIUM SERPL-SCNC: 3.4 MMOL/L (ref 3.4–5.3)
POTASSIUM SERPL-SCNC: 3.6 MMOL/L (ref 3.4–5.3)
PREALB SERPL IA-MCNC: 6 MG/DL (ref 15–45)
PREALB SERPL IA-MCNC: 9 MG/DL (ref 15–45)
PROT SERPL-MCNC: 5.5 G/DL (ref 6.4–8.3)
RBC # BLD AUTO: 3.72 10E6/UL (ref 3.8–5.2)
SODIUM SERPL-SCNC: 140 MMOL/L (ref 136–145)
SPECIMEN EXPIRATION DATE: NORMAL
UFH PPP CHRO-ACNC: 0.33 IU/ML
WBC # BLD AUTO: 15.4 10E3/UL (ref 4–11)

## 2022-09-12 PROCEDURE — 84132 ASSAY OF SERUM POTASSIUM: CPT | Performed by: SURGERY

## 2022-09-12 PROCEDURE — 120N000003 HC R&B IMCU UMMC

## 2022-09-12 PROCEDURE — 84100 ASSAY OF PHOSPHORUS: CPT | Performed by: STUDENT IN AN ORGANIZED HEALTH CARE EDUCATION/TRAINING PROGRAM

## 2022-09-12 PROCEDURE — 83735 ASSAY OF MAGNESIUM: CPT | Performed by: STUDENT IN AN ORGANIZED HEALTH CARE EDUCATION/TRAINING PROGRAM

## 2022-09-12 PROCEDURE — 250N000011 HC RX IP 250 OP 636: Performed by: STUDENT IN AN ORGANIZED HEALTH CARE EDUCATION/TRAINING PROGRAM

## 2022-09-12 PROCEDURE — 86901 BLOOD TYPING SEROLOGIC RH(D): CPT

## 2022-09-12 PROCEDURE — C9113 INJ PANTOPRAZOLE SODIUM, VIA: HCPCS | Performed by: STUDENT IN AN ORGANIZED HEALTH CARE EDUCATION/TRAINING PROGRAM

## 2022-09-12 PROCEDURE — 80053 COMPREHEN METABOLIC PANEL: CPT | Performed by: SURGERY

## 2022-09-12 PROCEDURE — 250N000009 HC RX 250

## 2022-09-12 PROCEDURE — 85027 COMPLETE CBC AUTOMATED: CPT | Performed by: STUDENT IN AN ORGANIZED HEALTH CARE EDUCATION/TRAINING PROGRAM

## 2022-09-12 PROCEDURE — 250N000009 HC RX 250: Performed by: SURGERY

## 2022-09-12 PROCEDURE — 250N000013 HC RX MED GY IP 250 OP 250 PS 637: Performed by: STUDENT IN AN ORGANIZED HEALTH CARE EDUCATION/TRAINING PROGRAM

## 2022-09-12 PROCEDURE — 258N000003 HC RX IP 258 OP 636: Performed by: SURGERY

## 2022-09-12 PROCEDURE — 258N000003 HC RX IP 258 OP 636

## 2022-09-12 PROCEDURE — 250N000011 HC RX IP 250 OP 636: Performed by: SURGERY

## 2022-09-12 PROCEDURE — 258N000003 HC RX IP 258 OP 636: Performed by: STUDENT IN AN ORGANIZED HEALTH CARE EDUCATION/TRAINING PROGRAM

## 2022-09-12 PROCEDURE — 84134 ASSAY OF PREALBUMIN: CPT | Performed by: SURGERY

## 2022-09-12 PROCEDURE — 250N000011 HC RX IP 250 OP 636

## 2022-09-12 PROCEDURE — 85520 HEPARIN ASSAY: CPT | Performed by: PHYSICIAN ASSISTANT

## 2022-09-12 PROCEDURE — 97535 SELF CARE MNGMENT TRAINING: CPT | Mod: GO | Performed by: OCCUPATIONAL THERAPIST

## 2022-09-12 PROCEDURE — 85610 PROTHROMBIN TIME: CPT | Performed by: SURGERY

## 2022-09-12 RX ORDER — METHYLPREDNISOLONE SODIUM SUCCINATE 125 MG/2ML
125 INJECTION, POWDER, LYOPHILIZED, FOR SOLUTION INTRAMUSCULAR; INTRAVENOUS
Status: DISCONTINUED | OUTPATIENT
Start: 2022-09-12 | End: 2022-09-16 | Stop reason: HOSPADM

## 2022-09-12 RX ORDER — POTASSIUM CHLORIDE 7.45 MG/ML
10 INJECTION INTRAVENOUS
Status: COMPLETED | OUTPATIENT
Start: 2022-09-12 | End: 2022-09-12

## 2022-09-12 RX ORDER — POTASSIUM CHLORIDE 29.8 MG/ML
20 INJECTION INTRAVENOUS
Status: COMPLETED | OUTPATIENT
Start: 2022-09-12 | End: 2022-09-12

## 2022-09-12 RX ORDER — DIPHENHYDRAMINE HYDROCHLORIDE 50 MG/ML
50 INJECTION INTRAMUSCULAR; INTRAVENOUS
Status: DISCONTINUED | OUTPATIENT
Start: 2022-09-12 | End: 2022-09-16 | Stop reason: HOSPADM

## 2022-09-12 RX ADMIN — Medication 5 ML: at 15:15

## 2022-09-12 RX ADMIN — HEPARIN SODIUM 1500 UNITS/HR: 10000 INJECTION, SOLUTION INTRAVENOUS at 21:12

## 2022-09-12 RX ADMIN — PIPERACILLIN AND TAZOBACTAM 3.38 G: 3; .375 INJECTION, POWDER, LYOPHILIZED, FOR SOLUTION INTRAVENOUS at 15:15

## 2022-09-12 RX ADMIN — IRON SUCROSE 200 MG: 20 INJECTION, SOLUTION INTRAVENOUS at 11:08

## 2022-09-12 RX ADMIN — POTASSIUM PHOSPHATE, MONOBASIC POTASSIUM PHOSPHATE, DIBASIC 15 MMOL: 224; 236 INJECTION, SOLUTION, CONCENTRATE INTRAVENOUS at 09:04

## 2022-09-12 RX ADMIN — CLOPIDOGREL BISULFATE 75 MG: 75 TABLET ORAL at 07:57

## 2022-09-12 RX ADMIN — PANTOPRAZOLE SODIUM 40 MG: 40 INJECTION, POWDER, FOR SOLUTION INTRAVENOUS at 07:58

## 2022-09-12 RX ADMIN — PIPERACILLIN AND TAZOBACTAM 3.38 G: 3; .375 INJECTION, POWDER, LYOPHILIZED, FOR SOLUTION INTRAVENOUS at 22:23

## 2022-09-12 RX ADMIN — PIPERACILLIN AND TAZOBACTAM 3.38 G: 3; .375 INJECTION, POWDER, LYOPHILIZED, FOR SOLUTION INTRAVENOUS at 09:43

## 2022-09-12 RX ADMIN — KETAMINE HYDROCHLORIDE 7.5 MG/HR: 100 INJECTION, SOLUTION, CONCENTRATE INTRAMUSCULAR; INTRAVENOUS at 15:30

## 2022-09-12 RX ADMIN — HEPARIN SODIUM 1500 UNITS/HR: 10000 INJECTION, SOLUTION INTRAVENOUS at 04:26

## 2022-09-12 RX ADMIN — MAGNESIUM SULFATE HEPTAHYDRATE: 500 INJECTION, SOLUTION INTRAMUSCULAR; INTRAVENOUS at 20:47

## 2022-09-12 RX ADMIN — I.V. FAT EMULSION 250 ML: 20 EMULSION INTRAVENOUS at 20:48

## 2022-09-12 RX ADMIN — POTASSIUM CHLORIDE 20 MEQ: 29.8 INJECTION, SOLUTION INTRAVENOUS at 06:51

## 2022-09-12 RX ADMIN — POTASSIUM CHLORIDE 20 MEQ: 29.8 INJECTION, SOLUTION INTRAVENOUS at 06:09

## 2022-09-12 RX ADMIN — POTASSIUM CHLORIDE 10 MEQ: 7.46 INJECTION, SOLUTION INTRAVENOUS at 16:47

## 2022-09-12 RX ADMIN — POTASSIUM CHLORIDE 10 MEQ: 7.46 INJECTION, SOLUTION INTRAVENOUS at 18:10

## 2022-09-12 RX ADMIN — METHOCARBAMOL 500 MG: 100 INJECTION, SOLUTION INTRAMUSCULAR; INTRAVENOUS at 08:54

## 2022-09-12 RX ADMIN — PIPERACILLIN AND TAZOBACTAM 3.38 G: 3; .375 INJECTION, POWDER, LYOPHILIZED, FOR SOLUTION INTRAVENOUS at 04:26

## 2022-09-12 ASSESSMENT — ACTIVITIES OF DAILY LIVING (ADL)
ADLS_ACUITY_SCORE: 25
DEPENDENT_IADLS:: INDEPENDENT
ADLS_ACUITY_SCORE: 25

## 2022-09-12 NOTE — PROGRESS NOTES
Transfer  Transferred from:  Via: wc  Reason for transfer: Pt appropriate for 6B- improved/worsened patient condition  Family: Aware of transfer  Belongings: Received with pt  Chart: Received with pt  Medications: Meds received from old unit with pt  Code Status verified on armband: yes  2 RN Skin Assessment Completed By: Kianna Bright rec completed: yes/no  Bed surface reassessed with algorithm and charted: yes  New bed surface ordered: yes  Suction/Ambu bag/Flowmeter at bedside: yes    Report received from:   Pt status: stable

## 2022-09-12 NOTE — PLAN OF CARE
Goal Outcome Evaluation:    Plan of Care Reviewed With: patient, spouse             Patient arrived on floor around 1630. She was able to ambulate to bed from wheelchair. Minimal complaints of pain 3/10 and stated pain regime sufficient for coverage. Patient alert and oriented x4, VSS. Titrated to RA. Son present on transfer. Midline incision CDI w/ abd dressing coverage. Ketamine running continuously, TPN and potassium running at time of transfer. Fentanyl PCA as needed. Bowel sounds minimal. No other concerns.

## 2022-09-12 NOTE — PROGRESS NOTES
Vascular Surgery Note    S: Pain much improved this am. Adequately controlled with PCA and ketamine.  Adequate UOP. O2 requirements decreasing, now on 3L.  Not yet passing gas, does report eructations.     O  /69 (BP Location: Left arm)   Pulse 75   Temp 98.5  F (36.9  C) (Axillary)   Resp 16   Wt 90.3 kg (199 lb)   SpO2 98%   BMI 34.16 kg/m    Gen: Aox 3  Resp: NLB  Abd: soft and mildly tender around incisions, no guarding. Incision clean, dry, in tact.   Extremities warm and well perfused, palpable DP L foot.  R foot with monophasic DP, triphasic PT.     Labs    Hb 7.6  (7.2)   WBC 15.4 (21)   Plts 248    A/P    POD4  s/p embolectomy and stenting of SMA. 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 RTOR on 9/9 for ex lap, drainage of rectus sheath hematoma with findings of possible ischemia of  one segment of ileum , Abthera placed. 9/10 ex lap, SB resection and primary anastomosis and closure of fascia. Doing well postoperatively, AROBF, pain well controlled, weaning down.    - NPO, TPN  - TKO IVF  - Continue zosyn x 4days postop end date 9/14/22  - Please continue heparin gtt.   - PPI  - PCA for pain control, decreasing ketamine drip today to 7.5 mg/hr per discussion with pharmacy  - Continue plavix 75  - Appreciate hematology consult, will follow up recs  - No bowel meds   - Ok to transfer to floor    Raymond Pan MD   Vascular Surgery PGY2

## 2022-09-12 NOTE — PROGRESS NOTES
General Surgery Progress Note  2022    Subjective:  NAEON. No flatus or stool yet. Patient had no questions or concerns.    Objective:  /69 (BP Location: Left arm)   Pulse 75   Temp 98.5  F (36.9  C) (Axillary)   Resp 16   Wt 90.3 kg (199 lb)   SpO2 98%   BMI 34.16 kg/m    Physical Exam:  Gen: NAD, resting comfortably  HEENT: EOMI, Sclera Anicteric, Hearing intact  Neuro: AO, no focal deficits  Psych: Affect appropriate, Behavior appropriate, Cooperative  CV: Normocardic, Slightly hypertensive  Pulm: NWOB on 3L  ABD: Soft, NT, ND  MSK: MAEx4  Wounds: Dressings CDI    A/P:  46F presenting with acute mesenteric ischemia s/p the followin/8 ex lap SMA thrombectomy and stent graft (Dr. [facility])   ex lap evacuation of rectus sheath hematoma, Abthera (Dr. [facility], Dr. Lopez). 9/10 ex lap, small bowel resection, abd closure (Dr. Lopez).    - Continue NPO until bowel function returning  - No bowel meds until stool function  - Continue TPN  - OOB and ambulating  - Anticoagulation per vascular    Discussed with chief resident, who discussed with staff.    Please page resident on call with questions,   Jeremy Shafer MD   General Surgery Resident

## 2022-09-12 NOTE — PLAN OF CARE
Major Shift Events:  Patient transferred to . Completely neurologically intact. Afebrile. SR. Normotensive. NPO with ice chips. Adequate urine output. Standby assist to the commode. Walked with PT around unit. No BM this shift.  TNP running at 60mL/hr. Heparin running at 1500 units/hr. Ketamine running at 7.5mg/hr. PCA Fentanyl pump in place. Shift total performed at 1544. Phos and potassium replaced. Double lumen PICC on R, PIV with heparin gtt running on L.     Sharon Obando RN on 9/12/2022 at 5:40 PM

## 2022-09-12 NOTE — PROGRESS NOTES
Hematology Note    Called and discussed plan with vascular surgery team.    Bridgette Veras is a 46 year old female with PMHx of pepper-en-Y gastric bypass surgery, depression, and RLS. Admitted to SICU (9/8/2022) post-operative (emergent ex-lap, SMA thrombectomy and retrograde SMA stenting) for aortic/SMA thrombus. Her hospital course is complicated by rectus sheath hematoma and short-segment ischemia of ileum s/p resection.    Chart reviewed. VSS with stable cell counts. Did not require any transfusion over the weekend.  APS panel were all negative.  Currently on clopidogrel and high-intensity heparin.    Recommendations:  Agree with the plan for antiplatelet and anticoagulant for a treatment of unexplained arterial thrombosis. Initial plan is for 3 months with repeat imaging to evaluate thrombosis burden at that time.  - Can switch high-intensity heparin to apixaban (10 mg BID for 7 days then 5 mg BID) when medically ready (no issues with bleeding or further plan for procedure etc) per primary team  - We will arrange follow-up at around that time (3 months), with Dr.Cogan to further discuss thrombophilia work-up and long-tem plan for anticoagulant at that time.  - Resume Venofer to complete a total of 5 doses (ordered for you)    Hematology will sign-off. Please feel free to page/call if any Qs.    Carrillo Pretty MD  Hematology/Medical Oncology (PGY-4)  P: [phone]

## 2022-09-12 NOTE — CONSULTS
Care Management Initial Consult    General Information  Assessment completed with: VM-chart review,    Type of CM/SW Visit: Initial Assessment    Primary Care Provider verified and updated as needed: Yes   Readmission within the last 30 days: no previous admission in last 30 days   Return Category: Progression of disease     Advance Care Planning: Advance Care Planning Reviewed: no concerns identified          Communication Assessment  Patient's communication style: spoken language (English or Bilingual)    Hearing Difficulty or Deaf: no   Wear Glasses or Blind: yes    Cognitive  Cognitive/Neuro/Behavioral: WDL  Level of Consciousness: alert  Arousal Level: opens eyes spontaneously  Orientation: oriented x 4  Mood/Behavior: calm, cooperative  Best Language: 0 - No aphasia  Speech: logical, spontaneous, clear    Living Environment:   People in home: child(smooth), dependent, parent(s), spouse     Current living Arrangements: house      Able to return to prior arrangements: yes       Family/Social Support:  Care provided by: self, spouse/significant other, parent(s)  Provides care for: child(smooth)  Marital Status:             Description of Support System: Supportive, Involved    Support Assessment: Adequate family and caregiver support    Current Resources:   Patient receiving home care services: No     Community Resources: None  Equipment currently used at home: none  Supplies currently used at home: None    Employment/Financial:  Employment Status: employed full-time        Financial Concerns: No concerns identified   Referral to Financial Worker: No       Lifestyle & Psychosocial Needs:  Social Determinants of Health     Tobacco Use: High Risk     Smoking Tobacco Use: Current Every Day Smoker     Smokeless Tobacco Use: Never Used   Alcohol Use: Not on file   Financial Resource Strain: Not on file   Food Insecurity: Not on file   Transportation Needs: Not on file   Physical Activity: Not on file   Stress:  Not on file   Social Connections: Not on file   Intimate Partner Violence: Not on file   Depression: Not at risk     PHQ-2 Score: 0   Housing Stability: Not on file       Functional Status:  Prior to admission patient needed assistance:   Dependent ADLs:: Independent  Dependent IADLs:: Independent       Mental Health Status:  Mental Health Status: No Current Concerns       Chemical Dependency Status:  Chemical Dependency Status: No Current Concerns             Values/Beliefs:  Spiritual, Cultural Beliefs, Buddhism Practices, Values that affect care: yes               Additional Information:  Patient lives with her  and children in a home. She has parents that are local who also provide help. She has been independent at baseline and does not receive services. RNCC/SW will continue to follow for needs.     Vero Corey RN, BSN  RN Care Coordinator   [facility]  [phone]         Vero Corey RN

## 2022-09-12 NOTE — PLAN OF CARE
Major Shift Events:      Awaits transfer to floor pending room availability; weaned oxygen to 3lpm oxy mask. Remains NPO following bowel resection aside from ice chips and only plavix. TPN and lipids infusing. Pain management improving.    For vital signs and complete assessments, please see documentation flowsheets.     Problem: Plan of Care - These are the overarching goals to be used throughout the patient stay.    Goal: Plan of Care Review/Shift Note  Description: The Plan of Care Review/Shift note should be completed every shift.  The Outcome Evaluation is a brief statement about your assessment that the patient is improving, declining, or no change.  This information will be displayed automatically on your shift note.  Outcome: Ongoing, Progressing

## 2022-09-13 ENCOUNTER — APPOINTMENT (OUTPATIENT)
Dept: OCCUPATIONAL THERAPY | Facility: CLINIC | Age: 47
End: 2022-09-13
Payer: COMMERCIAL

## 2022-09-13 LAB
ANION GAP SERPL CALCULATED.3IONS-SCNC: 13 MMOL/L (ref 7–15)
BUN SERPL-MCNC: 16.6 MG/DL (ref 6–20)
CALCIUM SERPL-MCNC: 8.8 MG/DL (ref 8.6–10)
CHLORIDE SERPL-SCNC: 106 MMOL/L (ref 98–107)
CREAT SERPL-MCNC: 0.58 MG/DL (ref 0.51–0.95)
DEPRECATED HCO3 PLAS-SCNC: 20 MMOL/L (ref 22–29)
ERYTHROCYTE [DISTWIDTH] IN BLOOD BY AUTOMATED COUNT: 31.9 % (ref 10–15)
GFR SERPL CREATININE-BSD FRML MDRD: >90 ML/MIN/1.73M2
GLUCOSE BLDC GLUCOMTR-MCNC: 116 MG/DL (ref 70–99)
GLUCOSE BLDC GLUCOMTR-MCNC: 119 MG/DL (ref 70–99)
GLUCOSE BLDC GLUCOMTR-MCNC: 170 MG/DL (ref 70–99)
GLUCOSE SERPL-MCNC: 105 MG/DL (ref 70–99)
HCT VFR BLD AUTO: 29.3 % (ref 35–47)
HGB BLD-MCNC: 8.2 G/DL (ref 11.7–15.7)
MAGNESIUM SERPL-MCNC: 1.7 MG/DL (ref 1.7–2.3)
MCH RBC QN AUTO: 20.6 PG (ref 26.5–33)
MCHC RBC AUTO-ENTMCNC: 28 G/DL (ref 31.5–36.5)
MCV RBC AUTO: 74 FL (ref 78–100)
PHOSPHATE SERPL-MCNC: 4.2 MG/DL (ref 2.5–4.5)
PLATELET # BLD AUTO: 253 10E3/UL (ref 150–450)
POTASSIUM SERPL-SCNC: 3.3 MMOL/L (ref 3.4–5.3)
POTASSIUM SERPL-SCNC: 3.7 MMOL/L (ref 3.4–5.3)
RBC # BLD AUTO: 3.98 10E6/UL (ref 3.8–5.2)
SODIUM SERPL-SCNC: 139 MMOL/L (ref 136–145)
UFH PPP CHRO-ACNC: 0.28 IU/ML
UFH PPP CHRO-ACNC: 0.52 IU/ML
UFH PPP CHRO-ACNC: 0.63 IU/ML
WBC # BLD AUTO: 18.2 10E3/UL (ref 4–11)

## 2022-09-13 PROCEDURE — 250N000011 HC RX IP 250 OP 636: Performed by: STUDENT IN AN ORGANIZED HEALTH CARE EDUCATION/TRAINING PROGRAM

## 2022-09-13 PROCEDURE — 85520 HEPARIN ASSAY: CPT | Performed by: STUDENT IN AN ORGANIZED HEALTH CARE EDUCATION/TRAINING PROGRAM

## 2022-09-13 PROCEDURE — 36592 COLLECT BLOOD FROM PICC: CPT | Performed by: SURGERY

## 2022-09-13 PROCEDURE — 97535 SELF CARE MNGMENT TRAINING: CPT | Mod: GO

## 2022-09-13 PROCEDURE — 250N000009 HC RX 250: Performed by: SURGERY

## 2022-09-13 PROCEDURE — 85018 HEMOGLOBIN: CPT | Performed by: STUDENT IN AN ORGANIZED HEALTH CARE EDUCATION/TRAINING PROGRAM

## 2022-09-13 PROCEDURE — 85520 HEPARIN ASSAY: CPT | Performed by: SURGERY

## 2022-09-13 PROCEDURE — 120N000003 HC R&B IMCU UMMC

## 2022-09-13 PROCEDURE — C9113 INJ PANTOPRAZOLE SODIUM, VIA: HCPCS | Performed by: STUDENT IN AN ORGANIZED HEALTH CARE EDUCATION/TRAINING PROGRAM

## 2022-09-13 PROCEDURE — 36592 COLLECT BLOOD FROM PICC: CPT | Performed by: STUDENT IN AN ORGANIZED HEALTH CARE EDUCATION/TRAINING PROGRAM

## 2022-09-13 PROCEDURE — 80048 BASIC METABOLIC PNL TOTAL CA: CPT | Performed by: STUDENT IN AN ORGANIZED HEALTH CARE EDUCATION/TRAINING PROGRAM

## 2022-09-13 PROCEDURE — 250N000013 HC RX MED GY IP 250 OP 250 PS 637: Performed by: SURGERY

## 2022-09-13 PROCEDURE — 84132 ASSAY OF SERUM POTASSIUM: CPT | Performed by: SURGERY

## 2022-09-13 PROCEDURE — 250N000013 HC RX MED GY IP 250 OP 250 PS 637: Performed by: STUDENT IN AN ORGANIZED HEALTH CARE EDUCATION/TRAINING PROGRAM

## 2022-09-13 PROCEDURE — 258N000003 HC RX IP 258 OP 636: Performed by: STUDENT IN AN ORGANIZED HEALTH CARE EDUCATION/TRAINING PROGRAM

## 2022-09-13 PROCEDURE — 84100 ASSAY OF PHOSPHORUS: CPT | Performed by: STUDENT IN AN ORGANIZED HEALTH CARE EDUCATION/TRAINING PROGRAM

## 2022-09-13 PROCEDURE — 83735 ASSAY OF MAGNESIUM: CPT | Performed by: STUDENT IN AN ORGANIZED HEALTH CARE EDUCATION/TRAINING PROGRAM

## 2022-09-13 RX ORDER — POTASSIUM CHLORIDE 750 MG/1
20 TABLET, EXTENDED RELEASE ORAL ONCE
Status: COMPLETED | OUTPATIENT
Start: 2022-09-13 | End: 2022-09-13

## 2022-09-13 RX ORDER — POTASSIUM CHLORIDE 750 MG/1
40 TABLET, EXTENDED RELEASE ORAL ONCE
Status: COMPLETED | OUTPATIENT
Start: 2022-09-13 | End: 2022-09-13

## 2022-09-13 RX ADMIN — POTASSIUM CHLORIDE 40 MEQ: 750 TABLET, EXTENDED RELEASE ORAL at 08:02

## 2022-09-13 RX ADMIN — I.V. FAT EMULSION 250 ML: 20 EMULSION INTRAVENOUS at 20:33

## 2022-09-13 RX ADMIN — CLOPIDOGREL BISULFATE 75 MG: 75 TABLET ORAL at 08:02

## 2022-09-13 RX ADMIN — PIPERACILLIN AND TAZOBACTAM 3.38 G: 3; .375 INJECTION, POWDER, LYOPHILIZED, FOR SOLUTION INTRAVENOUS at 16:01

## 2022-09-13 RX ADMIN — MAGNESIUM SULFATE HEPTAHYDRATE: 500 INJECTION, SOLUTION INTRAMUSCULAR; INTRAVENOUS at 20:32

## 2022-09-13 RX ADMIN — PIPERACILLIN AND TAZOBACTAM 3.38 G: 3; .375 INJECTION, POWDER, LYOPHILIZED, FOR SOLUTION INTRAVENOUS at 10:52

## 2022-09-13 RX ADMIN — PIPERACILLIN AND TAZOBACTAM 3.38 G: 3; .375 INJECTION, POWDER, LYOPHILIZED, FOR SOLUTION INTRAVENOUS at 22:03

## 2022-09-13 RX ADMIN — PANTOPRAZOLE SODIUM 40 MG: 40 INJECTION, POWDER, FOR SOLUTION INTRAVENOUS at 08:02

## 2022-09-13 RX ADMIN — HEPARIN SODIUM 1650 UNITS/HR: 10000 INJECTION, SOLUTION INTRAVENOUS at 11:14

## 2022-09-13 RX ADMIN — IRON SUCROSE 200 MG: 20 INJECTION, SOLUTION INTRAVENOUS at 11:43

## 2022-09-13 RX ADMIN — POTASSIUM CHLORIDE 20 MEQ: 750 TABLET, EXTENDED RELEASE ORAL at 16:00

## 2022-09-13 RX ADMIN — PIPERACILLIN AND TAZOBACTAM 3.38 G: 3; .375 INJECTION, POWDER, LYOPHILIZED, FOR SOLUTION INTRAVENOUS at 04:17

## 2022-09-13 ASSESSMENT — ACTIVITIES OF DAILY LIVING (ADL)
ADLS_ACUITY_SCORE: 25
ADLS_ACUITY_SCORE: 23
ADLS_ACUITY_SCORE: 25
ADLS_ACUITY_SCORE: 23
ADLS_ACUITY_SCORE: 25
ADLS_ACUITY_SCORE: 23

## 2022-09-13 NOTE — PLAN OF CARE
Neuro: A&Ox4.   Cardiac: SR. VSS.   Respiratory: Sating >92 on RA.  GI/: Adequate urine output. BM X3  Diet/appetite: NPO with ice chips. TPN running at 60ml/hr  Activity:  Assist of 1. Pt able to ambulate to and use bathroom  Pain: At acceptable level on current regimen.   Skin: No new deficits noted. Abdominal dressing CDI.  LDA's: L PIV infusing heparin at 15ml/hr, R PICC double lumen infusing TPN, lipids, ketamine. PCA pump.    Plan: Heparin dosage changed from 1500 units to 1650 units this shift due to Anti Xa Level of 0.28. Continue with plan of care. Notify provider of any new changes.

## 2022-09-13 NOTE — ANESTHESIA POSTPROCEDURE EVALUATION
Patient: Bridgette Veras    Procedure: Procedure(s):  EXPLORATORY LAPAROTOMY, EVACUATION OF RECTUS SHEATH HEMATOMA, ABDOMINAL WASHOUT       Anesthesia Type:  General    Note:  Disposition: Admission   Postop Pain Control: Uneventful            Sign Out: Well controlled pain   PONV: No   Neuro/Psych: Uneventful            Sign Out: Acceptable/Baseline neuro status   Airway/Respiratory: Uneventful            Sign Out: Acceptable/Baseline resp. status   CV/Hemodynamics: Uneventful            Sign Out: Acceptable CV status; No obvious hypovolemia; No obvious fluid overload   Other NRE:    DID A NON-ROUTINE EVENT OCCUR?            Last vitals:  Vitals Value Taken Time   /75 09/13/22 1522   Temp 36.7  C (98.1  F) 09/13/22 1522   Pulse 90 09/13/22 1522   Resp 20 09/13/22 1522   SpO2 96 % 09/13/22 1616   Vitals shown include unvalidated device data.    Electronically Signed By: Mike Rosario MD  September 13, 2022  4:17 PM  
4 = No assist / stand by assistance

## 2022-09-13 NOTE — PLAN OF CARE
Neuro: A&Ox4.   Cardiac: SR. VSS, on tele    Respiratory: Sating 92 on RA.  GI/: Adequate urine output. Urine frequency, binta/pink coloring. BM X1  Diet/appetite: Tolerating clear liquid diet. TPN 60ml/hr & Q6 BS  Activity:  Stand-by assist, up to chair/bathroom. Goal is walking 4XQD per rehab/pt.  Pain: At acceptable level on current regimen. PCA pump Fentanyl (10mcg, max dose 60mcg per hour)   Skin: No new deficits noted. Abdominal incision is stapled and approximated. Minor bruising around incision.   LDA's: R PICC double lumen (both infusing), L PIV (infusing), TPN, Heparin     Plan: Continue with POC. Notify primary team with changes. Goal of walking 4XQD. Recheck K+ lab to check for supplement needs

## 2022-09-13 NOTE — PROGRESS NOTES
VASCULAR SURGERY PROGRESS NOTE     S: No acute issues. Passing gas and had BMs. Pain better controlled. No respiratory distress. Wt down to baseline, off O2 this AM     O  /59 (BP Location: Left arm)   Pulse 81   Temp 97.6  F (36.4  C) (Oral)   Resp 16   Wt 84.8 kg (186 lb 15.2 oz)   SpO2 96%   BMI 32.09 kg/m    Gen: Aox 3  Resp: NLB RA  Abd: soft, non distended, incision C.D with mild ecchymosis   Extremities: Warm well perfused    Labs  Hb 8.2  WBC 18   Plts>200    A/P  POD5  s/p embolectomy and stenting of SMA. 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 RTOR on 9/9 for ex lap, drainage of rectus sheath hematoma with findings of possible ischemia of  one segment of ileum , Abthera placed. 9/10 ex lap, SB resection and primary anastomosis     - CLD, TPN  - Continue zosyn x 4days postop (last day tomorrow). Rise in WBC but no clinical concern for infection, Recheck CBC in AM tomorrow   - Please continue heparin gtt.   - PPI  - PCA . discontinue ketamine   - Continue plavix    - No bowel meds   - SCDs        Raquel Aly MD  Fellow

## 2022-09-13 NOTE — PLAN OF CARE
Neuro: A&Ox4.   Cardiac: Afebrile, VSS. SR   Respiratory: RA, denies SOB  GI/: Voiding spontaneously, hematuria. 1 BM this shift.  Diet/appetite: Tolerating clear liquid diet. TPN running at 60mL/hr, lipids running at 20.8mL/hr. Q6 BG checks. Denies nausea.  Activity: Up with standby assist    Pain: Denies. Fentanyl PCA pump, 10mcg doses, max 60mcg per hour  Skin: No new deficits noted.  Lines: R DL PICC, L PIV. Heparin running at 1650 units/hr.   Replacements: Potassium replaced    Plan: Continue with current POC. Report changes to primary team.

## 2022-09-13 NOTE — PROGRESS NOTES
General Surgery Progress Note  2022    Subjective:  NAEON. Having BMs. No nausea. Patient had no questions or concerns.    Objective:  /63   Pulse 83   Temp 97.7  F (36.5  C) (Oral)   Resp 18   Wt 84.8 kg (186 lb 15.2 oz)   SpO2 95%   BMI 32.09 kg/m    Physical Exam:  Gen: NAD, resting comfortably  HEENT: EOMI, Sclera Anicteric, Hearing intact  Neuro: AO, no focal deficits  Psych: Affect appropriate, Behavior appropriate, Cooperative  CV: Normocardic, normotensive  Pulm: NWOB on RA  ABD: Soft, NT, ND  MSK: MAEx4  Wounds: Dressings CDI    A/P:  46F presenting with acute mesenteric ischemia s/p the followin/8 ex lap SMA thrombectomy and stent graft (Dr. [facility])   ex lap evacuation of rectus sheath hematoma, Abthera (Dr. [facility], Dr. Lopez). 9/10 ex lap, small bowel resection, abd closure (Dr. Lancaster[facility]).    - CLD for today  --> If patient does well, can advance diet to regular tomorrow  - OOB and ambulating  - Bowel regimen as needed  - Anticoagulation per vascular    Anticipate signing off tomorrow if patient does well with diet today.   Discussed with chief resident, who discussed with staff.    Please page resident on call with questions,   Jeremy Shafer MD   General Surgery Resident

## 2022-09-14 ENCOUNTER — APPOINTMENT (OUTPATIENT)
Dept: OCCUPATIONAL THERAPY | Facility: CLINIC | Age: 47
End: 2022-09-14
Payer: COMMERCIAL

## 2022-09-14 ENCOUNTER — APPOINTMENT (OUTPATIENT)
Dept: GENERAL RADIOLOGY | Facility: CLINIC | Age: 47
End: 2022-09-14
Payer: COMMERCIAL

## 2022-09-14 LAB
ALBUMIN UR-MCNC: NEGATIVE MG/DL
ANION GAP SERPL CALCULATED.3IONS-SCNC: 10 MMOL/L (ref 7–15)
APPEARANCE UR: CLEAR
BILIRUB UR QL STRIP: NEGATIVE
BUN SERPL-MCNC: 15.4 MG/DL (ref 6–20)
C DIFF TOX B STL QL: NEGATIVE
CALCIUM SERPL-MCNC: 9 MG/DL (ref 8.6–10)
CHLORIDE SERPL-SCNC: 108 MMOL/L (ref 98–107)
COLOR UR AUTO: ABNORMAL
CREAT SERPL-MCNC: 0.6 MG/DL (ref 0.51–0.95)
DEPRECATED HCO3 PLAS-SCNC: 21 MMOL/L (ref 22–29)
ERYTHROCYTE [DISTWIDTH] IN BLOOD BY AUTOMATED COUNT: 33.5 % (ref 10–15)
GFR SERPL CREATININE-BSD FRML MDRD: >90 ML/MIN/1.73M2
GLUCOSE SERPL-MCNC: 109 MG/DL (ref 70–99)
GLUCOSE UR STRIP-MCNC: NEGATIVE MG/DL
HCT VFR BLD AUTO: 30.8 % (ref 35–47)
HGB BLD-MCNC: 8.5 G/DL (ref 11.7–15.7)
HGB UR QL STRIP: ABNORMAL
KETONES UR STRIP-MCNC: NEGATIVE MG/DL
LEUKOCYTE ESTERASE UR QL STRIP: NEGATIVE
MAGNESIUM SERPL-MCNC: 1.9 MG/DL (ref 1.7–2.3)
MCH RBC QN AUTO: 20.8 PG (ref 26.5–33)
MCHC RBC AUTO-ENTMCNC: 27.6 G/DL (ref 31.5–36.5)
MCV RBC AUTO: 76 FL (ref 78–100)
NITRATE UR QL: NEGATIVE
PATH REPORT.COMMENTS IMP SPEC: NORMAL
PATH REPORT.FINAL DX SPEC: NORMAL
PATH REPORT.GROSS SPEC: NORMAL
PATH REPORT.MICROSCOPIC SPEC OTHER STN: NORMAL
PATH REPORT.RELEVANT HX SPEC: NORMAL
PH UR STRIP: 5.5 [PH] (ref 5–7)
PHOSPHATE SERPL-MCNC: 4.5 MG/DL (ref 2.5–4.5)
PHOTO IMAGE: NORMAL
PLATELET # BLD AUTO: 300 10E3/UL (ref 150–450)
POTASSIUM SERPL-SCNC: 4 MMOL/L (ref 3.4–5.3)
RBC # BLD AUTO: 4.08 10E6/UL (ref 3.8–5.2)
RBC URINE: 126 /HPF
SODIUM SERPL-SCNC: 139 MMOL/L (ref 136–145)
SP GR UR STRIP: 1.01 (ref 1–1.03)
SQUAMOUS EPITHELIAL: 2 /HPF
UFH PPP CHRO-ACNC: 0.37 IU/ML
UROBILINOGEN UR STRIP-MCNC: NORMAL MG/DL
WBC # BLD AUTO: 21.9 10E3/UL (ref 4–11)
WBC URINE: 35 /HPF

## 2022-09-14 PROCEDURE — 87086 URINE CULTURE/COLONY COUNT: CPT | Performed by: SURGERY

## 2022-09-14 PROCEDURE — 250N000009 HC RX 250: Performed by: SURGERY

## 2022-09-14 PROCEDURE — 81001 URINALYSIS AUTO W/SCOPE: CPT | Performed by: SURGERY

## 2022-09-14 PROCEDURE — 71046 X-RAY EXAM CHEST 2 VIEWS: CPT

## 2022-09-14 PROCEDURE — 250N000013 HC RX MED GY IP 250 OP 250 PS 637: Performed by: STUDENT IN AN ORGANIZED HEALTH CARE EDUCATION/TRAINING PROGRAM

## 2022-09-14 PROCEDURE — 250N000011 HC RX IP 250 OP 636: Performed by: STUDENT IN AN ORGANIZED HEALTH CARE EDUCATION/TRAINING PROGRAM

## 2022-09-14 PROCEDURE — 258N000003 HC RX IP 258 OP 636: Performed by: STUDENT IN AN ORGANIZED HEALTH CARE EDUCATION/TRAINING PROGRAM

## 2022-09-14 PROCEDURE — 83735 ASSAY OF MAGNESIUM: CPT | Performed by: STUDENT IN AN ORGANIZED HEALTH CARE EDUCATION/TRAINING PROGRAM

## 2022-09-14 PROCEDURE — 80048 BASIC METABOLIC PNL TOTAL CA: CPT | Performed by: STUDENT IN AN ORGANIZED HEALTH CARE EDUCATION/TRAINING PROGRAM

## 2022-09-14 PROCEDURE — 250N000013 HC RX MED GY IP 250 OP 250 PS 637

## 2022-09-14 PROCEDURE — 36592 COLLECT BLOOD FROM PICC: CPT | Performed by: STUDENT IN AN ORGANIZED HEALTH CARE EDUCATION/TRAINING PROGRAM

## 2022-09-14 PROCEDURE — C9113 INJ PANTOPRAZOLE SODIUM, VIA: HCPCS | Performed by: STUDENT IN AN ORGANIZED HEALTH CARE EDUCATION/TRAINING PROGRAM

## 2022-09-14 PROCEDURE — 84100 ASSAY OF PHOSPHORUS: CPT | Performed by: STUDENT IN AN ORGANIZED HEALTH CARE EDUCATION/TRAINING PROGRAM

## 2022-09-14 PROCEDURE — 120N000003 HC R&B IMCU UMMC

## 2022-09-14 PROCEDURE — 97530 THERAPEUTIC ACTIVITIES: CPT | Mod: GO

## 2022-09-14 PROCEDURE — 87040 BLOOD CULTURE FOR BACTERIA: CPT | Performed by: STUDENT IN AN ORGANIZED HEALTH CARE EDUCATION/TRAINING PROGRAM

## 2022-09-14 PROCEDURE — 85027 COMPLETE CBC AUTOMATED: CPT | Performed by: STUDENT IN AN ORGANIZED HEALTH CARE EDUCATION/TRAINING PROGRAM

## 2022-09-14 PROCEDURE — 85520 HEPARIN ASSAY: CPT | Performed by: SURGERY

## 2022-09-14 PROCEDURE — 71046 X-RAY EXAM CHEST 2 VIEWS: CPT | Mod: 26 | Performed by: RADIOLOGY

## 2022-09-14 PROCEDURE — 87493 C DIFF AMPLIFIED PROBE: CPT

## 2022-09-14 RX ORDER — OXYCODONE HYDROCHLORIDE 5 MG/1
5 TABLET ORAL EVERY 4 HOURS PRN
Status: DISCONTINUED | OUTPATIENT
Start: 2022-09-14 | End: 2022-09-16 | Stop reason: HOSPADM

## 2022-09-14 RX ADMIN — OXYCODONE HYDROCHLORIDE 5 MG: 5 TABLET ORAL at 07:54

## 2022-09-14 RX ADMIN — IRON SUCROSE 200 MG: 20 INJECTION, SOLUTION INTRAVENOUS at 11:47

## 2022-09-14 RX ADMIN — PIPERACILLIN AND TAZOBACTAM 3.38 G: 3; .375 INJECTION, POWDER, LYOPHILIZED, FOR SOLUTION INTRAVENOUS at 04:21

## 2022-09-14 RX ADMIN — PANTOPRAZOLE SODIUM 40 MG: 40 INJECTION, POWDER, FOR SOLUTION INTRAVENOUS at 07:46

## 2022-09-14 RX ADMIN — OXYCODONE HYDROCHLORIDE 5 MG: 5 TABLET ORAL at 15:35

## 2022-09-14 RX ADMIN — HEPARIN SODIUM 1650 UNITS/HR: 10000 INJECTION, SOLUTION INTRAVENOUS at 02:33

## 2022-09-14 RX ADMIN — MAGNESIUM SULFATE HEPTAHYDRATE: 500 INJECTION, SOLUTION INTRAMUSCULAR; INTRAVENOUS at 20:17

## 2022-09-14 RX ADMIN — CLOPIDOGREL BISULFATE 75 MG: 75 TABLET ORAL at 07:46

## 2022-09-14 RX ADMIN — OXYCODONE HYDROCHLORIDE 5 MG: 5 TABLET ORAL at 21:29

## 2022-09-14 RX ADMIN — HEPARIN SODIUM 1650 UNITS/HR: 10000 INJECTION, SOLUTION INTRAVENOUS at 18:33

## 2022-09-14 ASSESSMENT — ACTIVITIES OF DAILY LIVING (ADL)
ADLS_ACUITY_SCORE: 22

## 2022-09-14 NOTE — PROGRESS NOTES
General Surgery Progress Note  2022    Subjective:  No acute events overnight. Pain well controlled on current regimen. Tolerated CLD without nausea or emesis, continues to pass gas and have bowel movements    Objective:  /73 (BP Location: Left arm)   Pulse 82   Temp 98.5  F (36.9  C) (Oral)   Resp 18   Wt 84.1 kg (185 lb 8 oz)   SpO2 93%   BMI 31.84 kg/m    Physical Exam:  Gen: NAD, resting comfortably  Resp:  NLB on RA  Psych: Affect appropriate, Behavior appropriate, Cooperative  CV: RRR  ABD: Soft, NT, ND; midline incision c/d/i with staples in place  MSK: wwp     A/P:  46F presenting with acute mesenteric ischemia s/p the followin/8 ex lap SMA thrombectomy and stent graft (Dr. [facility])   ex lap evacuation of rectus sheath hematoma, Abthera (Dr. [facility], Dr. Lopez). 9/10 ex lap, small bowel resection, abd closure (Dr. Lopez).    Ms. Veras continues to do well with diet and antegrade bowel function.    -- Advance diet as tolerated   -- Rest of cares per primary team  -- EGS will sign off at this time, okay to f/u prn  -- Do not hesitate to call with questions or concerns. Appreciate opportunity to assist in the care of this patient.     Discussed with chief and staff.    Gabriel Schrader MD  General Surgery PGY 2 Resident

## 2022-09-14 NOTE — PROGRESS NOTES
CLINICAL NUTRITION SERVICES - BRIEF NOTE  For last full RD assessment, see note dated 9/10/22    NEW FINDINGS   - Pt tx out of ICU to [facility]     - Continues with TPN but diet has progressed from clear liquids to a low fiber/low residue diet today. Per primary team note, plan to cut TPN rate in half and possibly fully wean tomorrow if tolerating diet.     - Met with pt today to check on appetite and to see if she is interested in setting up any oral nutrition supplements while weaning from nutrition support. Pt eating breakfast at time of visit; pancakes and eggs. Only interested in PRN supplement order.     - Pt acknowledged nutrition POC mentioned above (possible full wean from TPN tomorrow) and reports that her primary team has already discussed this with her.     - Reviewed labs, meds, and discussed the above POC with PharmD.     INTERVENTIONS  Implementation  Medical food supplement therapy - PRN supplement order for Ensure Max Protein  Parenteral Nutrition/IV Fluids - Continue x1 more day per primary team  - Given request to half volume, will adjust macros as below:     Modified PN prescription:   Volume 800 mL or per PharmD, Dextrose 120 gm/day (408 Kcals, GIR 1.3 mg/kg/minute), 100 gm AA (400 Kcals, 1.6 gm/kg), and discontinue lipid order for total provision of 808 Kcals (~13 Kcal/kg), meets ~51% est energy needs and 100% est protein needs.    Monitoring/Evaluation  Will continue to monitor and evaluate per protocol.    Jason Flores, LETITIA, LD, CNSC  6B RD pager: [phone]   6B RD Phone: [phone]

## 2022-09-14 NOTE — PROGRESS NOTES
VASCULAR SURGERY PROGRESS NOTE     S: No acute issues. Passing gas and had BMs. Pain remains well controlled. No respiratory distress. Up ambulating yesterday several times.   Denies fever, chills.      O  /73 (BP Location: Left arm)   Pulse 82   Temp 98.5  F (36.9  C) (Oral)   Resp 18   Wt 84.1 kg (185 lb 8 oz)   SpO2 93%   BMI 31.84 kg/m    Gen: Aox 3  Resp: NLB RA  Abd: soft, non distended, incision C.D with mild ecchymosis   Extremities: Warm well perfused    Labs  Hb 8.5 (8.2)   WBC 21.9 (18.2)   Plts>200    A/P  46 year old female pmh of smoking, csection, pepper-en-y gastric bypass now s/p embolectomy and stenting of SMA 9/8. 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 RTOR on 9/9 for ex lap, drainage of rectus sheath hematoma with findings of possible ischemia of  one segment of ileum , Abthera placed. 9/10 ex lap, SB resection and primary anastomosis.  She is doing well today, tolerated clears without issues, having bowel function, pain well controlled.     - discontinue PCA today, add oxycodone 5mg q4 prn, restart robaxin 500q6 prn, continue to monitor.  - Zosyn x 4days postop, will stop this am.   - Rise in WBC continues. Pt with loose diarrhea several times, possible c. Diff, will check.     - will get ua/blood cx/cxr for workup  - No other clinical concern for infection, trend CBC in AM tomorrow   - Please continue heparin gtt, plan to transition to orals when patient is tolerating diet, poss tomorrow.  - Continue plavix     - Low residue diet today. Will plan to half TPN today and stop tomorrow pending tolerance of diet.   - PPI  - No bowel meds   - SCDs      Raymond Pan MD   Vascular Surgery

## 2022-09-14 NOTE — PLAN OF CARE
Neuro: A&Ox4.   Cardiac: Afebrile, VSS. SR      Respiratory: RA, denies SOB  GI/: Voiding spontaneously, scant amount of blood in urine for the past few days. BM this shift.  Diet/appetite: Tolerating low fiber diet. TPN running at 60mL/hr. Denies nausea.  Activity: Up with standby assist    Pain: C/o abdominal pain. Controlled with current regimen  Skin: No new deficits noted.  Lines: R DL PICC, L PIV. Heparin running at 1650 units/hr.   PRN: Oxycodone x2     Plan: Continue with current POC. Report changes to primary team.

## 2022-09-14 NOTE — PLAN OF CARE
A: A&Ox4, calm and cooperative. VS, on room air sating >92%. SR. Afebrile. Intermittent surgical pain, Fentanyl PCA discontinued and prn oxy ordered. Denies nausea. Low Fiber diet, fair appetite. 10A therapeutic, heparin gtt continued. No new skin deficits. Abdominal incision with staples CELESTINO. Pt showered. Up with SBA. UA & Cdiff sent. C-diff negative. Able to make her needs known. Family came to visit, supportive.     I/O this shift:  In: 1980.26 [P.O.:680; I.V.:373.99]  Out: 450 [Urine:450]    Temp:  [98.1  F (36.7  C)-98.9  F (37.2  C)] 98.9  F (37.2  C)  Pulse:  [72-91] 81  Resp:  [16-20] 18  BP: (110-127)/(63-91) 114/81  SpO2:  [91 %-96 %] 95 %     R: Continue with POC. Notify primary team with changes.

## 2022-09-14 NOTE — PLAN OF CARE
Neuro: A&Ox4. Calls appropriately.   Cardiac: SR. VSS.    Respiratory: Sating >92 on RA. Denied SOB.  GI/: Adequate urine output.   Diet/appetite: Tolerating * diet. Eating well.  Activity:  Assist of *, up to chair and in halls.  Pain: At acceptable level on current regimen.   Skin: No new deficits noted. Abdominal incision stapled, approximated, CELESTINO  LDA's: Fentanyl PCA, heparin gtt, TPN & lipids, PICC, PIV    Plan: Continue with POC. Notify primary team with changes.     /63 (BP Location: Left arm)   Pulse 82   Temp 98.4  F (36.9  C) (Oral)   Resp 16   Wt 84.1 kg (185 lb 8 oz)   SpO2 93%   BMI 31.84 kg/m

## 2022-09-15 ENCOUNTER — APPOINTMENT (OUTPATIENT)
Dept: OCCUPATIONAL THERAPY | Facility: CLINIC | Age: 47
End: 2022-09-15
Payer: COMMERCIAL

## 2022-09-15 LAB
ABO/RH(D): NORMAL
ANION GAP SERPL CALCULATED.3IONS-SCNC: 10 MMOL/L (ref 7–15)
ANTIBODY SCREEN: NEGATIVE
BACTERIA UR CULT: NORMAL
BUN SERPL-MCNC: 21.4 MG/DL (ref 6–20)
CALCIUM SERPL-MCNC: 8.7 MG/DL (ref 8.6–10)
CHLORIDE SERPL-SCNC: 106 MMOL/L (ref 98–107)
CREAT SERPL-MCNC: 0.52 MG/DL (ref 0.51–0.95)
DEPRECATED HCO3 PLAS-SCNC: 22 MMOL/L (ref 22–29)
ERYTHROCYTE [DISTWIDTH] IN BLOOD BY AUTOMATED COUNT: 35.3 % (ref 10–15)
GFR SERPL CREATININE-BSD FRML MDRD: >90 ML/MIN/1.73M2
GLUCOSE BLDC GLUCOMTR-MCNC: 109 MG/DL (ref 70–99)
GLUCOSE BLDC GLUCOMTR-MCNC: 99 MG/DL (ref 70–99)
GLUCOSE SERPL-MCNC: 105 MG/DL (ref 70–99)
HCT VFR BLD AUTO: 30.4 % (ref 35–47)
HGB BLD-MCNC: 8.5 G/DL (ref 11.7–15.7)
MAGNESIUM SERPL-MCNC: 2.1 MG/DL (ref 1.7–2.3)
MCH RBC QN AUTO: 21.6 PG (ref 26.5–33)
MCHC RBC AUTO-ENTMCNC: 28 G/DL (ref 31.5–36.5)
MCV RBC AUTO: 77 FL (ref 78–100)
PHOSPHATE SERPL-MCNC: 4.1 MG/DL (ref 2.5–4.5)
PLATELET # BLD AUTO: 296 10E3/UL (ref 150–450)
POTASSIUM SERPL-SCNC: 3.9 MMOL/L (ref 3.4–5.3)
RBC # BLD AUTO: 3.94 10E6/UL (ref 3.8–5.2)
SODIUM SERPL-SCNC: 138 MMOL/L (ref 136–145)
SPECIMEN EXPIRATION DATE: NORMAL
UFH PPP CHRO-ACNC: 0.39 IU/ML
WBC # BLD AUTO: 20.9 10E3/UL (ref 4–11)

## 2022-09-15 PROCEDURE — 250N000011 HC RX IP 250 OP 636

## 2022-09-15 PROCEDURE — 36592 COLLECT BLOOD FROM PICC: CPT

## 2022-09-15 PROCEDURE — 250N000013 HC RX MED GY IP 250 OP 250 PS 637

## 2022-09-15 PROCEDURE — 83735 ASSAY OF MAGNESIUM: CPT | Performed by: STUDENT IN AN ORGANIZED HEALTH CARE EDUCATION/TRAINING PROGRAM

## 2022-09-15 PROCEDURE — 97530 THERAPEUTIC ACTIVITIES: CPT | Mod: GO

## 2022-09-15 PROCEDURE — 120N000003 HC R&B IMCU UMMC

## 2022-09-15 PROCEDURE — 84100 ASSAY OF PHOSPHORUS: CPT | Performed by: STUDENT IN AN ORGANIZED HEALTH CARE EDUCATION/TRAINING PROGRAM

## 2022-09-15 PROCEDURE — 36592 COLLECT BLOOD FROM PICC: CPT | Performed by: STUDENT IN AN ORGANIZED HEALTH CARE EDUCATION/TRAINING PROGRAM

## 2022-09-15 PROCEDURE — 86901 BLOOD TYPING SEROLOGIC RH(D): CPT

## 2022-09-15 PROCEDURE — 87070 CULTURE OTHR SPECIMN AEROBIC: CPT | Performed by: STUDENT IN AN ORGANIZED HEALTH CARE EDUCATION/TRAINING PROGRAM

## 2022-09-15 PROCEDURE — 85014 HEMATOCRIT: CPT | Performed by: STUDENT IN AN ORGANIZED HEALTH CARE EDUCATION/TRAINING PROGRAM

## 2022-09-15 PROCEDURE — 97535 SELF CARE MNGMENT TRAINING: CPT | Mod: GO

## 2022-09-15 PROCEDURE — 85520 HEPARIN ASSAY: CPT

## 2022-09-15 PROCEDURE — 82310 ASSAY OF CALCIUM: CPT | Performed by: STUDENT IN AN ORGANIZED HEALTH CARE EDUCATION/TRAINING PROGRAM

## 2022-09-15 PROCEDURE — 87040 BLOOD CULTURE FOR BACTERIA: CPT | Performed by: STUDENT IN AN ORGANIZED HEALTH CARE EDUCATION/TRAINING PROGRAM

## 2022-09-15 PROCEDURE — 250N000013 HC RX MED GY IP 250 OP 250 PS 637: Performed by: STUDENT IN AN ORGANIZED HEALTH CARE EDUCATION/TRAINING PROGRAM

## 2022-09-15 RX ORDER — PANTOPRAZOLE SODIUM 40 MG/1
40 TABLET, DELAYED RELEASE ORAL
Status: DISCONTINUED | OUTPATIENT
Start: 2022-09-15 | End: 2022-09-16 | Stop reason: HOSPADM

## 2022-09-15 RX ADMIN — METHOCARBAMOL 500 MG: 500 TABLET ORAL at 01:07

## 2022-09-15 RX ADMIN — APIXABAN 10 MG: 5 TABLET, FILM COATED ORAL at 08:31

## 2022-09-15 RX ADMIN — CLOPIDOGREL BISULFATE 75 MG: 75 TABLET ORAL at 08:31

## 2022-09-15 RX ADMIN — METHOCARBAMOL 500 MG: 500 TABLET ORAL at 22:17

## 2022-09-15 RX ADMIN — OXYCODONE HYDROCHLORIDE 5 MG: 5 TABLET ORAL at 12:31

## 2022-09-15 RX ADMIN — OXYCODONE HYDROCHLORIDE 5 MG: 5 TABLET ORAL at 08:31

## 2022-09-15 RX ADMIN — OXYCODONE HYDROCHLORIDE 5 MG: 5 TABLET ORAL at 01:35

## 2022-09-15 RX ADMIN — APIXABAN 10 MG: 5 TABLET, FILM COATED ORAL at 20:35

## 2022-09-15 RX ADMIN — Medication 5 ML: at 06:05

## 2022-09-15 RX ADMIN — Medication 5 ML: at 09:50

## 2022-09-15 RX ADMIN — PANTOPRAZOLE SODIUM 40 MG: 40 TABLET, DELAYED RELEASE ORAL at 08:31

## 2022-09-15 RX ADMIN — OXYCODONE HYDROCHLORIDE 5 MG: 5 TABLET ORAL at 16:30

## 2022-09-15 RX ADMIN — OXYCODONE HYDROCHLORIDE 5 MG: 5 TABLET ORAL at 22:17

## 2022-09-15 ASSESSMENT — ACTIVITIES OF DAILY LIVING (ADL)
ADLS_ACUITY_SCORE: 22
ADLS_ACUITY_SCORE: 23
ADLS_ACUITY_SCORE: 22

## 2022-09-15 NOTE — PROGRESS NOTES
"CLINICAL NUTRITION SERVICES  Reason for Assessment:  Nutrition education regarding low fiber nutrition therapy recommendations per referral s/p gastrointestinal surgery.   Diet History:  Remote hx of RNTHI VERDUGO. Reports following a regular diet at home; takes a protein powder drink once daily (mixed with water, brand unknown); reports an intolerance to most lactose-containing foods.   Per initial RD note -  was experiencing diarrhea and vomiting upon admission.   Per pt, decreased PO intake PTA with decreased appetite \"I was eating a lot of watermelon\"   Pt denies any significant weight loss -- had gained some of the weight she lost recently.   Pt takes B12 and MVI, iron, and D at home.   Usual meal pattern is small, frequent meals   Nutrition Diagnosis:  Food- and nutrition-related knowledge deficit r/t no previous knowledge of low-fiber diet recommendations   Interventions:  Provided instruction on high fiber foods vs low fiber foods, foods to avoid/foods to consume in moderation, general time frame to follow a low fiber diet ~6-8 weeks post-op, and recommendations for slowly re-introducing fiber into diet once approved by MD.   Provided handouts - Low fiber nutrition therapy, and fiber content of foods list   Goals:   Patient will verbalize understanding of 3 examples of high fiber foods and 3 examples of low fiber foods   Follow-up:    Patient to ask any further nutrition-related questions before discharge.  In addition, pt may request outpatient RD appointment.    Jason Flores RDN, LD, CNSC  6B RD pager: [phone] [address] RD Phone: [phone]       "

## 2022-09-15 NOTE — PLAN OF CARE
Neuro: A&Ox4.   Cardiac: SR. VSS.   Respiratory: Sating >93% on RA.  GI/: Adequate urine output.  Diet/appetite: Tolerating Low fiber diet.  Activity: SBA, ambulated to bathroom and halls.  Pain: Sternal incision site- Oxycodone x2 given  Skin: No new deficits noted.  LDA's: L PIV    Removed PICC, tip sent for culture.     Plan: Discharge tomorrow possibly. Continue with POC. Notify primary team with changes.

## 2022-09-15 NOTE — PLAN OF CARE
Goal Outcome Evaluation:    Plan of Care Reviewed With: patient     Overall Patient Progress: improving    Neuro: A&Ox4.   Cardiac: SR. VSS.   Respiratory: Sating 98% on RA.  GI/: Adequate urine output. BM X3  Diet/appetite: Tolerating low fiber diet. Eating well.  Activity:  Stand by, up to chair and in halls.  Pain: At acceptable level on current regimen.   Skin: No new deficits noted.  LDA's:    Plan: Continue with POC. Notify primary team with changes.

## 2022-09-15 NOTE — PROGRESS NOTES
VASCULAR SURGERY PROGRESS NOTE     S: No acute issues. Passing gas and had BMs.     O  /67 (BP Location: Left arm)   Pulse 93   Temp 98.9  F (37.2  C) (Oral)   Resp 16   Wt 82.8 kg (182 lb 8.7 oz)   SpO2 93%   BMI 31.33 kg/m    Gen: Aox 3  Resp: NLB RA  Abd: soft, non distended, incision C.D with mild ecchymosis   Extremities: Warm well perfused    Labs  WBC 20.9 this AM vs 22 yest  Afebrile  UA neg  CDiff neg  CXR without consolidation   BCx neg  PICC tip sent for culture as well     A/P  POD6  s/p embolectomy and stenting of SMA. 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 RTOR on 9/9 for ex lap, drainage of rectus sheath hematoma with findings of possible ischemia of  one segment of ileum , Abthera placed. 9/10 ex lap, SB resection and primary anastomosis     -  Low residue diet, protein supplements  _ discontinue TPN  - Remove PICC and send tip for Cx   - Continue zosyn x 4days postop (stopped 9/14)   - Please discontinue heparin gtt, start apixaban 10 BID as per hematology recs  - PPI PO  - PRN oxycodone and robaxin   - Continue plavix    - No bowel meds   - SCDs  - Likely discharge home in 1-2 days      Raquel Aly MD  Fellow

## 2022-09-16 ENCOUNTER — DOCUMENTATION ONLY (OUTPATIENT)
Dept: VASCULAR SURGERY | Facility: CLINIC | Age: 47
End: 2022-09-16

## 2022-09-16 VITALS
HEART RATE: 75 BPM | OXYGEN SATURATION: 94 % | DIASTOLIC BLOOD PRESSURE: 73 MMHG | SYSTOLIC BLOOD PRESSURE: 121 MMHG | BODY MASS INDEX: 30.61 KG/M2 | TEMPERATURE: 97.8 F | WEIGHT: 178.35 LBS | RESPIRATION RATE: 16 BRPM

## 2022-09-16 LAB
ERYTHROCYTE [DISTWIDTH] IN BLOOD BY AUTOMATED COUNT: 36 % (ref 10–15)
GLUCOSE BLDC GLUCOMTR-MCNC: 95 MG/DL (ref 70–99)
HCT VFR BLD AUTO: 32 % (ref 35–47)
HGB BLD-MCNC: 9.1 G/DL (ref 11.7–15.7)
HOLD SPECIMEN: NORMAL
MCH RBC QN AUTO: 21.9 PG (ref 26.5–33)
MCHC RBC AUTO-ENTMCNC: 28.4 G/DL (ref 31.5–36.5)
MCV RBC AUTO: 77 FL (ref 78–100)
PLATELET # BLD AUTO: 308 10E3/UL (ref 150–450)
RBC # BLD AUTO: 4.15 10E6/UL (ref 3.8–5.2)
WBC # BLD AUTO: 15.5 10E3/UL (ref 4–11)

## 2022-09-16 PROCEDURE — 250N000013 HC RX MED GY IP 250 OP 250 PS 637: Performed by: PHYSICIAN ASSISTANT

## 2022-09-16 PROCEDURE — 250N000013 HC RX MED GY IP 250 OP 250 PS 637: Performed by: STUDENT IN AN ORGANIZED HEALTH CARE EDUCATION/TRAINING PROGRAM

## 2022-09-16 PROCEDURE — 250N000013 HC RX MED GY IP 250 OP 250 PS 637

## 2022-09-16 PROCEDURE — 36415 COLL VENOUS BLD VENIPUNCTURE: CPT | Performed by: STUDENT IN AN ORGANIZED HEALTH CARE EDUCATION/TRAINING PROGRAM

## 2022-09-16 PROCEDURE — 85027 COMPLETE CBC AUTOMATED: CPT | Performed by: STUDENT IN AN ORGANIZED HEALTH CARE EDUCATION/TRAINING PROGRAM

## 2022-09-16 RX ORDER — OXYCODONE HYDROCHLORIDE 5 MG/1
5 TABLET ORAL EVERY 6 HOURS PRN
Qty: 20 TABLET | Refills: 0 | Status: SHIPPED | OUTPATIENT
Start: 2022-09-16 | End: 2022-09-21

## 2022-09-16 RX ORDER — PANTOPRAZOLE SODIUM 40 MG/1
40 TABLET, DELAYED RELEASE ORAL DAILY
Qty: 60 TABLET | Refills: 0 | Status: SHIPPED | OUTPATIENT
Start: 2022-09-16 | End: 2022-11-15

## 2022-09-16 RX ORDER — METHOCARBAMOL 500 MG/1
500 TABLET, FILM COATED ORAL EVERY 6 HOURS PRN
Qty: 20 TABLET | Refills: 0 | Status: SHIPPED | OUTPATIENT
Start: 2022-09-16 | End: 2022-09-26

## 2022-09-16 RX ORDER — CLOPIDOGREL BISULFATE 75 MG/1
75 TABLET ORAL DAILY
Qty: 42 TABLET | Refills: 1 | Status: SHIPPED | OUTPATIENT
Start: 2022-09-16 | End: 2022-10-20

## 2022-09-16 RX ADMIN — PANTOPRAZOLE SODIUM 40 MG: 40 TABLET, DELAYED RELEASE ORAL at 08:01

## 2022-09-16 RX ADMIN — CLOPIDOGREL BISULFATE 75 MG: 75 TABLET ORAL at 08:01

## 2022-09-16 RX ADMIN — ACETAMINOPHEN 975 MG: 325 TABLET, FILM COATED ORAL at 08:01

## 2022-09-16 RX ADMIN — APIXABAN 10 MG: 5 TABLET, FILM COATED ORAL at 08:01

## 2022-09-16 RX ADMIN — OXYCODONE HYDROCHLORIDE 5 MG: 5 TABLET ORAL at 08:01

## 2022-09-16 RX ADMIN — OXYCODONE HYDROCHLORIDE 5 MG: 5 TABLET ORAL at 03:48

## 2022-09-16 RX ADMIN — METHOCARBAMOL 500 MG: 500 TABLET ORAL at 08:02

## 2022-09-16 ASSESSMENT — ACTIVITIES OF DAILY LIVING (ADL)
ADLS_ACUITY_SCORE: 22

## 2022-09-16 NOTE — PROGRESS NOTES
DISCHARGE                         No discharge date for patient encounter.  ----------------------------------------------------------------------------  Discharged to: Home  Via: private transportation  Accompanied by: Family (  at main entrance)  Discharge Instructions: *diet, *activity, medications, follow up appointments, when to call the MD, aftercare instructions.  Prescriptions: Filled by pharmacy; medication list reviewed & sent with pt  Follow Up Appointments: arranged; information given  Belongings: All sent with pt  IV: d/c'd  Telemetry: d/c'd  Pt exhibits understanding of above discharge instructions; all questions answered.    Discharge Paperwork: Signed, copied, and sent home with patient.

## 2022-09-16 NOTE — DISCHARGE SUMMARY
Vascular Surgery Discharge Summary     NAME: Bridgette Veras   MRN: [id number]   : 1975     DATE OF ADMISSION: 2022     PRE/POSTOPERATIVE DIAGNOSES:     1.Acute mesenteric ischemia   2. 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 Small bowel ischemia     PROCEDURES PERFORMED:       Ex lap, SMA embolectomy and retrograde SMA stenting       Ex lap, evacuation of rectus hematoma , temporary closure    9/10  Ex lap, small bowel resection and primary anastomosis ( 50 cm), abdominal closure       PATHOLOGY RESULTS: Ischemic bowel     CULTURE RESULTS: Negative     INTRAOPERATIVE COMPLICATIONS: None     POSTOPERATIVE MEDICAL ISSUES:  Return to OR for small bowel ischemia on  and 9/10     DRAINS/TUBES PRESENT AT DISCHARGE: None     DATE OF DISCHARGE:  2022    HOSPITAL COURSE: Bridgette Veras is a 46 year old female who on 2022 presented with diffuse abdominal pain and imaging findings of aortic thrombus extending into celiac artery and SMA . She  underwent the above-named procedures on . Postoperatively she continued to have signfican pain prompting a return to OR on  and evacuationof rectus hematoma and inspection of bowel. A 50 cm segment of bowel was not clearly viable nor necrtotic so her abdomen was temporarily closed and she went back to OR on 9/10. There were no doppler signals in the mesentery of this 50 cm segment so a small bowel resection and anastomosis was performed and her fascia closed. She was started on IV TPN . Over the course of the week, she had return of bowel function and her TPN was discontinued. Her WBC lucia upto 21 but all wokrup was negative and on day of discharge she is afebrile , WBC down to 15. .  Prior to discharge, her pain was controlled well, she was able to perform ADLs and ambulate independently without difficulty, and had full return of bowel and bladder function.  On , she was discharged to home in stable condition. She will  return to clinic in 2 weeks for wound check. She will be on apixaban and plavix. Plan is to repeat CTA in 6 weeks to evaluate aortic thrombus burden.     DISCHARGE EXAM:   A&O, NAD  Resp non-labored  Distal extremities warm    Incisions C/D   Abdomen soft, non distended      DISCHARGE INSTRUCTIONS:  Discharge Procedure Orders   CTA Chest Abdomen Pelvis w Contrast   Standing Status: Future Standing Exp. Date: 09/16/23     Order Specific Question Answer Comments   Clinical Info for the Interpreting Provider 6 week CTA to evaluate aortic thrombus burden    Priority Routine      Adult Oncology/Hematology  Referral   Standing Status: Future   Referral Priority: Routine: Next available opening Referral Type: Consultation   Requested Specialty: Medical Oncology   Number of Visits Requested: 1     Reason for your hospital stay   Order Comments: SMA embolus     Activity   Order Comments: Your activity upon discharge: No heavy lifting>10 lbs for6 weeks  Ok to shower, no soaking in bath or swimming till seen in clinic     Order Specific Question Answer Comments   Is discharge order? Yes      Diet   Order Comments: Follow this diet upon discharge: Low residue diet     Order Specific Question Answer Comments   Is discharge order? Yes        DISCHARGE MEDICATIONS:   Current Discharge Medication List      START taking these medications    Details   !! apixaban ANTICOAGULANT (ELIQUIS) 5 MG tablet Take 2 tablets (10 mg) by mouth 2 times daily for 6 days  Qty: 24 tablet, Refills: 0    Associated Diagnoses: Superior mesenteric artery thrombosis (H); Aortic thrombus (H)      !! apixaban ANTICOAGULANT (ELIQUIS) 5 MG tablet Take 1 tablet (5 mg) by mouth 2 times daily for 90 days  Qty: 180 tablet, Refills: 0    Associated Diagnoses: Superior mesenteric artery thrombosis (H); Aortic thrombus (H)      clopidogrel (PLAVIX) 75 MG tablet Take 1 tablet (75 mg) by mouth daily  Qty: 42 tablet, Refills: 1    Associated Diagnoses: Superior  mesenteric artery thrombosis (H); Aortic thrombus (H)      methocarbamol (ROBAXIN) 500 MG tablet Take 1 tablet (500 mg) by mouth every 6 hours as needed for muscle spasms  Qty: 20 tablet, Refills: 0    Associated Diagnoses: Superior mesenteric artery thrombosis (H); Aortic thrombus (H)      oxyCODONE (ROXICODONE) 5 MG tablet Take 1 tablet (5 mg) by mouth every 6 hours as needed for moderate to severe pain  Qty: 20 tablet, Refills: 0    Associated Diagnoses: Superior mesenteric artery thrombosis (H); Aortic thrombus (H)      pantoprazole (PROTONIX) 40 MG EC tablet Take 1 tablet (40 mg) by mouth daily for 60 days  Qty: 60 tablet, Refills: 0    Associated Diagnoses: Superior mesenteric artery thrombosis (H); Aortic thrombus (H)       !! - Potential duplicate medications found. Please discuss with provider.      CONTINUE these medications which have NOT CHANGED    Details   cyanocolbalamin (VITAMIN  B-12) 500 MCG tablet Take 500 mcg by mouth daily       cyclobenzaprine (FLEXERIL) 10 MG tablet Take 1 tablet (10 mg) by mouth nightly as needed for muscle spasms  Qty: 30 tablet, Refills: 0    Associated Diagnoses: Rib pain      Ergocalciferol (VITAMIN D2 PO)       ferrous sulfate (IRON) 325 (65 FE) MG tablet Take 325 mg by mouth daily (with breakfast)       Multiple Vitamin (MULTIVITAMIN PO) Take 1 tablet by mouth daily         STOP taking these medications       ibuprofen (ADVIL/MOTRIN) 200 MG tablet Comments:   Reason for Stopping:         Pseudoephedrine-Ibuprofen  MG TABS Comments:   Reason for Stopping:                    D/w Dr. Elisabeth Aly MD  Fellow

## 2022-09-16 NOTE — PLAN OF CARE
Goal Outcome Evaluation:    Plan of Care Reviewed With: patient     Overall Patient Progress: improving    Neuro: A&Ox4.   Cardiac:SR. VSS.   Respiratory: Sating 97% on RA  GI/: Adequate urine output. BM X2  Diet/appetite: Patient was on low fiber diet. Eating well.  Activity:  Independent, up to chair and in halls.  Pain: At acceptable level on current regimen.   Skin: No new deficits noted.  LDA's:    Plan: Continue with POC. Notify primary team with changes.    Will be discharged either today or tomorrow

## 2022-09-16 NOTE — PLAN OF CARE
Occupational Therapy Discharge Summary    Reason for therapy discharge:    Discharged to home.    Progress towards therapy goal(s). See goals on Care Plan in [facility] electronic health record for goal details.  Goals partially met.  Barriers to achieving goals:   discharge from facility.    Therapy recommendation(s):    Continue home exercise program.

## 2022-09-16 NOTE — PROGRESS NOTES
Prior Authorization Approval    Eliquis 5mg tabs   Date Initiated: 9/16/2022  Date Completed: 9/16/2022  Prior Auth Type: Clinical                Status: Approved    Effective Date: 08/17/2022 - 09/16/2023  Copay: 128.78     Filling Pharmacy: [facility] - [place], [place] - [address] [place] [address]    Insurance: [employer] - Phone [phone] Fax [phone]  ID: [id number]  Case Number: [id number]   Submitted Via: Kaycee Mast  [facility] Liaison  Ph: [phone] Pager: [phone]

## 2022-09-18 LAB — BACTERIA SPEC CULT: NO GROWTH

## 2022-09-19 LAB
BACTERIA BLD CULT: NO GROWTH
BACTERIA BLD CULT: NO GROWTH

## 2022-09-20 ENCOUNTER — PATIENT OUTREACH (OUTPATIENT)
Dept: SURGERY | Facility: CLINIC | Age: 47
End: 2022-09-20

## 2022-09-20 LAB — BACTERIA BLD CULT: NO GROWTH

## 2022-09-20 NOTE — PROGRESS NOTES
Patient Telephone Reminder Call    Date of call:  09/20/22  Phone numbers:  Cell number on file:    Telephone Information:   Mobile [phone]       Reached patient/confirmed appointment:  Yes  Appointment with:   Dr. Tariq Lancaster  Reason for visit:  PO

## 2022-09-21 ENCOUNTER — OFFICE VISIT (OUTPATIENT)
Dept: SURGERY | Facility: CLINIC | Age: 47
End: 2022-09-21
Payer: COMMERCIAL

## 2022-09-21 VITALS
BODY MASS INDEX: 31.07 KG/M2 | SYSTOLIC BLOOD PRESSURE: 117 MMHG | WEIGHT: 182 LBS | OXYGEN SATURATION: 99 % | DIASTOLIC BLOOD PRESSURE: 79 MMHG | HEART RATE: 67 BPM | HEIGHT: 64 IN

## 2022-09-21 DIAGNOSIS — K55.069 SUPERIOR MESENTERIC ARTERY THROMBOSIS (H): ICD-10-CM

## 2022-09-21 DIAGNOSIS — I74.10 AORTIC THROMBUS (H): ICD-10-CM

## 2022-09-21 PROCEDURE — 99024 POSTOP FOLLOW-UP VISIT: CPT | Performed by: SURGERY

## 2022-09-21 RX ORDER — OXYCODONE HYDROCHLORIDE 5 MG/1
5 TABLET ORAL EVERY 6 HOURS PRN
Qty: 5 TABLET | Refills: 0 | Status: SHIPPED | OUTPATIENT
Start: 2022-09-21 | End: 2023-05-12

## 2022-09-21 ASSESSMENT — PAIN SCALES - GENERAL: PAINLEVEL: MODERATE PAIN (4)

## 2022-09-21 NOTE — LETTER
2022       RE: Bridgette Veras  [address]  [place] [address]     Dear Colleague,    Thank you for referring your patient, Bridgette Veras, to the [facility] [place] [facility]. Please see a copy of my visit note below.    General Surgery Clinic:    HISTORY OF PRESENT ILLNESS:  Bridgette Veras, a 46-year-old woman who is well known to the Surgery Service, presents following bowel resection, exploration and a temporary abdominal dressing for ischemic bowel.  The patient has had no fevers or chills.  She is doing very well.  She is eating a regular diet.  She does have quite a few questions regarding her abdominal aorta and the thrombus present.  The patient will be followed by the Vascular Surgery Service and has a primary care physician to participate in her recovery.    ROS: a ten point ROS is negative.     PAST MEDICAL HISTORY:  Past Medical History:   Diagnosis Date     Cervical high risk HPV (human papillomavirus) test positive 16    type 16     Chickenpox      Chlamydia trachomatis infection of unspecified site      POONAM 1      Depressive disorder      Depressive disorder, not elsewhere classified      History of colposcopy with cervical biopsy 16    Bx - POONAM 1, ECC - negative     Other abnormal heart sounds as child    Murmur - resolved     Polycystic ovaries     prior to gastic bypass in , pt carried the diagnosis of PCOS/anovuolation     Streptococcus infection in conditions classified elsewhere and of unspecified site, group B 1998    In pregnancy        PAST SURGICAL HISTORY:  Past Surgical History:   Procedure Laterality Date      SECTION  1998      SECTION, TUBAL LIGATION, COMBINED  2013    Procedure: COMBINED  SECTION, TUBAL LIGATION;   Section, Tubal Ligation;  Surgeon: Paul Allen MD;  Location: [facility]     GASTRIC BYPASS   2005     GASTRIC BYPASS       HC REMOVAL GALLBLADDER  2006    Dr. Inman @ [facility]     HC REPAIR ING HERNIA,5+Y/O,REDUCIBL  age 20 ()    [facility]     LAPAROTOMY EXPLORATORY N/A 2022    Procedure: Exploratory LAPAROTOMY, Superior Mesenteric Artery Thrombectomy, Retrograde Superior Mesenteric Artery Stenting;  Surgeon: Balta Ernst MD;  Location[facility]     LAPAROTOMY EXPLORATORY N/A 9/10/2022    Procedure: LAPAROTOMY, SMALL BOWEL RESECTION, INCISIONAL WOUND CLOSURE;  Surgeon: Tariq Lancaster MD;  Location: [facility]     LAPAROTOMY EXPLORATORY N/A 2022    Procedure: EXPLORATORY LAPAROTOMY, EVACUATION OF RECTUS SHEATH HEMATOMA, ABDOMINAL WASHOUT;  Surgeon: Nathan Barber MD;  Location: [facility]     PICC DOUBLE LUMEN PLACEMENT Right 09/10/2022    5FR DL PICC. Basilic vein.     SURGICAL HISTORY OF -       PE tubes     SURGICAL HISTORY OF -   10/2006    removal of pannus ans breast lift     TONSILLECTOMY  1981     [facility]  DELIVERY ONLY  1998    Failure to progress     [facility] NONSPECIFIC PROCEDURE  2006    Laser ablation genital condyloma/ Bx labial lesion     [facility] RAD RESEC TONSIL/PILLARS          MEDICATIONS:  Current Outpatient Medications   Medication     apixaban ANTICOAGULANT (ELIQUIS) 5 MG tablet     [START ON 2022] apixaban ANTICOAGULANT (ELIQUIS) 5 MG tablet     clopidogrel (PLAVIX) 75 MG tablet     cyanocolbalamin (VITAMIN  B-12) 500 MCG tablet     cyclobenzaprine (FLEXERIL) 10 MG tablet     Ergocalciferol (VITAMIN D2 PO)     ferrous sulfate (IRON) 325 (65 FE) MG tablet     methocarbamol (ROBAXIN) 500 MG tablet     Multiple Vitamin (MULTIVITAMIN PO)     oxyCODONE (ROXICODONE) 5 MG tablet     pantoprazole (PROTONIX) 40 MG EC tablet     No current facility-administered medications for this visit.        ALLERGIES:  Allergies   Allergen Reactions     Codeine Nausea and Vomiting     Darvocet [Acetaminophen] Nausea and Vomiting     Propoxyphene Nausea  "    Tylenol Nausea and Vomiting        SOCIAL HISTORY:  Social History     Socioeconomic History     Marital status:      Spouse name: None     Number of children: 2     Years of education: None     Highest education level: None   Occupational History     Employer: UNEMPLOYED   Tobacco Use     Smoking status: Current Every Day Smoker     Packs/day: 0.50     Types: Cigarettes     Smokeless tobacco: Never Used     Tobacco comment: Trying to quit, a few cigarettes a day   Vaping Use     Vaping Use: Never used   Substance and Sexual Activity     Alcohol use: Yes     Alcohol/week: 0.0 standard drinks     Comment: rarely- quit with pregnancy     Drug use: No     Sexual activity: Yes     Partners: Male     Birth control/protection: Surgical     Comment: Status post tubal ligation   Other Topics Concern     Parent/sibling w/ CABG, MI or angioplasty before 65F 55M? No       FAMILY HISTORY:  Family History   Problem Relation Age of Onset     Cerebrovascular Disease Paternal Grandfather      Diabetes Other      Cancer Maternal Grandmother         Lung     Depression Maternal Grandmother      Asthma Father      Gastrointestinal Disease Father         PSC     Alcohol/Drug Maternal Grandfather         alcohol     Cardiovascular Maternal Grandfather         MI     Respiratory Paternal Grandmother      Hypertension Mother      Depression Mother      Hypertension Brother      Depression Brother      C.A.D. No family hx of      Breast Cancer No family hx of      Cancer - colorectal No family hx of         PHYSICAL EXAM:  Vital Signs: /79 (BP Location: Left arm, Patient Position: Sitting, Cuff Size: Adult Regular)   Pulse 67   Ht 1.626 m (5' 4\")   Wt 82.6 kg (182 lb)   SpO2 99%   BMI 31.24 kg/m    GEN: Bridgette Veras has a soft and nontender abdomen.    ABD: The incision is clean, dry and intact.  The surgical clips were removed.  Two areas of epithelial separation, each less than 1 cm in dimension, were present.  " No purulence, drainage or other abnormality was significant.  The patient appears to have simply separation of the epithelium without infection.   EXT:  Extremities are warm and well perfused.    IMPRESSION:  Bridgette Veras, a 46-year-old female, presents following open surgery for ischemic bowel associated with a vascular disorder of the aorta.  The patient is being followed by the Vascular Surgery Service and the patient has done very well, eating a regular diet and having normal bowel movements.  I anticipate the patient will continue to do well.  I have recommended the patient consider the potential to be seen again regarding the incisions due to the epithelial separation.  The patient will call or return should she develop problems in the interim.        Sincerely,    Tariq Lancaster MD

## 2022-09-21 NOTE — NURSING NOTE
"Chief Complaint   Patient presents with     Post-Op - General Surgery     Post-op       Vitals:    09/21/22 1110   BP: 117/79   BP Location: Left arm   Patient Position: Sitting   Cuff Size: Adult Regular   Pulse: 67   SpO2: 99%   Weight: 82.6 kg (182 lb)   Height: 1.626 m (5' 4\")       Body mass index is 31.24 kg/m .                          Josué Boss, EMT    "

## 2022-09-21 NOTE — PROGRESS NOTES
General Surgery Clinic:    HISTORY OF PRESENT ILLNESS:  Bridgette Veras, a 46-year-old woman who is well known to the Surgery Service, presents following bowel resection, exploration and a temporary abdominal dressing for ischemic bowel.  The patient has had no fevers or chills.  She is doing very well.  She is eating a regular diet.  She does have quite a few questions regarding her abdominal aorta and the thrombus present.  The patient will be followed by the Vascular Surgery Service and has a primary care physician to participate in her recovery.    ROS: a ten point ROS is negative.     PAST MEDICAL HISTORY:  Past Medical History:   Diagnosis Date    Cervical high risk HPV (human papillomavirus) test positive 16    type 16    Chickenpox     Chlamydia trachomatis infection of unspecified site     POONAM 1     Depressive disorder     Depressive disorder, not elsewhere classified     History of colposcopy with cervical biopsy 16    Bx - POONAM 1, ECC - negative    Other abnormal heart sounds as child    Murmur - resolved    Polycystic ovaries     prior to gastic bypass in , pt carried the diagnosis of PCOS/anovuolation    Streptococcus infection in conditions classified elsewhere and of unspecified site, group B 1998    In pregnancy        PAST SURGICAL HISTORY:  Past Surgical History:   Procedure Laterality Date     SECTION  1998     SECTION, TUBAL LIGATION, COMBINED  2013    Procedure: COMBINED  SECTION, TUBAL LIGATION;   Section, Tubal Ligation;  Surgeon: Paul Allen MD;  Location: [facility]    GASTRIC BYPASS  2005    GASTRIC BYPASS      HC REMOVAL GALLBLADDER  2006    Dr. Inman @ [facility]    HC REPAIR ING HERNIA,5+Y/O,REDUCIBL  age 20 ()    [facility]    LAPAROTOMY EXPLORATORY N/A 2022    Procedure: Exploratory LAPAROTOMY, Superior Mesenteric Artery Thrombectomy, Retrograde Superior Mesenteric Artery Stenting;  Surgeon:  Balta Ernst MD;  [facility]    LAPAROTOMY EXPLORATORY N/A 9/10/2022    Procedure: LAPAROTOMY, SMALL BOWEL RESECTION, INCISIONAL WOUND CLOSURE;  Surgeon: Tariq Lancaster MD;  [facility]    LAPAROTOMY EXPLORATORY N/A 2022    Procedure: EXPLORATORY LAPAROTOMY, EVACUATION OF RECTUS SHEATH HEMATOMA, ABDOMINAL WASHOUT;  Surgeon: Nathan Barber MD;  [facility]    PICC DOUBLE LUMEN PLACEMENT Right 09/10/2022    5FR DL PICC. Basilic vein.    SURGICAL HISTORY OF -       PE tubes    SURGICAL HISTORY OF -   10/2006    removal of pannus ans breast lift    TONSILLECTOMY      [facility]  DELIVERY ONLY  1998    Failure to progress    [facility] NONSPECIFIC PROCEDURE  2006    Laser ablation genital condyloma/ Bx labial lesion    [facility] RAD RESEC TONSIL/PILLARS          MEDICATIONS:  Current Outpatient Medications   Medication    apixaban ANTICOAGULANT (ELIQUIS) 5 MG tablet    [START ON 2022] apixaban ANTICOAGULANT (ELIQUIS) 5 MG tablet    clopidogrel (PLAVIX) 75 MG tablet    cyanocolbalamin (VITAMIN  B-12) 500 MCG tablet    cyclobenzaprine (FLEXERIL) 10 MG tablet    Ergocalciferol (VITAMIN D2 PO)    ferrous sulfate (IRON) 325 (65 FE) MG tablet    methocarbamol (ROBAXIN) 500 MG tablet    Multiple Vitamin (MULTIVITAMIN PO)    oxyCODONE (ROXICODONE) 5 MG tablet    pantoprazole (PROTONIX) 40 MG EC tablet     No current facility-administered medications for this visit.        ALLERGIES:  Allergies   Allergen Reactions    Codeine Nausea and Vomiting    Darvocet [Acetaminophen] Nausea and Vomiting    Propoxyphene Nausea    Tylenol Nausea and Vomiting        SOCIAL HISTORY:  Social History     Socioeconomic History    Marital status:      Spouse name: None    Number of children: 2    Years of education: None    Highest education level: None   Occupational History     Employer: UNEMPLOYED   Tobacco Use    Smoking status: Current Every Day Smoker     Packs/day: 0.50     Types:  "Cigarettes    Smokeless tobacco: Never Used    Tobacco comment: Trying to quit, a few cigarettes a day   Vaping Use    Vaping Use: Never used   Substance and Sexual Activity    Alcohol use: Yes     Alcohol/week: 0.0 standard drinks     Comment: rarely- quit with pregnancy    Drug use: No    Sexual activity: Yes     Partners: Male     Birth control/protection: Surgical     Comment: Status post tubal ligation   Other Topics Concern    Parent/sibling w/ CABG, MI or angioplasty before 65F 55M? No       FAMILY HISTORY:  Family History   Problem Relation Age of Onset    Cerebrovascular Disease Paternal Grandfather     Diabetes Other     Cancer Maternal Grandmother         Lung    Depression Maternal Grandmother     Asthma Father     Gastrointestinal Disease Father         PSC    Alcohol/Drug Maternal Grandfather         alcohol    Cardiovascular Maternal Grandfather         MI    Respiratory Paternal Grandmother     Hypertension Mother     Depression Mother     Hypertension Brother     Depression Brother     C.A.D. No family hx of     Breast Cancer No family hx of     Cancer - colorectal No family hx of         PHYSICAL EXAM:  Vital Signs: /79 (BP Location: Left arm, Patient Position: Sitting, Cuff Size: Adult Regular)   Pulse 67   Ht 1.626 m (5' 4\")   Wt 82.6 kg (182 lb)   SpO2 99%   BMI 31.24 kg/m    GEN: Bridgette Veras has a soft and nontender abdomen.    ABD: The incision is clean, dry and intact.  The surgical clips were removed.  Two areas of epithelial separation, each less than 1 cm in dimension, were present.  No purulence, drainage or other abnormality was significant.  The patient appears to have simply separation of the epithelium without infection.   EXT:  Extremities are warm and well perfused.    IMPRESSION:  Bridgette Veras, a 46-year-old female, presents following open surgery for ischemic bowel associated with a vascular disorder of the aorta.  The patient is being followed by the Vascular " Surgery Service and the patient has done very well, eating a regular diet and having normal bowel movements.  I anticipate the patient will continue to do well.  I have recommended the patient consider the potential to be seen again regarding the incisions due to the epithelial separation.  The patient will call or return should she develop problems in the interim.

## 2022-09-21 NOTE — PATIENT INSTRUCTIONS
You met with Dr. Tariq Lancaster.      Today's visit instructions:    Dr. Lancaster would like to see you in a couple of weeks for another wound check. He has also given you 5 more tablets of oxycodone. Once these are gone, please transition to over the counter anti-analgesics such as Tylenol, ice and heat packs for pain control.       If you have questions please contact Lenora RN or Nancy RN during regular clinic hours, Monday through Friday 7:30 AM - 4:00 PM, or you can contact us via [facility] at anytime.       If you have urgent needs after-hours, weekends, or holidays please call the hospital at [phone] and ask to speak with our on-call General Surgery Team.    Appointment [phone]  Nurse Advice (Lenora or Nancy): [phone]   Surgery Scheduler (Rasheed): [phone]  Fax: [phone]

## 2022-09-27 ENCOUNTER — TELEPHONE (OUTPATIENT)
Dept: VASCULAR SURGERY | Facility: CLINIC | Age: 47
End: 2022-09-27

## 2022-09-28 NOTE — TELEPHONE ENCOUNTER
Returned phone call to [employer] at number listed ([phone]). Was unable to connect with a live person. Automated line wants patients SSN which this writer does not know. If [employer] calls back, please get their extension number and forward to [facility].

## 2022-09-29 ENCOUNTER — OFFICE VISIT (OUTPATIENT)
Dept: VASCULAR SURGERY | Facility: CLINIC | Age: 47
End: 2022-09-29
Payer: COMMERCIAL

## 2022-09-29 VITALS — DIASTOLIC BLOOD PRESSURE: 67 MMHG | SYSTOLIC BLOOD PRESSURE: 100 MMHG | HEART RATE: 101 BPM | OXYGEN SATURATION: 97 %

## 2022-09-29 DIAGNOSIS — K55.069 SUPERIOR MESENTERIC ARTERY THROMBOSIS (H): Primary | ICD-10-CM

## 2022-09-29 DIAGNOSIS — I74.10 AORTIC THROMBUS (H): ICD-10-CM

## 2022-09-29 PROCEDURE — 99024 POSTOP FOLLOW-UP VISIT: CPT | Performed by: SURGERY

## 2022-09-29 RX ORDER — VARENICLINE TARTRATE 0.5 MG/1
0.5 TABLET, FILM COATED ORAL 2 TIMES DAILY
COMMUNITY
End: 2022-12-06

## 2022-09-29 ASSESSMENT — PAIN SCALES - GENERAL: PAINLEVEL: MILD PAIN (2)

## 2022-09-29 NOTE — NURSING NOTE
Chief Complaint   Patient presents with     Follow Up     2 week post op.       Vitals were taken and medications were reconciled.    Kiki Fernandez  2:51 PM

## 2022-09-29 NOTE — PROGRESS NOTES
VASCULAR SURGERY CLINIC FOLLOWUP VISIT    VASCULAR SURGEON: Dr. 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 Reinforced importance of continuing to improve PO intake and protein supplementation  - Continue anticoagulation and plavix. Repeat CTA and followup on 10/20 to determine resolution of aortic mural thrombus as well as evaluate SMA stent   - No heavy lifting >10 lbs for 6 weeks after surgery     HPI:  Bridgette Veras is a 46 year old female who was seen today in followup after her recent hospitalization.On 9/8/2022 she presented with diffuse abdominal pain and imaging findings of aortic thrombus extending into celiac artery and SMA . She  underwent the above-named procedures on 9/8. Postoperatively she continued to have signfican pain prompting a return to OR on 9.9 and evacuationof rectus hematoma and inspection of bowel. A 50 cm segment of bowel was not clearly viable nor necrtotic so her abdomen was temporarily closed and she went back to OR on 9/10. There were no doppler signals in the mesentery of this 50 cm segment so a small bowel resection and anastomosis was performed and her fascia closed. She was started on IV TPN . Over the course of the week, she had return of bowel function and her TPN was discontinued. Her WBC lucia upto 21 but all wokrup was negative and on day of discharge she is afebrile , WBC down to 15.     She has been doing well. Tolerating diet. Her staples were removed by General  Surgery. She is slowly ramping up oral intake. Denies diarrhea,fevers or chills. Denies any rest pain or claudication in BLE     Plan for 6 weeks of anticoagulation and repeat CTA to assess aortic thrombus as well her SMA stent     PHH:    Past Medical History:   Diagnosis Date     Cervical high risk HPV (human papillomavirus) test positive 16    type 16     Chickenpox      Chlamydia trachomatis infection of unspecified site      POONAM 1      Depressive disorder      Depressive disorder, not elsewhere classified      History of colposcopy with cervical biopsy 16    Bx - POONAM 1, ECC - negative     Other abnormal heart sounds as child    Murmur - resolved     Polycystic ovaries     prior to gastic bypass in , pt carried the diagnosis of PCOS/anovuolation     Streptococcus infection in conditions classified elsewhere and of unspecified site, group B     In pregnancy         Past Surgical History:   Procedure Laterality Date      SECTION  1998      SECTION, TUBAL LIGATION, COMBINED  2013    Procedure: COMBINED  SECTION, TUBAL LIGATION;   Section, Tubal Ligation;  Surgeon: Paul Allen MD;  Location: [facility] OR     GASTRIC BYPASS  2005     GASTRIC BYPASS       HC REMOVAL GALLBLADDER  2006    Dr. Inman @ [facility]     HC REPAIR ING HERNIA,5+Y/O,REDUCIBL  age 20 ()    [facility]     LAPAROTOMY EXPLORATORY N/A 2022    Procedure: Exploratory LAPAROTOMY, Superior Mesenteric Artery Thrombectomy, Retrograde Superior Mesenteric Artery Stenting;  Surgeon: Balta Ernst MD;  Location: [facility] OR     LAPAROTOMY EXPLORATORY N/A 9/10/2022    Procedure: LAPAROTOMY, SMALL BOWEL RESECTION, INCISIONAL WOUND CLOSURE;  Surgeon: Tariq Lancaster MD;  Location: [facility] OR     LAPAROTOMY EXPLORATORY N/A 2022    Procedure: EXPLORATORY LAPAROTOMY, EVACUATION OF RECTUS SHEATH HEMATOMA, ABDOMINAL WASHOUT;  Surgeon: Nathan Barber MD;   Location: [facility] OR     PICC DOUBLE LUMEN PLACEMENT Right 09/10/2022    5FR DL PICC. Basilic vein.     SURGICAL HISTORY OF -       PE tubes     SURGICAL HISTORY OF -   10/2006    removal of pannus ans breast lift     TONSILLECTOMY  1981     [facility]  DELIVERY ONLY  1998    Failure to progress     [facility] NONSPECIFIC PROCEDURE  2006    Laser ablation genital condyloma/ Bx labial lesion     [facility] RAD RESEC TONSIL/PILLARS          ALLERGIES:     Allergies   Allergen Reactions     Codeine Nausea and Vomiting     Darvocet [Acetaminophen] Nausea and Vomiting     Propoxyphene Nausea     Tylenol Nausea and Vomiting        MEDS:    Current Outpatient Medications   Medication     apixaban ANTICOAGULANT (ELIQUIS) 5 MG tablet     clopidogrel (PLAVIX) 75 MG tablet     cyanocolbalamin (VITAMIN  B-12) 500 MCG tablet     cyclobenzaprine (FLEXERIL) 10 MG tablet     Ergocalciferol (VITAMIN D2 PO)     ferrous sulfate (IRON) 325 (65 FE) MG tablet     Multiple Vitamin (MULTIVITAMIN PO)     oxyCODONE (ROXICODONE) 5 MG tablet     pantoprazole (PROTONIX) 40 MG EC tablet     No current facility-administered medications for this visit.        SOCIAL HABITS:    History   Smoking Status     Current Every Day Smoker     Packs/day: 0.50     Types: Cigarettes   Smokeless Tobacco     Never Used     Comment: Trying to quit, a few cigarettes a day       Alcohol Use: Not on file        History   Drug Use No        FAMILY HISTORY:    Family History   Problem Relation Age of Onset     Cerebrovascular Disease Paternal Grandfather      Diabetes Other      Cancer Maternal Grandmother         Lung     Depression Maternal Grandmother      Asthma Father      Gastrointestinal Disease Father         PSC     Alcohol/Drug Maternal Grandfather         alcohol     Cardiovascular Maternal Grandfather         MI     Respiratory Paternal Grandmother      Hypertension Mother      Depression Mother      Hypertension Brother      Depression Brother      C.AJESS  No family hx of      Breast Cancer No family hx of      Cancer - colorectal No family hx of        REVIEW OF SYSTEMS:    A 12 point ROS was reviewed and except for what is listed in the HPI above, all others are negative    PE:  There were no vitals taken for this visit.   Wt Readings from Last 1 Encounters:   09/21/22 82.6 kg (182 lb)     There is no height or weight on file to calculate BMI.    EXAM:  GENERAL: This is a well-developed 46 year old female who appears her stated age  EYES: Grossly normal.  MOUTH: Buccal mucosa normal   CARDIAC:  NS1 S2, No Murmur  CHEST/LUNG:  NLB RA  GASTROINTESINAL (ABDOMEN): Soft, non-tender, incision well approximated and clean  MUSCULOSKELETAL: Grossly normal and both lower extremities are intact.  HEME/LYMPH: No lymphedema  NEUROLOGIC: Focally intact, Alert and oriented x 3.   PSYCH: appropriate affect  INTEGUMENT: No open lesions or ulcers  Pulse Exam:   Palpable L DP, biphasic L PT  Monophasic DP and PT on R   Feet warm and well perfused, no sores or ulcers         DIAGNOSTIC STUDIES:     Images:  XR Chest 2 Views    Result Date: 9/14/2022  EXAMINATION: XR CHEST 2 VIEWS, 9/14/2022 9:38 AM COMPARISON: 9/11/2022 HISTORY: CXR, leukocytosis FINDINGS: Right PICC line tip in the mid SVC. Heart size is within normal limits. Basilar atelectasis is unchanged. No new airspace opacities. Right upper quadrant surgical clips. No pneumothorax or pleural effusion.     IMPRESSION: No change in the low lung volumes with basilar atelectasis. GILDA AYALA MD   SYSTEM ID:  [id number]    CTA Chest Abdomen Pelvis w Contrast    Result Date: 9/8/2022  EXAM: CTA CHEST ABDOMEN PELVIS W CONTRAST LOCATION: [facility] DATE/TIME: 9/8/2022 12:55 AM INDICATION: Possible mesenteric ischemia COMPARISON: CT from 09/07/2022. TECHNIQUE: CT angiogram chest abdomen pelvis during arterial phase of injection of IV contrast. 2D and 3D MIP reconstructions were performed by the CT  technologist. Dose reduction techniques were used. CONTRAST: 80 mL Isovue 370 FINDINGS: CT ANGIOGRAM CHEST, ABDOMEN, AND PELVIS: Again noted is irregular wall adherent thrombus along the ventral aspect of the descending thoracic aorta extending beyond the diaphragmatic hiatus and into the origins of the celiac and superior mesenteric arteries. This occludes the origin of the celiac artery (though the common hepatic and splenic arteries enhance) and is partially occlusive of the proximal SMA. There is also occlusion of multiple distal branch vessels of the superior mesenteric artery involving both the jejunal and ileocolic territories as seen on coronal image 129. The bilateral renal arteries are patent. The inferior mesenteric artery is patent. LUNGS AND PLEURA: Mild centrilobular emphysema. No focal airspace infiltrate. MEDIASTINUM/AXILLAE: Heart size is normal. No pericardial effusion. No visualized ventricular thrombus. No central pulmonary embolism. CORONARY ARTERY CALCIFICATION: None. HEPATOBILIARY: Absent gallbladder. Redemonstrated subcentimeter hypodensities, incompletely characterized. PANCREAS: Normal. SPLEEN: Heterogeneous enhancement of the spleen may be partially attributable to arterial phase imaging, though a similar abnormality noted on the prior scan is more consistent with the presence of infarction. ADRENAL GLANDS: Normal. KIDNEYS/BLADDER: Normal. BOWEL: Prior gastric bypass surgery. No mechanical bowel obstruction or free air. There is edema in the central mesentery. LYMPH NODES: Normal. PELVIC ORGANS: Normal. MUSCULOSKELETAL: Normal.     IMPRESSION: 1.  Redemonstration of wall adherent thrombus along the ventral aspect of the mid and distal descending thoracic aorta and the proximal aspect of the abdominal aorta resulting in occlusion of the celiac trunk and nonocclusive thrombus in the proximal superior mesenteric artery trunk. 2.  Occlusion of multiple jejunal and ileocolic branches of the  SMA. Wall enhancement characteristics of the distal small bowel are better demonstrated on the prior CT which was obtained in portal venous phase of contrast, with appearance on that exam remaining concerning for hypoperfusion/ischemia. 3.  The inferior mesenteric artery is patent. 4.  Findings consistent with splenic infarction.     Echo Complete    Result Date: 2022  [id number]^POOL^IRIS  [facility] [facility] [address] [place] [id number]  Name: ABDON DRISCOLL MRN: [id number] : 1975 Study Date: 2022 01:32 PM Age: 46 yrs Gender: Female Patient Location: [facility] Reason For Study: Arterial Embolism and Thrombosis Ordering Physician: IRIS LANZA Referring Physician: KAL VALDOVINOS Performed By: Milvia Hanks  BSA: 2.0 m2 Height: 67 in Weight: 195 lb HR: 95 ______________________________________________________________________________ Procedure Complete Portable Echo Adult. Contrast Lumason. Patient was given 5 ml mixture of 3 ml Optison and 6 ml saline. 4 ml wasted. ______________________________________________________________________________ Interpretation Summary No intracardiac thrombus identified. Left ventricular size, wall motion and function are normal. The ejection fraction is 60-65%. Global right ventricular function is normal. There is no prior study for direct comparison. ______________________________________________________________________________ Left Ventricle Left ventricular size, wall motion and function are normal. The ejection fraction is 60-65%. Left ventricular diastolic function is normal.  Right Ventricle The right ventricle is normal size. Global right ventricular function is normal.  Atria The left atrium appears normal.  Mitral Valve The mitral valve is normal.  Aortic Valve Aortic valve is normal in structure and function. The aortic valve is tricuspid.   Tricuspid Valve The tricuspid valve is normal. The peak velocity of the tricuspid regurgitant jet is not obtainable. Pulmonary artery systolic pressure cannot be assessed.  Pulmonic Valve The pulmonic valve is normal.  Vessels Normal diameter aortic root and proximal ascending aorta. The inferior vena cava cannot be assessed.  Pericardium No pericardial effusion is present.  Compared to Previous Study There is no prior study for direct comparison. ______________________________________________________________________________ MMode/2D Measurements & Calculations  IVSd: 0.90 cm LVIDd: 4.4 cm LVIDs: 2.7 cm LVPWd: 1.1 cm FS: 39.6 % LV mass(C)d: 143.5 grams LV mass(C)dI: 71.8 grams/m2 asc Aorta Diam: 2.7 cm LVOT diam: 1.9 cm LVOT area: 2.9 cm2 LA Volume Index (BP): 11.7 ml/m2 RWT: 0.48  Doppler Measurements & Calculations MV E max matias: 66.1 cm/sec MV A max matias: 68.6 cm/sec MV E/A: 0.96 MV dec slope: 378.2 cm/sec2 MV dec time: 0.17 sec Ao V2 max: 157.2 cm/sec Ao max P.9 mmHg Ao V2 mean: 95.5 cm/sec Ao mean P.4 mmHg Ao V2 VTI: 21.8 cm RONAL(I,D): 2.9 cm2 RONAL(V,D): 2.5 cm2 LV V1 max P.2 mmHg LV V1 max: 134.3 cm/sec LV V1 VTI: 21.5 cm SV(LVOT): 62.3 ml SI(LVOT): 31.1 ml/m2 AV Matias Ratio (DI): 0.85 RONAL Index (cm2/m2): 1.4 E/E' av.6 Lateral E/e': 10.5 Medial E/e': 12.7  ______________________________________________________________________________ Report approved by: Keira Mora 2022 04:16 PM       XR Chest Port 1 View    Result Date: 2022  Chest radiograph  2022 1:41 AM  HISTORY: Increased oxygen requirements COMPARISON: 9/10/2022, 2022 FINDINGS: Single AP view the chest. Interval extubation. New right arm PICC tip in the low SVC. Mediastinum within normal limits. No pneumothorax or significant pleural effusion. Low lung volumes. Similar predominantly streaky perihilar and bibasilar opacities. Laparotomy staples with surgical clips in the right upper quadrant.     IMPRESSION: 1.  New right arm PICC tip in the low SVC. 2. Low lung volumes with similar perihilar and bibasilar opacities most consistent with atelectasis. Superimposed infection is not excluded. I have personally reviewed the examination and initial interpretation and I agree with the findings. GILDA AYALA MD   SYSTEM ID:  [id number]    XR Abdomen Port 1 View    Result Date: 9/10/2022  Exam: XR ABDOMEN PORT 1 VIEW, 9/10/2022 12:58 PM Indication: lost/missing item Comparison: 9/8/2022 Findings: 3 portable supine radiographs of the abdomen were reviewed. Left abdomen has not been completely included in the field-of-view. Multiple presumed surgical clips project over the right upper, left upper and right lower quadrant. Partial visualization of the endotracheal tube. Vascular stent projecting over the central upper abdomen also seen on CT 9/8/2022. No radiopaque instrument demonstrated urinary catheter projects over the pelvis.     Impression: No radiopaque instrument identified in the field of view of provided 3 frontal projection radiographs. Findings were communicated with operating room staff Gerry STOVER via telephone at 1:05 PM on 9/10/2022 by MARY GRACE Marlow DO. I have personally reviewed the examination and initial interpretation and I agree with the findings. DC ROUSE MD   SYSTEM ID:  [id number]    CT Abdomen Pelvis w Contrast    Result Date: 9/7/2022  EXAM: CT ABDOMEN PELVIS W CONTRAST LOCATION: [facility] DATE/TIME: 9/7/2022 11:07 PM INDICATION: Epigastric pain nausea vomiting and diarrhea. COMPARISON: None. TECHNIQUE: CT scan of the abdomen and pelvis was performed following injection of IV contrast. Multiplanar reformats were obtained. Dose reduction techniques were used. CONTRAST: 83mL isovue 370 FINDINGS: LOWER CHEST: Scattered emphysematous changes are seen in the lung bases bilaterally. HEPATOBILIARY: No significant mass or bile duct dilatation. Gallbladder is surgically absent.  Scattered subcentimeter benign appearing cystic lesions are seen scattered throughout the liver, too small to characterize, favored to reflect benign hepatic cyst PANCREAS: No significant mass, duct dilatation, or inflammatory change. SPLEEN: There are patchy areas of diminished enhancement seen scattered throughout the kidney, predominantly within the midportion and anterior tip. (Image 82, series 2). ADRENAL GLANDS: No significant nodules. KIDNEYS/BLADDER: No significant mass, stone, or hydronephrosis. BOWEL: No obstruction or inflammatory change. Postsurgical changes from prior gastric bypass are again noted.The appendix is normal. No significant bowel wall thickening is noted. There is slightly decreased enhancement of a few loops of small bowel within the right mid abdomen and pelvis. LYMPH NODES: Increased number of subcentimeter lymph nodes are seen in the root of the mesentery inferiorly. VASCULATURE: Irregular soft plaque is seen along the anterior aspect of the descending thoracic aorta and proximal ascending aorta with occlusion of the celiac artery at its origin. (Image 80, series 2; image 70, series 4)The distal branches of the celiac artery are well opacified.  Additionally there is thrombus seen floating within the SMA proximally extending inferiorly from the celiac artery including thrombus. (Image 87, series 2; image 71, series 4). The distal branches of the SMA are not well opacified (image 144, series 2) with adjacent mesenteric stranding and subtle increased number of subcentimeter lymph nodes. DERICK is normal in appearance. PELVIC ORGANS: No pelvic masses. MUSCULOSKELETAL: Unremarkable.     IMPRESSION: 1.  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 There is irregular peripheral clot seen anteriorly within the descending thoracic aorta and superior aspect of  the abdominal aorta, this may reflect soft  plaque although there is very little atherosclerotic disease seen throughout the rest of the aorta.. 2.  Free floating thrombus seen in the SMA proximally which appears to extend inferiorly from the celiac artery. There is nonopacification of the distal branches of the SMA distally with adjacent mesenteric stranding concerning for occlusion given the presence of thrombus proximally, although the study was not obtained as a CTA so evaluation is limited. There are a few loops of small bowel which have slightly decreased enhancement without bowel wall thickening seen in the right mid and lower abdomen, which could reflect early changes of mesenteric ischemia. 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 Finding was identified on 9/7/2022 11:18 PM. Dr Gongora was contacted by me on 9/7/2022 11:45 PM and verbalized understanding of the critical result.     CTA Abdomen Pelvis with Contrast    Result Date: 9/8/2022  Exam: Computed tomographic angiography of the abdomen and pelvis without and with contrast, including 3D reformations dated 9/8/2022 5:36 PM Clinical information:  s/p SMA embolectomy, please eval patnecy mesenteric vessels Technique: Helical scans through the abdomen and pelvis obtained before the administration of intravenous contrast media and following the injection of contrast media  in the arterial phase. Source images reviewed as well as 3D and multi-planar reconstructions. Contrast: iopamidol (ISOVUE-370) solution 119 mL DLP: 1358 mGy*cm Comparison study: Chest abdomen and pelvis CTA 9/8/2022 Findings:  Partially occlusive noncalcified thrombus within the descending thoracic aorta extending through the diaphragmatic hiatus into the proximal  abdominal aorta and extending into the occluded celiac artery with distal reconstitution at the bifurcation of the splenic and common hepatic arteries which appear patent. The proper hepatic, left, and right hepatic arteries appear widely patent. The pancreaticoduodenal artery is patent. Superior mesenteric arterial stent is widely patent. Occlusion of the ileocolic branch of the superior mesenteric artery (series 5, image 359) with occlusion extending distally into multiple branch vessels. Additional occlusion involving a superior mesenteric artery branch and the mid left hemiabdomen (series 5, image 390). Diminished arterial enhancement involving multiple loops of he distal small bowel in the right lower quadrant with associated bowel wall thickening. Questionable hypoenhancement of the cecum and ascending colon to the hepatic flexure without significant colonic wall thickening. No definitive evidence for perforation although there is scattered intra-abdominal free air which is favored to be postoperative rather than due to bowel wall perforation. The inferior mesenteric artery is patent at the origin with diminutive opacification distally, likely due to contrast bolus timing. Right rectus sheath hematoma with evidence for active bleeding measuring 4.3 x 5.3 cm (series 5, image 438). Additional loculated fluid collection adjacent to a loop of small bowel in the mid abdomen measuring 2.5 x 4.3 cm with layering hyperdense fluid possibly representing a mesenteric hematoma without evidence for active bleeding (series 5, image 409). No suspicious hepatic lesion. Multiple subcentimeter hypodensities throughout the liver likely representing benign hepatic cysts. Unchanged multifocal splenic infarcts. No evidence for active hemorrhage. Pancreas is within normal limits. No focal adrenal nodule. The renal arteries are widely patent without intraluminal thrombus or significant stenosis. No suspicious renal lesion. No  evidence for renal infarct. No renal stone. No hydronephrosis. Layering contrast within the urinary bladder consistent with the earlier same day angiogram. The uterus and both ovaries are within normal limits. No abdominal or pelvic lymphadenopathy. Small volume of layering simple attenuating fluid in the pelvis. No suspicious sclerotic or lucent osseous lesion.     Impression: 1. Right rectus sheath hematoma with evidence for active bleeding as detailed above. 2. New widely patent superior mesenteric arterial origin stent with distal occlusive thrombus in the ileocolic artery and occlusive distal branch vessel off of the superior mesenteric artery in the left hemiabdomen as detailed above. These occlusions result in decreased enhancement with associated bowel wall thickening of multiple loops of distal small bowel and proximal colon concerning for bowel ischemia. No definitive evidence for perforation although this is difficult to entirely exclude given the multifocal foci of intra-abdominal free air (given history of patient's recent surgery). 3. Possible mesenteric hematoma adjacent to a loop of small bowel in the mid abdomen without evidence for active bleeding. 4. Unchanged splenic infarct. 5. Unchanged noncalcified thrombus within the descending thoracic aorta and abdominal aorta as detailed above. [Urgent Result: See impression #1 and #2] Finding was identified on 9/8/2022 5:58 PM. Dr. Pan was contacted by Dr. Villanueva at 9/8/2022 6:00 PM and verbalized understanding of the urgent finding.  I have personally reviewed the examination and initial interpretation and I agree with the findings. RYNE DUARTE MD   SYSTEM ID:  [id number]    XR Surgery ROGER G/T 5 Min Fluoro w Stills    Result Date: 9/8/2022  This exam was marked as non-reportable because it will not be read by a radiologist or a [place] non-radiologist provider.       LABS:      Sodium   Date Value Ref Range Status   09/15/2022 138 136 - 145 mmol/L  Final   09/14/2022 139 136 - 145 mmol/L Final   09/13/2022 139 136 - 145 mmol/L Final   08/04/2016 139 133 - 144 mmol/L Final   03/07/2011 135 133 - 144 mmol/L Final   11/02/2010 138 133 - 144 mmol/L Final     Urea Nitrogen   Date Value Ref Range Status   09/15/2022 21.4 (H) 6.0 - 20.0 mg/dL Final   09/14/2022 15.4 6.0 - 20.0 mg/dL Final   09/13/2022 16.6 6.0 - 20.0 mg/dL Final   02/07/2022 10 7 - 30 mg/dL Final   08/04/2016 9 7 - 30 mg/dL Final   03/07/2011 11 5 - 24 mg/dL Final   11/02/2010 7 5 - 24 mg/dL Final     Hemoglobin   Date Value Ref Range Status   09/16/2022 9.1 (L) 11.7 - 15.7 g/dL Final   09/15/2022 8.5 (L) 11.7 - 15.7 g/dL Final   09/14/2022 8.5 (L) 11.7 - 15.7 g/dL Final   09/20/2016 11.1 (L) 11.7 - 15.7 g/dL Final   08/04/2016 9.6 (L) 11.7 - 15.7 g/dL Final   09/17/2013 13.0 11.7 - 15.7 g/dL Final     Platelet Count   Date Value Ref Range Status   09/16/2022 308 150 - 450 10e3/uL Final   09/15/2022 296 150 - 450 10e3/uL Final   09/14/2022 300 150 - 450 10e3/uL Final   08/04/2016 327 150 - 450 10e9/L Final   01/04/2013 290 150 - 450 10e9/L Final   03/07/2011 276 150 - 450 10e9/L Final     INR   Date Value Ref Range Status   09/12/2022 1.13 0.85 - 1.15 Final   09/11/2022 1.25 (H) 0.85 - 1.15 Final   09/09/2022 1.70 (H) 0.85 - 1.15 Final   03/29/2005 1.03 0.86 - 1.14 Final       30 minutes spent on the day of encounter doing chart review, history and exam, documentation, and further activities as noted.     Raquel Aly MD, MD  [facility] Vascular Surgery

## 2022-09-30 ENCOUNTER — PRE VISIT (OUTPATIENT)
Dept: SURGERY | Facility: CLINIC | Age: 47
End: 2022-09-30

## 2022-09-30 NOTE — TELEPHONE ENCOUNTER
Patient Telephone Reminder Call    Date of call:  09/30/22  Phone numbers:  Cell number on file:    Telephone Information:   Mobile [phone]       Reached patient/confirmed appointment:  No - left message:   on voicemail  Appointment with:   Dr. Tariq Lancaster  Reason for visit:  2 week wound check  Remind patient that this visit is a consultation only, NO procedure:  Yes  Can this visit be changed to a video visit:  No

## 2022-10-03 ENCOUNTER — OFFICE VISIT (OUTPATIENT)
Dept: SURGERY | Facility: CLINIC | Age: 47
End: 2022-10-03
Payer: COMMERCIAL

## 2022-10-03 VITALS
SYSTOLIC BLOOD PRESSURE: 111 MMHG | WEIGHT: 176.5 LBS | HEART RATE: 69 BPM | HEIGHT: 64 IN | BODY MASS INDEX: 30.13 KG/M2 | OXYGEN SATURATION: 98 % | DIASTOLIC BLOOD PRESSURE: 76 MMHG

## 2022-10-03 DIAGNOSIS — R11.0 NAUSEA: Primary | ICD-10-CM

## 2022-10-03 PROCEDURE — 99024 POSTOP FOLLOW-UP VISIT: CPT | Performed by: SURGERY

## 2022-10-03 RX ORDER — ONDANSETRON 4 MG/1
4 TABLET, ORALLY DISINTEGRATING ORAL EVERY 8 HOURS PRN
Qty: 12 TABLET | Refills: 0 | Status: SHIPPED | OUTPATIENT
Start: 2022-10-03 | End: 2024-02-05

## 2022-10-03 RX ORDER — SACCHAROMYCES BOULARDII 250 MG
250 CAPSULE ORAL 2 TIMES DAILY
Qty: 60 CAPSULE | Refills: 3 | Status: SHIPPED | OUTPATIENT
Start: 2022-10-03

## 2022-10-03 ASSESSMENT — PAIN SCALES - GENERAL: PAINLEVEL: MODERATE PAIN (4)

## 2022-10-03 NOTE — LETTER
10/3/2022       RE: Bridgette Veras  [address]  [place] [address]     Dear Colleague,    Thank you for referring your patient, Bridgette Veras, to the [facility] [place] [facility]. Please see a copy of my visit note below.    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  The patient had a compromised small intestine.  This required a 50 cm resection of segmental small bowel.  This was also part of an open abdominal exploration reevaluation and the patient had resection and primary anastomosis.  The patient has healed her anastomosis, but continues to have symptomatic loose stools and changes in her bowel postoperatively that could be related to her antibiotic requirements in the perioperative period.  The patient appears to be doing well otherwise.  She is not taking any probiotics at this time. The patient also notes episodes of nausea and episodes of abdominal pain.  The patient currently has had no other new problems associated with her midline abdominal incision.    ROS: a ten point ROS is negative.    PAST MEDICAL HISTORY:  Past Medical History:   Diagnosis Date     Cervical high risk HPV (human papillomavirus) test positive 8/4/16    type 16     Chickenpox      Chlamydia trachomatis infection of unspecified site      POONAM 1 1998     Depressive disorder      Depressive disorder, not elsewhere classified      History of colposcopy with cervical biopsy 9/20/16    Bx - POONAM 1, ECC - negative     Other abnormal heart sounds as child    Murmur - resolved     Polycystic ovaries     prior to gastic bypass in June, 2005, pt carried the diagnosis of PCOS/anovuolation     Streptococcus infection in conditions classified elsewhere and of unspecified site,  group B     In pregnancy        PAST SURGICAL HISTORY:  Past Surgical History:   Procedure Laterality Date      SECTION  ,       SECTION, TUBAL LIGATION, COMBINED  2013    Procedure: COMBINED  SECTION, TUBAL LIGATION;   Section, Tubal Ligation;  Surgeon: Paul Allen MD;  Location: [facility]     GASTRIC BYPASS  2005     GASTRIC BYPASS       HC REMOVAL GALLBLADDER  2006    Dr. Inman @ [facility]     HC REPAIR ING HERNIA,5+Y/O,REDUCIBL  age 20 ()    [facility]     LAPAROTOMY EXPLORATORY N/A 2022    Procedure: Exploratory LAPAROTOMY, Superior Mesenteric Artery Thrombectomy, Retrograde Superior Mesenteric Artery Stenting;  Surgeon: Balta Ernst MD;  [facility]     LAPAROTOMY EXPLORATORY N/A 9/10/2022    Procedure: LAPAROTOMY, SMALL BOWEL RESECTION, INCISIONAL WOUND CLOSURE;  Surgeon: Tariq Lancaster MD;  Location: [facility]     LAPAROTOMY EXPLORATORY N/A 2022    Procedure: EXPLORATORY LAPAROTOMY, EVACUATION OF RECTUS SHEATH HEMATOMA, ABDOMINAL WASHOUT;  Surgeon: Nathan Barber MD;  Location: [facility]     PICC DOUBLE LUMEN PLACEMENT Right 09/10/2022    5FR DL PICC. Basilic vein.     SURGICAL HISTORY OF -       PE tubes     SURGICAL HISTORY OF -   10/2006    removal of pannus ans breast lift     TONSILLECTOMY  1981     [facility]  DELIVERY ONLY  1998    Failure to progress     [facility] NONSPECIFIC PROCEDURE  2006    Laser ablation genital condyloma/ Bx labial lesion     [facility] RAD RESEC TONSIL/PILLARS          MEDICATIONS:  Current Outpatient Medications   Medication     apixaban ANTICOAGULANT (ELIQUIS) 5 MG tablet     clopidogrel (PLAVIX) 75 MG tablet     cyanocolbalamin (VITAMIN  B-12) 500 MCG tablet     cyclobenzaprine (FLEXERIL) 10 MG tablet     Ergocalciferol (VITAMIN D2 PO)     ferrous sulfate (IRON) 325 (65 FE) MG tablet     Multiple Vitamin (MULTIVITAMIN PO)     ondansetron (ZOFRAN ODT) 4 MG ODT tab      oxyCODONE (ROXICODONE) 5 MG tablet     pantoprazole (PROTONIX) 40 MG EC tablet     saccharomyces boulardii (FLORASTOR) 250 MG capsule     varenicline (CHANTIX) 0.5 MG tablet     No current facility-administered medications for this visit.        ALLERGIES:  Allergies   Allergen Reactions     Codeine Nausea and Vomiting     Darvocet [Acetaminophen] Nausea and Vomiting     Propoxyphene Nausea     Tylenol Nausea and Vomiting        SOCIAL HISTORY:  Social History     Socioeconomic History     Marital status:      Spouse name: None     Number of children: 2     Years of education: None     Highest education level: None   Occupational History     Employer: UNEMPLOYED   Tobacco Use     Smoking status: Former Smoker     Packs/day: 0.50     Types: Cigarettes     Smokeless tobacco: Never Used     Tobacco comment: Quit as of October 1   Vaping Use     Vaping Use: Never used   Substance and Sexual Activity     Alcohol use: Yes     Alcohol/week: 0.0 standard drinks     Comment: rarely- quit with pregnancy     Drug use: No     Sexual activity: Yes     Partners: Male     Birth control/protection: Surgical     Comment: Status post tubal ligation   Other Topics Concern     Parent/sibling w/ CABG, MI or angioplasty before 65F 55M? No       FAMILY HISTORY:  Family History   Problem Relation Age of Onset     Cerebrovascular Disease Paternal Grandfather      Diabetes Other      Cancer Maternal Grandmother         Lung     Depression Maternal Grandmother      Asthma Father      Gastrointestinal Disease Father         PSC     Alcohol/Drug Maternal Grandfather         alcohol     Cardiovascular Maternal Grandfather         MI     Respiratory Paternal Grandmother      Hypertension Mother      Depression Mother      Hypertension Brother      Depression Brother      C.A.D. No family hx of      Breast Cancer No family hx of      Cancer - colorectal No family hx of         PHYSICAL EXAM:  Vital Signs: /76 (BP Location: Left arm,  "Patient Position: Sitting, Cuff Size: Adult Regular)   Pulse 69   Ht 1.626 m (5' 4\")   Wt 80.1 kg (176 lb 8 oz)   SpO2 98%   BMI 30.30 kg/m    GEN:  Bridgette Veras was examined in the standing and supine position.  The patient appears completely septic today  ABD: Her abdomen is soft and nontender.  No evidence of hernia occurrence at the midline abdominal incision.  The incision is clean, dry and intact and the Steri-Strips will remain in place.  The abdomen is soft with some tenderness at the left abdomen and right abdomen.  There are no peritoneal signs.  The incision clean, dry and intact without erythema, drainage or other abnormality.    EXT: Extremities are warm and well perfused.  .      IMPRESSION/PLAN:  Bridgette Veras is a 46-year-old female who presents following segmental bowel resection that was due to arterial compromise of the small intestine.  The patient is followed up by the Vascular Surgery Service.  The CT scan is planned for the 10/28/2022.  At this point, that would be the best option for her to have a single CT scan rather than multiple CT scans at this point.  I have no  hard indication to obtain imaging at this time.  The patient was provided a prescription for ondansetron, dissolving, 4 mg tablet under the tongue, 12 tablets and recommended to take a probiotic if those are tolerated.  The patient will call or return should she develop problems in the interim.  I would like to re-evaluate the patient following the CT scan, at some point, prior to the end of November.        Sincerely,    Tariq Lancaster MD  "

## 2022-10-03 NOTE — PATIENT INSTRUCTIONS
You met with Dr. Tariq Lancaster.      Today's visit instructions:    Return to see Dr. Lancaster as scheduled on 11/10.       If you have questions please contact Lenora RN or Nancy RN during regular clinic hours, Monday through Friday 7:30 AM - 4:00 PM, or you can contact us via [employer] at anytime.       If you have urgent needs after-hours, weekends, or holidays please call the hospital at [phone] and ask to speak with our on-call General Surgery Team.    Appointment [phone]  Nurse Advice (Lenora or Nancy): [phone]   Surgery Scheduler (Rasheed or Kasie): [phone]  Fax: [phone]

## 2022-10-03 NOTE — NURSING NOTE
"Chief Complaint   Patient presents with     Post-Op - General Surgery     2 week wound check       Vitals:    10/03/22 1109   BP: 111/76   BP Location: Left arm   Patient Position: Sitting   Cuff Size: Adult Regular   Pulse: 69   SpO2: 98%   Weight: 80.1 kg (176 lb 8 oz)   Height: 1.626 m (5' 4\")       Body mass index is 30.3 kg/m .                          Josué Boss, EMT    "

## 2022-10-03 NOTE — PROGRESS NOTES
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  The patient had a compromised small intestine.  This required a 50 cm resection of segmental small bowel.  This was also part of an open abdominal exploration reevaluation and the patient had resection and primary anastomosis.  The patient has healed her anastomosis, but continues to have symptomatic loose stools and changes in her bowel postoperatively that could be related to her antibiotic requirements in the perioperative period.  The patient appears to be doing well otherwise.  She is not taking any probiotics at this time. The patient also notes episodes of nausea and episodes of abdominal pain.  The patient currently has had no other new problems associated with her midline abdominal incision.    ROS: a ten point ROS is negative.    PAST MEDICAL HISTORY:  Past Medical History:   Diagnosis Date     Cervical high risk HPV (human papillomavirus) test positive 16    type 16     Chickenpox      Chlamydia trachomatis infection of unspecified site      POONAM 1      Depressive disorder      Depressive disorder, not elsewhere classified      History of colposcopy with cervical biopsy 16    Bx - POONAM 1, ECC - negative     Other abnormal heart sounds as child    Murmur - resolved     Polycystic ovaries     prior to gastic bypass in , pt carried the diagnosis of PCOS/anovuolation     Streptococcus infection in conditions classified elsewhere and of unspecified site, group B     In pregnancy        PAST SURGICAL HISTORY:  Past Surgical History:   Procedure Laterality Date      SECTION  1998      SECTION, TUBAL LIGATION, COMBINED  2013    Procedure: COMBINED  SECTION, TUBAL LIGATION;   Section, Tubal Ligation;  Surgeon: Paul Allen MD;   Location: [facility]     GASTRIC BYPASS  2005     GASTRIC BYPASS       HC REMOVAL GALLBLADDER  2006    Dr. Inman @ [facility]     HC REPAIR ING HERNIA,5+Y/O,REDUCIBL  age 20 ()    [facility]     LAPAROTOMY EXPLORATORY N/A 2022    Procedure: Exploratory LAPAROTOMY, Superior Mesenteric Artery Thrombectomy, Retrograde Superior Mesenteric Artery Stenting;  Surgeon: Balta Ernst MD;  [facility]     LAPAROTOMY EXPLORATORY N/A 9/10/2022    Procedure: LAPAROTOMY, SMALL BOWEL RESECTION, INCISIONAL WOUND CLOSURE;  Surgeon: Tariq Lancaster MD;  [facility]     LAPAROTOMY EXPLORATORY N/A 2022    Procedure: EXPLORATORY LAPAROTOMY, EVACUATION OF RECTUS SHEATH HEMATOMA, ABDOMINAL WASHOUT;  Surgeon: Nathan Barber MD;  [facility]     PICC DOUBLE LUMEN PLACEMENT Right 09/10/2022    5FR DL PICC. Basilic vein.     SURGICAL HISTORY OF -       PE tubes     SURGICAL HISTORY OF -   10/2006    removal of pannus ans breast lift     TONSILLECTOMY  1981     [facility]  DELIVERY ONLY  1998    Failure to progress     [facility] NONSPECIFIC PROCEDURE  2006    Laser ablation genital condyloma/ Bx labial lesion     [facility] RAD RESEC TONSIL/PILLARS          MEDICATIONS:  Current Outpatient Medications   Medication     apixaban ANTICOAGULANT (ELIQUIS) 5 MG tablet     clopidogrel (PLAVIX) 75 MG tablet     cyanocolbalamin (VITAMIN  B-12) 500 MCG tablet     cyclobenzaprine (FLEXERIL) 10 MG tablet     Ergocalciferol (VITAMIN D2 PO)     ferrous sulfate (IRON) 325 (65 FE) MG tablet     Multiple Vitamin (MULTIVITAMIN PO)     ondansetron (ZOFRAN ODT) 4 MG ODT tab     oxyCODONE (ROXICODONE) 5 MG tablet     pantoprazole (PROTONIX) 40 MG EC tablet     saccharomyces boulardii (FLORASTOR) 250 MG capsule     varenicline (CHANTIX) 0.5 MG tablet     No current facility-administered medications for this visit.        ALLERGIES:  Allergies   Allergen Reactions     Codeine Nausea and Vomiting     Darvocet  "[Acetaminophen] Nausea and Vomiting     Propoxyphene Nausea     Tylenol Nausea and Vomiting        SOCIAL HISTORY:  Social History     Socioeconomic History     Marital status:      Spouse name: None     Number of children: 2     Years of education: None     Highest education level: None   Occupational History     Employer: UNEMPLOYED   Tobacco Use     Smoking status: Former Smoker     Packs/day: 0.50     Types: Cigarettes     Smokeless tobacco: Never Used     Tobacco comment: Quit as of October 1   Vaping Use     Vaping Use: Never used   Substance and Sexual Activity     Alcohol use: Yes     Alcohol/week: 0.0 standard drinks     Comment: rarely- quit with pregnancy     Drug use: No     Sexual activity: Yes     Partners: Male     Birth control/protection: Surgical     Comment: Status post tubal ligation   Other Topics Concern     Parent/sibling w/ CABG, MI or angioplasty before 65F 55M? No       FAMILY HISTORY:  Family History   Problem Relation Age of Onset     Cerebrovascular Disease Paternal Grandfather      Diabetes Other      Cancer Maternal Grandmother         Lung     Depression Maternal Grandmother      Asthma Father      Gastrointestinal Disease Father         PSC     Alcohol/Drug Maternal Grandfather         alcohol     Cardiovascular Maternal Grandfather         MI     Respiratory Paternal Grandmother      Hypertension Mother      Depression Mother      Hypertension Brother      Depression Brother      C.A.D. No family hx of      Breast Cancer No family hx of      Cancer - colorectal No family hx of         PHYSICAL EXAM:  Vital Signs: /76 (BP Location: Left arm, Patient Position: Sitting, Cuff Size: Adult Regular)   Pulse 69   Ht 1.626 m (5' 4\")   Wt 80.1 kg (176 lb 8 oz)   SpO2 98%   BMI 30.30 kg/m    GEN:  Bridgette Veras was examined in the standing and supine position.  The patient appears completely septic today  ABD: Her abdomen is soft and nontender.  No evidence of hernia " occurrence at the midline abdominal incision.  The incision is clean, dry and intact and the Steri-Strips will remain in place.  The abdomen is soft with some tenderness at the left abdomen and right abdomen.  There are no peritoneal signs.  The incision clean, dry and intact without erythema, drainage or other abnormality.    EXT: Extremities are warm and well perfused.  .      IMPRESSION/PLAN:  Bridgette Veras is a 46-year-old female who presents following segmental bowel resection that was due to arterial compromise of the small intestine.  The patient is followed up by the Vascular Surgery Service.  The CT scan is planned for the 10/28/2022.  At this point, that would be the best option for her to have a single CT scan rather than multiple CT scans at this point.  I have no  hard indication to obtain imaging at this time.  The patient was provided a prescription for ondansetron, dissolving, 4 mg tablet under the tongue, 12 tablets and recommended to take a probiotic if those are tolerated.  The patient will call or return should she develop problems in the interim.  I would like to re-evaluate the patient following the CT scan, at some point, prior to the end of November.

## 2022-10-20 ENCOUNTER — ANCILLARY PROCEDURE (OUTPATIENT)
Dept: CT IMAGING | Facility: CLINIC | Age: 47
End: 2022-10-20
Payer: COMMERCIAL

## 2022-10-20 ENCOUNTER — OFFICE VISIT (OUTPATIENT)
Dept: VASCULAR SURGERY | Facility: CLINIC | Age: 47
End: 2022-10-20
Payer: COMMERCIAL

## 2022-10-20 VITALS — OXYGEN SATURATION: 99 % | HEART RATE: 78 BPM | DIASTOLIC BLOOD PRESSURE: 76 MMHG | SYSTOLIC BLOOD PRESSURE: 107 MMHG

## 2022-10-20 DIAGNOSIS — Z98.84 S/P GASTRIC BYPASS: ICD-10-CM

## 2022-10-20 DIAGNOSIS — I74.10 AORTIC THROMBUS (H): ICD-10-CM

## 2022-10-20 DIAGNOSIS — K55.069 SUPERIOR MESENTERIC ARTERY THROMBOSIS (H): Primary | ICD-10-CM

## 2022-10-20 DIAGNOSIS — K55.069 SUPERIOR MESENTERIC ARTERY THROMBOSIS (H): ICD-10-CM

## 2022-10-20 PROCEDURE — 71275 CT ANGIOGRAPHY CHEST: CPT | Mod: GC | Performed by: RADIOLOGY

## 2022-10-20 PROCEDURE — 99207 PR NO CHARGE LOS: CPT

## 2022-10-20 PROCEDURE — 74174 CTA ABD&PLVS W/CONTRAST: CPT | Mod: GC | Performed by: RADIOLOGY

## 2022-10-20 RX ORDER — IOPAMIDOL 755 MG/ML
100 INJECTION, SOLUTION INTRAVASCULAR ONCE
Status: COMPLETED | OUTPATIENT
Start: 2022-10-20 | End: 2022-10-20

## 2022-10-20 RX ADMIN — IOPAMIDOL 100 ML: 755 INJECTION, SOLUTION INTRAVASCULAR at 11:17

## 2022-10-20 ASSESSMENT — PAIN SCALES - GENERAL: PAINLEVEL: NO PAIN (1)

## 2022-10-20 NOTE — NURSING NOTE
Chief Complaint   Patient presents with     Follow Up     5 week post-op       Vitals were taken and medications were reconciled.    Kiki Fernandez  2:36 PM

## 2022-10-20 NOTE — PROGRESS NOTES
VASCULAR SURGERY CLINIC FOLLOWUP    VASCULAR SURGEON: Dr. 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 Repeat CTA after 6 weeks of anticoagulation shows significant decrease in thrombus and shows a patent SMA stent       RECOMMENDATION:       - Continue anticoagulation with Eliquis. Stop plavix and start ASA 81 mg . 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 She  underwent the above-named procedures on 9/8. Postoperatively she continued to have signfican pain prompting a return to OR on 9.9 and evacuationof rectus hematoma and inspection of bowel. A 50 cm segment of bowel was not clearly viable nor necrtotic so her abdomen was temporarily closed and she went back to OR on 9/10. There were no doppler signals in the mesentery of this 50 cm segment so a small bowel resection and anastomosis was performed and her fascia closed.       Tolerating diet. She is slowly ramping up oral intake. Denies diarrhea,fevers or chills. Denies any rest pain or claudication in BLE . Some pain in heel of L foot. No numbness or weakness in BLE       PHH:    Past Medical History:   Diagnosis Date     Cervical high risk HPV (human papillomavirus)  test positive 16    type 16     Chickenpox      Chlamydia trachomatis infection of unspecified site      POONAM 1      Depressive disorder      Depressive disorder, not elsewhere classified      History of colposcopy with cervical biopsy 16    Bx - POONAM 1, ECC - negative     Other abnormal heart sounds as child    Murmur - resolved     Polycystic ovaries     prior to gastic bypass in , pt carried the diagnosis of PCOS/anovuolation     Streptococcus infection in conditions classified elsewhere and of unspecified site, group B 1998    In pregnancy         Past Surgical History:   Procedure Laterality Date      SECTION  1998      SECTION, TUBAL LIGATION, COMBINED  2013    Procedure: COMBINED  SECTION, TUBAL LIGATION;   Section, Tubal Ligation;  Surgeon: Paul Allen MD;  Location: [facility]     GASTRIC BYPASS  2005     GASTRIC BYPASS       HC REMOVAL GALLBLADDER  2006    Dr. Inman @ [facility]     HC REPAIR ING HERNIA,5+Y/O,REDUCIBL  age 20 ()    [facility]     LAPAROTOMY EXPLORATORY N/A 2022    Procedure: Exploratory LAPAROTOMY, Superior Mesenteric Artery Thrombectomy, Retrograde Superior Mesenteric Artery Stenting;  Surgeon: Balta Ernst MD;  Location: [facility]     LAPAROTOMY EXPLORATORY N/A 9/10/2022    Procedure: LAPAROTOMY, SMALL BOWEL RESECTION, INCISIONAL WOUND CLOSURE;  Surgeon: Tariq Lancaster MD;  Location: [facility]     LAPAROTOMY EXPLORATORY N/A 2022    Procedure: EXPLORATORY LAPAROTOMY, EVACUATION OF RECTUS SHEATH HEMATOMA, ABDOMINAL WASHOUT;  Surgeon: Nathan Barber MD;  Location: [facility]     PICC DOUBLE LUMEN PLACEMENT Right 09/10/2022    5FR DL PICC. Basilic vein.     SURGICAL HISTORY OF -       PE tubes     SURGICAL HISTORY OF -   10/2006    removal of pannus ans breast lift     TONSILLECTOMY  1981     [facility]  DELIVERY ONLY  1998    Failure to progress     [facility] NONSPECIFIC PROCEDURE   03/09/2006    Laser ablation genital condyloma/ Bx labial lesion     [facility] RAD RESEC TONSIL/PILLARS          ALLERGIES:     Allergies   Allergen Reactions     Codeine Nausea and Vomiting     Darvocet [Acetaminophen] Nausea and Vomiting     Propoxyphene Nausea     Tylenol Nausea and Vomiting        MEDS:    Current Outpatient Medications   Medication     apixaban ANTICOAGULANT (ELIQUIS) 5 MG tablet     clopidogrel (PLAVIX) 75 MG tablet     cyanocolbalamin (VITAMIN  B-12) 500 MCG tablet     cyclobenzaprine (FLEXERIL) 10 MG tablet     Ergocalciferol (VITAMIN D2 PO)     ferrous sulfate (IRON) 325 (65 FE) MG tablet     Multiple Vitamin (MULTIVITAMIN PO)     ondansetron (ZOFRAN ODT) 4 MG ODT tab     oxyCODONE (ROXICODONE) 5 MG tablet     pantoprazole (PROTONIX) 40 MG EC tablet     saccharomyces boulardii (FLORASTOR) 250 MG capsule     varenicline (CHANTIX) 0.5 MG tablet     No current facility-administered medications for this visit.        SOCIAL HABITS:    History   Smoking Status     Former     Packs/day: 0.50     Types: Cigarettes   Smokeless Tobacco     Never     Comment: Quit as of October 1       Alcohol Use: Not on file        History   Drug Use No        FAMILY HISTORY:    Family History   Problem Relation Age of Onset     Cerebrovascular Disease Paternal Grandfather      Diabetes Other      Cancer Maternal Grandmother         Lung     Depression Maternal Grandmother      Asthma Father      Gastrointestinal Disease Father         PSC     Alcohol/Drug Maternal Grandfather         alcohol     Cardiovascular Maternal Grandfather         MI     Respiratory Paternal Grandmother      Hypertension Mother      Depression Mother      Hypertension Brother      Depression Brother      C.A.D. No family hx of      Breast Cancer No family hx of      Cancer - colorectal No family hx of        REVIEW OF SYSTEMS:    A 12 point ROS was reviewed and except for what is listed in the HPI above, all others are negative    PE:  There  were no vitals taken for this visit.   Wt Readings from Last 1 Encounters:   10/03/22 80.1 kg (176 lb 8 oz)     There is no height or weight on file to calculate BMI.    EXAM:  GENERAL: This is a well-developed 46 year old female who appears her stated age  EYES: Grossly normal.  MOUTH: Buccal mucosa normal   CARDIAC:  RRR  CHEST/LUNG:  NLB  GASTROINTESINAL (ABDOMEN): Soft, non-tender, incision C/D, s  MUSCULOSKELETAL: Grossly normal and both lower extremities are intact.  HEME/LYMPH: No lymphedema  NEUROLOGIC: Focally intact, Alert and oriented x 3.   PSYCH: appropriate affect  INTEGUMENT: No open lesions or ulcers  Pulse Exam:   Biphasic L AT and PT  Monophasic R AT , biphasic R PT  Motor and sensory intact      DIAGNOSTIC STUDIES:     I personally reviewed the images and my interpretation :     Significant improvement and near resolution of aortic thrombus. Some periaortic thickening at level of median arcuate ligament. Patent SMA stent     LABS:      Sodium   Date Value Ref Range Status   09/15/2022 138 136 - 145 mmol/L Final   09/14/2022 139 136 - 145 mmol/L Final   09/13/2022 139 136 - 145 mmol/L Final   08/04/2016 139 133 - 144 mmol/L Final   03/07/2011 135 133 - 144 mmol/L Final   11/02/2010 138 133 - 144 mmol/L Final     Urea Nitrogen   Date Value Ref Range Status   09/15/2022 21.4 (H) 6.0 - 20.0 mg/dL Final   09/14/2022 15.4 6.0 - 20.0 mg/dL Final   09/13/2022 16.6 6.0 - 20.0 mg/dL Final   02/07/2022 10 7 - 30 mg/dL Final   08/04/2016 9 7 - 30 mg/dL Final   03/07/2011 11 5 - 24 mg/dL Final   11/02/2010 7 5 - 24 mg/dL Final     Hemoglobin   Date Value Ref Range Status   09/16/2022 9.1 (L) 11.7 - 15.7 g/dL Final   09/15/2022 8.5 (L) 11.7 - 15.7 g/dL Final   09/14/2022 8.5 (L) 11.7 - 15.7 g/dL Final   09/20/2016 11.1 (L) 11.7 - 15.7 g/dL Final   08/04/2016 9.6 (L) 11.7 - 15.7 g/dL Final   09/17/2013 13.0 11.7 - 15.7 g/dL Final     Platelet Count   Date Value Ref Range Status   09/16/2022 308 150 - 450 10e3/uL  Final   09/15/2022 296 150 - 450 10e3/uL Final   09/14/2022 300 150 - 450 10e3/uL Final   08/04/2016 327 150 - 450 10e9/L Final   01/04/2013 290 150 - 450 10e9/L Final   03/07/2011 276 150 - 450 10e9/L Final     INR   Date Value Ref Range Status   09/12/2022 1.13 0.85 - 1.15 Final   09/11/2022 1.25 (H) 0.85 - 1.15 Final   09/09/2022 1.70 (H) 0.85 - 1.15 Final   03/29/2005 1.03 0.86 - 1.14 Final       30  minutes spent on the day of encounter doing chart review, history and exam, documentation, and further activities as noted.     Raquel Aly MD, MD  [facility] Vascular Surgery

## 2022-10-20 NOTE — PATIENT INSTRUCTIONS
Thank you so much for choosing us for your care. It was a pleasure to see you at the vascular clinic today.     Follow-up recommendations: We will see you back in 6 months. You will need to take Eliquis 5mg twice daily for 3 months. You will also need to start a baby aspirin 81mg daily. Please STOP your plavix.    Additional testing/imaging ordered today: CTA in 6 months prior to returning to clinic.      Our scheduling team will get in touch with you to set up any follow-up testing/imaging and/or appointments. Please be aware that any testing/imaging recommended today will need to completed prior to your next visit with the provider. If testing/imaging is not completed prior to your next visit, your visit may be rescheduled.        If you have any questions, please call Sona Luis RN at [phone] or contact our clinic at [phone]. We also encourage the use of [employer] to communicate with your HealthCare Provider.      If you have an urgent need after business hours (8:00 am to 4:30 pm) please call [phone], option 4, and ask for the vascular attending on call. For non-urgent after hours needs, please call the vascular nurse at [phone] and leave a detailed voicemail. For scheduling needs, please call our clinic directly at [phone].

## 2022-10-24 DIAGNOSIS — I74.10 AORTIC THROMBUS (H): ICD-10-CM

## 2022-10-24 DIAGNOSIS — K55.069 SUPERIOR MESENTERIC ARTERY THROMBOSIS (H): Primary | ICD-10-CM

## 2022-11-09 ENCOUNTER — MYC MEDICAL ADVICE (OUTPATIENT)
Dept: FAMILY MEDICINE | Facility: CLINIC | Age: 47
End: 2022-11-09

## 2022-11-09 ENCOUNTER — PATIENT OUTREACH (OUTPATIENT)
Dept: SURGERY | Facility: CLINIC | Age: 47
End: 2022-11-09

## 2022-11-09 DIAGNOSIS — Z72.0 TOBACCO ABUSE: Primary | ICD-10-CM

## 2022-11-09 NOTE — PROGRESS NOTES
Patient Telephone Reminder Call    Date of call:  11/09/22  Phone numbers:  Cell number on file:    Telephone Information:   Mobile [phone]       Reached patient/confirmed appointment:  Yes  Appointment with:   Dr. Tariq Lancaster  Reason for visit:  One month follow up  Remind patient that this visit is a consultation only, NO procedure:  Yes  Can this visit be changed to a video visit:  No

## 2022-11-10 ENCOUNTER — OFFICE VISIT (OUTPATIENT)
Dept: SURGERY | Facility: CLINIC | Age: 47
End: 2022-11-10
Payer: COMMERCIAL

## 2022-11-10 VITALS
SYSTOLIC BLOOD PRESSURE: 121 MMHG | OXYGEN SATURATION: 98 % | DIASTOLIC BLOOD PRESSURE: 81 MMHG | WEIGHT: 171.6 LBS | HEART RATE: 101 BPM | BODY MASS INDEX: 29.3 KG/M2 | HEIGHT: 64 IN

## 2022-11-10 DIAGNOSIS — Z98.890 POSTOPERATIVE STATE: Primary | ICD-10-CM

## 2022-11-10 PROCEDURE — 99024 POSTOP FOLLOW-UP VISIT: CPT | Performed by: SURGERY

## 2022-11-10 ASSESSMENT — PAIN SCALES - GENERAL: PAINLEVEL: NO PAIN (0)

## 2022-11-10 NOTE — PATIENT INSTRUCTIONS
You met with Dr. Tariq Lancaster.      Today's visit instructions:    Return to the Surgery Clinic on an as needed basis.        If you have questions please contact Lenora RN or Nancy RN during regular clinic hours, Monday through Friday 7:30 AM - 4:00 PM, or you can contact us via [employer] at anytime.       If you have urgent needs after-hours, weekends, or holidays please call the hospital at [phone] and ask to speak with our on-call General Surgery Team.    Appointment [phone]  Nurse Advice (Lenora or Nancy): [phone]   Surgery Scheduler (Rasheed or Kasie): [phone]  Fax: [phone]

## 2022-11-10 NOTE — LETTER
11/10/2022       RE: Bridgette Veras  [address]  [place] [address]     Dear Colleague,    Thank you for referring your patient, Bridgette Veras, to the [facility] [place] [facility]. Please see a copy of my visit note below.    General Surgery Clinic Staff:    HISTORY OF PRESENT ILLNESS:  Bridgette Veras is a 47-year-old female well known to Surgery Service.  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  The patient has been cared for by the [facility] Vascular Surgery Service as well as Interventional Radiology.  The patient subsequently required a segmental small-bowel resection with primary anastomosis.  The patient has done very well, is eating a regular diet and having normal bowel movements.  She has unfortunately not been able to quit smoking at this point, and she is using Chantix as a method for smoking cessation.  The patient would like to continue smoking cessation and continue back on Chantix.  She notes that her mother smokes and it has been difficult for her.  She understands the need and the importance of smoking cessation for her vascular complex disease.    PAST MEDICAL HISTORY:  Past Medical History:   Diagnosis Date     Cervical high risk HPV (human papillomavirus) test positive 8/4/16    type 16     Chickenpox      Chlamydia trachomatis infection of unspecified site      POONAM 1 1998     Depressive disorder      Depressive disorder, not elsewhere classified      History of colposcopy with cervical biopsy 9/20/16    Bx - POONAM 1, ECC - negative     Other abnormal heart sounds as child    Murmur - resolved     Polycystic ovaries     prior to gastic bypass in June, 2005, pt carried the diagnosis of PCOS/anovuolation     Streptococcus infection in conditions classified elsewhere and of unspecified site, group B 1998    In pregnancy         PAST SURGICAL HISTORY:  Past Surgical History:   Procedure Laterality Date      SECTION  1998      SECTION, TUBAL LIGATION, COMBINED  2013    Procedure: COMBINED  SECTION, TUBAL LIGATION;   Section, Tubal Ligation;  Surgeon: Paul Allen MD;  Location: [facility] OR     GASTRIC BYPASS  2005     GASTRIC BYPASS       HC REMOVAL GALLBLADDER  2006    Dr. Inman @ [facility]     HC REPAIR ING HERNIA,5+Y/O,REDUCIBL  age 20 ()    [facility]     LAPAROTOMY EXPLORATORY N/A 2022    Procedure: Exploratory LAPAROTOMY, Superior Mesenteric Artery Thrombectomy, Retrograde Superior Mesenteric Artery Stenting;  Surgeon: Balta Ernst MD;  Location: [facility]     LAPAROTOMY EXPLORATORY N/A 9/10/2022    Procedure: LAPAROTOMY, SMALL BOWEL RESECTION, INCISIONAL WOUND CLOSURE;  Surgeon: Tariq Lancaster MD;  [facility]: [facility]     LAPAROTOMY EXPLORATORY N/A 2022    Procedure: EXPLORATORY LAPAROTOMY, EVACUATION OF RECTUS SHEATH HEMATOMA, ABDOMINAL WASHOUT;  Surgeon: Nathan Barber MD;  Location: [facility]     PICC DOUBLE LUMEN PLACEMENT Right 09/10/2022    5FR DL PICC. Basilic vein.     SURGICAL HISTORY OF -       PE tubes     SURGICAL HISTORY OF -   10/2006    removal of pannus ans breast lift     TONSILLECTOMY  1981     [facility]  DELIVERY ONLY  1998    Failure to progress     [facility] NONSPECIFIC PROCEDURE  2006    Laser ablation genital condyloma/ Bx labial lesion     [facility] RAD RESEC TONSIL/PILLARS          MEDICATIONS:  Current Outpatient Medications   Medication     apixaban ANTICOAGULANT (ELIQUIS) 5 MG tablet     aspirin (ASA) 81 MG EC tablet     cyanocolbalamin (VITAMIN  B-12) 500 MCG tablet     cyclobenzaprine (FLEXERIL) 10 MG tablet     Ergocalciferol (VITAMIN D2 PO)     ferrous sulfate (IRON) 325 (65 FE) MG tablet     Multiple Vitamin (MULTIVITAMIN PO)     ondansetron (ZOFRAN ODT) 4 MG ODT tab     saccharomyces boulardii (FLORASTOR) 250  MG capsule     varenicline (CHANTIX AVIVA) 0.5 MG X 11 & 1 MG X 42 tablet     oxyCODONE (ROXICODONE) 5 MG tablet     pantoprazole (PROTONIX) 40 MG EC tablet     varenicline (CHANTIX) 0.5 MG tablet     No current facility-administered medications for this visit.        ALLERGIES:  Allergies   Allergen Reactions     Codeine Nausea and Vomiting     Darvocet [Acetaminophen] Nausea and Vomiting     Propoxyphene Nausea        SOCIAL HISTORY:  Social History     Socioeconomic History     Marital status:      Spouse name: None     Number of children: 2     Years of education: None     Highest education level: None   Occupational History     Employer: UNEMPLOYED   Tobacco Use     Smoking status: Every Day     Packs/day: 0.50     Types: Cigarettes     Smokeless tobacco: Never     Tobacco comments:     Chantex to quit, 4 or 5 cigarettes a day   Vaping Use     Vaping Use: Never used   Substance and Sexual Activity     Alcohol use: Yes     Alcohol/week: 0.0 standard drinks     Comment: rarely- quit with pregnancy     Drug use: No     Sexual activity: Yes     Partners: Male     Birth control/protection: Surgical     Comment: Status post tubal ligation   Other Topics Concern     Parent/sibling w/ CABG, MI or angioplasty before 65F 55M? No       FAMILY HISTORY:  Family History   Problem Relation Age of Onset     Cerebrovascular Disease Paternal Grandfather      Diabetes Other      Cancer Maternal Grandmother         Lung     Depression Maternal Grandmother      Asthma Father      Gastrointestinal Disease Father         PSC     Alcohol/Drug Maternal Grandfather         alcohol     Cardiovascular Maternal Grandfather         MI     Respiratory Paternal Grandmother      Hypertension Mother      Depression Mother      Hypertension Brother      Depression Brother      C.A.D. No family hx of      Breast Cancer No family hx of      Cancer - colorectal No family hx of      PHYSICAL EXAM:  Vital Signs: /81 (BP Location: Left arm,  "Patient Position: Sitting, Cuff Size: Adult Regular)   Pulse 101   Ht 1.626 m (5' 4\")   Wt 77.8 kg (171 lb 9.6 oz)   SpO2 98%   BMI 29.46 kg/m    GEN: Bridgette Veras was examined in the standing and supine position.   ABD:  The abdomen is soft and nontender.  No masses, other hernias or other abnormalities were noted.  The incision is clean, dry and intact and healing without evidence of fascial defect.    EXT: The patient's extremities are warm and well perfused.    IMPRESSION:  Bridgette Veras has done very well following open segmental resection for ischemic bowel.  The patient has been attempting to complete smoking cessation.  She will work with her primary care provider to obtain additional Chantix.  The patient will also call or return should she develop problems in the interim.  At this point, the patient has completed all general surgical followup required.  The patient will continue to follow up with the Vascular Surgery Service.        Sincerely,    Tariq Lancasetr MD  "

## 2022-11-10 NOTE — NURSING NOTE
"Chief Complaint   Patient presents with     Post-Op - General Surgery     One-month follow up       Vitals:    11/10/22 0923   BP: 121/81   BP Location: Left arm   Patient Position: Sitting   Cuff Size: Adult Regular   Pulse: 101   SpO2: 98%   Weight: 77.8 kg (171 lb 9.6 oz)   Height: 1.626 m (5' 4\")       Body mass index is 29.46 kg/m .                          Josué Boss, EMT    "

## 2022-11-11 NOTE — PROGRESS NOTES
General Surgery Clinic Staff:    HISTORY OF PRESENT ILLNESS:  Bridgette Veras is a 47-year-old female well known to Surgery Service.  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  The patient has been cared for by the [facility] as well as Interventional Radiology.  The patient subsequently required a segmental small-bowel resection with primary anastomosis.  The patient has done very well, is eating a regular diet and having normal bowel movements.  She has unfortunately not been able to quit smoking at this point, and she is using Chantix as a method for smoking cessation.  The patient would like to continue smoking cessation and continue back on Chantix.  She notes that her mother smokes and it has been difficult for her.  She understands the need and the importance of smoking cessation for her vascular complex disease.    PAST MEDICAL HISTORY:  Past Medical History:   Diagnosis Date     Cervical high risk HPV (human papillomavirus) test positive 16    type 16     Chickenpox      Chlamydia trachomatis infection of unspecified site      POONAM 1      Depressive disorder      Depressive disorder, not elsewhere classified      History of colposcopy with cervical biopsy 16    Bx - POONAM 1, ECC - negative     Other abnormal heart sounds as child    Murmur - resolved     Polycystic ovaries     prior to gastic bypass in , pt carried the diagnosis of PCOS/anovuolation     Streptococcus infection in conditions classified elsewhere and of unspecified site, group B     In pregnancy        PAST SURGICAL HISTORY:  Past Surgical History:   Procedure Laterality Date      SECTION  1998      SECTION, TUBAL LIGATION, COMBINED  2013    Procedure: COMBINED  SECTION, TUBAL LIGATION;   Section, Tubal Ligation;  Surgeon: Paul Allen MD;  Location: [facility] OR     GASTRIC  BYPASS  2005     GASTRIC BYPASS       HC REMOVAL GALLBLADDER  2006    Dr. Inman @ [facility]     HC REPAIR ING HERNIA,5+Y/O,REDUCIBL  age 20 ()    [facility]     LAPAROTOMY EXPLORATORY N/A 2022    Procedure: Exploratory LAPAROTOMY, Superior Mesenteric Artery Thrombectomy, Retrograde Superior Mesenteric Artery Stenting;  Surgeon: Balta Ernst MD;  Location: [facility] OR     LAPAROTOMY EXPLORATORY N/A 9/10/2022    Procedure: LAPAROTOMY, SMALL BOWEL RESECTION, INCISIONAL WOUND CLOSURE;  Surgeon: Tariq Lancaster MD;  Location: [facility] OR     LAPAROTOMY EXPLORATORY N/A 2022    Procedure: EXPLORATORY LAPAROTOMY, EVACUATION OF RECTUS SHEATH HEMATOMA, ABDOMINAL WASHOUT;  Surgeon: Nathan Barber MD;  Location: [facility] OR     PICC DOUBLE LUMEN PLACEMENT Right 09/10/2022    5FR DL PICC. Basilic vein.     SURGICAL HISTORY OF -       PE tubes     SURGICAL HISTORY OF -   10/2006    removal of pannus ans breast lift     TONSILLECTOMY  1981     [facility]  DELIVERY ONLY  1998    Failure to progress     [facility] NONSPECIFIC PROCEDURE  2006    Laser ablation genital condyloma/ Bx labial lesion     [facility] RAD RESEC TONSIL/PILLARS          MEDICATIONS:  Current Outpatient Medications   Medication     apixaban ANTICOAGULANT (ELIQUIS) 5 MG tablet     aspirin (ASA) 81 MG EC tablet     cyanocolbalamin (VITAMIN  B-12) 500 MCG tablet     cyclobenzaprine (FLEXERIL) 10 MG tablet     Ergocalciferol (VITAMIN D2 PO)     ferrous sulfate (IRON) 325 (65 FE) MG tablet     Multiple Vitamin (MULTIVITAMIN PO)     ondansetron (ZOFRAN ODT) 4 MG ODT tab     saccharomyces boulardii (FLORASTOR) 250 MG capsule     varenicline (CHANTIX AVIVA) 0.5 MG X 11 & 1 MG X 42 tablet     oxyCODONE (ROXICODONE) 5 MG tablet     pantoprazole (PROTONIX) 40 MG EC tablet     varenicline (CHANTIX) 0.5 MG tablet     No current facility-administered medications for this visit.        ALLERGIES:  Allergies   Allergen Reactions     Codeine  "Nausea and Vomiting     Darvocet [Acetaminophen] Nausea and Vomiting     Propoxyphene Nausea        SOCIAL HISTORY:  Social History     Socioeconomic History     Marital status:      Spouse name: None     Number of children: 2     Years of education: None     Highest education level: None   Occupational History     Employer: UNEMPLOYED   Tobacco Use     Smoking status: Every Day     Packs/day: 0.50     Types: Cigarettes     Smokeless tobacco: Never     Tobacco comments:     Chantex to quit, 4 or 5 cigarettes a day   Vaping Use     Vaping Use: Never used   Substance and Sexual Activity     Alcohol use: Yes     Alcohol/week: 0.0 standard drinks     Comment: rarely- quit with pregnancy     Drug use: No     Sexual activity: Yes     Partners: Male     Birth control/protection: Surgical     Comment: Status post tubal ligation   Other Topics Concern     Parent/sibling w/ CABG, MI or angioplasty before 65F 55M? No       FAMILY HISTORY:  Family History   Problem Relation Age of Onset     Cerebrovascular Disease Paternal Grandfather      Diabetes Other      Cancer Maternal Grandmother         Lung     Depression Maternal Grandmother      Asthma Father      Gastrointestinal Disease Father         PSC     Alcohol/Drug Maternal Grandfather         alcohol     Cardiovascular Maternal Grandfather         MI     Respiratory Paternal Grandmother      Hypertension Mother      Depression Mother      Hypertension Brother      Depression Brother      C.A.D. No family hx of      Breast Cancer No family hx of      Cancer - colorectal No family hx of      PHYSICAL EXAM:  Vital Signs: /81 (BP Location: Left arm, Patient Position: Sitting, Cuff Size: Adult Regular)   Pulse 101   Ht 1.626 m (5' 4\")   Wt 77.8 kg (171 lb 9.6 oz)   SpO2 98%   BMI 29.46 kg/m    GEN: Bridgette Veras was examined in the standing and supine position.   ABD:  The abdomen is soft and nontender.  No masses, other hernias or other abnormalities were " noted.  The incision is clean, dry and intact and healing without evidence of fascial defect.    EXT: The patient's extremities are warm and well perfused.    IMPRESSION:  Bridgette Veras has done very well following open segmental resection for ischemic bowel.  The patient has been attempting to complete smoking cessation.  She will work with her primary care provider to obtain additional Chantix.  The patient will also call or return should she develop problems in the interim.  At this point, the patient has completed all general surgical followup required.  The patient will continue to follow up with the Vascular Surgery Service.

## 2022-11-21 ENCOUNTER — E-VISIT (OUTPATIENT)
Dept: URGENT CARE | Facility: CLINIC | Age: 47
End: 2022-11-21
Payer: COMMERCIAL

## 2022-11-21 ENCOUNTER — E-VISIT (OUTPATIENT)
Dept: FAMILY MEDICINE | Facility: CLINIC | Age: 47
End: 2022-11-21

## 2022-11-21 ENCOUNTER — HEALTH MAINTENANCE LETTER (OUTPATIENT)
Age: 47
End: 2022-11-21

## 2022-11-21 DIAGNOSIS — R19.7 DIARRHEA, UNSPECIFIED TYPE: Primary | ICD-10-CM

## 2022-11-21 PROCEDURE — 99207 PR NON-BILLABLE SERV PER CHARTING: CPT | Performed by: FAMILY MEDICINE

## 2022-11-21 PROCEDURE — 99207 PR NON-BILLABLE SERV PER CHARTING: CPT | Performed by: EMERGENCY MEDICINE

## 2022-11-21 NOTE — PATIENT INSTRUCTIONS
Because of your recent admission, please contact your PCP through [facility]  TODAY.If no response in 24 hours go into urgent care. MARY GRACE Mulligan MD      Thank you for choosing us for your care. Based on your symptoms and length of illness, I do not think that you need a prescription at this time.  Please follow the care advise I ve provided and use the over the counter medications to help relieve your symptoms. View your full visit summary for details by clicking on the link below.     If you re not feeling better within 2-3 days, please respond to this message and we can consider if a prescription is needed.  You can schedule an appointment right here in [facility], or call [phone]  If the visit is for the same symptoms as your eVisit, we ll refund the cost of your eVisit if seen within seven days.

## 2022-11-22 ENCOUNTER — OFFICE VISIT (OUTPATIENT)
Dept: FAMILY MEDICINE | Facility: CLINIC | Age: 47
End: 2022-11-22
Payer: COMMERCIAL

## 2022-11-22 ENCOUNTER — TELEPHONE (OUTPATIENT)
Dept: FAMILY MEDICINE | Facility: CLINIC | Age: 47
End: 2022-11-22

## 2022-11-22 ENCOUNTER — HOSPITAL ENCOUNTER (OUTPATIENT)
Dept: CT IMAGING | Facility: CLINIC | Age: 47
Discharge: HOME | End: 2022-11-22
Attending: FAMILY MEDICINE | Admitting: FAMILY MEDICINE
Payer: COMMERCIAL

## 2022-11-22 VITALS
HEART RATE: 85 BPM | HEIGHT: 64 IN | TEMPERATURE: 98.1 F | OXYGEN SATURATION: 97 % | WEIGHT: 167 LBS | DIASTOLIC BLOOD PRESSURE: 75 MMHG | SYSTOLIC BLOOD PRESSURE: 122 MMHG | BODY MASS INDEX: 28.51 KG/M2

## 2022-11-22 DIAGNOSIS — R10.11 ABDOMINAL PAIN, RIGHT UPPER QUADRANT: ICD-10-CM

## 2022-11-22 DIAGNOSIS — R19.7 DIARRHEA, UNSPECIFIED TYPE: Primary | ICD-10-CM

## 2022-11-22 DIAGNOSIS — R10.31 ABDOMINAL PAIN, RIGHT LOWER QUADRANT: ICD-10-CM

## 2022-11-22 DIAGNOSIS — K55.069 SUPERIOR MESENTERIC ARTERY THROMBOSIS (H): ICD-10-CM

## 2022-11-22 DIAGNOSIS — R19.7 DIARRHEA, UNSPECIFIED TYPE: ICD-10-CM

## 2022-11-22 DIAGNOSIS — F40.240 CLAUSTROPHOBIA: ICD-10-CM

## 2022-11-22 LAB
BASOPHILS # BLD AUTO: 0 10E3/UL (ref 0–0.2)
BASOPHILS NFR BLD AUTO: 0 %
EOSINOPHIL # BLD AUTO: 0.2 10E3/UL (ref 0–0.7)
EOSINOPHIL NFR BLD AUTO: 2 %
ERYTHROCYTE [DISTWIDTH] IN BLOOD BY AUTOMATED COUNT: 19.5 % (ref 10–15)
HCT VFR BLD AUTO: 41.8 % (ref 35–47)
HGB BLD-MCNC: 13 G/DL (ref 11.7–15.7)
LYMPHOCYTES # BLD AUTO: 2.4 10E3/UL (ref 0.8–5.3)
LYMPHOCYTES NFR BLD AUTO: 24 %
MCH RBC QN AUTO: 26.5 PG (ref 26.5–33)
MCHC RBC AUTO-ENTMCNC: 31.1 G/DL (ref 31.5–36.5)
MCV RBC AUTO: 85 FL (ref 78–100)
MONOCYTES # BLD AUTO: 0.5 10E3/UL (ref 0–1.3)
MONOCYTES NFR BLD AUTO: 5 %
NEUTROPHILS # BLD AUTO: 6.9 10E3/UL (ref 1.6–8.3)
NEUTROPHILS NFR BLD AUTO: 69 %
PLATELET # BLD AUTO: 313 10E3/UL (ref 150–450)
RBC # BLD AUTO: 4.91 10E6/UL (ref 3.8–5.2)
WBC # BLD AUTO: 10 10E3/UL (ref 4–11)

## 2022-11-22 PROCEDURE — 74177 CT ABD & PELVIS W/CONTRAST: CPT

## 2022-11-22 PROCEDURE — 250N000011 HC RX IP 250 OP 636: Performed by: FAMILY MEDICINE

## 2022-11-22 PROCEDURE — 87493 C DIFF AMPLIFIED PROBE: CPT | Mod: 59

## 2022-11-22 PROCEDURE — 87506 IADNA-DNA/RNA PROBE TQ 6-11: CPT

## 2022-11-22 PROCEDURE — 85025 COMPLETE CBC W/AUTO DIFF WBC: CPT | Performed by: FAMILY MEDICINE

## 2022-11-22 PROCEDURE — 250N000009 HC RX 250: Performed by: FAMILY MEDICINE

## 2022-11-22 PROCEDURE — 99214 OFFICE O/P EST MOD 30 MIN: CPT | Performed by: FAMILY MEDICINE

## 2022-11-22 PROCEDURE — 87324 CLOSTRIDIUM AG IA: CPT | Mod: 59

## 2022-11-22 PROCEDURE — 36415 COLL VENOUS BLD VENIPUNCTURE: CPT | Performed by: FAMILY MEDICINE

## 2022-11-22 RX ORDER — LORAZEPAM 1 MG/1
1 TABLET ORAL EVERY 6 HOURS PRN
Qty: 1 TABLET | Refills: 0 | Status: SHIPPED | OUTPATIENT
Start: 2022-11-22 | End: 2022-12-06

## 2022-11-22 RX ORDER — IOPAMIDOL 755 MG/ML
80 INJECTION, SOLUTION INTRAVASCULAR ONCE
Status: COMPLETED | OUTPATIENT
Start: 2022-11-22 | End: 2022-11-22

## 2022-11-22 RX ADMIN — SODIUM CHLORIDE 60 ML: 9 INJECTION, SOLUTION INTRAVENOUS at 13:44

## 2022-11-22 RX ADMIN — IOPAMIDOL 80 ML: 755 INJECTION, SOLUTION INTRAVENOUS at 13:44

## 2022-11-22 NOTE — TELEPHONE ENCOUNTER
Call placed to Patient  Changed Patients to an in person visit today with Dr Morillo at 1040 on 11/22/22  Freddy Mac RN

## 2022-11-22 NOTE — PROGRESS NOTES
A/P:      ICD-10-CM    1. Diarrhea, unspecified type  R19.7 CBC with platelets and differential     C. difficile Toxin B PCR with reflex to C. difficile Antigen and Toxins A/B EIA     Enteric Bacteria and Virus Panel by GRAY Stool     CT Abdomen Pelvis w Contrast     CBC with platelets and differential      2. Abdominal pain, right lower quadrant  R10.31 CBC with platelets and differential     CT Abdomen Pelvis w Contrast     CBC with platelets and differential      3. Abdominal pain, right upper quadrant  R10.11 CBC with platelets and differential     CT Abdomen Pelvis w Contrast     CBC with platelets and differential      4. Superior mesenteric artery thrombosis (H)  K55.069 CT Abdomen Pelvis w Contrast      5. Claustrophobia  F40.240 LORazepam (ATIVAN) 1 MG tablet        Pt with new onset frequent watery diarrhea.  High suspicion for C diff given recent hospitalization, surgery and significant antibiotic use.  However, recent h/o mesenteric ischemia s/p resection of ischemic bowel and now recurrent abd pain we'll need to confirm there is no evidence of new ischemia.  Plan CBC and CT as ordered.  CT stat and will call pt with results.    Stool studies as ordered.  Pt will return them as soon as possible.    Kristen Angeles is a 47 year old, presenting for the following health issues:  Diarrhea      HPI     Diarrhea  Onset/Duration: 6 days ago about   Description:       Consistency of stool: watery       Blood in stool: No       Number of loose stools past 24 hours:more than 8   Progression of Symptoms: worsening  Accompanying signs and symptoms:       Fever: yesterday        Nausea/Vomiting: YES - slight nausea        Abdominal pain: No- right upper quadrant today        Weight loss: yes- but has been losing weight since September        Episodes of constipation: No  History   Ill contacts: No  Recent use of antibiotics: YES- in September   Recent travels: No  Recent medication-new or changes(Rx or OTC):  "No  Precipitating or alleviating factors:   Therapies tried and outcome: Imodium right ear- not helpful     Started Wednesday or Thursday.  Diarrhea started just after eating.  Was initially thinking that it was related to diet changes.    Saturday afternoon diarrhea seemed to have resolved.  Woke early Sunday AM with diarrhea and then was having diarrhea hourly after that.  Has been like that essentially since.    Yesterday had temp around 100 (1 reading of 101).  No fever today.  Stooling every hour.    Today developed epigastric/RUQ pain.  Lower abd cramping prior to BM.  Feels a bit nauseated as well.  Tried zofran which helped a bit.  Tried imodium twice with no improvement.    Yesterday and today not able to eat due to nausea.  No vomiting.      No blood in the stool, just very watery.          Review of Systems   Constitutional, HEENT, cardiovascular, pulmonary, gi and gu systems are negative, except as otherwise noted.      Objective    /75   Pulse 85   Temp 98.1  F (36.7  C) (Tympanic)   Ht 1.626 m (5' 4\")   Wt 75.8 kg (167 lb)   SpO2 97%   BMI 28.67 kg/m    Body mass index is 28.67 kg/m .  Physical Exam   PE:  VS as above   Gen:  WN/WD/WH female in NAD   Heart:  RRR without murmur, nl S1, S2, no rubs or gallops   Lungs CTA isidoro without rales/ronchi/wheezes   Abd: soft, hyperactive bowel sounds, ND, no HSM, no rebounding/guarding/ridigity, tender entire R side of abdomen.  Midline incision well healed.      CBC wnl, Epic reviewed                "

## 2022-11-22 NOTE — TELEPHONE ENCOUNTER
Please call pt.  She sent an Evisit but needs to be seen in person.      If there are no slots for her I could see her as an overbook at 8:00 or 11:00 this morning    Dr. Cecy Morillo, DO

## 2022-11-22 NOTE — TELEPHONE ENCOUNTER
Called and spoke with pt.  Reviewed CT results with more of a pancolitis vs any acute ischemia or ischemic bowel issues.    Given this finding, normal WBC and pt's symptoms continue to suspect c diff ans the most likely cause.    Pt has collection containers and will bring back to clinic when she is able.  Advised to avoid imodium and continue probiotic.      Reviewed signs and symptoms that should prompt additional evaluation.    Dr. Cecy Morillo, DO

## 2022-11-23 ENCOUNTER — LAB (OUTPATIENT)
Dept: LAB | Facility: CLINIC | Age: 47
End: 2022-11-23
Payer: COMMERCIAL

## 2022-11-23 DIAGNOSIS — R19.7 DIARRHEA, UNSPECIFIED TYPE: ICD-10-CM

## 2022-11-23 LAB
C DIFF GDH STL QL IA: POSITIVE
C DIFF TOX A+B STL QL IA: POSITIVE
C DIFF TOX B STL QL: POSITIVE

## 2022-11-25 ENCOUNTER — TELEPHONE (OUTPATIENT)
Dept: FAMILY MEDICINE | Facility: CLINIC | Age: 47
End: 2022-11-25

## 2022-11-25 DIAGNOSIS — A04.72 C. DIFFICILE COLITIS: Primary | ICD-10-CM

## 2022-11-25 RX ORDER — VANCOMYCIN HYDROCHLORIDE 125 MG/1
125 CAPSULE ORAL 4 TIMES DAILY
Qty: 40 CAPSULE | Refills: 0 | Status: SHIPPED | OUTPATIENT
Start: 2022-11-25 | End: 2022-12-06

## 2022-11-25 NOTE — TELEPHONE ENCOUNTER
Please call pt.  Her stool exam was positive for c diff.  I sent prescription to her [facility] for vancomycin.  She'll need to take this 4 times daily for 10 days.  Her diarrhea should resolve in the medication.  She needs to let me know if the diarrhea does not resolve by the end of the course or if it resolves and comes back within a few weeks.    Cecy Morillo, DO

## 2022-11-25 NOTE — TELEPHONE ENCOUNTER
Call placed to Patient  Relayed Dr Castellanos message  Rescheduled Patients hospital  follow up appointment to 12/6/22 to [id number]  Patient will reschedule flu and covid vaccines  Freddy Mac RN

## 2022-12-06 ENCOUNTER — OFFICE VISIT (OUTPATIENT)
Dept: FAMILY MEDICINE | Facility: CLINIC | Age: 47
End: 2022-12-06
Payer: COMMERCIAL

## 2022-12-06 VITALS
OXYGEN SATURATION: 99 % | TEMPERATURE: 97.5 F | WEIGHT: 167.6 LBS | DIASTOLIC BLOOD PRESSURE: 78 MMHG | SYSTOLIC BLOOD PRESSURE: 114 MMHG | HEART RATE: 73 BPM | BODY MASS INDEX: 28.77 KG/M2

## 2022-12-06 DIAGNOSIS — H81.10 BENIGN PAROXYSMAL POSITIONAL VERTIGO, UNSPECIFIED LATERALITY: ICD-10-CM

## 2022-12-06 DIAGNOSIS — Z12.31 VISIT FOR SCREENING MAMMOGRAM: ICD-10-CM

## 2022-12-06 DIAGNOSIS — Z72.0 TOBACCO ABUSE: ICD-10-CM

## 2022-12-06 DIAGNOSIS — K55.069 MESENTERIC THROMBOSIS (H): Primary | ICD-10-CM

## 2022-12-06 DIAGNOSIS — M76.62 ACHILLES TENDINITIS OF LEFT LOWER EXTREMITY: ICD-10-CM

## 2022-12-06 PROCEDURE — 90471 IMMUNIZATION ADMIN: CPT | Performed by: FAMILY MEDICINE

## 2022-12-06 PROCEDURE — 0124A COVID-19 VACCINE BIVALENT BOOSTER 12+ (PFIZER): CPT | Performed by: FAMILY MEDICINE

## 2022-12-06 PROCEDURE — 91312 COVID-19 VACCINE BIVALENT BOOSTER 12+ (PFIZER): CPT | Performed by: FAMILY MEDICINE

## 2022-12-06 PROCEDURE — 90686 IIV4 VACC NO PRSV 0.5 ML IM: CPT | Performed by: FAMILY MEDICINE

## 2022-12-06 PROCEDURE — 99214 OFFICE O/P EST MOD 30 MIN: CPT | Mod: 25 | Performed by: FAMILY MEDICINE

## 2022-12-06 RX ORDER — VARENICLINE TARTRATE 1 MG/1
1 TABLET, FILM COATED ORAL 2 TIMES DAILY
Qty: 60 TABLET | Refills: 2 | Status: SHIPPED | OUTPATIENT
Start: 2022-12-06 | End: 2023-05-12

## 2022-12-06 NOTE — PROGRESS NOTES
A/P:      ICD-10-CM    1. Mesenteric thrombosis (H)  K55.069       2. Tobacco abuse  Z72.0 varenicline (CHANTIX) 1 MG tablet      3. Achilles tendinitis of left lower extremity  M76.62       4. Benign paroxysmal positional vertigo, unspecified laterality  H81.10       5. Visit for screening mammogram  Z12.31 *MA Screening Digital Bilateral        Mesenteric thrombosis:  Unclear underlying etiology.  Reviewed probable need for statin given now known vascular disease.  She wishes to discuss with hematology.  Appropriate follows ups are all scheduled.  She is taking medication as prescribed.    tob abuse:  Knows importance of cessation.  Med refilled.    Achilles tendinitis:  Discussed pathophysiology, exercises given.  Plan podiatry if not improving.    BPPV:  Reviewed pathophysioloy, YouTube exercises given.  Pt will call if she wishes to pursue vestibular rehab        Subjective   Bridgette is a 47 year old, presenting for the following health issues:  [facility] F/U      Hospital follow up from  of  9/8/22-9/16/22 for mesenteric thrombosis    History of Present Illness       Reason for visit:  Hospital stay in Sep    She eats 2-3 servings of fruits and vegetables daily.She consumes 0 sweetened beverage(s) daily.She exercises with enough effort to increase her heart rate 10 to 19 minutes per day.  She exercises with enough effort to increase her heart rate 3 or less days per week.   She is taking medications regularly.       *  Diarrhea has resolved completely about Tuesday/Wednesday of last week.  Stooling, formed twice weekly.  On a probiotic as well.    *  Feels like she has developed vertigo since her surgery.  Usually happens when she is in bed but can bother throughout the day as well.    Feels like a spinning sensation.  Notes when she lays down, feels laying back will trigger it.  Can happen when rolling over as well.  Seems to be triggered by movement and resolves quickly when she is still.    *  States that  "vascular surgery suspects that she had a blood clot that went to her R foot.  In July was getting cramps in her calf and then the middle 3 toes got a \"weird tingling\" feeling.  Since then the foot has improved with her blood thinner treatment.    Feels like the tingling sensation has been improving and now is rare.  Happens when walking.  Has only happened once since September on the R foot.    Now having pain in the L heel at the Achilles.  Gets sore with walking and get worse when she walks more.  If she does a lot of walking she will get \"electrical sensation\" in the back of the heel.  No radiation of pain or this sensation.  No particular injury, occasional swelling in the back of the heel..  Not the same as what she was getting in the R foot.      *  Tolerating her blood thinners.  Has hematology appointment upcoming.  Has vascular appointment scheduled for follow up as well.  Really working on quitting smoking.  Needs meds refilled.    Review of Systems   Constitutional, HEENT, cardiovascular, pulmonary, gi and gu systems are negative, except as otherwise noted.      Objective    /78   Pulse 73   Temp 97.5  F (36.4  C) (Tympanic)   Wt 76 kg (167 lb 9.6 oz)   SpO2 99%   BMI 28.77 kg/m    Body mass index is 28.77 kg/m .  Physical Exam   PE:  VS as above   Gen:  WN/WD/WH female in NAD   Psych: Alert and oriented times 3; coherent speech, normal   rate and volume, able to articulate logical thoughts, able   to abstract reason, no tangential thoughts, no hallucinations   or delusions  Her affect is bright and appropriate   Ext:  Tender on palpation of achilles insertion L.  Full ankle ROM with some tenderness on achilles stretch.  Achilles tendon intact.  Dorsal pedal pulse intoact.  Cap refill <3 seconds      Epic reviewed                "

## 2022-12-06 NOTE — NURSING NOTE
"Initial /78   Pulse 73   Temp 97.5  F (36.4  C) (Tympanic)   Wt 76 kg (167 lb 9.6 oz)   SpO2 99%   BMI 28.77 kg/m   Estimated body mass index is 28.77 kg/m  as calculated from the following:    Height as of 11/22/22: 1.626 m (5' 4\").    Weight as of this encounter: 76 kg (167 lb 9.6 oz). .      "

## 2022-12-12 NOTE — PROGRESS NOTES
[facility]  [address] [place] [id number]  Phone: [phone], Fax: [phone]    Outpatient Visit Note:    Patient: Bridgette Veras  MRN: [id number]  : 1975  ZORAN: Dec 13, 2022    Reason for Consultation:  Bridgette Veras is referred for evaluation and treatment of aortic thrombosis.    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    She has completed 3 months of apixaban, and I think we should continue for 3 more months at least given the severity of the event.  She will likely need indefinite anticoagulation.  The cause of her aortic thrombus remains unclear.  Smoking certainly contributed, though this in and of itself should not be a complete explanation.  However, for as long as she continues smoking (she has cut down to 5 cigarettes a day with the help of Chantix), I am not comfortable with her stopping anticoagulation.    Testing for antiphospholipid antibody syndrome has been negative thus far.  I will repeat the antibody serologies today, but do not expect these to be positive.  I do not see any evidence of a myeloproliferative neoplasm (erythroid or thrombocytosis, aquagenic pruritus or erythromelalgia, etc.) or paroxysmal nocturnal hemoglobinuria (cytopenias, hematuria, etc.).  Testing for protein C, protein S or Antithrombin deficiency is not possible while she is on anticoagulation, and I do not think any of these diagnoses will meaningfully .  Similarly, while testing for prothrombin gene mutation and factor V Leiden are possible, again I do not think a diagnosis would .  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  And even if any of this testing was positive, it would not change the type or intensity of anticoagulation that  I opted to continue.    Separately, she received IV iron while in the hospital, and continues on oral iron supplementation.  I will repeat iron testing today to see her response.  She has been taking oral iron daily, and I advised her to space it to every other day to improve absorption.  Her history of gastric bypass may affect her ability to absorb oral iron, so she may need IV iron again now or in the future.    Plan:  - Continue apixaban  - Cardiolipin antibody, beta-2 glycoprotein antibody testing today  - Continue oral iron, changed to every other day  - Return to clinic in 3 months for follow-up  - Consider MPN testing, given admittedly small case series showing that all patients presenting with phantom thrombi like this were found to have malignancy (PMID [id number]).  Additionally, an anecdotal report of 1 such case showed that DOAC dose reduction resulted in recurrence, while resumption of therapeutic dose led to resolution of    The patient is given our center's contact information and is instructed to call if she should have any further questions or concerns.    Vera Maldonado, nurse clinician, is assigned to provide patient care coordination and education.     Patient understands and agrees with the above plan and recommendation.    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  There was also irregular peripheral clot seen within the descending thoracic aorta and the superior aspect of the abdominal aorta.  There was a free-floating thrombus seen in the SMA proximally that appeared to extend inferiorly from the celiac artery.  She underwent an emergent ex lap and a SMA thrombectomy and stenting.     She reports no prior known history of blood clots or bleeding.  She had 2 pregnancies with C-sections, no known miscarriages or pregnancy complications such as preeclampsia.  No family history of blood clotting.    Saw vascular surgery 10/20/22. CTA at that time showed improvement in overall SMA clot burden; decreased adherent clot burden in descending thoracic aorta with small residual adherent thrombus in mid desending thoracic aorta; and evolving multifocal splenic infarcts. They switched hr from plavix to ASA and planned for AC until evaluated by hematology. Recent diarrhea, concern for CDiff. CT A/P showed infectious/inflammatory pancolitis, no e/o bowel ischemia.    She presents for initial outpatient evaluation by hematology today after being seen as a consult in the hospital.  She is doing much better, without any notable residual symptoms.  Continues on apixaban without any reports of bleeding.  Also taking Plavix without any issue.  Denies any bleeding or bruising, no melena.  No fevers, night sweats, weight loss, rashes, adenopathy.  Denies aquagenic pruritus or erythromelalgia.  No hematuria.  No prior personal or family history of blood clotting.  She has not had any prior miscarriages.  Serologies for beta-2 glycoprotein antibodies and cardiolipin antibodies were negative.  Lupus anticoagulant testing negative as well.  She received IV iron while in the hospital, and currently takes oral iron daily.    Past Medical History:  Past Medical History:   Diagnosis Date     Cervical high risk HPV (human papillomavirus) test positive 8/4/16    type 16     Chickenpox       Chlamydia trachomatis infection of unspecified site      POONAM 1      Depressive disorder      Depressive disorder, not elsewhere classified      History of colposcopy with cervical biopsy 16    Bx - POONAM 1, ECC - negative     Other abnormal heart sounds as child    Murmur - resolved     Polycystic ovaries     prior to gastic bypass in , pt carried the diagnosis of PCOS/anovuolation     Streptococcus infection in conditions classified elsewhere and of unspecified site, group B 1998    In pregnancy       Past Surgical History:  Past Surgical History:   Procedure Laterality Date      SECTION  ,       SECTION, TUBAL LIGATION, COMBINED  2013    Procedure: COMBINED  SECTION, TUBAL LIGATION;   Section, Tubal Ligation;  Surgeon: Paul Allen MD;  Location: [facility]     GASTRIC BYPASS  2005     GASTRIC BYPASS       HC REMOVAL GALLBLADDER  2006    Dr. Inman @ [facility]     HC REPAIR ING HERNIA,5+Y/O,REDUCIBL  age 20 ()    [facility]     LAPAROTOMY EXPLORATORY N/A 2022    Procedure: Exploratory LAPAROTOMY, Superior Mesenteric Artery Thrombectomy, Retrograde Superior Mesenteric Artery Stenting;  Surgeon: Balta Ernst MD;  Location: [facility] OR     LAPAROTOMY EXPLORATORY N/A 9/10/2022    Procedure: LAPAROTOMY, SMALL BOWEL RESECTION, INCISIONAL WOUND CLOSURE;  Surgeon: Tariq Lancaster MD;  Location: [facility] OR     LAPAROTOMY EXPLORATORY N/A 2022    Procedure: EXPLORATORY LAPAROTOMY, EVACUATION OF RECTUS SHEATH HEMATOMA, ABDOMINAL WASHOUT;  Surgeon: Nathan Barber MD;  Location: [facility] OR     PICC DOUBLE LUMEN PLACEMENT Right 09/10/2022    5FR DL PICC. Basilic vein.     SURGICAL HISTORY OF -       PE tubes     SURGICAL HISTORY OF -   10/2006    removal of pannus ans breast lift     TONSILLECTOMY  1981     [facility]  DELIVERY ONLY  1998    Failure to progress     [facility] NONSPECIFIC PROCEDURE  2006    Laser  ablation genital condyloma/ Bx labial lesion     ZZC RAD RESEC TONSIL/PILLARS         Medications:  Current Outpatient Medications   Medication Sig Dispense Refill     apixaban ANTICOAGULANT (ELIQUIS) 5 MG tablet Take 1 tablet (5 mg) by mouth 2 times daily for 30 days 60 tablet 4     apixaban ANTICOAGULANT (ELIQUIS) 5 MG tablet Take 1 tablet (5 mg) by mouth 2 times daily for 90 days 180 tablet 0     aspirin (ASA) 81 MG EC tablet Take 1 tablet (81 mg) by mouth daily 90 tablet 3     cyanocolbalamin (VITAMIN  B-12) 500 MCG tablet Take 500 mcg by mouth daily        cyclobenzaprine (FLEXERIL) 10 MG tablet Take 1 tablet (10 mg) by mouth nightly as needed for muscle spasms 30 tablet 0     Ergocalciferol (VITAMIN D2 PO)        ferrous sulfate (IRON) 325 (65 FE) MG tablet Take 325 mg by mouth daily (with breakfast)        Methocarbamol (ROBAXIN PO) Take by mouth 3 times daily as needed       Multiple Vitamin (MULTIVITAMIN PO) Take 1 tablet by mouth daily       ondansetron (ZOFRAN ODT) 4 MG ODT tab Take 1 tablet (4 mg) by mouth every 8 hours as needed for nausea 12 tablet 0     oxyCODONE (ROXICODONE) 5 MG tablet Take 1 tablet (5 mg) by mouth every 6 hours as needed for moderate to severe pain 5 tablet 0     saccharomyces boulardii (FLORASTOR) 250 MG capsule Take 1 capsule (250 mg) by mouth 2 times daily 60 capsule 3     varenicline (CHANTIX AVIVA) 0.5 MG X 11 & 1 MG X 42 tablet Take 0.5 mg tab daily for 3 days, THEN 0.5 mg tab twice daily for 4 days, THEN 1 mg twice daily. 53 tablet 0     varenicline (CHANTIX) 1 MG tablet Take 1 tablet (1 mg) by mouth 2 times daily 60 tablet 2        Allergies:  Allergies   Allergen Reactions     Codeine Nausea and Vomiting     Darvocet [Acetaminophen] Nausea and Vomiting     Propoxyphene Nausea       ROS:  Remainder of a comprehensive 14 point ROS is negative unless noted above.    Social History:  Denies any tobacco use. No significant alcohol use. Denies any illicit drug use.     Family  History:  Non-contributory to the current presentation    Objectives:  /70 (BP Location: Right arm, Patient Position: Sitting, Cuff Size: Adult Regular)   Pulse 77   Temp 98  F (36.7  C) (Oral)   Wt 75.3 kg (166 lb)   SpO2 99%   BMI 28.49 kg/m    Exam:   Constitutional: Appears well, no distress  HEENT: Pupils equal and reactive to light. No scleral icterus or hemorrhage. Nares without evidence of telangiectasia. Mucous membranes moist with no wet purpura. Dentition overall ok with no signs of decay. No pharyngeal exudates. No lymphadenopathy, no thyromeagaly  CV: regular rate and rhythm, no murmurs  Respiratory: clear  GI: abdomen soft, nontender, without guarding or rebound. No hepatomeagaly. No splenomegaly. Mus/Skele: no edema  Skin: no petechiae, no ecchymosis.  Neuro: CN II-XII intact. Normal gait. AOx3  Heme/Lymph: no supraclavicular, axillary or umbilical adenopathy.     Labs:  WBC   Date Value Ref Range Status   08/04/2016 9.3 4.0 - 11.0 10e9/L Final   01/04/2013 10.9 4.0 - 11.0 10e9/L Final   03/07/2011 7.8 4.0 - 11.0 10e9/L Final   05/22/2006 13.1 (H) 4.0 - 11.0 10e9/L Final     WBC Count   Date Value Ref Range Status   11/22/2022 10.0 4.0 - 11.0 10e3/uL Final   09/16/2022 15.5 (H) 4.0 - 11.0 10e3/uL Final   09/15/2022 20.9 (H) 4.0 - 11.0 10e3/uL Final   09/14/2022 21.9 (H) 4.0 - 11.0 10e3/uL Final     Hemoglobin   Date Value Ref Range Status   11/22/2022 13.0 11.7 - 15.7 g/dL Final   09/16/2022 9.1 (L) 11.7 - 15.7 g/dL Final   09/15/2022 8.5 (L) 11.7 - 15.7 g/dL Final   09/14/2022 8.5 (L) 11.7 - 15.7 g/dL Final   09/20/2016 11.1 (L) 11.7 - 15.7 g/dL Final   08/04/2016 9.6 (L) 11.7 - 15.7 g/dL Final   09/17/2013 13.0 11.7 - 15.7 g/dL Final   08/06/2013 9.9 (L) 11.7 - 15.7 g/dL Final     Hematocrit   Date Value Ref Range Status   11/22/2022 41.8 35.0 - 47.0 % Final   09/16/2022 32.0 (L) 35.0 - 47.0 % Final   09/15/2022 30.4 (L) 35.0 - 47.0 % Final   09/14/2022 30.8 (L) 35.0 - 47.0 % Final    08/04/2016 31.3 (L) 35.0 - 47.0 % Final   01/04/2013 36.4 35.0 - 47.0 % Final   03/07/2011 39.5 35.0 - 47.0 % Final   05/22/2006 45.7 35.0 - 47.0 % Final     Platelet Count   Date Value Ref Range Status   11/22/2022 313 150 - 450 10e3/uL Final   09/16/2022 308 150 - 450 10e3/uL Final   09/15/2022 296 150 - 450 10e3/uL Final   09/14/2022 300 150 - 450 10e3/uL Final   08/04/2016 327 150 - 450 10e9/L Final   01/04/2013 290 150 - 450 10e9/L Final   03/07/2011 276 150 - 450 10e9/L Final   05/22/2006 321 150 - 450 10e9/L Final         Imaging:  CT Abdomen Pelvis w Contrast  Narrative: CT ABDOMEN PELVIS WITH CONTRAST 11/22/2022 1:57 PM    CLINICAL HISTORY: New diarrhea and right-sided abdominal pain with  recent history of mesenteric ischemia status post bowel resection.  Diarrhea, unspecified type. Abdominal pain, right lower quadrant.  Abdominal pain, right upper quadrant. Superior mesenteric artery  thrombosis (H).    TECHNIQUE: CT scan of the abdomen and pelvis was performed following  injection of IV contrast. Multiplanar reformats were obtained. Dose  reduction techniques were used.  CONTRAST: 80 mL Isovue-370    COMPARISON: 10/20/2022, 9/8/2022.    FINDINGS:   LOWER CHEST: Several tiny thin-walled bilateral pulmonary cysts are  noted. No airspace consolidation or pleural fluid.    HEPATOBILIARY: No evidence of portal venous gas. A couple  subcentimeter small low-density hepatic foci are seen in the right  hepatic lobe, which most likely represent cysts or hemangiomas and do  not require follow-up. No suspicious appearing hepatic observation.  Liver contour is smooth. Gallbladder is absent. No bile duct dilation.    PANCREAS: Normal.    SPLEEN: Suspected multifocal scarring of the spleen is stable to  comparison. Spleen is not enlarged.    ADRENAL GLANDS: Normal.    KIDNEYS/BLADDER: Normal.    BOWEL: There is increased wall thickening and mucosal hyperenhancement  seen throughout the colon. The colon is  decompressed, which can  accentuate these findings. However, the degree of wall thickening as  well as the degree of mucosal enhancement are thought to be greater  than expected for underdistention alone. Postsurgical changes of the  stomach and small bowel are stable. No signs of bowel obstruction.  Appendix is normal-appearing. No signs of pneumatosis or  pneumoperitoneum.    PELVIC ORGANS: Follicular changes of the ovaries are noted. Largest  follicle on the right measures up to 1.7 cm. No cystic or solid  adnexal mass. No free pelvic fluid.    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  There is apparent reconstitution of flow in the proper hepatic artery  as well as the right and left hepatic arteries. The splenic artery and  pancreaticoduodenal arteries appear patent as well. An SMA stent is  present and appears patent, as  well. The colic branches also appear  patent without abrupt termination.    MUSCULOSKELETAL: Postsurgical changes of the ventral abdominal wall  along the midline. Minimal degenerative changes of the spine. No acute  osseous abnormality.  Impression: IMPRESSION:   1.  Findings suggestive of infectious or inflammatory pancolitis.  2.  No findings specific for bowel ischemia.  3.  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  4.  Patent appearing SMA stent.  5.  Status post gastric bypass and cholecystectomy.  6.  Additional nonacute findings as above.    Results called to the ordering provider, Dr. Morillo, at 2:10 PM on  11/22/2022.     SHERRY HAIR MD         SYSTEM ID:  [id number]

## 2022-12-13 ENCOUNTER — OFFICE VISIT (OUTPATIENT)
Dept: HEMATOLOGY | Facility: CLINIC | Age: 47
End: 2022-12-13
Attending: STUDENT IN AN ORGANIZED HEALTH CARE EDUCATION/TRAINING PROGRAM
Payer: COMMERCIAL

## 2022-12-13 ENCOUNTER — MYC MEDICAL ADVICE (OUTPATIENT)
Dept: FAMILY MEDICINE | Facility: CLINIC | Age: 47
End: 2022-12-13

## 2022-12-13 VITALS
HEART RATE: 77 BPM | WEIGHT: 166 LBS | DIASTOLIC BLOOD PRESSURE: 70 MMHG | TEMPERATURE: 98 F | OXYGEN SATURATION: 99 % | SYSTOLIC BLOOD PRESSURE: 120 MMHG | BODY MASS INDEX: 28.49 KG/M2

## 2022-12-13 DIAGNOSIS — K55.069 SUPERIOR MESENTERIC ARTERY THROMBOSIS (H): ICD-10-CM

## 2022-12-13 DIAGNOSIS — D50.9 IRON DEFICIENCY ANEMIA, UNSPECIFIED IRON DEFICIENCY ANEMIA TYPE: ICD-10-CM

## 2022-12-13 DIAGNOSIS — K55.069 SUPERIOR MESENTERIC ARTERY THROMBOSIS (H): Primary | ICD-10-CM

## 2022-12-13 DIAGNOSIS — E61.1 IRON DEFICIENCY: Primary | ICD-10-CM

## 2022-12-13 DIAGNOSIS — I74.10 AORTIC THROMBUS (H): ICD-10-CM

## 2022-12-13 LAB
FERRITIN SERPL-MCNC: 19 NG/ML (ref 8–252)
IRON SATN MFR SERPL: 44 % (ref 15–46)
IRON SERPL-MCNC: 131 UG/DL (ref 35–180)
TIBC SERPL-MCNC: 300 UG/DL (ref 240–430)

## 2022-12-13 PROCEDURE — 82728 ASSAY OF FERRITIN: CPT | Performed by: STUDENT IN AN ORGANIZED HEALTH CARE EDUCATION/TRAINING PROGRAM

## 2022-12-13 PROCEDURE — 99211 OFF/OP EST MAY X REQ PHY/QHP: CPT | Performed by: STUDENT IN AN ORGANIZED HEALTH CARE EDUCATION/TRAINING PROGRAM

## 2022-12-13 PROCEDURE — 86147 CARDIOLIPIN ANTIBODY EA IG: CPT | Performed by: STUDENT IN AN ORGANIZED HEALTH CARE EDUCATION/TRAINING PROGRAM

## 2022-12-13 PROCEDURE — 86146 BETA-2 GLYCOPROTEIN ANTIBODY: CPT | Performed by: STUDENT IN AN ORGANIZED HEALTH CARE EDUCATION/TRAINING PROGRAM

## 2022-12-13 PROCEDURE — 83550 IRON BINDING TEST: CPT | Performed by: STUDENT IN AN ORGANIZED HEALTH CARE EDUCATION/TRAINING PROGRAM

## 2022-12-13 PROCEDURE — 36415 COLL VENOUS BLD VENIPUNCTURE: CPT | Performed by: STUDENT IN AN ORGANIZED HEALTH CARE EDUCATION/TRAINING PROGRAM

## 2022-12-13 PROCEDURE — G0463 HOSPITAL OUTPT CLINIC VISIT: HCPCS

## 2022-12-13 PROCEDURE — 99215 OFFICE O/P EST HI 40 MIN: CPT | Performed by: STUDENT IN AN ORGANIZED HEALTH CARE EDUCATION/TRAINING PROGRAM

## 2022-12-13 RX ORDER — ATORVASTATIN CALCIUM 20 MG/1
20 TABLET, FILM COATED ORAL DAILY
Qty: 90 TABLET | Refills: 3 | Status: SHIPPED | OUTPATIENT
Start: 2022-12-13 | End: 2023-09-28

## 2022-12-14 DIAGNOSIS — D50.0 IRON DEFICIENCY ANEMIA DUE TO CHRONIC BLOOD LOSS: Primary | ICD-10-CM

## 2022-12-14 LAB
B2 GLYCOPROT1 IGG SERPL IA-ACNC: <0.8 U/ML
B2 GLYCOPROT1 IGM SERPL IA-ACNC: 4.3 U/ML
CARDIOLIPIN IGG SER IA-ACNC: <2 GPL-U/ML
CARDIOLIPIN IGG SER IA-ACNC: NEGATIVE
CARDIOLIPIN IGM SER IA-ACNC: 8.5 MPL-U/ML
CARDIOLIPIN IGM SER IA-ACNC: NEGATIVE

## 2022-12-14 RX ORDER — DIPHENHYDRAMINE HYDROCHLORIDE 50 MG/ML
50 INJECTION INTRAMUSCULAR; INTRAVENOUS
Status: CANCELLED
Start: 2022-12-19

## 2022-12-14 RX ORDER — EPINEPHRINE 1 MG/ML
0.3 INJECTION, SOLUTION, CONCENTRATE INTRAVENOUS EVERY 5 MIN PRN
Status: CANCELLED | OUTPATIENT
Start: 2022-12-19

## 2022-12-14 RX ORDER — ALBUTEROL SULFATE 0.83 MG/ML
2.5 SOLUTION RESPIRATORY (INHALATION)
Status: CANCELLED | OUTPATIENT
Start: 2022-12-19

## 2022-12-14 RX ORDER — MEPERIDINE HYDROCHLORIDE 25 MG/ML
25 INJECTION INTRAMUSCULAR; INTRAVENOUS; SUBCUTANEOUS EVERY 30 MIN PRN
Status: CANCELLED | OUTPATIENT
Start: 2022-12-19

## 2022-12-14 RX ORDER — HEPARIN SODIUM (PORCINE) LOCK FLUSH IV SOLN 100 UNIT/ML 100 UNIT/ML
5 SOLUTION INTRAVENOUS
Status: CANCELLED | OUTPATIENT
Start: 2022-12-19

## 2022-12-14 RX ORDER — ALBUTEROL SULFATE 90 UG/1
1-2 AEROSOL, METERED RESPIRATORY (INHALATION)
Status: CANCELLED
Start: 2022-12-19

## 2022-12-14 RX ORDER — HEPARIN SODIUM,PORCINE 10 UNIT/ML
5 VIAL (ML) INTRAVENOUS
Status: CANCELLED | OUTPATIENT
Start: 2022-12-19

## 2022-12-14 NOTE — PROGRESS NOTES
Letter of medical necessity    Ms Veras is a 47-year-old woman with a history of gastric bypass surgery, and aortic thrombus on long-term anticoagulation, and iron deficiency.    Her most recent ferritin value was 17 ng/mL, which is diagnostic of iron deficiency.  While her hemoglobin is technically normal, the iron deficiency will only worsen over time given that she is on long-term anticoagulation and has malabsorption due to her gastric bypass.  She has been taking oral iron for several months without significant improvement in her ferritin.    She needs IV iron to adequately replete her iron deficiency.  Again, her malabsorption prevents her from absorbing adequate iron from her daily diet, and also prevents adequate repletion of the iron deficiency with oral iron.    Jacob Cogan, MD  Hematology

## 2023-01-10 ENCOUNTER — INFUSION THERAPY VISIT (OUTPATIENT)
Dept: INFUSION THERAPY | Facility: CLINIC | Age: 48
End: 2023-01-10
Attending: STUDENT IN AN ORGANIZED HEALTH CARE EDUCATION/TRAINING PROGRAM
Payer: COMMERCIAL

## 2023-01-10 VITALS
TEMPERATURE: 98.2 F | SYSTOLIC BLOOD PRESSURE: 125 MMHG | RESPIRATION RATE: 18 BRPM | DIASTOLIC BLOOD PRESSURE: 79 MMHG | HEART RATE: 62 BPM

## 2023-01-10 DIAGNOSIS — D50.0 IRON DEFICIENCY ANEMIA DUE TO CHRONIC BLOOD LOSS: Primary | ICD-10-CM

## 2023-01-10 DIAGNOSIS — Z98.84 S/P GASTRIC BYPASS: ICD-10-CM

## 2023-01-10 PROCEDURE — 96365 THER/PROPH/DIAG IV INF INIT: CPT

## 2023-01-10 PROCEDURE — 250N000011 HC RX IP 250 OP 636: Performed by: STUDENT IN AN ORGANIZED HEALTH CARE EDUCATION/TRAINING PROGRAM

## 2023-01-10 PROCEDURE — 96366 THER/PROPH/DIAG IV INF ADDON: CPT

## 2023-01-10 PROCEDURE — 258N000003 HC RX IP 258 OP 636: Performed by: STUDENT IN AN ORGANIZED HEALTH CARE EDUCATION/TRAINING PROGRAM

## 2023-01-10 RX ORDER — HEPARIN SODIUM,PORCINE 10 UNIT/ML
5 VIAL (ML) INTRAVENOUS
Status: CANCELLED | OUTPATIENT
Start: 2023-01-12

## 2023-01-10 RX ORDER — ALBUTEROL SULFATE 90 UG/1
1-2 AEROSOL, METERED RESPIRATORY (INHALATION)
Status: CANCELLED
Start: 2023-01-12

## 2023-01-10 RX ORDER — EPINEPHRINE 1 MG/ML
0.3 INJECTION, SOLUTION, CONCENTRATE INTRAVENOUS EVERY 5 MIN PRN
Status: CANCELLED | OUTPATIENT
Start: 2023-01-12

## 2023-01-10 RX ORDER — ALBUTEROL SULFATE 0.83 MG/ML
2.5 SOLUTION RESPIRATORY (INHALATION)
Status: CANCELLED | OUTPATIENT
Start: 2023-01-12

## 2023-01-10 RX ORDER — HEPARIN SODIUM (PORCINE) LOCK FLUSH IV SOLN 100 UNIT/ML 100 UNIT/ML
5 SOLUTION INTRAVENOUS
Status: CANCELLED | OUTPATIENT
Start: 2023-01-12

## 2023-01-10 RX ORDER — DIPHENHYDRAMINE HYDROCHLORIDE 50 MG/ML
50 INJECTION INTRAMUSCULAR; INTRAVENOUS
Status: CANCELLED
Start: 2023-01-12

## 2023-01-10 RX ORDER — MEPERIDINE HYDROCHLORIDE 25 MG/ML
25 INJECTION INTRAMUSCULAR; INTRAVENOUS; SUBCUTANEOUS EVERY 30 MIN PRN
Status: CANCELLED | OUTPATIENT
Start: 2023-01-12

## 2023-01-10 RX ORDER — METHYLPREDNISOLONE SODIUM SUCCINATE 125 MG/2ML
125 INJECTION, POWDER, LYOPHILIZED, FOR SOLUTION INTRAMUSCULAR; INTRAVENOUS
Status: CANCELLED
Start: 2023-01-12

## 2023-01-10 RX ADMIN — IRON SUCROSE 300 MG: 20 INJECTION, SOLUTION INTRAVENOUS at 13:55

## 2023-01-10 RX ADMIN — SODIUM CHLORIDE 250 ML: 9 INJECTION, SOLUTION INTRAVENOUS at 13:55

## 2023-01-10 NOTE — PROGRESS NOTES
Infusion Nursing Note:  Bridgette Veras presents today for Venofer.    Patient seen by provider today: No   present during visit today: Not Applicable.    Note: Micromedex handout given to and reviewed with patient.     Intravenous Access:  Peripheral IV placed.    Treatment Conditions:  Not Applicable.    Post Infusion Assessment:  Patient tolerated infusion without incident.  Patient observed for 30 minutes post Venofer per protocol.  Blood return noted pre and post infusion.  Site patent and intact, free from redness, edema or discomfort.  No evidence of extravasations.  Access discontinued per protocol.     Discharge Plan:   Discharge instructions reviewed with: Patient.  Patient and/or family verbalized understanding of discharge instructions and all questions answered.  AVS to patient via DocSendT.  Patient will return 1/12/2023 for next appointment.   Patient discharged in stable condition accompanied by: self.  Departure Mode: Ambulatory.    Margarita Isabel RN

## 2023-01-12 ENCOUNTER — INFUSION THERAPY VISIT (OUTPATIENT)
Dept: INFUSION THERAPY | Facility: CLINIC | Age: 48
End: 2023-01-12
Attending: STUDENT IN AN ORGANIZED HEALTH CARE EDUCATION/TRAINING PROGRAM
Payer: COMMERCIAL

## 2023-01-12 VITALS
TEMPERATURE: 97.7 F | HEART RATE: 89 BPM | RESPIRATION RATE: 14 BRPM | DIASTOLIC BLOOD PRESSURE: 78 MMHG | SYSTOLIC BLOOD PRESSURE: 127 MMHG

## 2023-01-12 DIAGNOSIS — Z98.84 S/P GASTRIC BYPASS: ICD-10-CM

## 2023-01-12 DIAGNOSIS — D50.0 IRON DEFICIENCY ANEMIA DUE TO CHRONIC BLOOD LOSS: Primary | ICD-10-CM

## 2023-01-12 PROCEDURE — 250N000011 HC RX IP 250 OP 636: Performed by: STUDENT IN AN ORGANIZED HEALTH CARE EDUCATION/TRAINING PROGRAM

## 2023-01-12 PROCEDURE — 96366 THER/PROPH/DIAG IV INF ADDON: CPT

## 2023-01-12 PROCEDURE — 258N000003 HC RX IP 258 OP 636: Performed by: STUDENT IN AN ORGANIZED HEALTH CARE EDUCATION/TRAINING PROGRAM

## 2023-01-12 PROCEDURE — 96365 THER/PROPH/DIAG IV INF INIT: CPT

## 2023-01-12 RX ORDER — EPINEPHRINE 1 MG/ML
0.3 INJECTION, SOLUTION, CONCENTRATE INTRAVENOUS EVERY 5 MIN PRN
Status: CANCELLED | OUTPATIENT
Start: 2023-01-14

## 2023-01-12 RX ORDER — METHYLPREDNISOLONE SODIUM SUCCINATE 125 MG/2ML
125 INJECTION, POWDER, LYOPHILIZED, FOR SOLUTION INTRAMUSCULAR; INTRAVENOUS
Status: CANCELLED
Start: 2023-01-14

## 2023-01-12 RX ORDER — DIPHENHYDRAMINE HYDROCHLORIDE 50 MG/ML
50 INJECTION INTRAMUSCULAR; INTRAVENOUS
Status: CANCELLED
Start: 2023-01-14

## 2023-01-12 RX ORDER — HEPARIN SODIUM (PORCINE) LOCK FLUSH IV SOLN 100 UNIT/ML 100 UNIT/ML
5 SOLUTION INTRAVENOUS
Status: CANCELLED | OUTPATIENT
Start: 2023-01-14

## 2023-01-12 RX ORDER — ALBUTEROL SULFATE 90 UG/1
1-2 AEROSOL, METERED RESPIRATORY (INHALATION)
Status: CANCELLED
Start: 2023-01-14

## 2023-01-12 RX ORDER — MEPERIDINE HYDROCHLORIDE 25 MG/ML
25 INJECTION INTRAMUSCULAR; INTRAVENOUS; SUBCUTANEOUS EVERY 30 MIN PRN
Status: CANCELLED | OUTPATIENT
Start: 2023-01-14

## 2023-01-12 RX ORDER — HEPARIN SODIUM,PORCINE 10 UNIT/ML
5 VIAL (ML) INTRAVENOUS
Status: CANCELLED | OUTPATIENT
Start: 2023-01-14

## 2023-01-12 RX ORDER — ALBUTEROL SULFATE 0.83 MG/ML
2.5 SOLUTION RESPIRATORY (INHALATION)
Status: CANCELLED | OUTPATIENT
Start: 2023-01-14

## 2023-01-12 RX ADMIN — IRON SUCROSE 300 MG: 20 INJECTION, SOLUTION INTRAVENOUS at 14:24

## 2023-01-12 RX ADMIN — SODIUM CHLORIDE 250 ML: 9 INJECTION, SOLUTION INTRAVENOUS at 14:24

## 2023-01-12 NOTE — PROGRESS NOTES
Infusion Nursing Note:  Bridgette Veras presents today for Venofer.    Patient seen by provider today: No   present during visit today: Not Applicable.    Note: N/A.    Intravenous Access:  Peripheral IV placed.    Treatment Conditions:  Results reviewed, labs MET treatment parameters, ok to proceed with treatment.    Post Infusion Assessment:  Patient tolerated infusion without incident.  Patient observed for 30 minutes post Venofer per protocol.  Site patent and intact, free from redness, edema or discomfort.  No evidence of extravasations.  Access discontinued per protocol.     Discharge Plan:   Patient discharged in stable condition accompanied by: self.  Departure Mode: Ambulatory.      Flaco Charles RN

## 2023-01-16 ENCOUNTER — INFUSION THERAPY VISIT (OUTPATIENT)
Dept: INFUSION THERAPY | Facility: CLINIC | Age: 48
End: 2023-01-16
Attending: STUDENT IN AN ORGANIZED HEALTH CARE EDUCATION/TRAINING PROGRAM
Payer: COMMERCIAL

## 2023-01-16 VITALS — SYSTOLIC BLOOD PRESSURE: 114 MMHG | DIASTOLIC BLOOD PRESSURE: 76 MMHG | HEART RATE: 68 BPM | RESPIRATION RATE: 16 BRPM

## 2023-01-16 DIAGNOSIS — Z98.84 S/P GASTRIC BYPASS: ICD-10-CM

## 2023-01-16 DIAGNOSIS — D50.0 IRON DEFICIENCY ANEMIA DUE TO CHRONIC BLOOD LOSS: Primary | ICD-10-CM

## 2023-01-16 PROCEDURE — 96365 THER/PROPH/DIAG IV INF INIT: CPT

## 2023-01-16 PROCEDURE — 250N000011 HC RX IP 250 OP 636: Performed by: STUDENT IN AN ORGANIZED HEALTH CARE EDUCATION/TRAINING PROGRAM

## 2023-01-16 PROCEDURE — 96366 THER/PROPH/DIAG IV INF ADDON: CPT

## 2023-01-16 PROCEDURE — 258N000003 HC RX IP 258 OP 636: Performed by: STUDENT IN AN ORGANIZED HEALTH CARE EDUCATION/TRAINING PROGRAM

## 2023-01-16 RX ORDER — HEPARIN SODIUM (PORCINE) LOCK FLUSH IV SOLN 100 UNIT/ML 100 UNIT/ML
5 SOLUTION INTRAVENOUS
Status: CANCELLED | OUTPATIENT
Start: 2023-01-16

## 2023-01-16 RX ORDER — ALBUTEROL SULFATE 90 UG/1
1-2 AEROSOL, METERED RESPIRATORY (INHALATION)
Status: CANCELLED
Start: 2023-01-16

## 2023-01-16 RX ORDER — EPINEPHRINE 1 MG/ML
0.3 INJECTION, SOLUTION, CONCENTRATE INTRAVENOUS EVERY 5 MIN PRN
Status: CANCELLED | OUTPATIENT
Start: 2023-01-16

## 2023-01-16 RX ORDER — ALBUTEROL SULFATE 0.83 MG/ML
2.5 SOLUTION RESPIRATORY (INHALATION)
Status: CANCELLED | OUTPATIENT
Start: 2023-01-16

## 2023-01-16 RX ORDER — MEPERIDINE HYDROCHLORIDE 25 MG/ML
25 INJECTION INTRAMUSCULAR; INTRAVENOUS; SUBCUTANEOUS EVERY 30 MIN PRN
Status: CANCELLED | OUTPATIENT
Start: 2023-01-16

## 2023-01-16 RX ORDER — DIPHENHYDRAMINE HYDROCHLORIDE 50 MG/ML
50 INJECTION INTRAMUSCULAR; INTRAVENOUS
Status: CANCELLED
Start: 2023-01-16

## 2023-01-16 RX ORDER — METHYLPREDNISOLONE SODIUM SUCCINATE 125 MG/2ML
125 INJECTION, POWDER, LYOPHILIZED, FOR SOLUTION INTRAMUSCULAR; INTRAVENOUS
Status: CANCELLED
Start: 2023-01-16

## 2023-01-16 RX ORDER — HEPARIN SODIUM,PORCINE 10 UNIT/ML
5 VIAL (ML) INTRAVENOUS
Status: CANCELLED | OUTPATIENT
Start: 2023-01-16

## 2023-01-16 RX ADMIN — SODIUM CHLORIDE 250 ML: 9 INJECTION, SOLUTION INTRAVENOUS at 13:42

## 2023-01-16 RX ADMIN — IRON SUCROSE 300 MG: 20 INJECTION, SOLUTION INTRAVENOUS at 13:43

## 2023-01-16 NOTE — PROGRESS NOTES
Infusion Nursing Note:  Bridgette Veras presents today for 3/3 Venofer.    Patient seen by provider today: No   present during visit today: Not Applicable.    Note: Pt denies any new health changes or concerns.    Intravenous Access:  Peripheral IV placed.    Treatment Conditions:  Not Applicable.    Post Infusion Assessment:  Patient tolerated infusion without incident.  Patient observed for 30 minutes post venofer per protocol.  Blood return noted pre and post infusion.  Site patent and intact, free from redness, edema or discomfort.  No evidence of extravasations.  Access discontinued per protocol.     Discharge Plan:   Discharge instructions reviewed with: Patient.  Patient and/or family verbalized understanding of discharge instructions and all questions answered.  Patient discharged in stable condition accompanied by: self.  Departure Mode: Ambulatory.      Alona Anderson RN

## 2023-01-17 ENCOUNTER — HOSPITAL ENCOUNTER (OUTPATIENT)
Dept: MAMMOGRAPHY | Facility: CLINIC | Age: 48
Discharge: HOME | End: 2023-01-17
Attending: FAMILY MEDICINE | Admitting: FAMILY MEDICINE
Payer: COMMERCIAL

## 2023-01-17 DIAGNOSIS — Z12.31 VISIT FOR SCREENING MAMMOGRAM: ICD-10-CM

## 2023-01-17 PROCEDURE — 77067 SCR MAMMO BI INCL CAD: CPT

## 2023-02-20 ASSESSMENT — ENCOUNTER SYMPTOMS
CONSTIPATION: 0
ABDOMINAL PAIN: 1
FREQUENCY: 0
HEADACHES: 0
DYSURIA: 0
SORE THROAT: 0
HEMATOCHEZIA: 0
NAUSEA: 0
WEAKNESS: 0
HEARTBURN: 0
PALPITATIONS: 0
PARESTHESIAS: 0
SHORTNESS OF BREATH: 0
NERVOUS/ANXIOUS: 0
EYE PAIN: 0
CHILLS: 0
HEMATURIA: 0
COUGH: 0
ARTHRALGIAS: 0
JOINT SWELLING: 0
MYALGIAS: 0
BREAST MASS: 0
DIZZINESS: 1
DIARRHEA: 0
FEVER: 0

## 2023-02-21 ENCOUNTER — OFFICE VISIT (OUTPATIENT)
Dept: FAMILY MEDICINE | Facility: CLINIC | Age: 48
End: 2023-02-21
Payer: COMMERCIAL

## 2023-02-21 ENCOUNTER — LAB (OUTPATIENT)
Dept: FAMILY MEDICINE | Facility: CLINIC | Age: 48
End: 2023-02-21

## 2023-02-21 VITALS
HEART RATE: 76 BPM | OXYGEN SATURATION: 100 % | SYSTOLIC BLOOD PRESSURE: 120 MMHG | TEMPERATURE: 97.2 F | DIASTOLIC BLOOD PRESSURE: 80 MMHG | BODY MASS INDEX: 29.02 KG/M2 | WEIGHT: 163.8 LBS | HEIGHT: 63 IN

## 2023-02-21 DIAGNOSIS — K55.069 SUPERIOR MESENTERIC ARTERY THROMBOSIS (H): ICD-10-CM

## 2023-02-21 DIAGNOSIS — Z12.11 SCREEN FOR COLON CANCER: ICD-10-CM

## 2023-02-21 DIAGNOSIS — Z98.84 S/P GASTRIC BYPASS: ICD-10-CM

## 2023-02-21 DIAGNOSIS — Z12.4 CERVICAL CANCER SCREENING: ICD-10-CM

## 2023-02-21 DIAGNOSIS — F41.8 SITUATIONAL ANXIETY: ICD-10-CM

## 2023-02-21 DIAGNOSIS — Z00.00 ROUTINE GENERAL MEDICAL EXAMINATION AT A HEALTH CARE FACILITY: Primary | ICD-10-CM

## 2023-02-21 DIAGNOSIS — K43.9 VENTRAL HERNIA WITHOUT OBSTRUCTION OR GANGRENE: ICD-10-CM

## 2023-02-21 DIAGNOSIS — Z11.59 NEED FOR HEPATITIS C SCREENING TEST: ICD-10-CM

## 2023-02-21 LAB
ALBUMIN SERPL BCG-MCNC: 4.1 G/DL (ref 3.5–5.2)
ALP SERPL-CCNC: 79 U/L (ref 35–104)
ALT SERPL W P-5'-P-CCNC: 14 U/L (ref 10–35)
ANION GAP SERPL CALCULATED.3IONS-SCNC: 11 MMOL/L (ref 7–15)
AST SERPL W P-5'-P-CCNC: 19 U/L (ref 10–35)
BILIRUB SERPL-MCNC: 0.5 MG/DL
BUN SERPL-MCNC: 9.4 MG/DL (ref 6–20)
CALCIUM SERPL-MCNC: 9.3 MG/DL (ref 8.6–10)
CHLORIDE SERPL-SCNC: 103 MMOL/L (ref 98–107)
CHOLEST SERPL-MCNC: 117 MG/DL
CREAT SERPL-MCNC: 0.75 MG/DL (ref 0.51–0.95)
DEPRECATED HCO3 PLAS-SCNC: 25 MMOL/L (ref 22–29)
FOLATE SERPL-MCNC: 34 NG/ML (ref 4.6–34.8)
GFR SERPL CREATININE-BSD FRML MDRD: >90 ML/MIN/1.73M2
GLUCOSE SERPL-MCNC: 89 MG/DL (ref 70–99)
HDLC SERPL-MCNC: 76 MG/DL
LDLC SERPL CALC-MCNC: 27 MG/DL
NONHDLC SERPL-MCNC: 41 MG/DL
POTASSIUM SERPL-SCNC: 4.1 MMOL/L (ref 3.4–5.3)
PROT SERPL-MCNC: 6.8 G/DL (ref 6.4–8.3)
PTH-INTACT SERPL-MCNC: 27 PG/ML (ref 15–65)
SODIUM SERPL-SCNC: 139 MMOL/L (ref 136–145)
TRIGL SERPL-MCNC: 68 MG/DL
VIT B12 SERPL-MCNC: 528 PG/ML (ref 232–1245)

## 2023-02-21 PROCEDURE — 99213 OFFICE O/P EST LOW 20 MIN: CPT | Mod: 25 | Performed by: FAMILY MEDICINE

## 2023-02-21 PROCEDURE — 82306 VITAMIN D 25 HYDROXY: CPT | Performed by: FAMILY MEDICINE

## 2023-02-21 PROCEDURE — 99396 PREV VISIT EST AGE 40-64: CPT | Performed by: FAMILY MEDICINE

## 2023-02-21 PROCEDURE — 82746 ASSAY OF FOLIC ACID SERUM: CPT | Performed by: FAMILY MEDICINE

## 2023-02-21 PROCEDURE — 86803 HEPATITIS C AB TEST: CPT | Performed by: FAMILY MEDICINE

## 2023-02-21 PROCEDURE — 87902 NFCT AGT GNTYP ALYS HEP C: CPT | Performed by: FAMILY MEDICINE

## 2023-02-21 PROCEDURE — 99000 SPECIMEN HANDLING OFFICE-LAB: CPT | Performed by: FAMILY MEDICINE

## 2023-02-21 PROCEDURE — 36415 COLL VENOUS BLD VENIPUNCTURE: CPT | Performed by: FAMILY MEDICINE

## 2023-02-21 PROCEDURE — 80061 LIPID PANEL: CPT | Performed by: FAMILY MEDICINE

## 2023-02-21 PROCEDURE — 83970 ASSAY OF PARATHORMONE: CPT | Performed by: FAMILY MEDICINE

## 2023-02-21 PROCEDURE — G0145 SCR C/V CYTO,THINLAYER,RESCR: HCPCS | Performed by: FAMILY MEDICINE

## 2023-02-21 PROCEDURE — 84425 ASSAY OF VITAMIN B-1: CPT | Mod: 90 | Performed by: FAMILY MEDICINE

## 2023-02-21 PROCEDURE — 87624 HPV HI-RISK TYP POOLED RSLT: CPT | Performed by: FAMILY MEDICINE

## 2023-02-21 PROCEDURE — 80053 COMPREHEN METABOLIC PANEL: CPT | Performed by: FAMILY MEDICINE

## 2023-02-21 PROCEDURE — 82607 VITAMIN B-12: CPT | Performed by: FAMILY MEDICINE

## 2023-02-21 RX ORDER — APIXABAN 5 MG/1
5 TABLET, FILM COATED ORAL 2 TIMES DAILY
COMMUNITY
Start: 2023-02-08 | End: 2023-03-13

## 2023-02-21 ASSESSMENT — ENCOUNTER SYMPTOMS
HEMATOCHEZIA: 0
FEVER: 0
DYSURIA: 0
COUGH: 0
HEMATURIA: 0
PALPITATIONS: 0
NAUSEA: 0
CONSTIPATION: 0
WEAKNESS: 0
ABDOMINAL PAIN: 1
SHORTNESS OF BREATH: 0
BREAST MASS: 0
DIARRHEA: 0
CHILLS: 0
EYE PAIN: 0
HEADACHES: 0
DIZZINESS: 1
NERVOUS/ANXIOUS: 0
FREQUENCY: 0
MYALGIAS: 0
SORE THROAT: 0
ARTHRALGIAS: 0
JOINT SWELLING: 0
PARESTHESIAS: 0
HEARTBURN: 0

## 2023-02-21 NOTE — NURSING NOTE
"Initial /80   Pulse 76   Temp 97.2  F (36.2  C) (Tympanic)   Ht 1.607 m (5' 3.25\")   Wt 74.3 kg (163 lb 12.8 oz)   LMP 02/15/2023   SpO2 100%   BMI 28.79 kg/m   Estimated body mass index is 28.79 kg/m  as calculated from the following:    Height as of this encounter: 1.607 m (5' 3.25\").    Weight as of this encounter: 74.3 kg (163 lb 12.8 oz). .      "

## 2023-02-21 NOTE — PROGRESS NOTES
"   SUBJECTIVE:   CC: Li is an 47 year old who presents for preventive health visit.     Patient has been advised of split billing requirements and indicates understanding: Yes  Healthy Habits:     Getting at least 3 servings of Calcium per day:  Yes    Bi-annual eye exam:  Yes    Dental care twice a year:  Yes    Sleep apnea or symptoms of sleep apnea:  Daytime drowsiness    Diet:  Regular (no restrictions)    Frequency of exercise:  2-3 days/week    Duration of exercise:  Less than 15 minutes    Taking medications regularly:  Yes    Medication side effects:  Not applicable    PHQ-2 Total Score: 0    Additional concerns today:  Yes    Concerns:  * abdominal lump for the last 2 months, concerned about hernia  Can reduce it when she lays flat.  Occasionally painful/uncomfortable.       * anxiety    Feels she has always had the \"standard\" anxiety.  In the past felt like she might worry about things but then be able to let them go.  Now finds she is spending more time thinking abut things and having difficulty getting worries out of her mind.   Does not really feel it is daily, more sporadic.             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  Currently planned to continue anticoagulation.  Has follow up scheduled in March.    Vascular has essentially signed off.  Transferred anticoagulation to hematology.  vascular recommend continuing ASA indefinitely due to stenting.      *  Thought she had cut on 2/15 but continued to take the chantix and then had a cigarette this morning.  Hoping to be able to maintain cessation        Today's PHQ-2 Score:   PHQ-2 ( 1999 [employer]) 2/20/2023   Q1: Little interest or pleasure in doing things 0   Q2: Feeling down, depressed or hopeless 0   PHQ-2 Score 0   Q1: Little interest or pleasure in doing things Not at all   Q2: Feeling down, depressed or hopeless Not at all   PHQ-2 Score 0           Social History     Tobacco Use     " Smoking status: Some Days     Packs/day: 0.50     Types: Cigarettes     Smokeless tobacco: Never     Tobacco comments:     Chantex to quit, 4 or 5 cigarettes a day   Substance Use Topics     Alcohol use: Yes     Alcohol/week: 0.0 standard drinks     Comment: rarely- quit with pregnancy         Alcohol Use 2/20/2023   Prescreen: >3 drinks/day or >7 drinks/week? No   Prescreen: >3 drinks/day or >7 drinks/week? -       Reviewed orders with patient.  Reviewed health maintenance and updated orders accordingly - Yes      Breast Cancer Screening:    Breast CA Risk Assessment (FHS-7) 2/7/2022   Do you have a family history of breast, colon, or ovarian cancer? No / Unknown         Mammogram Screening: Recommended annual mammography  Pertinent mammograms are reviewed under the imaging tab.    History of abnormal Pap smear: YES - updated in Problem List and Health Maintenance accordingly  PAP / HPV Latest Ref Rng & Units 2/7/2022 9/25/2017 8/4/2016   PAP   Negative for Intraepithelial Lesion or Malignancy (NILM) - -   PAP (Historical) - - NIL NIL   HPV16 Negative Positive(A) Positive(A) Positive(A)   HPV18 Negative Negative Negative Negative   HRHPV Negative Negative Negative Negative     Reviewed and updated as needed this visit by clinical staff   Tobacco  Allergies  Meds              Reviewed and updated as needed this visit by Provider   Tobacco  Allergies  Meds             Past Medical History:   Diagnosis Date     Cervical high risk HPV (human papillomavirus) test positive 08/04/2016    type 16     Chickenpox      Chlamydia trachomatis infection of unspecified site      POONAM 1 1998     Depressive disorder      Depressive disorder, not elsewhere classified      History of colposcopy with cervical biopsy 09/20/2016    Bx - POONAM 1, ECC - negative     Other abnormal heart sounds as child    Murmur - resolved     Polycystic ovaries     prior to gastic bypass in June, 2005, pt carried the diagnosis of PCOS/anovuolation      Streptococcus infection in conditions classified elsewhere and of unspecified site, group B 1998    In pregnancy      Past Surgical History:   Procedure Laterality Date      SECTION  ,       SECTION, TUBAL LIGATION, COMBINED  2013    Procedure: COMBINED  SECTION, TUBAL LIGATION;   Section, Tubal Ligation;  Surgeon: Paul Allen MD;  Location: [facility] OR     GASTRIC BYPASS  2005     GASTRIC BYPASS       HC REMOVAL GALLBLADDER  2006    Dr. Inman @ [facility]     HC REPAIR ING HERNIA,5+Y/O,REDUCIBL  age 20 ()    [facility]     LAPAROTOMY EXPLORATORY N/A 2022    Procedure: Exploratory LAPAROTOMY, Superior Mesenteric Artery Thrombectomy, Retrograde Superior Mesenteric Artery Stenting;  Surgeon: Balta Ernst MD;  Location: [facility] OR     LAPAROTOMY EXPLORATORY N/A 09/10/2022    Procedure: LAPAROTOMY, SMALL BOWEL RESECTION, INCISIONAL WOUND CLOSURE;  Surgeon: Tariq Lancaster MD;  Location: [facility] OR     LAPAROTOMY EXPLORATORY N/A 2022    Procedure: EXPLORATORY LAPAROTOMY, EVACUATION OF RECTUS SHEATH HEMATOMA, ABDOMINAL WASHOUT;  Surgeon: Nathan Barber MD;  Location: [facility] OR     PICC DOUBLE LUMEN PLACEMENT Right 09/10/2022    5FR DL PICC. Basilic vein.     SURGICAL HISTORY OF -       PE tubes     SURGICAL HISTORY OF -   10/2006    removal of pannus ans breast lift     TONSILLECTOMY  1981     [facility]  DELIVERY ONLY  1998    Failure to progress     [facility] NONSPECIFIC PROCEDURE  2006    Laser ablation genital condyloma/ Bx labial lesion     [facility] RAD RESEC TONSIL/PILLARS         Review of Systems   Constitutional: Negative for chills and fever.   HENT: Negative for congestion, ear pain, hearing loss and sore throat.    Eyes: Negative for pain and visual disturbance.   Respiratory: Negative for cough and shortness of breath.    Cardiovascular: Negative for chest pain, palpitations and peripheral edema.   Gastrointestinal:  "Positive for abdominal pain. Negative for constipation, diarrhea, heartburn, hematochezia and nausea.   Breasts:  Negative for tenderness, breast mass and discharge.   Genitourinary: Negative for dysuria, frequency, genital sores, hematuria, pelvic pain, urgency, vaginal bleeding and vaginal discharge.   Musculoskeletal: Negative for arthralgias, joint swelling and myalgias.   Skin: Negative for rash.   Neurological: Positive for dizziness. Negative for weakness, headaches and paresthesias.   Psychiatric/Behavioral: Negative for mood changes. The patient is not nervous/anxious.           OBJECTIVE:   /80   Pulse 76   Temp 97.2  F (36.2  C) (Tympanic)   Ht 1.607 m (5' 3.25\")   Wt 74.3 kg (163 lb 12.8 oz)   LMP 02/15/2023   SpO2 100%   BMI 28.79 kg/m    Physical Exam  GENERAL: healthy, alert and no distress  EYES: Eyes grossly normal to inspection, PERRL and conjunctivae and sclerae normal  NECK: no adenopathy, no asymmetry, masses, or scars and thyroid normal to palpation  RESP: lungs clear to auscultation - no rales, rhonchi or wheezes  CV: regular rate and rhythm, normal S1 S2, no S3 or S4, no murmur, click or rub, no peripheral edema and peripheral pulses strong  ABDOMEN: soft, nontender, no hepatosplenomegaly, no masses and bowel sounds normal, ventral hernia noted to the R of the umbilicus, reducible   (female): normal female external genitalia, normal urethral meatus, vaginal mucosa pink, moist, well rugated, and normal cervix/adnexa/uterus without masses or discharge  MS: no gross musculoskeletal defects noted, no edema  NEURO: Normal strength and tone, mentation intact and speech normal  PSYCH: mentation appears normal, affect normal/bright    Diagnostic Test Results:  Labs reviewed in Epic    ASSESSMENT/PLAN:       ICD-10-CM    1. Routine general medical examination at a health care facility  Z00.00       2. Ventral hernia without obstruction or gangrene  K43.9 Adult General Surg Referral    " "  3. Situational anxiety  F41.8 Adult Mental Health [facility] Referral      4. Superior mesenteric artery thrombosis (H)  K55.069       5. S/P gastric bypass  Z98.84 Lipid panel reflex to direct LDL Fasting     Comprehensive metabolic panel (BMP + Alb, Alk Phos, ALT, AST, Total. Bili, TP)     Vitamin B12     Folate     Vitamin B1 whole blood     Parathyroid Hormone Intact     Vitamin D Deficiency     Lipid panel reflex to direct LDL Fasting     Comprehensive metabolic panel (BMP + Alb, Alk Phos, ALT, AST, Total. Bili, TP)     Vitamin B12     Folate     Vitamin B1 whole blood     Parathyroid Hormone Intact     Vitamin D Deficiency      6. Screen for colon cancer  Z12.11 COLOGUARD([employer])      7. Need for hepatitis C screening test  Z11.59 Hepatitis C Screen Reflex to HCV RNA Quant and Genotype     Hepatitis C Screen Reflex to HCV RNA Quant and Genotype      8. Cervical cancer screening  Z12.4 Pap Screen with HPV - recommended age 30 - 65 years        Ventral hernia:  New surgery referral placed.  Pt will call to schedule.    Anxiety:  Situational, plan therapy.  Referral placed    H/o mesenteric artery thrombosis:  Followed by [facility] currently, on statin      Patient has been advised of split billing requirements and indicates understanding: Yes      COUNSELING:  Reviewed preventive health counseling, as reflected in patient instructions      BMI:   Estimated body mass index is 28.79 kg/m  as calculated from the following:    Height as of this encounter: 1.607 m (5' 3.25\").    Weight as of this encounter: 74.3 kg (163 lb 12.8 oz).   Weight management plan: Discussed healthy diet and exercise guidelines      She reports that she has been smoking cigarettes. She has been smoking an average of .5 packs per day. She has never used smokeless tobacco.  Nicotine/Tobacco Cessation Plan:   Self help information given to patient          Cecy Morillo,   [facility]  "

## 2023-02-22 LAB
DEPRECATED CALCIDIOL+CALCIFEROL SERPL-MC: 44 UG/L (ref 20–75)
HCV AB SERPL QL IA: REACTIVE

## 2023-02-23 ENCOUNTER — VIRTUAL VISIT (OUTPATIENT)
Dept: PSYCHOLOGY | Facility: CLINIC | Age: 48
End: 2023-02-23
Payer: COMMERCIAL

## 2023-02-23 DIAGNOSIS — F41.8 SITUATIONAL ANXIETY: ICD-10-CM

## 2023-02-23 LAB — HCV RNA SERPL NAA+PROBE-ACNC: NOT DETECTED IU/ML

## 2023-02-23 PROCEDURE — 90791 PSYCH DIAGNOSTIC EVALUATION: CPT | Mod: VID | Performed by: MARRIAGE & FAMILY THERAPIST
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
## 2023-02-24 LAB
BKR LAB AP GYN ADEQUACY: NORMAL
BKR LAB AP GYN INTERPRETATION: NORMAL
BKR LAB AP HPV REFLEX: NORMAL
BKR LAB AP PREVIOUS ABNL DX: NORMAL
BKR LAB AP PREVIOUS ABNORMAL: NORMAL
PATH REPORT.COMMENTS IMP SPEC: NORMAL
PATH REPORT.COMMENTS IMP SPEC: NORMAL
PATH REPORT.RELEVANT HX SPEC: NORMAL

## 2023-02-25 LAB — VIT B1 PYROPHOSHATE BLD-SCNC: 188 NMOL/L

## 2023-02-28 ENCOUNTER — PATIENT OUTREACH (OUTPATIENT)
Dept: FAMILY MEDICINE | Facility: CLINIC | Age: 48
End: 2023-02-28
Payer: COMMERCIAL

## 2023-03-02 ENCOUNTER — VIRTUAL VISIT (OUTPATIENT)
Dept: PSYCHOLOGY | Facility: CLINIC | Age: 48
End: 2023-03-02
Payer: COMMERCIAL

## 2023-03-02 DIAGNOSIS — F42.2 MIXED OBSESSIONAL THOUGHTS AND ACTS: Primary | ICD-10-CM

## 2023-03-02 PROCEDURE — 90834 PSYTX W PT 45 MINUTES: CPT | Mod: VID | Performed by: MARRIAGE & FAMILY THERAPIST

## 2023-03-02 ASSESSMENT — COLUMBIA-SUICIDE SEVERITY RATING SCALE - C-SSRS
2. HAVE YOU ACTUALLY HAD ANY THOUGHTS OF KILLING YOURSELF?: NO
TOTAL  NUMBER OF INTERRUPTED ATTEMPTS LIFETIME: NO
6. HAVE YOU EVER DONE ANYTHING, STARTED TO DO ANYTHING, OR PREPARED TO DO ANYTHING TO END YOUR LIFE?: NO
1. HAVE YOU WISHED YOU WERE DEAD OR WISHED YOU COULD GO TO SLEEP AND NOT WAKE UP?: NO
TOTAL  NUMBER OF ABORTED OR SELF INTERRUPTED ATTEMPTS LIFETIME: NO
ATTEMPT LIFETIME: NO

## 2023-03-02 NOTE — PROGRESS NOTES
[facility]                                     Progress Note    Patient Name: Bridgette Veras  Date: 3/02/23           Service Type: Individual      Session Start Time: 1340    Session End Time: 1430     Session Length: 50    Session #: 1    Attendees: Client attended alone    Service Modality:  Video Visit:      Provider verified identity through the following two step process.  Patient provided:  Patient was verified at admission/transfer    Telemedicine Visit: The patient's condition can be safely assessed and treated via synchronous audio and visual telemedicine encounter.      Reason for Telemedicine Visit: Patient has requested telehealth visit and Patient unable to travel    Originating Site (Patient Location): Patient's home    Distant Site (Provider Location): [facility]    Consent:  The patient/guardian has verbally consented to: the potential risks and benefits of telemedicine (video visit) versus in person care; bill my insurance or make self-payment for services provided; and responsibility for payment of non-covered services.     Patient would like the video invitation sent by:  My Chart    Mode of Communication:  Video Conference via [employer]well    Distant Location (Provider):  On-site    As the provider I attest to compliance with applicable laws and regulations related to telemedicine.    DATA  Interactive Complexity: No  Crisis: No        Progress Since Last Session (Related to Symptoms / Goals / Homework):   Symptoms: No change      Homework: Completed in session      Episode of Care Goals: No improvement - PREPARATION (Decided to change - considering how); Intervened by negotiating a change plan and determining options / strategies for behavior change, identifying triggers, exploring social supports, and working towards setting a date to begin behavior change     Current / Ongoing Stressors and Concerns:  Patient is a 47 year old,     " cisgender mother of two, from [place], [place] to address \"Anxiety\".  The problem(s) began intensifying over the past 6 months to 1 year. Bridgette reported anxious thoughts difficult to manage which frequently lead to rumination and compulsive behaviors (eating odd number of bites, compulsive cleaning, needing to complete lists of  \"to do's,\" etc.). Past 6 months symptoms increased surrounding medical issue of stomach pain, that could have been fatal if not treated.     She discussed organization compulsions, frustration with excess time and worry devoted to her anxious and intrusive thoughts (including visualizing her  and son and daughter off a cruise ship.    Client was provided [facility] recommendation for meditation.   Client was encouraged to use deep breathing strategies to reduce internal tensions  Butterfly hug was presented as way of letting go of stressful obsessions and intrusive/catastophic thoughts.   Client was encouraged to consider the [employer] for information regarding OCD and ways of dealing with symptoms and to discuss these symptoms with her PCP to assess medication effectiveness.      Treatment Objective(s) Addressed in This Session:   use thought-stopping strategy daily to reduce intrusive thoughts   Decrease time spent worrying daily to a \"worry hour.\"     Intervention:   Motivational Interviewing  MI Intervention: Expressed Empathy/Understanding, Supported Autonomy, Collaboration, Evocation, Permission to raise concern or advise, Open-ended questions, Reflections: simple and complex, Change talk (evoked) and Reframe   Change Talk Expressed by the Patient: Desire to change Ability to change Reasons to change Need to change Committment to change Activation Taking steps  Provider Response to Change Talk: E - Evoked more info from patient about behavior change, A - Affirmed patient's thoughts, decisions, or attempts at behavior change, R - Reflected patient's change talk and S - Summarized " patient's change talk statements     CBT: challenge distorted thoughts with rational responses.      Assessments completed prior to visit:  The following assessments were completed by patient for this visit:  PHQ9:   PHQ-9 SCORE 8/8/2013   PHQ-9 Total Score 2     GAD7: No flowsheet data found.  CAGE-AID:   CAGE-AID Total Score 2/23/2023   Total Score 0   Total Score MyChart 0 (A total score of 2 or greater is considered clinically significant)     PROMIS 10-Global Health (only subscores and total score):   PROMIS-10 Scores Only 2/23/2023 2/23/2023   Global Mental Health Score 15 15   Global Physical Health Score 15 15   PROMIS TOTAL - SUBSCORES 30 30     Stowell Suicide Severity Rating Scale (Lifetime/Recent)  Stowell Suicide Severity Rating (Lifetime/Recent) 3/2/2023   1. Wish to be Dead (Lifetime) 0   2. Non-Specific Active Suicidal Thoughts (Lifetime) 0   Actual Attempt (Lifetime) 0   Has subject engaged in non-suicidal self-injurious behavior? (Lifetime) 0   Interrupted Attempts (Lifetime) 0   Aborted or Self-Interrupted Attempt (Lifetime) 0   Preparatory Acts or Behavior (Lifetime) 0   Calculated C-SSRS Risk Score (Lifetime/Recent) No Risk Indicated         ASSESSMENT: Current Emotional / Mental Status (status of significant symptoms):   Risk status (Self / Other harm or suicidal ideation)   Patient denies current fears or concerns for personal safety.   Patient denies current or recent suicidal ideation or behaviors.   Patient denies current or recent homicidal ideation or behaviors.   Patient denies current or recent self injurious behavior or ideation.   Patient denies other safety concerns.   Patient reports there has been no change in risk factors since their last session.     Patient reports there has been no change in protective factors since their last session.     Recommended that patient call 911 or go to the local ED should there be a change in any of these risk  factors.     Appearance:   Appropriate    Eye Contact:   Good    Psychomotor Behavior: Normal    Attitude:   Cooperative  Friendly Pleasant   Orientation:   All   Speech    Rate / Production: Normal/ Responsive Normal     Volume:  Normal    Mood:    Anxious  Fearful   Affect:    Worrisome    Thought Content:  Obsessions Compulsions   Thought Form:  Coherent  Logical  Obsessive  Organized   Insight:    Good      Medication Review:   No current psychiatric medications prescribed     Medication Compliance:   NA     Changes in Health Issues:   None reported     Chemical Use Review:   Substance Use: Chemical use reviewed, no active concerns identified      Tobacco Use: No change in amount of tobacco use since last session.  Action    Diagnosis:  1. Mixed obsessional thoughts and acts        Collateral Reports Completed:   Not Applicable    PLAN: (Patient Tasks / Therapist Tasks / Other)  Client will follow strategies, practicing mindfulness, muscle relaxation, resource gathering, worry hour, and replacement cognitions for distorted thinking         Tariq Roberson, [facility]                                                         ______________________________________________________________________    Individual Treatment Plan    Patient's Name: Bridgette Veras  YOB: 1975    Date of Creation: 3/2/2023  Date Treatment Plan Last Reviewed/Revised: 3/2/2023    DSM5 Diagnoses: 300.3 (F42) Obsessive Compulsive Disorder (F42.2 Mixed Type)  Psychosocial / Contextual Factors:  5 surgeries from blood clots in abdomen since September 2022, her mother is living with the family following her own difficulties with health challenges, her employer demands a lot from her on a daily basis, 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    PROMIS (reviewed every 90 days): see above    Referral / Collaboration:  Referral to another professional/service is not indicated at this time..    Anticipated number  of session for this episode of care: 6-9 sessions  Anticipation frequency of session: Every other week  Anticipated Duration of each session: 38-52 minutes  Treatment plan will be reviewed in 90 days or when goals have been changed.       MeasurableTreatment Goal(s) related to diagnosis / functional impairment(s)  Goal 1: Patient will reduce time spent with worry, compulsive organization, and intrusive ideation by 90%.    I will know I've met my goal when worry less.      Objective #A (Patient Action)    Patient will identify three distraction and diversion activities and use those activities to decrease level of anxiety     Patient will use thought-stopping strategy daily to reduce intrusive thoughts.  Status: New - Date: 3/2/2023   Intervention(s)  Therapist will teach emotional recognition/identification. Worry hour assigned. .        Patient has reviewed and agreed to the above plan.      FERNANDO Fine  March 2, 2023

## 2023-03-11 NOTE — PROGRESS NOTES
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    She has completed 6 months of therapeutic anticoagulation.  Given that she has been tolerating this well in addition to aspirin, and that the (admittedly limited) data available suggests that therapeutic anticoagulation is superior to prophylactic dose anticoagulation for phantom aortic thrombosis, I think we should continue apixaban at therapeutic dose indefinitely.  She will be seeing vascular surgery in April, but presumably she will stay on aspirin indefinitely as well given her stent.  I think this is appropriate.    She continues to have menorrhagia, and is at risk of further recurrence of iron deficiency.  We will recheck today, and she will need to be monitored for this regularly.    She continues to smoke, though she has cut back significantly.  She is on varenicline, and will continue this.  She understands the importance of quitting, as this is the only modifiable risk factor for recurrence that we have identified.    In terms of other thrombophilia testing, she will need to stay on indefinite anticoagulation, and is at too high risk of recurrence to discontinue for the length of time necessary for the majority of testing to be valid.  Genetic testing for factor V Leiden and the prothrombin gene mutation is possible, and we can consider this.  I would argue against it, as any of these diagnoses would not change her management, and might subject her children to unnecessary management, a pre-existing condition that might give them health insurance issues, etc.  However this is something that we can  continue to readdress over time.    Lastly, I do think we should test for a myeloproliferative neoplasm today.  Again I do not see any elevated blood cell counts to suggest this, but in the very limited case series of phantom aortic thrombus, there seems to be an association with malignancy in this diagnosis.  An MPN is an appropriate want to screen for at this time.    She is planned for hernia repair in the near future.  She knows that she and/or her surgeon can reach out to me in advance of the procedure and I can provide anticoagulation recommendations perioperatively.  In brief, she is at very high risk of recurrence, and should be managed with as little interruption of anticoagulation as possible.    Plan:  - Continue apixaban 5 mg twice daily indefinitely  - Iron studies, ferritin, CBC, JAK2 testing today  - Continue oral iron every other day  - Return to clinic in 6 months for follow-up    The patient is given our center's contact information and is instructed to call if she should have any further questions or concerns.    Page Patterson, nurse clinician, is assigned to provide patient care coordination and education.     Patient understands and agrees with the above plan and recommendation.    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  There was also  irregular peripheral clot seen within the descending thoracic aorta and the superior aspect of the abdominal aorta.  There was a free-floating thrombus seen in the SMA proximally that appeared to extend inferiorly from the celiac artery.  She underwent an emergent ex lap and a SMA thrombectomy and stenting.     She reports no prior known history of blood clots or bleeding.  She had 2 pregnancies with C-sections, no known miscarriages or pregnancy complications such as preeclampsia.  No family history of blood clotting.    Saw vascular surgery 10/20/22. CTA at that time showed improvement in overall SMA clot burden; decreased adherent clot burden in descending thoracic aorta with small residual adherent thrombus in mid desending thoracic aorta; and evolving multifocal splenic infarcts. They switched hr from plavix to ASA and planned for AC until evaluated by hematology. Recent diarrhea, concern for CDiff. CT A/P showed infectious/inflammatory pancolitis, no e/o bowel ischemia.    She presents for initial outpatient evaluation by hematology today after being seen as a consult in the hospital.  She is doing much better, without any notable residual symptoms.  Continues on apixaban without any reports of bleeding.  Also taking Plavix without any issue.  Denies any bleeding or bruising, no melena.  No fevers, night sweats, weight loss, rashes, adenopathy.  Denies aquagenic pruritus or erythromelalgia.  No hematuria.  No prior personal or family history of blood clotting.  She has not had any prior miscarriages.  Serologies for beta-2 glycoprotein antibodies and cardiolipin antibodies were negative.  Lupus anticoagulant testing negative as well.  She received IV iron while in the hospital, and currently takes oral iron daily.    March 13, 2023: Has completed 6 months of therapeutic apixaban.  Antiphospholipid antibody testing unremarkable. Continues on aspirin and apixaban. Notes that periods are heavier, but no other  bleeding. Developed hernia, pending surgical plan. Had 3 IV iron infusions in early January, felt better in terms of energy after, though feels symptoms returning. Briefly quit smoking but still having a few (1-2 per day), hoping to quit entirely. Continues on Chantix. Continues on oral iron.  Has questions regarding whether it would be worth pursuing genetic testing for possible thrombophilia, given that she has children (adult son in his 20s, 9-year-old daughter).    Past Medical History:  Past Medical History:   Diagnosis Date     Cervical high risk HPV (human papillomavirus) test positive 2016    type 16     Chickenpox      Chlamydia trachomatis infection of unspecified site      POONAM 1      Depressive disorder      Depressive disorder, not elsewhere classified      History of colposcopy with cervical biopsy 2016    Bx - POONAM 1, ECC - negative     Other abnormal heart sounds as child    Murmur - resolved     Polycystic ovaries     prior to gastic bypass in , pt carried the diagnosis of PCOS/anovuolation     Streptococcus infection in conditions classified elsewhere and of unspecified site, group B     In pregnancy       Past Surgical History:  Past Surgical History:   Procedure Laterality Date      SECTION  1998      SECTION, TUBAL LIGATION, COMBINED  2013    Procedure: COMBINED  SECTION, TUBAL LIGATION;   Section, Tubal Ligation;  Surgeon: Paul Allen MD;  Location: [facility] OR     GASTRIC BYPASS  2005     GASTRIC BYPASS       HC REMOVAL GALLBLADDER  2006    Dr. Inman @ [facility]     HC REPAIR ING HERNIA,5+Y/O,REDUCIBL  age 20 ()    [facility]     LAPAROTOMY EXPLORATORY N/A 2022    Procedure: Exploratory LAPAROTOMY, Superior Mesenteric Artery Thrombectomy, Retrograde Superior Mesenteric Artery Stenting;  Surgeon: Balta Ernst MD;  Location:  OR     LAPAROTOMY EXPLORATORY N/A 09/10/2022    Procedure:  LAPAROTOMY, SMALL BOWEL RESECTION, INCISIONAL WOUND CLOSURE;  Surgeon: Tariq Lancaster MD;  Location: [facility] OR     LAPAROTOMY EXPLORATORY N/A 2022    Procedure: EXPLORATORY LAPAROTOMY, EVACUATION OF RECTUS SHEATH HEMATOMA, ABDOMINAL WASHOUT;  Surgeon: Nathan Barber MD;  Location: [facility] OR     PICC DOUBLE LUMEN PLACEMENT Right 09/10/2022    5FR DL PICC. Basilic vein.     SURGICAL HISTORY OF -       PE tubes     SURGICAL HISTORY OF -   10/2006    removal of pannus ans breast lift     TONSILLECTOMY  1981     [facility]  DELIVERY ONLY  1998    Failure to progress     [facility] NONSPECIFIC PROCEDURE  2006    Laser ablation genital condyloma/ Bx labial lesion     [facility] RAD RESEC TONSIL/PILLARS         Medications:  Current Outpatient Medications   Medication Sig Dispense Refill     aspirin (ASA) 81 MG EC tablet Take 1 tablet (81 mg) by mouth daily 90 tablet 3     atorvastatin (LIPITOR) 20 MG tablet Take 1 tablet (20 mg) by mouth daily 90 tablet 3     cyanocolbalamin (VITAMIN  B-12) 500 MCG tablet Take 500 mcg by mouth daily        cyclobenzaprine (FLEXERIL) 10 MG tablet Take 1 tablet (10 mg) by mouth nightly as needed for muscle spasms 30 tablet 0     ELIQUIS ANTICOAGULANT 5 MG tablet Take 1 tablet (5 mg) by mouth 2 times daily for 360 days 60 tablet 11     Ergocalciferol (VITAMIN D2 PO) Take by mouth daily       ferrous sulfate (IRON) 325 (65 FE) MG tablet Take 325 mg by mouth daily (with breakfast)        Methocarbamol (ROBAXIN PO) Take by mouth 3 times daily as needed       Multiple Vitamin (MULTIVITAMIN PO) Take 1 tablet by mouth daily       ondansetron (ZOFRAN ODT) 4 MG ODT tab Take 1 tablet (4 mg) by mouth every 8 hours as needed for nausea 12 tablet 0     oxyCODONE (ROXICODONE) 5 MG tablet Take 1 tablet (5 mg) by mouth every 6 hours as needed for moderate to severe pain 5 tablet 0     saccharomyces boulardii (FLORASTOR) 250 MG capsule Take 1 capsule (250 mg) by mouth 2 times daily 60 capsule  3     varenicline (CHANTIX) 1 MG tablet Take 1 tablet (1 mg) by mouth 2 times daily 60 tablet 2        Allergies:  Allergies   Allergen Reactions     Codeine Nausea and Vomiting     Darvocet [Acetaminophen] Nausea and Vomiting     Propoxyphene Nausea       ROS:  Remainder of a comprehensive 14 point ROS is negative unless noted above.    Social History:  Denies any tobacco use. No significant alcohol use. Denies any illicit drug use.     Family History:  Non-contributory to the current presentation    Objectives:  /86 (BP Location: Right arm, Patient Position: Sitting, Cuff Size: Adult Regular)   Pulse 81   Temp 98.1  F (36.7  C) (Oral)   Wt 76 kg (167 lb 9.6 oz)   LMP 02/15/2023   SpO2 97%   BMI 29.45 kg/m    Exam:   Constitutional: Appears well, no distress  HEENT: Pupils equal and reactive to light. No scleral icterus or hemorrhage. Nares without evidence of telangiectasia. Mucous membranes moist with no wet purpura. Dentition overall ok with no signs of decay. No pharyngeal exudates. No lymphadenopathy, no thyromeagaly  CV: regular rate and rhythm, no murmurs  Respiratory: clear  GI: abdomen soft, nontender, without guarding or rebound. No hepatomeagaly. No splenomegaly. Mus/Skele: no edema  Skin: no petechiae, no ecchymosis.  Neuro: CN II-XII intact. Normal gait. AOx3  Heme/Lymph: no supraclavicular, axillary or umbilical adenopathy.     Labs:  WBC   Date Value Ref Range Status   08/04/2016 9.3 4.0 - 11.0 10e9/L Final   01/04/2013 10.9 4.0 - 11.0 10e9/L Final   03/07/2011 7.8 4.0 - 11.0 10e9/L Final   05/22/2006 13.1 (H) 4.0 - 11.0 10e9/L Final     WBC Count   Date Value Ref Range Status   11/22/2022 10.0 4.0 - 11.0 10e3/uL Final   09/16/2022 15.5 (H) 4.0 - 11.0 10e3/uL Final   09/15/2022 20.9 (H) 4.0 - 11.0 10e3/uL Final   09/14/2022 21.9 (H) 4.0 - 11.0 10e3/uL Final     Hemoglobin   Date Value Ref Range Status   11/22/2022 13.0 11.7 - 15.7 g/dL Final   09/16/2022 9.1 (L) 11.7 - 15.7 g/dL Final    09/15/2022 8.5 (L) 11.7 - 15.7 g/dL Final   09/14/2022 8.5 (L) 11.7 - 15.7 g/dL Final   09/20/2016 11.1 (L) 11.7 - 15.7 g/dL Final   08/04/2016 9.6 (L) 11.7 - 15.7 g/dL Final   09/17/2013 13.0 11.7 - 15.7 g/dL Final   08/06/2013 9.9 (L) 11.7 - 15.7 g/dL Final     Hematocrit   Date Value Ref Range Status   11/22/2022 41.8 35.0 - 47.0 % Final   09/16/2022 32.0 (L) 35.0 - 47.0 % Final   09/15/2022 30.4 (L) 35.0 - 47.0 % Final   09/14/2022 30.8 (L) 35.0 - 47.0 % Final   08/04/2016 31.3 (L) 35.0 - 47.0 % Final   01/04/2013 36.4 35.0 - 47.0 % Final   03/07/2011 39.5 35.0 - 47.0 % Final   05/22/2006 45.7 35.0 - 47.0 % Final     Platelet Count   Date Value Ref Range Status   11/22/2022 313 150 - 450 10e3/uL Final   09/16/2022 308 150 - 450 10e3/uL Final   09/15/2022 296 150 - 450 10e3/uL Final   09/14/2022 300 150 - 450 10e3/uL Final   08/04/2016 327 150 - 450 10e9/L Final   01/04/2013 290 150 - 450 10e9/L Final   03/07/2011 276 150 - 450 10e9/L Final   05/22/2006 321 150 - 450 10e9/L Final         Imaging:  *MA Screening Digital Bilateral  Narrative: BILATERAL FULL FIELD DIGITAL SCREENING MAMMOGRAM    Performed on: 1/17/23    No comparisons were made when reading this study.    Technique: This study was evaluated with the assistance of Computer-Aided   Detection.    Findings: The breasts have scattered areas of fibroglandular density.    There is no radiographic evidence of malignancy.   Impression: IMPRESSION: ACR BI-RADS Category 1: Negative    RECOMMENDED FOLLOW-UP: Annual routine screening mammogram    The results and recommendations of this examination will be communicated   to the patient.    Regino Aguila, DO

## 2023-03-13 ENCOUNTER — OFFICE VISIT (OUTPATIENT)
Dept: HEMATOLOGY | Facility: CLINIC | Age: 48
End: 2023-03-13
Attending: STUDENT IN AN ORGANIZED HEALTH CARE EDUCATION/TRAINING PROGRAM
Payer: COMMERCIAL

## 2023-03-13 VITALS
WEIGHT: 167.6 LBS | OXYGEN SATURATION: 97 % | SYSTOLIC BLOOD PRESSURE: 125 MMHG | TEMPERATURE: 98.1 F | HEART RATE: 81 BPM | DIASTOLIC BLOOD PRESSURE: 86 MMHG | BODY MASS INDEX: 29.45 KG/M2

## 2023-03-13 DIAGNOSIS — I74.10 AORTIC THROMBUS (H): Primary | ICD-10-CM

## 2023-03-13 LAB
ERYTHROCYTE [DISTWIDTH] IN BLOOD BY AUTOMATED COUNT: 14.8 % (ref 10–15)
FERRITIN SERPL-MCNC: 124 NG/ML (ref 6–175)
HCT VFR BLD AUTO: 40.8 % (ref 35–47)
HGB BLD-MCNC: 13.4 G/DL (ref 11.7–15.7)
INTERPRETATION: NORMAL
IRON BINDING CAPACITY (ROCHE): 231 UG/DL (ref 240–430)
IRON SATN MFR SERPL: 38 % (ref 15–46)
IRON SERPL-MCNC: 87 UG/DL (ref 37–145)
MCH RBC QN AUTO: 29.5 PG (ref 26.5–33)
MCHC RBC AUTO-ENTMCNC: 32.8 G/DL (ref 31.5–36.5)
MCV RBC AUTO: 90 FL (ref 78–100)
PLATELET # BLD AUTO: 270 10E3/UL (ref 150–450)
RBC # BLD AUTO: 4.54 10E6/UL (ref 3.8–5.2)
SIGNIFICANT RESULTS: NORMAL
SPECIMEN DESCRIPTION: NORMAL
TEST DETAILS, MDL: NORMAL
WBC # BLD AUTO: 8.6 10E3/UL (ref 4–11)

## 2023-03-13 PROCEDURE — 82728 ASSAY OF FERRITIN: CPT | Performed by: STUDENT IN AN ORGANIZED HEALTH CARE EDUCATION/TRAINING PROGRAM

## 2023-03-13 PROCEDURE — 36415 COLL VENOUS BLD VENIPUNCTURE: CPT | Performed by: STUDENT IN AN ORGANIZED HEALTH CARE EDUCATION/TRAINING PROGRAM

## 2023-03-13 PROCEDURE — 81219 CALR GENE COM VARIANTS: CPT | Performed by: STUDENT IN AN ORGANIZED HEALTH CARE EDUCATION/TRAINING PROGRAM

## 2023-03-13 PROCEDURE — 81403 MOPATH PROCEDURE LEVEL 4: CPT | Mod: 59 | Performed by: STUDENT IN AN ORGANIZED HEALTH CARE EDUCATION/TRAINING PROGRAM

## 2023-03-13 PROCEDURE — 83550 IRON BINDING TEST: CPT | Performed by: STUDENT IN AN ORGANIZED HEALTH CARE EDUCATION/TRAINING PROGRAM

## 2023-03-13 PROCEDURE — 99211 OFF/OP EST MAY X REQ PHY/QHP: CPT | Mod: 25 | Performed by: STUDENT IN AN ORGANIZED HEALTH CARE EDUCATION/TRAINING PROGRAM

## 2023-03-13 PROCEDURE — G0452 MOLECULAR PATHOLOGY INTERPR: HCPCS | Mod: 26 | Performed by: PATHOLOGY

## 2023-03-13 PROCEDURE — 99214 OFFICE O/P EST MOD 30 MIN: CPT | Performed by: STUDENT IN AN ORGANIZED HEALTH CARE EDUCATION/TRAINING PROGRAM

## 2023-03-13 PROCEDURE — 85014 HEMATOCRIT: CPT | Performed by: STUDENT IN AN ORGANIZED HEALTH CARE EDUCATION/TRAINING PROGRAM

## 2023-03-13 RX ORDER — APIXABAN 5 MG/1
5 TABLET, FILM COATED ORAL 2 TIMES DAILY
Qty: 60 TABLET | Refills: 11 | Status: SHIPPED | OUTPATIENT
Start: 2023-03-13 | End: 2024-03-07

## 2023-03-14 ENCOUNTER — PRE VISIT (OUTPATIENT)
Dept: SURGERY | Facility: CLINIC | Age: 48
End: 2023-03-14
Payer: COMMERCIAL

## 2023-03-14 ENCOUNTER — VIRTUAL VISIT (OUTPATIENT)
Dept: PSYCHOLOGY | Facility: CLINIC | Age: 48
End: 2023-03-14
Payer: COMMERCIAL

## 2023-03-14 DIAGNOSIS — F42.2 MIXED OBSESSIONAL THOUGHTS AND ACTS: Primary | ICD-10-CM

## 2023-03-14 PROCEDURE — 90837 PSYTX W PT 60 MINUTES: CPT | Mod: VID | Performed by: MARRIAGE & FAMILY THERAPIST
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
## 2023-03-14 NOTE — TELEPHONE ENCOUNTER
Patient Telephone Reminder Call    Date of call:  03/14/23  Phone numbers:  Cell number on file:    Telephone Information:   Mobile [phone]       Reached patient/confirmed appointment:  Yes  Appointment with:   Dr. Tariq Lancaster  Reason for visit:  Ventral hernia  Remind patient that this visit is a consultation only, NO procedure:  Yes  Can this visit be changed to a video visit:  No

## 2023-03-15 ENCOUNTER — OFFICE VISIT (OUTPATIENT)
Dept: SURGERY | Facility: CLINIC | Age: 48
End: 2023-03-15
Payer: COMMERCIAL

## 2023-03-15 VITALS
DIASTOLIC BLOOD PRESSURE: 76 MMHG | HEIGHT: 63 IN | BODY MASS INDEX: 29.27 KG/M2 | HEART RATE: 77 BPM | WEIGHT: 165.2 LBS | OXYGEN SATURATION: 97 % | SYSTOLIC BLOOD PRESSURE: 111 MMHG

## 2023-03-15 DIAGNOSIS — K43.9 VENTRAL HERNIA WITHOUT OBSTRUCTION OR GANGRENE: Primary | ICD-10-CM

## 2023-03-15 PROCEDURE — 99213 OFFICE O/P EST LOW 20 MIN: CPT | Performed by: SURGERY

## 2023-03-15 ASSESSMENT — PAIN SCALES - GENERAL: PAINLEVEL: NO PAIN (0)

## 2023-03-15 NOTE — NURSING NOTE
"Chief Complaint   Patient presents with     RECHECK     Ventral hernia       Vitals:    03/15/23 0947   BP: 111/76   BP Location: Left arm   Patient Position: Sitting   Cuff Size: Adult Regular   Pulse: 77   SpO2: 97%   Weight: 74.9 kg (165 lb 3.2 oz)   Height: 1.607 m (5' 3.25\")       Body mass index is 29.03 kg/m .                          Josué Boss, EMT    "

## 2023-03-15 NOTE — LETTER
3/15/2023       RE: Bridgette Veras  [address]  [place] [address]     Dear Colleague,    Thank you for referring your patient, Bridgette Veras, to the [facility]ERY [facility] [place] at [facility]. Please see a copy of my visit note below.    General Surgery Clinic:    The patient is a 47-year-old woman who is well-known to the General Surgery service. The patient required a portion of her small bowel resected due to ischemia. The patient is also followed by the Vascular Surgery service and has a stent present in the super mesenteric artery. The patient has developed a hernia in the middle portion of her midline abdominal incision.  The patient notes this hernia is symptomatic; however, the hernia does fully reduce when the patient lays down.    PAST MEDICAL HISTORY:  Past Medical History:   Diagnosis Date     Cervical high risk HPV (human papillomavirus) test positive 2016    type 16     Chickenpox      Chlamydia trachomatis infection of unspecified site      POONAM 1      Depressive disorder      Depressive disorder, not elsewhere classified      History of colposcopy with cervical biopsy 2016    Bx - POONAM 1, ECC - negative     Other abnormal heart sounds as child    Murmur - resolved     Polycystic ovaries     prior to gastic bypass in , pt carried the diagnosis of PCOS/anovuolation     Streptococcus infection in conditions classified elsewhere and of unspecified site, group B     In pregnancy        PAST SURGICAL HISTORY:  Past Surgical History:   Procedure Laterality Date      SECTION  1998      SECTION, TUBAL LIGATION, COMBINED  2013    Procedure: COMBINED  SECTION, TUBAL LIGATION;   Section, Tubal Ligation;  Surgeon: Paul Allen MD;  Location: [facility]     GASTRIC BYPASS  2005     GASTRIC BYPASS       HC REMOVAL GALLBLADDER  2006    Dr. Inman @  [facility]     HC REPAIR ING HERNIA,5+Y/O,REDUCIBL  age 20 ()    [facility]     LAPAROTOMY EXPLORATORY N/A 2022    Procedure: Exploratory LAPAROTOMY, Superior Mesenteric Artery Thrombectomy, Retrograde Superior Mesenteric Artery Stenting;  Surgeon: Balta Ernst MD;  [facility]     LAPAROTOMY EXPLORATORY N/A 09/10/2022    Procedure: LAPAROTOMY, SMALL BOWEL RESECTION, INCISIONAL WOUND CLOSURE;  Surgeon: Tariq Lancaster MD;  [facility]     LAPAROTOMY EXPLORATORY N/A 2022    Procedure: EXPLORATORY LAPAROTOMY, EVACUATION OF RECTUS SHEATH HEMATOMA, ABDOMINAL WASHOUT;  Surgeon: Nathan Barber MD;  [facility]     PICC DOUBLE LUMEN PLACEMENT Right 09/10/2022    5FR DL PICC. Basilic vein.     SURGICAL HISTORY OF -       PE tubes     SURGICAL HISTORY OF -   10/2006    removal of pannus ans breast lift     TONSILLECTOMY  1981     Z  DELIVERY ONLY  1998    Failure to progress     [facility] NONSPECIFIC PROCEDURE  2006    Laser ablation genital condyloma/ Bx labial lesion     [facility] RAD RESEC TONSIL/PILLARS          MEDICATIONS:  Current Outpatient Medications   Medication     aspirin (ASA) 81 MG EC tablet     atorvastatin (LIPITOR) 20 MG tablet     cyanocolbalamin (VITAMIN  B-12) 500 MCG tablet     cyclobenzaprine (FLEXERIL) 10 MG tablet     ELIQUIS ANTICOAGULANT 5 MG tablet     Ergocalciferol (VITAMIN D2 PO)     ferrous sulfate (IRON) 325 (65 FE) MG tablet     Multiple Vitamin (MULTIVITAMIN PO)     ondansetron (ZOFRAN ODT) 4 MG ODT tab     saccharomyces boulardii (FLORASTOR) 250 MG capsule     varenicline (CHANTIX) 1 MG tablet     Methocarbamol (ROBAXIN PO)     oxyCODONE (ROXICODONE) 5 MG tablet     No current facility-administered medications for this visit.        ALLERGIES:  Allergies   Allergen Reactions     Codeine Nausea and Vomiting     Darvocet [Acetaminophen] Nausea and Vomiting     Propoxyphene Nausea        SOCIAL HISTORY:  Social History  "    Socioeconomic History     Marital status:      Spouse name: None     Number of children: 2     Years of education: None     Highest education level: None   Occupational History     Employer: UNEMPLOYED   Tobacco Use     Smoking status: Some Days     Packs/day: 0.50     Types: Cigarettes     Smokeless tobacco: Never     Tobacco comments:     Chantex to quit, about 2 cigarettes a day   Vaping Use     Vaping Use: Never used   Substance and Sexual Activity     Alcohol use: Yes     Alcohol/week: 0.0 standard drinks     Comment: rarely- quit with pregnancy     Drug use: No     Sexual activity: Yes     Partners: Male     Birth control/protection: Surgical     Comment: Status post tubal ligation   Other Topics Concern     Parent/sibling w/ CABG, MI or angioplasty before 65F 55M? No       FAMILY HISTORY:  Family History   Problem Relation Age of Onset     Cerebrovascular Disease Paternal Grandfather      Diabetes Other      Cancer Maternal Grandmother         Lung     Depression Maternal Grandmother      Asthma Father      Gastrointestinal Disease Father         PSC     Alcohol/Drug Maternal Grandfather         alcohol     Cardiovascular Maternal Grandfather         MI     Respiratory Paternal Grandmother      Hypertension Mother      Depression Mother      Hypertension Brother      Depression Brother      C.A.D. No family hx of      Breast Cancer No family hx of      Cancer - colorectal No family hx of         PHYSICAL EXAM:  Vital Signs: /76 (BP Location: Left arm, Patient Position: Sitting, Cuff Size: Adult Regular)   Pulse 77   Ht 1.607 m (5' 3.25\")   Wt 74.9 kg (165 lb 3.2 oz)   LMP 02/15/2023   SpO2 97%   BMI 29.03 kg/m    GEN: The patient was examined in the supine position.  ABD: soft and non-tender, the hernia has a fascial defect of approximately 3 cm.  EXT: warm and well perfused.    Impression:    The patient has a symptomatic ventral hernia. She is interested in pursuing surgical repair. " We will need to coordinate the patient's surgical care with the members of the Vascular Surgery service.       Sincerely,    Tariq Lancaster MD

## 2023-03-15 NOTE — PATIENT INSTRUCTIONS
You met with Dr. Tariq Lancaster.      Today's visit instructions:    Dr. Lancaster would like to discuss your case at our next Hernia Conference (3/22/23). In the meantime, please work on smoking cessation and weight loss of 5-10lb. We will call you with Hernia Conference recommendations.        If you have questions please contact Lenora RN or Nancy RN during regular clinic hours, Monday through Friday 7:30 AM - 4:00 PM, or you can contact us via [employer] at anytime.       If you have urgent needs after-hours, weekends, or holidays please call the hospital at [phone] and ask to speak with our on-call General Surgery Team.    Appointment [phone]  Nurse Advice (Lenora or Nancy): [phone]   Surgery Scheduler (Kasie): [phone]  Fax: [phone]

## 2023-03-16 NOTE — PROGRESS NOTES
General Surgery Clinic:    The patient is a 47-year-old woman who is well-known to the General Surgery service. The patient required a portion of her small bowel resected due to ischemia. The patient is also followed by the Vascular Surgery service and has a stent present in the super mesenteric artery. The patient has developed a hernia in the middle portion of her midline abdominal incision.  The patient notes this hernia is symptomatic; however, the hernia does fully reduce when the patient lays down.    PAST MEDICAL HISTORY:  Past Medical History:   Diagnosis Date     Cervical high risk HPV (human papillomavirus) test positive 2016    type 16     Chickenpox      Chlamydia trachomatis infection of unspecified site      POONAM 1      Depressive disorder      Depressive disorder, not elsewhere classified      History of colposcopy with cervical biopsy 2016    Bx - POONAM 1, ECC - negative     Other abnormal heart sounds as child    Murmur - resolved     Polycystic ovaries     prior to gastic bypass in , pt carried the diagnosis of PCOS/anovuolation     Streptococcus infection in conditions classified elsewhere and of unspecified site, group B 1998    In pregnancy        PAST SURGICAL HISTORY:  Past Surgical History:   Procedure Laterality Date      SECTION  1998      SECTION, TUBAL LIGATION, COMBINED  2013    Procedure: COMBINED  SECTION, TUBAL LIGATION;   Section, Tubal Ligation;  Surgeon: Paul Allen MD;  Location: [facility]     GASTRIC BYPASS  2005     GASTRIC BYPASS       HC REMOVAL GALLBLADDER  2006    Dr. Inman @ [facility]     HC REPAIR ING HERNIA,5+Y/O,REDUCIBL  age 20 ()    [facility]     LAPAROTOMY EXPLORATORY N/A 2022    Procedure: Exploratory LAPAROTOMY, Superior Mesenteric Artery Thrombectomy, Retrograde Superior Mesenteric Artery Stenting;  Surgeon: Balta Ernst MD;  [facility]     LAPAROTOMY  EXPLORATORY N/A 09/10/2022    Procedure: LAPAROTOMY, SMALL BOWEL RESECTION, INCISIONAL WOUND CLOSURE;  Surgeon: Tariq Lancaster MD;  [facility]     LAPAROTOMY EXPLORATORY N/A 2022    Procedure: EXPLORATORY LAPAROTOMY, EVACUATION OF RECTUS SHEATH HEMATOMA, ABDOMINAL WASHOUT;  Surgeon: Nathan Barber MD;  [facility]     PICC DOUBLE LUMEN PLACEMENT Right 09/10/2022    5FR DL PICC. Basilic vein.     SURGICAL HISTORY OF -       PE tubes     SURGICAL HISTORY OF -   10/2006    removal of pannus ans breast lift     TONSILLECTOMY       Z  DELIVERY ONLY  1998    Failure to progress     [facility] NONSPECIFIC PROCEDURE  2006    Laser ablation genital condyloma/ Bx labial lesion     [facility] RAD RESEC TONSIL/PILLARS          MEDICATIONS:  Current Outpatient Medications   Medication     aspirin (ASA) 81 MG EC tablet     atorvastatin (LIPITOR) 20 MG tablet     cyanocolbalamin (VITAMIN  B-12) 500 MCG tablet     cyclobenzaprine (FLEXERIL) 10 MG tablet     ELIQUIS ANTICOAGULANT 5 MG tablet     Ergocalciferol (VITAMIN D2 PO)     ferrous sulfate (IRON) 325 (65 FE) MG tablet     Multiple Vitamin (MULTIVITAMIN PO)     ondansetron (ZOFRAN ODT) 4 MG ODT tab     saccharomyces boulardii (FLORASTOR) 250 MG capsule     varenicline (CHANTIX) 1 MG tablet     Methocarbamol (ROBAXIN PO)     oxyCODONE (ROXICODONE) 5 MG tablet     No current facility-administered medications for this visit.        ALLERGIES:  Allergies   Allergen Reactions     Codeine Nausea and Vomiting     Darvocet [Acetaminophen] Nausea and Vomiting     Propoxyphene Nausea        SOCIAL HISTORY:  Social History     Socioeconomic History     Marital status:      Spouse name: None     Number of children: 2     Years of education: None     Highest education level: None   Occupational History     Employer: UNEMPLOYED   Tobacco Use     Smoking status: Some Days     Packs/day: 0.50     Types: Cigarettes     Smokeless tobacco:  "Never     Tobacco comments:     Chantex to quit, about 2 cigarettes a day   Vaping Use     Vaping Use: Never used   Substance and Sexual Activity     Alcohol use: Yes     Alcohol/week: 0.0 standard drinks     Comment: rarely- quit with pregnancy     Drug use: No     Sexual activity: Yes     Partners: Male     Birth control/protection: Surgical     Comment: Status post tubal ligation   Other Topics Concern     Parent/sibling w/ CABG, MI or angioplasty before 65F 55M? No       FAMILY HISTORY:  Family History   Problem Relation Age of Onset     Cerebrovascular Disease Paternal Grandfather      Diabetes Other      Cancer Maternal Grandmother         Lung     Depression Maternal Grandmother      Asthma Father      Gastrointestinal Disease Father         PSC     Alcohol/Drug Maternal Grandfather         alcohol     Cardiovascular Maternal Grandfather         MI     Respiratory Paternal Grandmother      Hypertension Mother      Depression Mother      Hypertension Brother      Depression Brother      C.A.D. No family hx of      Breast Cancer No family hx of      Cancer - colorectal No family hx of         PHYSICAL EXAM:  Vital Signs: /76 (BP Location: Left arm, Patient Position: Sitting, Cuff Size: Adult Regular)   Pulse 77   Ht 1.607 m (5' 3.25\")   Wt 74.9 kg (165 lb 3.2 oz)   LMP 02/15/2023   SpO2 97%   BMI 29.03 kg/m    GEN: The patient was examined in the supine position.  ABD: soft and non-tender, the hernia has a fascial defect of approximately 3 cm.  EXT: warm and well perfused.    Impression:    The patient has a symptomatic ventral hernia. She is interested in pursuing surgical repair. We will need to coordinate the patient's surgical care with the members of the Vascular Surgery service.   "

## 2023-03-20 LAB
INTERPRETATION: NORMAL
SIGNIFICANT RESULTS: NORMAL
SPECIMEN DESCRIPTION: NORMAL
TEST DETAILS, MDL: NORMAL

## 2023-03-22 ENCOUNTER — PATIENT OUTREACH (OUTPATIENT)
Dept: SURGERY | Facility: CLINIC | Age: 48
End: 2023-03-22
Payer: COMMERCIAL

## 2023-03-22 NOTE — PROGRESS NOTES
"   Complex Hernia Monthly Conference Note   Date: March 22, 2023    Attendees: Dr. Vishnu Ngerete, Dr. Oscar Avalos, Dr. Tariq Lancaster, Dr. Herminio Becerra, Dr. Andreas Palencia, Dr. John Montes, Dr. Deacon Layne, Dr. Franc Tan, Dr. Phi Rivas,  Lenora Velez RN, Nancy Jacobs RN, Korina Durant RN and Tamera Mason RN    Type of hernia: incisional    Estimated body mass index is 29.03 kg/m  as calculated from the following:    Height as of 3/15/23: 1.607 m (5' 3.25\").    Weight as of 3/15/23: 74.9 kg (165 lb 3.2 oz).    Wt Readings from Last 4 Encounters:   03/15/23 74.9 kg (165 lb 3.2 oz)   03/13/23 76 kg (167 lb 9.6 oz)   02/21/23 74.3 kg (163 lb 12.8 oz)   12/13/22 75.3 kg (166 lb)       History   Smoking Status     Some Days     Packs/day: 0.50     Types: Cigarettes   Smokeless Tobacco     Never       Weight at time of initial consult: 165 pounds    HgbA1C:   Lab Results   Component Value Date    A1C 5.8 02/07/2022       Lab Results   Component Value Date    ALBUMIN 4.1 02/21/2023    ALBUMIN 3.3 02/07/2022    ALBUMIN 3.5 08/04/2016       Is this a re-occurrence of hernia: No    Is mesh in place: N/A  Has there been multiple abdominal surgeries/previous repair: Yes    Fistulas: No  Is hernia is greater than 5 cm: No  Multiple hernias present: No  Is patient immunosuppressed: No  PAST MEDICAL HISTORY:   Past Medical History:   Diagnosis Date     Cervical high risk HPV (human papillomavirus) test positive 08/04/2016    type 16     Chickenpox      Chlamydia trachomatis infection of unspecified site      POONAM 1 1998     Depressive disorder      Depressive disorder, not elsewhere classified      History of colposcopy with cervical biopsy 09/20/2016    Bx - POONAM 1, ECC - negative     Other abnormal heart sounds as child    Murmur - resolved     Polycystic ovaries     prior to gastic bypass in June, 2005, pt carried the diagnosis of PCOS/anovuolation     Streptococcus infection in conditions classified elsewhere and of " unspecified site, group B     In pregnancy       PAST SURGICAL HISTORY:   Past Surgical History:   Procedure Laterality Date      SECTION  ,       SECTION, TUBAL LIGATION, COMBINED  2013    Procedure: COMBINED  SECTION, TUBAL LIGATION;   Section, Tubal Ligation;  Surgeon: Paul Allen MD;  Location: [facility] OR     GASTRIC BYPASS  2005     GASTRIC BYPASS       HC REMOVAL GALLBLADDER  2006    Dr. Inman @ [facility]     HC REPAIR ING HERNIA,5+Y/O,REDUCIBL  age 20 ()    [facility]     LAPAROTOMY EXPLORATORY N/A 2022    Procedure: Exploratory LAPAROTOMY, Superior Mesenteric Artery Thrombectomy, Retrograde Superior Mesenteric Artery Stenting;  Surgeon: Balta Ernst MD;  Location: [facility] OR     LAPAROTOMY EXPLORATORY N/A 09/10/2022    Procedure: LAPAROTOMY, SMALL BOWEL RESECTION, INCISIONAL WOUND CLOSURE;  Surgeon: Tariq Lancaster MD;  Location: [facility] OR     LAPAROTOMY EXPLORATORY N/A 2022    Procedure: EXPLORATORY LAPAROTOMY, EVACUATION OF RECTUS SHEATH HEMATOMA, ABDOMINAL WASHOUT;  Surgeon: Nathan Barber MD;  Location: [facility] OR     PICC DOUBLE LUMEN PLACEMENT Right 09/10/2022    5FR DL PICC. Basilic vein.     SURGICAL HISTORY OF -       PE tubes     SURGICAL HISTORY OF -   10/2006    removal of pannus ans breast lift     TONSILLECTOMY  1981     [facility]  DELIVERY ONLY  1998    Failure to progress     [facility] NONSPECIFIC PROCEDURE  2006    Laser ablation genital condyloma/ Bx labial lesion     [facility] RAD RESEC TONSIL/PILLARS         Patient Plan:    CT reviewed: YES    *  No weight loss needed. Must maintain weight- no gain  *  Nictotine/Continine Test needed: Yes   *  Smoking cessation required prior to considering hernia repair.  *  Schedule clinic appointment with Dr. Becerra once she has stopped smoking to discuss robotic hernia repair. Lab visit same day  *  Team will reach out to Vascular once ready to schedule  surgery to see if any procedures are needed under same anesthetic.    Patient will be contacted by  Lenora BEARD RN regarding plan of care.

## 2023-03-30 LAB — NONINV COLON CA DNA+OCC BLD SCRN STL QL: POSITIVE

## 2023-04-03 ENCOUNTER — MYC MEDICAL ADVICE (OUTPATIENT)
Dept: FAMILY MEDICINE | Facility: CLINIC | Age: 48
End: 2023-04-03

## 2023-04-03 DIAGNOSIS — Z12.11 SPECIAL SCREENING FOR MALIGNANT NEOPLASMS, COLON: ICD-10-CM

## 2023-04-03 DIAGNOSIS — R19.5 POSITIVE COLORECTAL CANCER SCREENING USING COLOGUARD TEST: Primary | ICD-10-CM

## 2023-04-11 ENCOUNTER — PATIENT OUTREACH (OUTPATIENT)
Dept: SURGERY | Facility: CLINIC | Age: 48
End: 2023-04-11
Payer: COMMERCIAL

## 2023-04-11 DIAGNOSIS — K43.9 VENTRAL HERNIA WITHOUT OBSTRUCTION OR GANGRENE: Primary | ICD-10-CM

## 2023-04-11 NOTE — PROGRESS NOTES
Patient calling into the General Surgery nurse line to report that she has quit smoking for 2 weeks. Appointment scheduled with Dr. Becerra on 5/3/23. Lab appointment scheduled for same day for cotinine urine test (ordered per Hernia Conference recommendations).

## 2023-04-13 ENCOUNTER — OFFICE VISIT (OUTPATIENT)
Dept: VASCULAR SURGERY | Facility: CLINIC | Age: 48
End: 2023-04-13
Payer: COMMERCIAL

## 2023-04-13 ENCOUNTER — ANCILLARY PROCEDURE (OUTPATIENT)
Dept: CT IMAGING | Facility: CLINIC | Age: 48
End: 2023-04-13
Attending: SURGERY
Payer: COMMERCIAL

## 2023-04-13 VITALS — OXYGEN SATURATION: 97 % | SYSTOLIC BLOOD PRESSURE: 119 MMHG | DIASTOLIC BLOOD PRESSURE: 81 MMHG | HEART RATE: 80 BPM

## 2023-04-13 DIAGNOSIS — I74.10 AORTIC THROMBUS (H): Primary | ICD-10-CM

## 2023-04-13 DIAGNOSIS — I74.10 AORTIC THROMBUS (H): ICD-10-CM

## 2023-04-13 DIAGNOSIS — K55.069 SUPERIOR MESENTERIC ARTERY THROMBOSIS (H): ICD-10-CM

## 2023-04-13 PROCEDURE — 99213 OFFICE O/P EST LOW 20 MIN: CPT | Mod: GC | Performed by: STUDENT IN AN ORGANIZED HEALTH CARE EDUCATION/TRAINING PROGRAM

## 2023-04-13 PROCEDURE — 74174 CTA ABD&PLVS W/CONTRAST: CPT | Mod: GC | Performed by: STUDENT IN AN ORGANIZED HEALTH CARE EDUCATION/TRAINING PROGRAM

## 2023-04-13 PROCEDURE — 71275 CT ANGIOGRAPHY CHEST: CPT | Mod: GC | Performed by: STUDENT IN AN ORGANIZED HEALTH CARE EDUCATION/TRAINING PROGRAM

## 2023-04-13 RX ORDER — IOPAMIDOL 755 MG/ML
90 INJECTION, SOLUTION INTRAVASCULAR ONCE
Status: COMPLETED | OUTPATIENT
Start: 2023-04-13 | End: 2023-04-13

## 2023-04-13 RX ADMIN — IOPAMIDOL 90 ML: 755 INJECTION, SOLUTION INTRAVASCULAR at 07:35

## 2023-04-13 NOTE — NURSING NOTE
Chief Complaint   Patient presents with     Follow Up     6 month follow up          Vitals were taken and medications reconciled.     Michael Marshall, Visit Facilitator    12:47 PM

## 2023-04-13 NOTE — PROGRESS NOTES
Vascular Surgery Clinic Note    Ms. Bridgette Veras presents for follow-up after SMA revascularization in September 2022.    She has a history of prior Raj-en-Y gastric bypass, presented with acute mesenteric ischemia and large aortic thrombus and underwent exploratory laparotomy, SMA thrombectomy, and SMA stent on 9/8/2022. Segment of ischemic ileum was resected by general surgery. At the time of her operation she had a large aortic thrombus of unclear etiology which has largely resolved. Hematology has not identified a hypercoagulable diagnosis and have recommended life-long anticoagulation with Eliquis and aspirin, which she continues to take.  She quit smoking 2.5 weeks ago. Recently seen in general surgery clinic for symptomatic incisional hernia with plans for elective repair once she successfully quits smoking. She notes a recently positive Cologuard test with plans for colonoscopy in the near future. She has been generally doing well though does have occasional central abdominal pain with bilateral radiating laterally bilaterally. This is random, infrequent, and not associated with meals.    /81 (BP Location: Left arm, Patient Position: Sitting, Cuff Size: Adult Regular)   Pulse 80   LMP 02/15/2023   SpO2 97%       Exam  Sitting chair, awake, alert, appropriate  Nonlabored breathing on room air  Abdomen soft, nontender, nondistended, infraumbilical incisional hernia defect easily palpated and spontaneously reduced with lying supine.  well-healed midline surgical scare  Palpable femoral pulses    We reviewed her CTA today which shows unchanged thrombus in the descending thoracic aorta, unchanged from last CTA and substantially smaller than her initial imaging in September. Unchanged chronic celiac artery occlusion, widely patent SMA stent, and patent renal and iliac arteries.    Assessment: 47 y.o. female with h/o RYGB, acute mesenteric ischemia s/p SMA thrombectomy, SMA stent, and small bowel  resection in Sept 2022. No hypercoagulable diagnosis, remains on Eliquis and aspirin as per hematology. She has quit smoking now and plans to undergo incisional hernia repair in near future.    Plan:  - continue Eliquis, aspirin, and statin. Per hematology she requires life-long anticoagulation.  - congratulated her on successful smoking cessation  - plans for elective incisional hernia repair, can hold Eliquis for 48-72 hr pre-op, will defer to general surgery as to their preferences for stopping Eliquis pre-procedure.  - follow-up in vascular surgery clinic in 6 months with repeat CTA chest abdomen pelvis.    40 minutes spent on encounter.    Discussed with staff, Dr. Barber.    Umesh Penn MD

## 2023-04-13 NOTE — PATIENT INSTRUCTIONS
Thank you so much for choosing us for your care. It was a pleasure to see you at the vascular clinic today.     Follow-up recommendations: We will see you back in 6 months. Please do not hesitate to reach out to us in the meantime with any concerns. Our schedulers will get in touch with you to set up your follow-up visit.    Additional testing/imaging ordered today: Repeat CT scan in 6 months.      Our scheduling team will get in touch with you to set up any follow-up testing/imaging and/or appointments. Please be aware that any testing/imaging recommended today will need to completed prior to your next visit with the provider. If testing/imaging is not completed prior to your next visit, your visit may be rescheduled.        If you have any questions, please contact our clinic directly at [phone] and ask for the nurse. We also encourage the use of [employer] to communicate with your HealthCare Provider.      If you have an urgent need after business hours (8:00 am to 4:30 pm) please call [phone], option 4, and ask for the vascular attending on call. For non-urgent after hours needs, please call the vascular clinic at [phone]. For scheduling needs, please call our clinic directly at [phone].

## 2023-05-01 ENCOUNTER — OFFICE VISIT (OUTPATIENT)
Dept: OBGYN | Facility: CLINIC | Age: 48
End: 2023-05-01
Payer: COMMERCIAL

## 2023-05-01 VITALS
RESPIRATION RATE: 18 BRPM | TEMPERATURE: 97.3 F | DIASTOLIC BLOOD PRESSURE: 80 MMHG | HEIGHT: 64 IN | BODY MASS INDEX: 29.71 KG/M2 | SYSTOLIC BLOOD PRESSURE: 125 MMHG | WEIGHT: 174 LBS | HEART RATE: 83 BPM

## 2023-05-01 DIAGNOSIS — B97.7 HIGH RISK HPV INFECTION: Primary | ICD-10-CM

## 2023-05-01 LAB
HCG UR QL: NEGATIVE
INTERNAL QC OK POCT: NORMAL
POCT KIT EXPIRATION DATE: NORMAL
POCT KIT LOT NUMBER: NORMAL

## 2023-05-01 PROCEDURE — 57454 BX/CURETT OF CERVIX W/SCOPE: CPT | Performed by: STUDENT IN AN ORGANIZED HEALTH CARE EDUCATION/TRAINING PROGRAM

## 2023-05-01 PROCEDURE — 81025 URINE PREGNANCY TEST: CPT | Performed by: STUDENT IN AN ORGANIZED HEALTH CARE EDUCATION/TRAINING PROGRAM

## 2023-05-01 PROCEDURE — 88305 TISSUE EXAM BY PATHOLOGIST: CPT | Performed by: PATHOLOGY

## 2023-05-01 NOTE — PROGRESS NOTES
[facility] Colpscopy Procedure Note    Bridgette Veras  1975  [id number]    Pap Hx:   8/4/16 NIL Pap, +HR HPV 16. Plan colp  9/20/16 [place] Bx POONAM 1, ECC Negative. Plan cotest in 1 year.   9/25/17 NIL Pap, +HR HPV 16. Plan colp  11/8/18 Lost to follow-up for pap tracking  2/7/22 NIL pap, +HR HPV 16. Plan: colposcopy due before 5/7/22  3/30/22 Colpo ECC no endocervical tissue identified, small squamous atypia. Plan: cotest due 2/7/23 per provider  2/21/23 NIL pap, +HR HPV 16.     The patient was counseled on the risks (including risk of pain and bleeding), benefits (diagnosis of lesion severity and depth in invasion). Verbal and written consent were obtained.  A UPT was negative prior to the procedure.    Technique: The patient was placed in the dorsal lithotomy position.  A large Grave's speculum was placed in the vagina and the cervix visualized. Acetic acid was applied to the upper vagina and cervix and then visualized using the colposcope. No acetowhite staining was noted. Lugol's solution was also placed on the cervix with no decreased up take noted. The squamocolumnar junction and transformation zone were NOT fully visualized and colposcopy was NOT satisfactory. Colposcopic impression: NOT satisfactory colposcopy and no abnormalities noted.   Biopsies were taken at 7 and 12 o'clock. Endocervical curettage was performed. Silver nitrate used to assure excellent hemostasis.  The patient tolerated the procedure well.  She was given post op instructions which included activity and pelvic restrictions.      A/P: s/p colposcopy procedure per ASCCP guidelines.   Will MyChart to discuss results with patient.     Lynne Batista MD  Obstetrics and Gynecology  [facility]   05/01/2023

## 2023-05-01 NOTE — NURSING NOTE
"Initial /80 (BP Location: Left arm, Patient Position: Chair, Cuff Size: Adult Regular)   Pulse 83   Temp 97.3  F (36.3  C) (Tympanic)   Resp 18   Ht 1.613 m (5' 3.5\")   Wt 78.9 kg (174 lb)   BMI 30.34 kg/m   Estimated body mass index is 30.34 kg/m  as calculated from the following:    Height as of this encounter: 1.613 m (5' 3.5\").    Weight as of this encounter: 78.9 kg (174 lb). .      "

## 2023-05-02 ENCOUNTER — PATIENT OUTREACH (OUTPATIENT)
Dept: SURGERY | Facility: CLINIC | Age: 48
End: 2023-05-02
Payer: COMMERCIAL

## 2023-05-02 LAB
PATH REPORT.COMMENTS IMP SPEC: NORMAL
PATH REPORT.FINAL DX SPEC: NORMAL
PATH REPORT.FINAL DX SPEC: NORMAL
PATH REPORT.GROSS SPEC: NORMAL
PATH REPORT.GROSS SPEC: NORMAL
PATH REPORT.MICROSCOPIC SPEC OTHER STN: NORMAL
PATH REPORT.MICROSCOPIC SPEC OTHER STN: NORMAL
PATH REPORT.RELEVANT HX SPEC: NORMAL
PATH REPORT.RELEVANT HX SPEC: NORMAL
PHOTO IMAGE: NORMAL
PHOTO IMAGE: NORMAL

## 2023-05-02 NOTE — PROGRESS NOTES
Patient Telephone Reminder Call    Date of call:  05/02/23  Phone numbers:  Cell number on file:    Telephone Information:   Mobile [phone]       Reached patient/confirmed appointment:  No - left message:   on voicemail  Appointment with:   Dr. Herminio Becerra  Reason for visit:  Hernia, urine nicotine  Remind patient that this visit is a consultation only, NO procedure:  No  Can this visit be changed to a video visit:  No

## 2023-05-03 ENCOUNTER — OFFICE VISIT (OUTPATIENT)
Dept: SURGERY | Facility: CLINIC | Age: 48
End: 2023-05-03
Payer: COMMERCIAL

## 2023-05-03 ENCOUNTER — LAB (OUTPATIENT)
Dept: LAB | Facility: CLINIC | Age: 48
End: 2023-05-03
Payer: COMMERCIAL

## 2023-05-03 VITALS
DIASTOLIC BLOOD PRESSURE: 75 MMHG | OXYGEN SATURATION: 97 % | BODY MASS INDEX: 29.4 KG/M2 | HEIGHT: 64 IN | HEART RATE: 66 BPM | SYSTOLIC BLOOD PRESSURE: 112 MMHG | WEIGHT: 172.2 LBS

## 2023-05-03 DIAGNOSIS — K43.9 VENTRAL HERNIA WITHOUT OBSTRUCTION OR GANGRENE: Primary | ICD-10-CM

## 2023-05-03 DIAGNOSIS — K43.9 VENTRAL HERNIA WITHOUT OBSTRUCTION OR GANGRENE: ICD-10-CM

## 2023-05-03 PROCEDURE — 99213 OFFICE O/P EST LOW 20 MIN: CPT | Performed by: SURGERY

## 2023-05-03 PROCEDURE — 99000 SPECIMEN HANDLING OFFICE-LAB: CPT | Performed by: PATHOLOGY

## 2023-05-03 PROCEDURE — 80323 ALKALOIDS NOS: CPT | Mod: 90 | Performed by: PATHOLOGY

## 2023-05-03 RX ORDER — CEFAZOLIN SODIUM 2 G/50ML
2 SOLUTION INTRAVENOUS
Status: CANCELLED | OUTPATIENT
Start: 2023-05-03

## 2023-05-03 RX ORDER — CEFAZOLIN SODIUM 2 G/50ML
2 SOLUTION INTRAVENOUS SEE ADMIN INSTRUCTIONS
Status: CANCELLED | OUTPATIENT
Start: 2023-05-03

## 2023-05-03 ASSESSMENT — PAIN SCALES - GENERAL: PAINLEVEL: NO PAIN (0)

## 2023-05-03 NOTE — NURSING NOTE
Pre and Post op Patient Education/Teaching Flowsheet  Relevant Diagnosis:  Ventral Hernia (K43.9)  Teaching Topic:  Pre and post op teaching  Person(s) Involved in teaching:  Patient     Motivation Level:  Asks Questions:  Yes  Eager to Learn:  Yes  Cooperative:  Yes  Receptive (willing/able to accept information):  Yes  Any cultural factors/Orthodox beliefs that may influence understanding or compliance?  No    Patient/caregiver/family demonstrates understanding of the following:  Reason for the appointment, diagnosis, and treatment plan:  Yes  Patient demonstrates understanding of the following:  Pre-op bowel prep:  N/A, not needed per Dr. Becerra  Post-op pain management recommendations (medications, ice compress, binder/athletic supporter (if applicable), etc.:  Yes  Inguinal hernia patients:  Post-op urinary retention- discussed signs/symptoms and visit to ER for Soto catheter placement and to stay in place for at least 48 hours:  NA  Restrictions:  Yes  Medications to take the day of surgery:  Per PCP  Blood thinner medications discussed and when to stop (if applicable):  Yes. Patient will be contacted via "[employer] once Dr. Becerra hears back from Hematology regarding how long patient should hold her Eliquis.  Wound care:  Yes  Diabetes medication management (if applicable):  Per PCP  Which situations necessitate calling provider and whom to contact:  Discussed how to contact the hospital, nurse, and clinic scheduling staff if necessary      Date and time of surgery:  TBD  Location of surgery: [facility] [place]- 5th Floor  History and Physical and any other testing necessary prior to surgery:  Yes  Required time line for completion of History and Physical and any pre-op testing:  Yes  Discuss need for someone to drive patient home and stay with them for 24 hours:  Yes  Pre-op showering/scrub information with Surgical Scrub:  Yes  NPO Guidelines:  NPO per Anesthesia  Guidelines    Infection Prevention: Patient demonstrates understanding of the following:  Patient instructed on hand hygiene:  Yes  Surgical procedure site care will be taught and will be reviewed at the time of discharge  Signs and symptoms of infection taught:  Yes  Wound care reviewed and will be taught at the time of discharge  Central venous catheter care will be taught at the time of discharge (if applicable)    Post-op follow-up:  Instructional materials used/given/mailed:  Surgical logistics, post op teaching sheet, and surgical scrub

## 2023-05-03 NOTE — PROGRESS NOTES
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 Since then she has been on Eliquis and aspirin and doing well from a vascular perspective. She does, however, have an incisional hernia evaluated by Dr. Lancaster in clinic in March but at the time she was still smoking. Dr Lancaster's impression was that she would do quite well with robotic-assisted hernia repair once she had stopped using tobacco.     Today, she reports that she is doing well, eating normally, she has occasional constipation but doesn't strain to defecate. Her hernia feels like it's getting bigger, and it's occasionally uncomfortable but always reducible.     Exam  BMI 30  Abdomen soft, non-tender, non-distended  Midline ventral hernia soft, minimally tender, easily reducible    Imaging  CT abdomen recently performed, she has what appears to be two ventral hernias, or one large irregularly shaped hernia defect.    Impression  Good candidate for robotic-assisted ventral hernia repair if she is able to continue not smoking and does not gain significant weight in the interim. Vascular surgery team does not have any significant plans for the near future, they are happy with how things are going after the SMA stent.    Will coordinate with hematology regarding perioperative anticoagulation plan, and schedule robotic assisted hernia repair at the patient's convenience.

## 2023-05-03 NOTE — PATIENT INSTRUCTIONS
You met with Dr. Herminio Becerra.      Today's visit instructions:    Reminder: Surgery Requirements  Your surgery will be at [facility] [place]- 5th Floor  You will need to arrive 1.5 hours early.  You will need someone to drive you home (over 18 years old) and stay with you for 24 hours after the procedure.  You will need a preop physical with PAC (pre-anesthesia care) within 30 days of surgery- closer is always better.  Stop any blood thinners, vitamins, minerals, or herbal supplements 5 days before surgery.  If you are taking a prescribed blood thinner please let us know for specific instructions.  Fasting- a nurse from [facility] will call you 1-2 days before surgery to confirm your procedure and tell you when to stop eating and drinking.   Wash with the soap (Antibacterial, Dial Complete Foam, Hibiclense, or soap given/mailed from the clinic) the night before surgery and morning of surgery. See instructions in the Surgery Packet.  If you would like a procedure estimate please call Cost of Care at [phone].       If you have questions please contact Lenora RN or Nancy RN during regular clinic hours, Monday through Friday 7:30 AM - 4:00 PM, or you can contact us via [employer] at anytime.       If you have urgent needs after-hours, weekends, or holidays please call the hospital at [phone] and ask to speak with our on-call General Surgery Team.    Appointment [phone]  Nurse Advice (Lenora or Nancy): [phone]   Surgery Scheduler (Kasie): [phone]  Fax: [phone]    After Your Robotic Ventral Hernia Repair       Incision care   You may take a shower the day after surgery. Carefully wash your incision with soap and water. Do not submerge yourself in water (bath, whirlpool, hot tub, pool, lake) for 14 days after surgery.   Remove the bandage the day after surgery, but leave the medical tape (Steri-Strips) or glue in place. These will loosen and fall off on  their own 1-2 weeks after surgery.     Always wash your hands before touching your incisions or removing bandages.   It is not unusual to form a collection of fluid or blood under your incision that may feel firm or squishy- it can take several weeks to months for your body to reabsorb it.  At times, it may even drain.  If that should happen keep the area clean with soap, water,  and cover with a clean gauze dressing. You can change this daily or as needed.     Other medicines   Wait to start aspirin or blood thinners until the day after surgery. You can continue your regular medicines at your normal time the day after surgery.    Your pain medicine may cause constipation (hard, dry stools). To help with this, take the stool softener your doctor gave you or an over-the-counter stool softener or laxative. You can stop taking this when you are no longer taking pain medicine and your bowel movements are back to normal.      For pain or discomfort  Take the narcotic pain medicine your doctor gave you as needed and as instructed on the bottle. If you prefer to use over-the-counter medication, use acetaminophen (Tylenol) or ibuprofen (Advil, Motrin) as instructed on the box. Do not take Tylenol if it is in your narcotic pain medication.    Use an ice pack on your abdomen (belly) for 20 minutes at a time as needed for the first 24 hours. Be sure to protect your skin by putting a cloth between the ice pack and your skin.   After 24 hours you can switch to heat for 20 minutes as needed. Be sure to protect your skin by putting a cloth between the heat pack and your skin.    You may experience right shoulder pain after surgery which will go away 1-4 days after your procedure.  This is related to the gas that was used to inflate your abdomen, it gets trapped between your liver and diaphragm. Please walk frequently and apply a heating pad to the area protecting your skin with a barrier such as a towel.       Activities   No  driving until you feel it s safe to do so. Don t drive while taking narcotic pain medicine.   Don t lift anything heavier than 20 pounds for 3 to 4 weeks after surgery.      Special equipment   Wear your abdominal binder for the first 30 days after surgery. You don t have to wear it when you re sleeping. You can wear it longer than 30 days if you wish.        Diet   You can eat your regular meals after surgery.          When to call the doctor   Call your doctor if you have:   A fever above 101 F (38.3 C) (taken under the tongue), or a fever or chills lasting more than a day.   Redness at the incision site.   Any fluid or blood draining from the incision, especially if it smells bad. Severe pain that doesn t improve with pain medicine.      We will call you 2 to 4 days after surgery to review this handout, answer questions and help arrange after-surgery care. If you have questions or concerns, please call [phone] during regular office hours. If you need to call after business hours, call [phone] and ask to page the surgeon on-call.            Transversus Abdominis Plane (TAP) Pain Block      What is a TAP block?   A TAP block can help you manage your pain after surgery. TAP stands for transversus abdominis plane, which is a muscle layer in your abdomen (belly). The TAP block uses numbing medicine similar to Novocaine to block pain near the site of your surgery.       Why get a TAP block?   To better manage your pain after surgery. A tap block will help keep the pain from getting severe and out of control.   To block pain signals from the nerve, which helps decrease pain after surgery.   To help you sleep, easily breathe deeply, walk and visit with others.      How is it done?   You will lie still on a table. We will use an ultrasound machine to help us see the correct muscle layer of your abdomen. Then, we ll use a needle to inject the medicine. We may also give you some sleep medicine to lessen the pain  of the injection.       The procedure takes between 5 and 15 minutes. It is usually done right before surgery, but will sometimes be after. It depends on your surgery and care needs.      What can I expect?   You may feel numbness, tingling or a heaviness in your abdomen.    You may have pain control up to 72 hours after surgery.   The TAP block may not lessen all of your surgery pain. But most patients feel 50 to 75 percent less pain than without the block.       Tell your nurse if you have:   Numbness or tingling in areas other than where the injection was   Blurry vision   Ringing in your ears   A metallic taste in your mouth

## 2023-05-03 NOTE — LETTER
5/3/2023       RE: Bridgette Veras  [address]  [place] [address]       Dear Colleague,    Thank you for referring your patient, Bridgette Veras, to the [facility]AL SUR[facility] [place] at [facility]. Please see a copy of my visit note below.    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 Since then she has been on Eliquis and aspirin and doing well from a vascular perspective. She does, however, have an incisional hernia evaluated by Dr. Lancaster in clinic in March but at the time she was still smoking. Dr Lancaster's impression was that she would do quite well with robotic-assisted hernia repair once she had stopped using tobacco.     Today, she reports that she is doing well, eating normally, she has occasional constipation but doesn't strain to defecate. Her hernia feels like it's getting bigger, and it's occasionally uncomfortable but always reducible.     Exam  BMI 30  Abdomen soft, non-tender, non-distended  Midline ventral hernia soft, minimally tender, easily reducible    Imaging  CT abdomen recently performed, she has what appears to be two ventral hernias, or one large irregularly shaped hernia defect.    Impression  Good candidate for robotic-assisted ventral hernia repair if she is able to continue not smoking and does not gain significant weight in the interim. Vascular surgery team does not have any significant plans for the near future, they are happy with how things are going after the SMA stent.    Will coordinate with hematology regarding perioperative anticoagulation plan, and schedule robotic assisted hernia repair at the patient's convenience.     Attestation signed by Herminio Becerra MD at 5/8/2023 10:48 AM:  I saw and examined Bridgette Veras in clinic- she has  a large incisional hernia with multiple fascial defects.  Widest area about 5-6cm.  She has stopped smoking (confirmed on bloodwork today).    We discussed the MIS approach and all questions answered.  Will plan for OR for robotic assisted hernia repair.  I have reached out to Heme to confirm our anticoagulation plans (hold for 2 days preop without bridging and will continue aspirin throughout).          Again, thank you for allowing me to participate in the care of your patient.      Sincerely,    Herminio Becerra MD

## 2023-05-03 NOTE — NURSING NOTE
"Chief Complaint   Patient presents with     RECHECK     Hernia consult       Vitals:    05/03/23 1114   BP: 112/75   BP Location: Left arm   Patient Position: Sitting   Cuff Size: Adult Regular   Pulse: 66   SpO2: 97%   Weight: 78.1 kg (172 lb 3.2 oz)   Height: 1.613 m (5' 3.5\")       Body mass index is 30.03 kg/m .                          Josué Boss, EMT    "

## 2023-05-04 ENCOUNTER — TELEPHONE (OUTPATIENT)
Dept: SURGERY | Facility: CLINIC | Age: 48
End: 2023-05-04
Payer: COMMERCIAL

## 2023-05-04 PROBLEM — K43.9 VENTRAL HERNIA WITHOUT OBSTRUCTION OR GANGRENE: Status: ACTIVE | Noted: 2023-05-04

## 2023-05-04 NOTE — TELEPHONE ENCOUNTER
Patient is scheduled for surgery with Dr. Becerra    Spoke with: Bridgette    Date of Surgery: 6/9/2023    Location: [facility]    Informed patient they will need an adult  Yes    Pre op with Provider n/a    H&P: Scheduled with PAC on 5/15/2023 at 3:45pm via video    Additional imaging/appointments: n/a    Surgery packet: To be sent via [employer]     Additional comments: n/a        Kasie Redding on 5/4/2023 at 9:14 AM

## 2023-05-05 NOTE — TELEPHONE ENCOUNTER
FUTURE VISIT INFORMATION      SURGERY INFORMATION:    Date: 6/9/23    [facility]    Surgeon:  Herminio Becerra MD    Anesthesia Type:  general    Procedure: HERNIORRHAPHY, VENTRAL, ROBOT-ASSISTED    Consult: ov 5/3    RECORDS REQUESTED FROM:       Primary Care Provider: Cecy Morillo DO- [facility]    Pertinent Medical History: Aortic thrombosis

## 2023-05-06 LAB
ANABASINE UR-MCNC: <5 NG/ML
COTININE UR-MCNC: <15 NG/ML
NICOTINE UR-MCNC: <15 NG/ML
TRANS-3-OH-COTININE UR-MCNC: <50 NG/ML

## 2023-05-12 RX ORDER — CHOLECALCIFEROL (VITAMIN D3) 50 MCG
1 TABLET ORAL DAILY
COMMUNITY

## 2023-05-12 NOTE — PHARMACY - PREOPERATIVE ASSESSMENT CENTER
Anticoagulation Note - Preoperative Assessment Center (PAC) Pharmacist     Patient was interviewed on May 12, 2023 prior to scheduled PAC clinic appointment. The purpose of this note is to document the perioperative anticoagulation plan outlined by the providers caring for Bridgette Veras.     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       Resumption of anticoagulation after procedure will be based on surgery team assessment of bleeding risks and complications.  This plan may require re-assessment and modification by her primary team in the perioperative setting depending on patients clinical situation.      Isma Ibarra, PharmD  PAC Pharmacist  [phone]   May 12, 2023  10:43 AM

## 2023-05-12 NOTE — PHARMACY - PREOPERATIVE ASSESSMENT CENTER
Preoperative Assessment Center Medication History Note  Medication history completed on May 12, 2023 by this writer prior to patient's PAC appointment. See Epic admission navigator for prior to admission medications. Operating room staff will still need to confirm medications and last dose information on day of surgery.     Medication history interview sources  Patient via phone    Pertinent Information:   -Pt manages her own meds and is a reliable historian     Changes made to PTA medication list    Added: none    Deleted:   o varenicline (pt was successful in quitting smoking, last dose was ~6 weeks ago)  o Methocarbamol (pt no longer has any left from prior surgery in 9/2022)  o Oxycodone (pt no longer has any left from prior surgery in 9/2022)    Changed:   o Cyclobenzaprine - marked as Not Taking (pt requested to not remove this in case it needs to be refilled in future)    Allergies reviewed with patient and updates made in EHR: yes    -- No recent (within 30 days) course of antibiotics  -- No recent (within 30 days) course of systemic steroids  -- Reports being on blood thinning medications - apixaban 5 mg BID   -- Declines being on any other prescription or over-the-counter medications    Prior to Admission medications    Medication Sig Last Dose Taking? Auth Provider Long Term End Date   aspirin (ASA) 81 MG EC tablet Take 1 tablet (81 mg) by mouth daily Taking Yes Nathan Barber MD     atorvastatin (LIPITOR) 20 MG tablet Take 1 tablet (20 mg) by mouth daily Taking Yes Cecy Morillo,  Yes    cyanocolbalamin (VITAMIN  B-12) 500 MCG tablet Take 500 mcg by mouth daily  Taking Yes Reported, Patient     ELIQUIS ANTICOAGULANT 5 MG tablet Take 1 tablet (5 mg) by mouth 2 times daily for 360 days Taking Yes Cogan, Jacob, MD  3/7/24   ferrous sulfate (IRON) 325 (65 FE) MG tablet Take 325 mg by mouth daily (with breakfast)  Taking Yes Reported, Patient     Multiple Vitamin (MULTIVITAMIN PO) Take 1 tablet  by mouth daily Taking Yes Reported, Patient     ondansetron (ZOFRAN ODT) 4 MG ODT tab Take 1 tablet (4 mg) by mouth every 8 hours as needed for nausea Taking Yes Tariq Lancaster MD     saccharomyces boulardii (FLORASTOR) 250 MG capsule Take 1 capsule (250 mg) by mouth 2 times daily Taking Yes Tariq Lancaster MD     vitamin D3 (CHOLECALCIFEROL) 50 mcg (2000 units) tablet Take 1 tablet by mouth daily Taking Yes Unknown, Entered By History     cyclobenzaprine (FLEXERIL) 10 MG tablet Take 1 tablet (10 mg) by mouth nightly as needed for muscle spasms  Patient not taking: Reported on 5/12/2023 Not Taking  Stephan Flanagan MD          Medication History Completed By:  Isma Ibarra, PharmD  [facility] Pharmacist  [phone]

## 2023-05-15 ENCOUNTER — ANESTHESIA EVENT (OUTPATIENT)
Dept: SURGERY | Facility: CLINIC | Age: 48
End: 2023-05-15

## 2023-05-15 ENCOUNTER — VIRTUAL VISIT (OUTPATIENT)
Dept: SURGERY | Facility: CLINIC | Age: 48
End: 2023-05-15
Payer: COMMERCIAL

## 2023-05-15 ENCOUNTER — PRE VISIT (OUTPATIENT)
Dept: SURGERY | Facility: CLINIC | Age: 48
End: 2023-05-15

## 2023-05-15 DIAGNOSIS — K43.9 VENTRAL HERNIA WITHOUT OBSTRUCTION OR GANGRENE: ICD-10-CM

## 2023-05-15 DIAGNOSIS — Z01.818 PRE-OP EVALUATION: Primary | ICD-10-CM

## 2023-05-15 PROCEDURE — 99202 OFFICE O/P NEW SF 15 MIN: CPT | Mod: VID | Performed by: PHYSICIAN ASSISTANT

## 2023-05-15 ASSESSMENT — LIFESTYLE VARIABLES: TOBACCO_USE: 1

## 2023-05-15 ASSESSMENT — PAIN SCALES - GENERAL: PAINLEVEL: NO PAIN (0)

## 2023-05-15 ASSESSMENT — ENCOUNTER SYMPTOMS: SEIZURES: 0

## 2023-05-15 NOTE — PATIENT INSTRUCTIONS
Preparing for Your Surgery      Name:  Bridgette Veras   MRN:  [id number]   :  1975   Today's Date:  5/15/2023         Arriving for surgery:  Surgery date:  2023  Arrival time:  9:30 am    Restrictions due to COVID 19:    Effective 2022:  2 visitors may accompany patient and wait in the Surgery Waiting Room.  Please maintain social distance.  Masking is optional      parking is available for anyone with mobility limitations or disabilities. (Monday- Friday 7 am- 5 pm)    Please come to:    [facility]  [address] [place] [id number]    Please check in on the 5th floor at the Ambulatory Surgery Center.      What can I eat or drink?    -  You may eat and drink normally until 8 hours prior to arrival  time. (Until Midnight)  -  You may have clear liquids until 2 hours prior to arrival  time. (Until 7:30 am)    Examples of clear liquids:  Water  Clear broth  Juices (apple, white grape, white cranberry  and cider) without pulp  Noncarbonated, powder based beverages  (lemonade and Kalia-Aid)  Sodas (Sprite, 7-Up, ginger ale and seltzer)  Coffee or tea (without milk or cream)  Gatorade    --No alcohol for at least 24 hours before surgery.    Which medicines can I take?    Hold Multivitamins for 7 days before surgery.  Hold Supplements for 7 days before surgery.  Hold Ibuprofen (Advil, Motrin) for 1 day before surgery--unless otherwise directed by surgeon.  Hold Naproxen (Aleve) for 4 days before surgery.    Plan for Apixaban (Eliquis)---hold for 48 hours before surgery---take last dose on the evening of 23      -  DO NOT take the following medications the day of surgery:  Aspirin  Vitamin B 12  Ferrous sulfate  Saccharomyces Boulardi (Florastor)  Vitamin D      -  PLEASE TAKE the following medications the day of surgery:   Atorvastatin  Ondansetron if needed         How do I prepare myself?  - Please take 2 showers (one the night prior to surgery and one  the morning of surgery) using Scrubcare or Hibiclens soap.    Use this soap only from the neck to your toes.     Leave the soap on your skin for one minute--then rinse thoroughly.      You may use your own shampoo and conditioner. No other hair products.   - Please remove all jewelry and body piercings.  - No lotions, deodorants or fragrance.  - No makeup or fingernail polish.   - Bring your ID and insurance card.    -If you have a Deep Brain Stimulator, a Spinal Cord Stimulator, or any implanted Neuro Device, you must bring the remote to the Surgery Center.         ALL PATIENTS ARE REQUIRED TO HAVE A RESPONSIBLE ADULT TO DRIVE AND BE IN ATTENDANCE WITH THEM FOR 24 HOURS FOLLOWING SURGERY.     Covid testing policy as of 12/06/2022  Your surgeon will notify and schedule you for a COVID test if one is needed before surgery--please direct any questions or COVID symptoms to your surgeon      Questions or Concerns:    -For questions regarding the day of surgery, please contact the Ambulatory Surgery Center at [phone].    -If you have health changes between today and your surgery, please contact your surgeon.     - For questions after surgery, please contact your surgeon's office.

## 2023-05-15 NOTE — PROGRESS NOTES
Bridgette is a 47 year old who is being evaluated via a billable video visit.      How would you like to obtain your AVS? MyChart            HPI                 Review of Systems                 Physical Exam

## 2023-05-15 NOTE — H&P
Pre-Operative H & P     CC:  Preoperative exam to assess for increased cardiopulmonary risk while undergoing surgery and anesthesia.    Date of Encounter: 5/15/2023  Primary Care Physician:  Cecy Morillo     Reason for visit:   Encounter Diagnoses   Name Primary?     Ventral hernia without obstruction or gangrene      Pre-op evaluation Yes       HPI  Bridgette Veras is a 47 year old female who presents for pre-operative H & P in preparation for  Procedure Information     Date/Time: 6/9     Procedure: HERNIORRHAPHY, VENTRAL, ROBOT-ASSISTED    Anesthesia type: general    Pre-op diagnosis: ventral hernia    Location: [facility]    Providers: Hernan          Patient is being evaluated for comorbid conditions of GERD, s/p gastric bypass, depression    Ms. Veras has a history of idiopathic aortic thrombus with mesenteric ischemia. She is s/p laparotomy with bowel resection and SMA stenting. She now has an incisional hernia. She was seen by Dr. Becerra and is now scheduled for the above procedure.      History is obtained from the patient and chart review    Hx of abnormal bleeding or anti-platelet use: ASA, Eliquis    Menstrual history: Patient's last menstrual period was 05/10/2023 (exact date).     Past Medical History  Past Medical History:   Diagnosis Date     Cervical high risk HPV (human papillomavirus) test positive 08/04/2016    type 16     Chickenpox      Chlamydia trachomatis infection of unspecified site      POONAM 1 1998     Depressive disorder      Depressive disorder, not elsewhere classified      History of colposcopy with cervical biopsy 09/20/2016    Bx - POONAM 1, ECC - negative     Other abnormal heart sounds as child    Murmur - resolved     Polycystic ovaries     prior to gastic bypass in June, 2005, pt carried the diagnosis of PCOS/anovuolation     Streptococcus infection in conditions classified elsewhere and of unspecified site, group B 1998    In pregnancy       Past  Surgical History  Past Surgical History:   Procedure Laterality Date      SECTION  1998      SECTION, TUBAL LIGATION, COMBINED  2013    Procedure: COMBINED  SECTION, TUBAL LIGATION;   Section, Tubal Ligation;  Surgeon: Paul Allen MD;  Location: [facility] OR     GASTRIC BYPASS  2005     GASTRIC BYPASS       HC REMOVAL GALLBLADDER  2006    Dr. Inman @ [facility]     HC REPAIR ING HERNIA,5+Y/O,REDUCIBL  age 20 ()    [facility]     LAPAROTOMY EXPLORATORY N/A 2022    Procedure: Exploratory LAPAROTOMY, Superior Mesenteric Artery Thrombectomy, Retrograde Superior Mesenteric Artery Stenting;  Surgeon: Balta Ernst MD;  Location: [facility] OR     LAPAROTOMY EXPLORATORY N/A 09/10/2022    Procedure: LAPAROTOMY, SMALL BOWEL RESECTION, INCISIONAL WOUND CLOSURE;  Surgeon: Tariq Lancaster MD;  Location: [facility] OR     LAPAROTOMY EXPLORATORY N/A 2022    Procedure: EXPLORATORY LAPAROTOMY, EVACUATION OF RECTUS SHEATH HEMATOMA, ABDOMINAL WASHOUT;  Surgeon: Nathan Barber MD;  Location: [facility] OR     PICC DOUBLE LUMEN PLACEMENT Right 09/10/2022    5FR DL PICC. Basilic vein.     SURGICAL HISTORY OF -       PE tubes     SURGICAL HISTORY OF -   10/2006    removal of pannus ans breast lift     TONSILLECTOMY  1981     [facility]  DELIVERY ONLY  1998    Failure to progress     [facility] NONSPECIFIC PROCEDURE  2006    Laser ablation genital condyloma/ Bx labial lesion     [facility] RAD RESEC TONSIL/PILLARS         Prior to Admission Medications  Current Outpatient Medications   Medication Sig Dispense Refill     aspirin (ASA) 81 MG EC tablet Take 1 tablet (81 mg) by mouth daily 90 tablet 3     atorvastatin (LIPITOR) 20 MG tablet Take 1 tablet (20 mg) by mouth daily 90 tablet 3     cyanocolbalamin (VITAMIN  B-12) 500 MCG tablet Take 500 mcg by mouth daily        ELIQUIS ANTICOAGULANT 5 MG tablet Take 1 tablet (5 mg) by mouth 2 times daily for 360 days 60  tablet 11     ferrous sulfate (IRON) 325 (65 FE) MG tablet Take 325 mg by mouth daily (with breakfast)        Multiple Vitamin (MULTIVITAMIN PO) Take 1 tablet by mouth daily       ondansetron (ZOFRAN ODT) 4 MG ODT tab Take 1 tablet (4 mg) by mouth every 8 hours as needed for nausea 12 tablet 0     saccharomyces boulardii (FLORASTOR) 250 MG capsule Take 1 capsule (250 mg) by mouth 2 times daily 60 capsule 3     vitamin D3 (CHOLECALCIFEROL) 50 mcg (2000 units) tablet Take 1 tablet by mouth daily       cyclobenzaprine (FLEXERIL) 10 MG tablet Take 1 tablet (10 mg) by mouth nightly as needed for muscle spasms (Patient not taking: Reported on 2023) 30 tablet 0       Allergies  Allergies   Allergen Reactions     Codeine Nausea and Vomiting     Pt tolerated oxycodone .     Darvocet [Acetaminophen] Nausea and Vomiting     Pt tolerated oxycodone .     Propoxyphene Nausea       Social History  Social History     Socioeconomic History     Marital status:      Spouse name: Not on file     Number of children: 2     Years of education: Not on file     Highest education level: Not on file   Occupational History     Employer: UNEMPLOYED   Tobacco Use     Smoking status: Former     Packs/day: 0.50     Types: Cigarettes     Quit date: 3/27/2023     Years since quittin.1     Smokeless tobacco: Never   Vaping Use     Vaping status: Never Used     Passive vaping exposure: Yes   Substance and Sexual Activity     Alcohol use: Not Currently     Comment: rarely- quit with pregnancy     Drug use: Never     Sexual activity: Yes     Partners: Male     Birth control/protection: Surgical     Comment: Status post tubal ligation   Other Topics Concern     Parent/sibling w/ CABG, MI or angioplasty before 65F 55M? No   Social History Narrative     Not on file     Social Determinants of Health     Financial Resource Strain: Not on file   Food Insecurity: Not on file   Transportation Needs: Not on file   Physical Activity: Not  on file   Stress: Not on file   Social Connections: Not on file   Intimate Partner Violence: Not on file   Housing Stability: Not on file       Family History  Family History   Problem Relation Age of Onset     Hypertension Mother      Depression Mother      Asthma Father      Gastrointestinal Disease Father         PSC     Hypertension Brother      Depression Brother      Cancer Maternal Grandmother         Lung     Depression Maternal Grandmother      Alcohol/Drug Maternal Grandfather         alcohol     Cardiovascular Maternal Grandfather         MI     Respiratory Paternal Grandmother      Cerebrovascular Disease Paternal Grandfather      Diabetes Other      C.A.D. No family hx of      Breast Cancer No family hx of      Cancer - colorectal No family hx of      Anesthesia Reaction No family hx of      Deep Vein Thrombosis (DVT) No family hx of        Review of Systems  The complete review of systems is negative other than noted in the HPI or here.   Anesthesia Evaluation   Pt has had prior anesthetic.     No history of anesthetic complications       ROS/MED HX  ENT/Pulmonary:     (+) tobacco use, Past use,     Neurologic:  - neg neurologic ROS  (-) no seizures and no CVA   Cardiovascular: Comment: H/o aortic thrombus    (+) -----Taking blood thinners Previous cardiac testing   Echo: Date: 9/2022 Results:  Interpretation Summary  No intracardiac thrombus identified.  Left ventricular size, wall motion and function are normal. The ejection  fraction is 60-65%.  Global right ventricular function is normal.  There is no prior study for direct comparison.  Stress Test: Date: Results:    ECG Reviewed: Date: Results:    Cath: Date: Results:      METS/Exercise Tolerance: >4 METS Comment: Riding bike, stairs, etc   Hematologic:     (+) History of blood clots, pt is anticoagulated, history of blood transfusion, no previous transfusion reaction,     Musculoskeletal:  - neg musculoskeletal ROS     GI/Hepatic: Comment: S/p  gastric bypass   (-) GERD   Renal/Genitourinary:  - neg Renal ROS     Endo:     (+) Obesity (BMI 30),  (-) chronic steroid usage   Psychiatric/Substance Use:     (+) psychiatric history depression     Infectious Disease:  - neg infectious disease ROS     Malignancy:  - neg malignancy ROS     Other:            Virtual visit -  No vitals were obtained    Physical Exam  Constitutional: Awake, alert, cooperative, no apparent distress, and appears stated age.  HENT: Normocephalic  Respiratory: non labored breathing   Neurologic: Awake, alert, oriented to name, place and time.   Neuropsychiatric: Calm, cooperative. Normal affect.      Prior Labs/Diagnostic Studies   All labs and imaging personally reviewed     EKG/ stress test - if available please see in ROS above   Echo result w/o MOPS: Interpretation SummaryNo intracardiac thrombus identified.Left ventricular size, wall motion and function are normal. The ejectionfraction is 60-65%.Global right ventricular function is normal.There is no prior study for direct comparison.           View : No data to display.                  The patient's records and results personally reviewed by this provider.     Outside records reviewed from: Care Everywhere      Assessment      Bridgette Veras is a 47 year old female seen as a PAC referral for risk assessment and optimization for anesthesia.    Plan/Recommendations  Pt will be optimized for the proposed procedure.  See below for details on the assessment, risk, and preoperative recommendations    NEUROLOGY  - No history of TIA, CVA or seizure  -Post Op delirium risk factors:  No risk identified    ENT  - No current airway concerns.  Will need to be reassessed day of surgery.  Mallampati: Unable to assess  TM: Unable to assess    CARDIAC  -denies cardiac history or symptoms   - METS (Metabolic Equivalents)  Patient performs 4 or more METS exercise without symptoms            Total Score: 0      RCRI-Very low risk: Class 1 0.4%  "complication rate            Total Score: 0        PULMONARY  JOSE Low Risk            Total Score: 0      - Denies asthma or inhaler use  - Tobacco History      History   Smoking Status     Former     Packs/day: 0.50     Types: Cigarettes     Quit date: 3/27/2023   Smokeless Tobacco     Never       GI  -s/p gastric bypass  -h/o aortic thrombus that caused mesenteric ischemia. Using eliquis and ASA. No wwith ventral hernia. Plan per hematology and surgeon to continue ASA and hold eliquis x2 days.   PONV High Risk  Total Score: 3           1 AN PONV: Pt is Female    1 AN PONV: Patient is not a current smoker    1 AN PONV: Intended Post Op Opioids        /RENAL  - Baseline Creatinine WNL    ENDOCRINE    - BMI: Estimated body mass index is 30.03 kg/m  as calculated from the following:    Height as of 5/3/23: 1.613 m (5' 3.5\").    Weight as of 5/3/23: 78.1 kg (172 lb 3.2 oz).  Obesity (BMI >30)  - No history of Diabetes Mellitus    HEME  VTE Low Risk 0.5%            Total Score: 3    VTE: Pt history of VTE      - Coagulopathy second to Apixaban (Eliquis)  - Platelet disfunction second to Aspirin (Zahra, many others)    Different anesthesia methods/types have been discussed with the patient, but they are aware that the final plan will be decided by the assigned anesthesia provider on the date of service.    The patient is optimized for their procedure. AVS with information on surgery time/arrival time, meds and NPO status given by nursing staff. No further diagnostic testing indicated.    Please refer to the physical examination documented by the anesthesiologist in the anesthesia record on the day of surgery.    Video-Visit Details    Type of service:  Video Visit    Provider received verbal consent for a Video Visit from the patient? Yes     Originating Location (pt. Location): Home    Distant Location (provider location):  Off-site  Mode of Communication:  Video Conference via [employer]  On the day of service:     Prep " time: 11 minutes  Visit time: 9 minutes  Documentation time: 6 minutes  ------------------------------------------  Total time: 26 minutes      Yue Stein PA-C  Preoperative Assessment Center  [facility]  Clinic and Surgery Center  Phone: [phone]  Fax: [phone]

## 2023-05-24 ENCOUNTER — PATIENT OUTREACH (OUTPATIENT)
Dept: OBGYN | Facility: CLINIC | Age: 48
End: 2023-05-24
Payer: COMMERCIAL

## 2023-06-08 ENCOUNTER — ANESTHESIA EVENT (OUTPATIENT)
Dept: SURGERY | Facility: AMBULATORY SURGERY CENTER | Age: 48
End: 2023-06-08
Payer: COMMERCIAL

## 2023-06-09 ENCOUNTER — HOSPITAL ENCOUNTER (OUTPATIENT)
Facility: AMBULATORY SURGERY CENTER | Age: 48
Discharge: HOME | End: 2023-06-09
Attending: SURGERY
Payer: COMMERCIAL

## 2023-06-09 ENCOUNTER — ANESTHESIA (OUTPATIENT)
Dept: SURGERY | Facility: AMBULATORY SURGERY CENTER | Age: 48
End: 2023-06-09
Payer: COMMERCIAL

## 2023-06-09 VITALS
SYSTOLIC BLOOD PRESSURE: 105 MMHG | WEIGHT: 172.2 LBS | BODY MASS INDEX: 30.03 KG/M2 | OXYGEN SATURATION: 94 % | DIASTOLIC BLOOD PRESSURE: 64 MMHG | TEMPERATURE: 97.8 F | HEART RATE: 76 BPM | RESPIRATION RATE: 16 BRPM

## 2023-06-09 DIAGNOSIS — K43.9 VENTRAL HERNIA WITHOUT OBSTRUCTION OR GANGRENE: ICD-10-CM

## 2023-06-09 PROCEDURE — 49596 RPR AA HRN 1ST > 10 NCR/STRN: CPT | Mod: 22 | Performed by: SURGERY

## 2023-06-09 PROCEDURE — 81025 URINE PREGNANCY TEST: CPT | Performed by: PATHOLOGY

## 2023-06-09 PROCEDURE — 49596 RPR AA HRN 1ST > 10 NCR/STRN: CPT

## 2023-06-09 RX ORDER — SODIUM CHLORIDE, SODIUM LACTATE, POTASSIUM CHLORIDE, CALCIUM CHLORIDE 600; 310; 30; 20 MG/100ML; MG/100ML; MG/100ML; MG/100ML
INJECTION, SOLUTION INTRAVENOUS CONTINUOUS
Status: DISCONTINUED | OUTPATIENT
Start: 2023-06-09 | End: 2023-06-10 | Stop reason: HOSPADM

## 2023-06-09 RX ORDER — ONDANSETRON 2 MG/ML
4 INJECTION INTRAMUSCULAR; INTRAVENOUS EVERY 30 MIN PRN
Status: DISCONTINUED | OUTPATIENT
Start: 2023-06-09 | End: 2023-06-10 | Stop reason: HOSPADM

## 2023-06-09 RX ORDER — LIDOCAINE 40 MG/G
CREAM TOPICAL
Status: DISCONTINUED | OUTPATIENT
Start: 2023-06-09 | End: 2023-06-10 | Stop reason: HOSPADM

## 2023-06-09 RX ORDER — CEFAZOLIN SODIUM 2 G/50ML
2 SOLUTION INTRAVENOUS SEE ADMIN INSTRUCTIONS
Status: DISCONTINUED | OUTPATIENT
Start: 2023-06-09 | End: 2023-06-10 | Stop reason: HOSPADM

## 2023-06-09 RX ORDER — CEFAZOLIN SODIUM 2 G/50ML
2 SOLUTION INTRAVENOUS
Status: COMPLETED | OUTPATIENT
Start: 2023-06-09 | End: 2023-06-09

## 2023-06-09 RX ORDER — ONDANSETRON 4 MG/1
4 TABLET, ORALLY DISINTEGRATING ORAL EVERY 30 MIN PRN
Status: DISCONTINUED | OUTPATIENT
Start: 2023-06-09 | End: 2023-06-10 | Stop reason: HOSPADM

## 2023-06-09 RX ORDER — KETAMINE HYDROCHLORIDE 10 MG/ML
INJECTION INTRAMUSCULAR; INTRAVENOUS PRN
Status: DISCONTINUED | OUTPATIENT
Start: 2023-06-09 | End: 2023-06-09

## 2023-06-09 RX ORDER — LIDOCAINE HYDROCHLORIDE 20 MG/ML
INJECTION, SOLUTION INFILTRATION; PERINEURAL PRN
Status: DISCONTINUED | OUTPATIENT
Start: 2023-06-09 | End: 2023-06-09

## 2023-06-09 RX ORDER — HYDROMORPHONE HYDROCHLORIDE 1 MG/ML
0.4 INJECTION, SOLUTION INTRAMUSCULAR; INTRAVENOUS; SUBCUTANEOUS EVERY 5 MIN PRN
Status: DISCONTINUED | OUTPATIENT
Start: 2023-06-09 | End: 2023-06-10 | Stop reason: HOSPADM

## 2023-06-09 RX ORDER — HYDROMORPHONE HYDROCHLORIDE 1 MG/ML
0.2 INJECTION, SOLUTION INTRAMUSCULAR; INTRAVENOUS; SUBCUTANEOUS EVERY 5 MIN PRN
Status: DISCONTINUED | OUTPATIENT
Start: 2023-06-09 | End: 2023-06-10 | Stop reason: HOSPADM

## 2023-06-09 RX ORDER — OXYCODONE HYDROCHLORIDE 5 MG/1
5 TABLET ORAL EVERY 6 HOURS PRN
Qty: 12 TABLET | Refills: 0 | Status: SHIPPED | OUTPATIENT
Start: 2023-06-09 | End: 2023-06-12

## 2023-06-09 RX ORDER — OXYCODONE HYDROCHLORIDE 5 MG/1
5 TABLET ORAL
Status: COMPLETED | OUTPATIENT
Start: 2023-06-09 | End: 2023-06-09

## 2023-06-09 RX ORDER — FENTANYL CITRATE 50 UG/ML
INJECTION, SOLUTION INTRAMUSCULAR; INTRAVENOUS PRN
Status: DISCONTINUED | OUTPATIENT
Start: 2023-06-09 | End: 2023-06-09

## 2023-06-09 RX ORDER — BUPIVACAINE HYDROCHLORIDE 5 MG/ML
INJECTION, SOLUTION EPIDURAL; INTRACAUDAL PRN
Status: DISCONTINUED | OUTPATIENT
Start: 2023-06-09 | End: 2023-06-09 | Stop reason: HOSPADM

## 2023-06-09 RX ORDER — FENTANYL CITRATE 50 UG/ML
25 INJECTION, SOLUTION INTRAMUSCULAR; INTRAVENOUS EVERY 5 MIN PRN
Status: DISCONTINUED | OUTPATIENT
Start: 2023-06-09 | End: 2023-06-10 | Stop reason: HOSPADM

## 2023-06-09 RX ORDER — PROPOFOL 10 MG/ML
INJECTION, EMULSION INTRAVENOUS CONTINUOUS PRN
Status: DISCONTINUED | OUTPATIENT
Start: 2023-06-09 | End: 2023-06-09

## 2023-06-09 RX ORDER — ACETAMINOPHEN 325 MG/1
650 TABLET ORAL ONCE
Status: COMPLETED | OUTPATIENT
Start: 2023-06-09 | End: 2023-06-09

## 2023-06-09 RX ORDER — GLYCOPYRROLATE 0.2 MG/ML
INJECTION, SOLUTION INTRAMUSCULAR; INTRAVENOUS PRN
Status: DISCONTINUED | OUTPATIENT
Start: 2023-06-09 | End: 2023-06-09

## 2023-06-09 RX ORDER — PROPOFOL 10 MG/ML
INJECTION, EMULSION INTRAVENOUS PRN
Status: DISCONTINUED | OUTPATIENT
Start: 2023-06-09 | End: 2023-06-09

## 2023-06-09 RX ORDER — GABAPENTIN 300 MG/1
300 CAPSULE ORAL
Status: COMPLETED | OUTPATIENT
Start: 2023-06-09 | End: 2023-06-09

## 2023-06-09 RX ORDER — FENTANYL CITRATE 50 UG/ML
50 INJECTION, SOLUTION INTRAMUSCULAR; INTRAVENOUS EVERY 5 MIN PRN
Status: DISCONTINUED | OUTPATIENT
Start: 2023-06-09 | End: 2023-06-10 | Stop reason: HOSPADM

## 2023-06-09 RX ORDER — ONDANSETRON 2 MG/ML
INJECTION INTRAMUSCULAR; INTRAVENOUS PRN
Status: DISCONTINUED | OUTPATIENT
Start: 2023-06-09 | End: 2023-06-09

## 2023-06-09 RX ORDER — ACETAMINOPHEN 325 MG/1
975 TABLET ORAL ONCE
Status: COMPLETED | OUTPATIENT
Start: 2023-06-09 | End: 2023-06-09

## 2023-06-09 RX ADMIN — Medication 0.5 MG: at 14:47

## 2023-06-09 RX ADMIN — ONDANSETRON 4 MG: 2 INJECTION INTRAMUSCULAR; INTRAVENOUS at 15:10

## 2023-06-09 RX ADMIN — ACETAMINOPHEN 650 MG: 325 TABLET ORAL at 16:26

## 2023-06-09 RX ADMIN — Medication 100 MCG: at 11:31

## 2023-06-09 RX ADMIN — GABAPENTIN 300 MG: 300 CAPSULE ORAL at 10:34

## 2023-06-09 RX ADMIN — FENTANYL CITRATE 50 MCG: 50 INJECTION, SOLUTION INTRAMUSCULAR; INTRAVENOUS at 10:57

## 2023-06-09 RX ADMIN — PROPOFOL 180 MG: 10 INJECTION, EMULSION INTRAVENOUS at 10:57

## 2023-06-09 RX ADMIN — SODIUM CHLORIDE, SODIUM LACTATE, POTASSIUM CHLORIDE, CALCIUM CHLORIDE: 600; 310; 30; 20 INJECTION, SOLUTION INTRAVENOUS at 13:00

## 2023-06-09 RX ADMIN — Medication 100 MCG: at 11:56

## 2023-06-09 RX ADMIN — GLYCOPYRROLATE 0.2 MG: 0.2 INJECTION, SOLUTION INTRAMUSCULAR; INTRAVENOUS at 11:18

## 2023-06-09 RX ADMIN — CEFAZOLIN SODIUM 2 G: 2 SOLUTION INTRAVENOUS at 10:55

## 2023-06-09 RX ADMIN — Medication 30 MG: at 13:18

## 2023-06-09 RX ADMIN — Medication 30 MG: at 12:18

## 2023-06-09 RX ADMIN — OXYCODONE HYDROCHLORIDE 5 MG: 5 TABLET ORAL at 16:13

## 2023-06-09 RX ADMIN — ONDANSETRON 4 MG: 2 INJECTION INTRAMUSCULAR; INTRAVENOUS at 16:21

## 2023-06-09 RX ADMIN — FENTANYL CITRATE 50 MCG: 50 INJECTION, SOLUTION INTRAMUSCULAR; INTRAVENOUS at 11:25

## 2023-06-09 RX ADMIN — CEFAZOLIN SODIUM 2 G: 2 SOLUTION INTRAVENOUS at 15:07

## 2023-06-09 RX ADMIN — KETAMINE HYDROCHLORIDE 20 MG: 10 INJECTION INTRAMUSCULAR; INTRAVENOUS at 14:52

## 2023-06-09 RX ADMIN — Medication 0.5 MG: at 14:33

## 2023-06-09 RX ADMIN — SODIUM CHLORIDE, SODIUM LACTATE, POTASSIUM CHLORIDE, CALCIUM CHLORIDE: 600; 310; 30; 20 INJECTION, SOLUTION INTRAVENOUS at 10:36

## 2023-06-09 RX ADMIN — ACETAMINOPHEN 975 MG: 325 TABLET ORAL at 10:34

## 2023-06-09 RX ADMIN — Medication 100 MCG: at 11:46

## 2023-06-09 RX ADMIN — Medication 20 MG: at 11:48

## 2023-06-09 RX ADMIN — Medication 20 MG: at 12:43

## 2023-06-09 RX ADMIN — PROPOFOL 200 MCG/KG/MIN: 10 INJECTION, EMULSION INTRAVENOUS at 10:57

## 2023-06-09 RX ADMIN — LIDOCAINE HYDROCHLORIDE 80 MG: 20 INJECTION, SOLUTION INFILTRATION; PERINEURAL at 10:57

## 2023-06-09 RX ADMIN — Medication 50 MG: at 10:57

## 2023-06-09 ASSESSMENT — LIFESTYLE VARIABLES: TOBACCO_USE: 1

## 2023-06-09 NOTE — DISCHARGE INSTRUCTIONS
Ok to restart your blood thinner on Saturday Evening (6/10 PM)  After Laparoscopic Hernia Repair  You had a procedure called laparoscopic hernia repair. A hernia is a defect in the tough tissue covering the musculature of the abdominal wall (fascia). During laparoscopic hernia surgery, a surgeon inserts a telescope attached to a camera as well as surgical instruments through several tiny incisions in your abdomen. The surgeon repairs the hernia with mesh, which patches the tear or weakness in the fascia.   Home care  You may have sharp pain that radiates to your shoulder. This is referred pain from the gases they used to inflate your belly. It's common and usually lasts a short time. You may also have numbness around the incision area.  Keep doing the coughing and deep breathing exercises that you learned in the hospital. These will help to prevent lung infection.  Prevent constipation so you don t strain when going to the bathroom. Eat fruits, vegetables, and whole grains. Drink 6 to 8 glasses of water a day, unless otherwise directed. Use a laxative or a mild stool softener if your healthcare provider says it s OK.  Wash your incision with mild soap and water. Pat it dry. Don t use oil, powder, or lotion on your incision.  Shower or take baths as instructed by your healthcare provider. Instructions will vary based on how your incision was closed and how it s healing. It may be closed with glue, stitches, or staples. Your healthcare provider may have different advice for each kind.    Activity  Ask others to help with chores and errands while you recover.  Don t lift anything heavier than 10 pounds until your healthcare provider says it s OK.  Don t mow the lawn, use a vacuum , or do other strenuous activities until your healthcare provider says it's OK.  Climb stairs slowly and pause after every few steps.  Walk as often as you feel able.  Ask your healthcare provider when you can drive again. This may be  when you stop taking pain medicine and can move comfortably from side to side. Don t drive if you are still taking opioid pain medicine.    When to call your healthcare provider   Call your healthcare provider right away if you have any of the following:   Pain, bleeding, redness, or fluid at the incision site that gets worse  Fever of 100.4 F (38 C) or higher or as directed by your healthcare provider  Chills  Vomiting or nausea that doesn t go away  Inability to urinate  No bowel movement after three days  Swelling in abdomen or groin that gets worse  Pain that s not relieved by medicine  Nicole last reviewed this educational content on 2/1/2022 2000-2022 [employer]. All rights reserved. This information is not intended as a substitute for professional medical care. Always follow your healthcare professional's instructions.        [facility]  Home Care Following Anesthesia  For 24 hours after surgery:  Get plenty of rest.  A responsible adult must stay with you for at least 24 hours after you leave the surgery center.  Do not drive or use heavy equipment.  If you have weakness or tingling, don't drive or use heavy equipment until this feeling goes away.   Do not drink alcohol.   Avoid strenuous or risky activities.  Ask for help when climbing stairs.  You may feel lightheaded.  IF so, sit for a few minutes before standing.  Have someone help you get up.   If you have nausea (feel sick to your stomach): Drink only clear liquids such as apple juice, ginger ale, broth or 7-Up.  Rest may also help.  Be sure to drink enough fluids.  Move to a regular diet as you feel able.   You may have a slight fever.  Call the doctor if your fever is over 100 F (37.7 C) (taken under the tongue) or lasts longer than 24 hours.  You may have a dry mouth, a sore throat, muscle aches or trouble sleeping. These should go away after 24 hours.  Do not make important or legal  "decisions.   It is recommended to avoid smoking.   If you use hormonal birth control (such as the pill, patch, ring or implants):  You will need a second form of birth control for 7 days (condoms, a diaphragm or contraceptive foam).  While in the surgery center, you received a medicine called Sugammadex.  Hormonal birth control (such as the pill, patch, ring or implants) will not work as well for a week after taking this medicine.  Today you received a Marcaine or bupivacaine block to numb the nerves near your surgery site.  This is a block using local anesthetic or \"numbing\" medication injected around the nerves to anesthetize or \"numb\" the area supplied by those nerves.  This block is injected into the muscle layer near your surgical site.  The medication may numb the location where you had surgery for 6-18 hours, but may last up to 24 hours.  If your surgical site is an arm or leg you should be careful with your affected limb, since it is possible to injure your limb without being aware of it due to the numbing.  Until full feeling returns, you should guard against bumping or hitting your limb, and avoid extreme hot or cold temperatures on the skin.  As the block wears off, the feeling will return as a tingling or prickly sensation near your surgical site.  You will experience more discomfort from your incision as the feeling returns.  You may want to take a pain pill (a narcotic or Tylenol if this was prescribed by your surgeon) when you start to experience mild pain before the pain beccomes more severe.  If your pain medications do not control your pain you should notifiy your surgeon.    Tips for taking pain medications  To get the best pain relief possible, remember these points:  Take pain medications as directed, before pain becomes severe.  Pain medication can upset your stomach: taking it with food may help.  Constipation is a common side effect of pain medication. Drink plenty of  fluids.  Eat foods high " in fiber. Take a stool softener if recommended by your doctor or pharmacist.  Do not drink alcohol, drive or operate machinery while taking pain medications.  Ask about other ways to control pain, such as with heat, ice or relaxation.    Tylenol/Acetaminophen Consumption  To help encourage the safe use of acetaminophen, the makers of TYLENOL  have lowered the maximum daily dose for single-ingredient Extra Strength TYLENOL  (acetaminophen) products sold in the [place] from 8 pills per day (4,000 mg) to 6 pills per day (3,000 mg). The dosing interval has also changed from 2 pills every 4-6 hours to 2 pills every 6 hours.  If you feel your pain relief is insufficient, you may take Tylenol/Acetaminophen in addition to your narcotic pain medication.   Be careful not to exceed 3,000 mg of Tylenol/Acetaminophen in a 24 hour period from all sources.  If you are taking extra strength Tylenol/acetaminophen (500 mg), the maximum dose is 6 tablets in 24 hours.  If you are taking regular strength acetaminophen (325 mg), the maximum dose is 9 tablets in 24 hours.    975 mg Tylenol taken at 10:34 AM, OK to take additional Tylenol after 4:34 PM. Follow package instructions.    Call a doctor for any of the following:  Signs of infection (fever, growing tenderness at the surgery site, a large amount of drainage or bleeding, severe pain, foul-smelling drainage, redness, swelling).  It has been over 8 to 10 hours since surgery and you are still not able to urinate (pass water).  Headache for over 24 hours.  Signs of Covid-19 infection (temperature over 100 degrees, shortness of breath, cough, loss of taste/smell, generalized body aches, persistent headache, chills, sore throat, nausea/vomiting/diarrhea)  Your doctor is:   Dr. Herminio Becerra, General Surgery: [phone]               Or dial [phone] and ask for the resident on call for:  General Surgery  For emergency care, call the:  [facility]:  [phone] (TTY  for hearing impaired: [phone])

## 2023-06-09 NOTE — ANESTHESIA CARE TRANSFER NOTE
Patient: Bridgette Veras    Procedure: Procedure(s):  HERNIORRHAPHY, VENTRAL, ROBOT-ASSISTED WITH MESH       Diagnosis: Ventral hernia without obstruction or gangrene [K43.9]  Diagnosis Additional Information: No value filed.    Anesthesia Type:   General     Note:    Oropharynx: spontaneously breathing and oropharynx clear of all foreign objects  Level of Consciousness: drowsy  Oxygen Supplementation: room air    Independent Airway: airway patency satisfactory and stable  Dentition: dentition unchanged  Vital Signs Stable: post-procedure vital signs reviewed and stable  Report to RN Given: handoff report given  Patient transferred to: PACU  Comments: Resps easy and regular. Report to PACU RN  Handoff Report: Identifed the Patient, Identified the Reponsible Provider, Reviewed the pertinent medical history, Discussed the surgical course, Reviewed Intra-OP anesthesia mangement and issues during anesthesia, Set expectations for post-procedure period and Allowed opportunity for questions and acknowledgement of understanding      Vitals:  Vitals Value Taken Time   BP     Temp 97.5    Pulse 111 06/09/23 1543   Resp 18 06/09/23 1543   SpO2 93 % 06/09/23 1543   Vitals shown include unvalidated device data.    Electronically Signed By: LOUIE YOU CRNA  June 9, 2023  3:44 PM

## 2023-06-09 NOTE — ANESTHESIA POSTPROCEDURE EVALUATION
Patient: Bridgette Veras    Procedure: Procedure(s):  HERNIORRHAPHY, VENTRAL, ROBOT-ASSISTED WITH MESH       Anesthesia Type:  General    Note:  Disposition: Outpatient   Postop Pain Control: Uneventful            Sign Out: Well controlled pain   PONV: No   Neuro/Psych: Uneventful            Sign Out: Acceptable/Baseline neuro status   Airway/Respiratory: Uneventful            Sign Out: Acceptable/Baseline resp. status   CV/Hemodynamics: Uneventful            Sign Out: Acceptable CV status; No obvious hypovolemia; No obvious fluid overload   Other NRE: NONE   DID A NON-ROUTINE EVENT OCCUR? No           Last vitals:  Vitals Value Taken Time   /65 06/09/23 1600   Temp     Pulse 94 06/09/23 1614   Resp 26 06/09/23 1614   SpO2 95 % 06/09/23 1614   Vitals shown include unvalidated device data.    Electronically Signed By: Sharad Forde MD  June 9, 2023  4:54 PM

## 2023-06-09 NOTE — OP NOTE
[facility] Operative Note    Pre-operative diagnosis: Ventral hernia without obstruction or gangrene [K43.9]   Post-operative diagnosis Same   Procedure: Procedure(s):  HERNIORRHAPHY, VENTRAL, ROBOT-ASSISTED   Surgeon: Herminio Becerra MD   Assistants(s): Garcia WOODRUFF   Estimated blood loss: 20ml    Specimens: None   Findings: Hernia defect measuring 12cm by 6cm.  Additional defects were seen- total defect length of 20x6cm             Description:     Bridgette Veras was brought to the operating room and placed supine on the operating table with the bed flexed.   ABX were given.  They were sedated and intubated without issue, an OG tube was placed.  The abdomen was prepped and draped in sterile fashion and a time out was performed.  The hernia was palpated and our port placement marked out along the left lateral abdominal wall.     A Veress needle was placed at Lofton's point and the abdomen insufflated.  A 5mm port and camera were sed to access the abdomen under direct vision. The abdomen was inspected.  Three 8mm robotic ports were placed in the left abdomen (upsizing the 5mm port for one of them) The ports were placed at the appropriate depth in the abdominal wall and the robot was then docked with the patient.     The hernia defect was found to contain incarcerated omentum.  There were significant adhesions within the abdomen that took 1 hour to lyze.  The utueres was closed to the inferior border- it was mobilized free of the wall to allow for eventual mesh placement.  The right sided rectus muscle was gone in several places- I therefore decided against a preperitoneal flap for mesh placement.  The defect was measured- found to be 12cm by 6cm in size.  There were two additional defects noted- the entire hernia complex measured 20cm by 6cm in size.         The hernia was closed with running 0 barbed suture in a longitudinal orientation using several sutures after lowering insufflation to 6mmHg.  A piece  of coated polypropelene mesh measuring 25cm by 10cm in size was placed into the abdomen.  The mesh was fixed to the anterior abdominal wall using running 3.0 barbed suture.  The superior aspect of the mesh was covered with some residual hernia sack.     A suture passer was used to close each port site with 0 vicryl.  The ports were then removed and the abdomen allowed to collapse.  Additional local at the lateral port sites was injected. Skin was closed with 4.0 monocryl and steri strips applied. Bridgette CHEL Veras was then woken from anesthesia, extubated and brought to recovery.     I performed the procedure.

## 2023-06-12 ENCOUNTER — PATIENT OUTREACH (OUTPATIENT)
Dept: SURGERY | Facility: CLINIC | Age: 48
End: 2023-06-12
Payer: COMMERCIAL

## 2023-06-12 DIAGNOSIS — K43.9 VENTRAL HERNIA WITHOUT OBSTRUCTION OR GANGRENE: ICD-10-CM

## 2023-06-12 RX ORDER — OXYCODONE HYDROCHLORIDE 5 MG/1
5 TABLET ORAL EVERY 6 HOURS PRN
Qty: 8 TABLET | Refills: 0 | Status: SHIPPED | OUTPATIENT
Start: 2023-06-12 | End: 2024-02-05

## 2023-06-12 NOTE — PROGRESS NOTES
RN Post-Op/Post-Discharge Care Coordination Note    Ms. Bridgette Veras is a 47 year old female who underwent robotic ventral/umbilical hernia repair on 6/12 with  Dr. Herminio Becerra.  Spoke with Patient.    Support  Patient able to care for self independently     Health Status  Nausea/Vomiting: Patient denies nausea/vomiting.  Eating/drinking: Patient is able to eat and drink without any complaints.  Bowel habits: Patient reports constipation with last BM 3 days ago.  She is passing flatus but feeling bloated. Patient may take an OTC laxative per package instructions.  Drains (LEOPOLDO): N/A  Fevers/chills: Patient denies any fever or chills.  Incisions: Patient denies any signs and symptoms of infection..  Wound closure:  Steri-strips  Pain: Slowly improving. She is taking Tylenol during the day and Oxycodone sparingly at HS. Patient was given a refill of Oxycodone sent to her pharmacy.  New Medications:  Oxycodone    Activity/Restrictions  No lifting in excess of 15-20 pounds for 3-4 weeks    Equipment  None    Pathology reviewed with patient:  N/A    Forms/Letters  No    All of her questions were answered including reviewing restrictions and wound care.  She will call this office if she has any further questions and/or concerns.      In lieu of a post-op clinic patient that patient would like to be contacted in approximately 7-10 days via telephone.    A copy of this note was routed to the primary surgeon.      Whom and When to Call  Patient acknowledges understanding of how to manage any medication changes and   when to seek medical care.     Patient advised that if after hour medical concerns arise to please call [phone] and choose option 4 to speak to the physician on call.

## 2023-06-20 ENCOUNTER — PATIENT OUTREACH (OUTPATIENT)
Dept: SURGERY | Facility: CLINIC | Age: 48
End: 2023-06-20
Payer: COMMERCIAL

## 2023-06-20 NOTE — PROGRESS NOTES
Bridgette Veras was contacted several days post procedure via telephone for a status update and elected to have a telephone follow -up call approximately 7-10 days after original contact in lieu of a clinic visit with Dr. Herminio Becerra.  She is slowly resuming her normal activity and the steri-strips  have not peeled off.  The patients wounds are healed and the area is C/D/I.  She is afebrile,  and arden po. Bowels are moving and she passes gas during the day; however, she struggles with lower abdominal/gas pain at HS and is unable to get comfortable.  No changes in diet.    Post op appointment arranged with Dr. Becerra 6/21 at 1145.    Pathology was reviewed with the patient: N/A

## 2023-06-21 ENCOUNTER — OFFICE VISIT (OUTPATIENT)
Dept: SURGERY | Facility: CLINIC | Age: 48
End: 2023-06-21
Payer: COMMERCIAL

## 2023-06-21 VITALS
HEIGHT: 64 IN | HEART RATE: 65 BPM | WEIGHT: 176.8 LBS | SYSTOLIC BLOOD PRESSURE: 120 MMHG | DIASTOLIC BLOOD PRESSURE: 82 MMHG | OXYGEN SATURATION: 99 % | BODY MASS INDEX: 30.19 KG/M2

## 2023-06-21 DIAGNOSIS — Z98.890 POST-OPERATIVE STATE: Primary | ICD-10-CM

## 2023-06-21 PROCEDURE — 99024 POSTOP FOLLOW-UP VISIT: CPT | Performed by: SURGERY

## 2023-06-21 RX ORDER — TRAMADOL HYDROCHLORIDE 50 MG/1
50 TABLET ORAL EVERY 6 HOURS PRN
Qty: 20 TABLET | Refills: 0 | Status: SHIPPED | OUTPATIENT
Start: 2023-06-21 | End: 2023-06-24

## 2023-06-21 ASSESSMENT — PAIN SCALES - GENERAL: PAINLEVEL: MILD PAIN (3)

## 2023-06-21 NOTE — PROGRESS NOTES
"I saw Bridgette Veras for follow-up of her recent Robotic MIS hernia repair (20x6cm defect, IPOM placement of mesh, YADI performed.    She is doing ok- but for the past few days has had crampy, bloaty pain along her lower abdomen. She says it starts at about 6pm everyday. Not associated with food. It will last all night. Not associated with vomiting, does feel distended. Worse when laying flat. Improved by kneeling and laying forward on a pillow. Mildly improved with flatus.  Not associated with constipation or diarrhea. Eating fine. No fever/chills/nausea/vomiting.    Some incisional pain, which is improving. Not taking pain medicine.    PE:  /82 (BP Location: Left arm, Patient Position: Sitting, Cuff Size: Adult Regular)   Pulse 65   Ht 1.613 m (5' 3.5\")   Wt 80.2 kg (176 lb 12.8 oz)   LMP 05/10/2023 (Exact Date)   SpO2 99%   BMI 30.83 kg/m    Alert, pleasant  RRR  No resp distress or wheezing  Abd is soft, nondistended. Incisions healed. Nontender to palpation.  Palpable, nonreducible lump along her prior hernia site consistent with healing ridge.  Mild bruising along umbilicus.    A/P:  Unusual abdominal symptoms of bloating and discomfort post MIS hernia repair. No clear evidence of infection, bowel function is normal.  I do not suspect an intraabdominal infection.  I do not have a good explanation for her symptoms.    I did give a script for tramadol (oxy too strong for her).    She will obtain simethicone and see if that helps  Will call in 1-2 weeks for symptom check  She will call sooner if her symptoms worsen/change  "

## 2023-06-21 NOTE — PATIENT INSTRUCTIONS
You met with Dr. Herminio Becerra.      Today's visit instructions:    Dr. Becerra would like to follow-up with you via telephone in a few weeks. We will reach out to you to schedule this appointment.        If you have questions please contact Lenora RN or Nancy RN during regular clinic hours, Monday through Friday 7:30 AM - 4:00 PM, or you can contact us via [employer] at anytime.       If you have urgent needs after-hours, weekends, or holidays please call the hospital at [phone] and ask to speak with our on-call General Surgery Team.    Appointment [phone]  Nurse Advice (Lenora or Nancy): [phone]   Surgery Scheduler (Kasie): [phone]  Fax: [phone]

## 2023-06-21 NOTE — NURSING NOTE
"Chief Complaint   Patient presents with     Post-Op - General Surgery     Post-op, abd/gas pain       Vitals:    06/21/23 1138   BP: 120/82   BP Location: Left arm   Patient Position: Sitting   Cuff Size: Adult Regular   Pulse: 65   SpO2: 99%   Weight: 80.2 kg (176 lb 12.8 oz)   Height: 1.613 m (5' 3.5\")       Body mass index is 30.83 kg/m .                          Josué Boss, EMT    "

## 2023-06-21 NOTE — LETTER
"6/21/2023       RE: Bridgette Veras  [address]  [place] [address]     Dear Colleague,    Thank you for referring your patient, Bridegtte Veras, to the [facility] [place] at [facility]. Please see a copy of my visit note below.    I saw Bridgette Veras for follow-up of her recent Robotic MIS hernia repair (20x6cm defect, IPOM placement of mesh, YADI performed.    She is doing ok- but for the past few days has had crampy, bloaty pain along her lower abdomen. She says it starts at about 6pm everyday. Not associated with food. It will last all night. Not associated with vomiting, does feel distended. Worse when laying flat. Improved by kneeling and laying forward on a pillow. Mildly improved with flatus.  Not associated with constipation or diarrhea. Eating fine. No fever/chills/nausea/vomiting.    Some incisional pain, which is improving. Not taking pain medicine.    PE:  /82 (BP Location: Left arm, Patient Position: Sitting, Cuff Size: Adult Regular)   Pulse 65   Ht 1.613 m (5' 3.5\")   Wt 80.2 kg (176 lb 12.8 oz)   LMP 05/10/2023 (Exact Date)   SpO2 99%   BMI 30.83 kg/m    Alert, pleasant  RRR  No resp distress or wheezing  Abd is soft, nondistended. Incisions healed. Nontender to palpation.  Palpable, nonreducible lump along her prior hernia site consistent with healing ridge.  Mild bruising along umbilicus.    A/P:  Unusual abdominal symptoms of bloating and discomfort post MIS hernia repair. No clear evidence of infection, bowel function is normal.  I do not suspect an intraabdominal infection.  I do not have a good explanation for her symptoms.    I did give a script for tramadol (oxy too strong for her).    She will obtain simethicone and see if that helps  Will call in 1-2 weeks for symptom check  She will call sooner if her symptoms worsen/change    Sincerely,    Herminio Becerra MD      "

## 2023-07-03 ENCOUNTER — PATIENT OUTREACH (OUTPATIENT)
Dept: SURGERY | Facility: CLINIC | Age: 48
End: 2023-07-03
Payer: COMMERCIAL

## 2023-07-03 NOTE — PROGRESS NOTES
Patient Telephone Reminder Call    Date of call:  07/03/23  Phone numbers:  Cell number on file:    Telephone Information:   Mobile [phone]       Reached patient/confirmed appointment:  Yes  Appointment with:   Dr. Herminio Becerra  Reason for visit: follow-up on abdominal pain

## 2023-07-05 ENCOUNTER — VIRTUAL VISIT (OUTPATIENT)
Dept: SURGERY | Facility: CLINIC | Age: 48
End: 2023-07-05
Payer: COMMERCIAL

## 2023-07-05 VITALS — WEIGHT: 176.8 LBS | BODY MASS INDEX: 30.19 KG/M2 | HEIGHT: 64 IN

## 2023-07-05 DIAGNOSIS — Z98.890 POST-OPERATIVE STATE: Primary | ICD-10-CM

## 2023-07-05 PROCEDURE — 99024 POSTOP FOLLOW-UP VISIT: CPT | Mod: 93 | Performed by: SURGERY

## 2023-07-05 ASSESSMENT — PAIN SCALES - GENERAL: PAINLEVEL: NO PAIN (0)

## 2023-07-05 NOTE — PROGRESS NOTES
Bridgette is a 47 year old who is being evaluated via a billable telephone visit.      What phone number would you like to be contacted at? [phone]  How would you like to obtain your AVS? [place]    Distant Location (provider location):  On-site  Phone call duration: 3 minutes    During this telephone visit the patient is located in [place], patient verifies this as the location during the entirety of this visit.     I called Bridgette to discuss her abdominal pain.  She is status post laparoscopic hernia repair (IPOM mesh placement).  She has been having pain only when she lays down.      She says it is still present, but improving. Less frequent in occurrence and less severity as well.    Not taking any pain medicine currently. Bathing normally. No infectious signs.    A/P: pain resolving without intervention, eating normally.    She will call us if needed

## 2023-07-05 NOTE — PATIENT INSTRUCTIONS
You met with Dr. Herminio Becerra.      Today's visit instructions:    Ok to submerge in water at this point. Return to the Surgery Clinic on an as needed basis.        If you have questions please contact Lenora RN or Nancy RN during regular clinic hours, Monday through Friday 7:30 AM - 4:00 PM, or you can contact us via J[employer] at anytime.       If you have urgent needs after-hours, weekends, or holidays please call the hospital at [phone] and ask to speak with our on-call General Surgery Team.    Appointment [phone]  Nurse Advice (Lenora or Nancy): [phone]   Surgery Scheduler (Kasie): [phone]  Fax: [phone]

## 2023-07-05 NOTE — LETTER
7/5/2023       RE: Bridgette Veras  [address]  [place] [address]       Dear Colleague,    Thank you for referring your patient, Bridgette Veras, to the [facility] SUR[facility] [place] at [facility]. Please see a copy of my visit note below.    Bridgette is a 47 year old who is being evaluated via a billable telephone visit.      What phone number would you like to be contacted at? [phone]  How would you like to obtain your AVS? MyChart      During this telephone visit the patient is located in [place], patient verifies this as the location during the entirety of this visit.     I called Bridgette to discuss her abdominal pain.  She is status post laparoscopic hernia repair (IPOM mesh placement).  She has been having pain only when she lays down.      She says it is still present, but improving. Less frequent in occurrence and less severity as well.    Not taking any pain medicine currently. Bathing normally. No infectious signs.    A/P: pain resolving without intervention, eating normally.    She will call us if needed                Again, thank you for allowing me to participate in the care of your patient.      Sincerely,    Herminio Becerra MD

## 2023-07-05 NOTE — NURSING NOTE
"Chief Complaint   Patient presents with     RECHECK     Abdominal and gas pain follow up       Vitals:    07/05/23 0658   Weight: 80.2 kg (176 lb 12.8 oz)   Height: 1.613 m (5' 3.5\")       Body mass index is 30.83 kg/m .                          Josué Boss, EMT    "

## 2023-08-01 ENCOUNTER — TELEPHONE (OUTPATIENT)
Dept: FAMILY MEDICINE | Facility: CLINIC | Age: 48
End: 2023-08-01
Payer: COMMERCIAL

## 2023-08-01 NOTE — TELEPHONE ENCOUNTER
Patient Quality Outreach    Patient is due for the following:       Topic Date Due    Hepatitis B Vaccine (1 of 3 - 3-dose series) Never done    Diptheria Tetanus Pertussis (DTAP/TDAP/TD) Vaccine (2 - Td or Tdap) 07/25/2023     Chronic Opioid Use -  Treatment Agreement (CSA), Urine Drug Screen, DESMOND-7, and PHQ-9    Next Steps:   Pain is not managed by Dr. Morillo.    Type of outreach:    none      Questions for provider review:    None           Brenna Baker CMA

## 2023-09-26 ENCOUNTER — MYC MEDICAL ADVICE (OUTPATIENT)
Dept: FAMILY MEDICINE | Facility: CLINIC | Age: 48
End: 2023-09-26
Payer: COMMERCIAL

## 2023-09-26 DIAGNOSIS — F40.240 CLAUSTROPHOBIA: Primary | ICD-10-CM

## 2023-09-26 DIAGNOSIS — K55.069 SUPERIOR MESENTERIC ARTERY THROMBOSIS (H): ICD-10-CM

## 2023-09-28 RX ORDER — LORAZEPAM 1 MG/1
TABLET ORAL
Qty: 1 TABLET | Refills: 0 | Status: SHIPPED | OUTPATIENT
Start: 2023-09-28 | End: 2024-02-05

## 2023-09-28 RX ORDER — ATORVASTATIN CALCIUM 20 MG/1
20 TABLET, FILM COATED ORAL DAILY
Qty: 90 TABLET | Refills: 0 | Status: SHIPPED | OUTPATIENT
Start: 2023-09-28 | End: 2024-02-05

## 2023-10-09 ENCOUNTER — TELEPHONE (OUTPATIENT)
Dept: VASCULAR SURGERY | Facility: CLINIC | Age: 48
End: 2023-10-09
Payer: COMMERCIAL

## 2023-10-09 DIAGNOSIS — K55.069 SUPERIOR MESENTERIC ARTERY THROMBOSIS (H): ICD-10-CM

## 2023-10-09 DIAGNOSIS — I74.10 AORTIC THROMBUS (H): ICD-10-CM

## 2023-10-11 ENCOUNTER — OFFICE VISIT (OUTPATIENT)
Dept: HEMATOLOGY | Facility: CLINIC | Age: 48
End: 2023-10-11
Attending: STUDENT IN AN ORGANIZED HEALTH CARE EDUCATION/TRAINING PROGRAM
Payer: COMMERCIAL

## 2023-10-11 VITALS
WEIGHT: 174.4 LBS | SYSTOLIC BLOOD PRESSURE: 131 MMHG | OXYGEN SATURATION: 98 % | DIASTOLIC BLOOD PRESSURE: 79 MMHG | BODY MASS INDEX: 30.41 KG/M2 | TEMPERATURE: 98.4 F | HEART RATE: 91 BPM

## 2023-10-11 DIAGNOSIS — D50.0 IRON DEFICIENCY ANEMIA DUE TO CHRONIC BLOOD LOSS: Primary | ICD-10-CM

## 2023-10-11 LAB
ALBUMIN SERPL BCG-MCNC: 4 G/DL (ref 3.5–5.2)
ALP SERPL-CCNC: 89 U/L (ref 35–104)
ALT SERPL W P-5'-P-CCNC: 11 U/L (ref 0–50)
ANION GAP SERPL CALCULATED.3IONS-SCNC: 11 MMOL/L (ref 7–15)
AST SERPL W P-5'-P-CCNC: 17 U/L (ref 0–45)
BASO+EOS+MONOS # BLD AUTO: ABNORMAL 10*3/UL
BASO+EOS+MONOS NFR BLD AUTO: ABNORMAL %
BASOPHILS # BLD AUTO: 0.1 10E3/UL (ref 0–0.2)
BASOPHILS NFR BLD AUTO: 1 %
BILIRUB SERPL-MCNC: 0.2 MG/DL
BUN SERPL-MCNC: 11.6 MG/DL (ref 6–20)
CALCIUM SERPL-MCNC: 9.4 MG/DL (ref 8.6–10)
CHLORIDE SERPL-SCNC: 104 MMOL/L (ref 98–107)
CREAT SERPL-MCNC: 0.72 MG/DL (ref 0.51–0.95)
DEPRECATED HCO3 PLAS-SCNC: 24 MMOL/L (ref 22–29)
EGFRCR SERPLBLD CKD-EPI 2021: >90 ML/MIN/1.73M2
EOSINOPHIL # BLD AUTO: 0.4 10E3/UL (ref 0–0.7)
EOSINOPHIL NFR BLD AUTO: 2 %
ERYTHROCYTE [DISTWIDTH] IN BLOOD BY AUTOMATED COUNT: 14 % (ref 10–15)
FERRITIN SERPL-MCNC: 31 NG/ML (ref 6–175)
GLUCOSE SERPL-MCNC: 65 MG/DL (ref 70–99)
HCT VFR BLD AUTO: 42.6 % (ref 35–47)
HGB BLD-MCNC: 14.1 G/DL (ref 11.7–15.7)
IMM GRANULOCYTES # BLD: 0.1 10E3/UL
IMM GRANULOCYTES NFR BLD: 0 %
IRON BINDING CAPACITY (ROCHE): 314 UG/DL (ref 240–430)
IRON SATN MFR SERPL: 30 % (ref 15–46)
IRON SERPL-MCNC: 95 UG/DL (ref 37–145)
LYMPHOCYTES # BLD AUTO: 3.6 10E3/UL (ref 0.8–5.3)
LYMPHOCYTES NFR BLD AUTO: 22 %
MCH RBC QN AUTO: 29 PG (ref 26.5–33)
MCHC RBC AUTO-ENTMCNC: 33.1 G/DL (ref 31.5–36.5)
MCV RBC AUTO: 88 FL (ref 78–100)
MONOCYTES # BLD AUTO: 0.6 10E3/UL (ref 0–1.3)
MONOCYTES NFR BLD AUTO: 4 %
NEUTROPHILS # BLD AUTO: 11.5 10E3/UL (ref 1.6–8.3)
NEUTROPHILS NFR BLD AUTO: 71 %
NRBC # BLD AUTO: 0 10E3/UL
NRBC BLD AUTO-RTO: 0 /100
PLATELET # BLD AUTO: 359 10E3/UL (ref 150–450)
POTASSIUM SERPL-SCNC: 4.1 MMOL/L (ref 3.4–5.3)
PROT SERPL-MCNC: 6.7 G/DL (ref 6.4–8.3)
RBC # BLD AUTO: 4.87 10E6/UL (ref 3.8–5.2)
SODIUM SERPL-SCNC: 139 MMOL/L (ref 135–145)
WBC # BLD AUTO: 16.3 10E3/UL (ref 4–11)

## 2023-10-11 PROCEDURE — 80053 COMPREHEN METABOLIC PANEL: CPT | Performed by: STUDENT IN AN ORGANIZED HEALTH CARE EDUCATION/TRAINING PROGRAM

## 2023-10-11 PROCEDURE — 82728 ASSAY OF FERRITIN: CPT | Performed by: STUDENT IN AN ORGANIZED HEALTH CARE EDUCATION/TRAINING PROGRAM

## 2023-10-11 PROCEDURE — 99214 OFFICE O/P EST MOD 30 MIN: CPT | Performed by: STUDENT IN AN ORGANIZED HEALTH CARE EDUCATION/TRAINING PROGRAM

## 2023-10-11 PROCEDURE — 85025 COMPLETE CBC W/AUTO DIFF WBC: CPT | Performed by: STUDENT IN AN ORGANIZED HEALTH CARE EDUCATION/TRAINING PROGRAM

## 2023-10-11 PROCEDURE — 36415 COLL VENOUS BLD VENIPUNCTURE: CPT | Performed by: STUDENT IN AN ORGANIZED HEALTH CARE EDUCATION/TRAINING PROGRAM

## 2023-10-11 PROCEDURE — 99213 OFFICE O/P EST LOW 20 MIN: CPT | Performed by: STUDENT IN AN ORGANIZED HEALTH CARE EDUCATION/TRAINING PROGRAM

## 2023-10-11 PROCEDURE — 83550 IRON BINDING TEST: CPT | Performed by: STUDENT IN AN ORGANIZED HEALTH CARE EDUCATION/TRAINING PROGRAM

## 2023-10-11 RX ORDER — ASPIRIN 81 MG/1
81 TABLET ORAL DAILY
Qty: 90 TABLET | Refills: 3 | Status: SHIPPED | OUTPATIENT
Start: 2023-10-11 | End: 2024-09-28

## 2023-10-11 NOTE — TELEPHONE ENCOUNTER
Script sent.    LOLIS SunshineN, RN  RN Care Coordinator  [facility] - [facility] phone: [phone]  Fax: [phone]

## 2023-10-11 NOTE — PROGRESS NOTES
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  There was also irregular peripheral clot seen within the descending thoracic aorta and the superior aspect of the abdominal aorta.  There was a free-floating thrombus seen in the SMA proximally that appeared to extend inferiorly from the celiac artery.  She underwent an emergent ex lap and a SMA thrombectomy and stenting.     She reports no prior known history of blood clots or bleeding.  She had 2 pregnancies with C-sections, no known miscarriages or pregnancy complications such as preeclampsia.  No family history of blood clotting.    Saw vascular surgery 10/20/22. CTA at that time showed improvement in overall SMA clot burden; decreased adherent clot burden in descending thoracic aorta with small residual adherent thrombus in mid desending thoracic aorta; and evolving multifocal splenic infarcts. They switched hr from plavix to ASA and planned for AC until evaluated by hematology. Recent diarrhea, concern for CDiff. CT A/P showed infectious/inflammatory pancolitis, no e/o bowel ischemia.    She presents for initial outpatient evaluation by hematology today after being seen as a consult in the hospital.  She is doing much better, without any notable residual symptoms.  Continues on apixaban without any reports of bleeding.  Also taking Plavix without any issue.   Denies any bleeding or bruising, no melena.  No fevers, night sweats, weight loss, rashes, adenopathy.  Denies aquagenic pruritus or erythromelalgia.  No hematuria.  No prior personal or family history of blood clotting.  She has not had any prior miscarriages.  Serologies for beta-2 glycoprotein antibodies and cardiolipin antibodies were negative.  Lupus anticoagulant testing negative as well.  She received IV iron while in the hospital, and currently takes oral iron daily.    March 13, 2023: Has completed 6 months of therapeutic apixaban.  Antiphospholipid antibody testing unremarkable. Continues on aspirin and apixaban. Notes that periods are heavier, but no other bleeding. Developed hernia, pending surgical plan. Had 3 IV iron infusions in early January, felt better in terms of energy after, though feels symptoms returning. Briefly quit smoking but still having a few (1-2 per day), hoping to quit entirely. Continues on Chantix. Continues on oral iron.  Has questions regarding whether it would be worth pursuing genetic testing for possible thrombophilia, given that she has children (adult son in his 20s, 9-year-old daughter).    October 11, 2023: Had quit smoking for 5 months, but then had a death in family so restarted. Stopped Chantix - not sure if it helped her quit. No fevers or other constitutional symptoms.  Continues to have menstrual periods, heavier since being on anticoagulation.  Due for repeat imaging with vascular surgery next week to check on the status of the aortic thrombus.      Medications:  Current Outpatient Medications   Medication Sig Dispense Refill     aspirin (ASA) 81 MG EC tablet Take 1 tablet (81 mg) by mouth daily 90 tablet 3     atorvastatin (LIPITOR) 20 MG tablet Take 1 tablet (20 mg) by mouth daily 90 tablet 0     cyanocolbalamin (VITAMIN  B-12) 500 MCG tablet Take 500 mcg by mouth daily        cyclobenzaprine (FLEXERIL) 10 MG tablet Take 1 tablet (10 mg) by mouth nightly as needed  for muscle spasms 30 tablet 0     ELIQUIS ANTICOAGULANT 5 MG tablet Take 1 tablet (5 mg) by mouth 2 times daily for 360 days 60 tablet 11     ferrous sulfate (IRON) 325 (65 FE) MG tablet Take 325 mg by mouth daily (with breakfast)        LORazepam (ATIVAN) 1 MG tablet Take 1 tablet 30 minutes prior to procedure.  Do not drive after taking 1 tablet 0     Multiple Vitamin (MULTIVITAMIN PO) Take 1 tablet by mouth daily       ondansetron (ZOFRAN ODT) 4 MG ODT tab Take 1 tablet (4 mg) by mouth every 8 hours as needed for nausea 12 tablet 0     oxyCODONE (ROXICODONE) 5 MG tablet Take 1 tablet (5 mg) by mouth every 6 hours as needed for pain 8 tablet 0     saccharomyces boulardii (FLORASTOR) 250 MG capsule Take 1 capsule (250 mg) by mouth 2 times daily 60 capsule 3     vitamin D3 (CHOLECALCIFEROL) 50 mcg (2000 units) tablet Take 1 tablet by mouth daily          Assessment:  Bridgette Veras is a 47 year old woman with a 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    The cause of her phantom aortic thrombosis remains unclear.  MPN testing was negative, as well as testing for antiphospholipid syndrome.  In the future we can check labs that are not affected by anticoagulation (chromogenic protein C, protein S, Antithrombin, genetic thrombophilias) for diagnostic purposes.    The limited data available suggests that therapeutic anticoagulation is superior to prophylactic dose anticoagulation for phantom aortic thrombosis, and I think we should continue apixaban at therapeutic dose indefinitely.  She should likely continue on aspirin indefinitely as well given her SMA stent, but I will touch base with her vascular team about this after her repeat imaging and visit with them next week.    Menorrhagia persists.  I will recheck her iron studies today.  Based on the results I will decide how to proceed with the oral iron (whether to continue or not), and also  discussed with her whether she should see a gynecologist to be evaluated for menstrual regulation with birth control to reduce blood loss and therefore reduce the risk of recurrent iron deficiency.    Finally, she has restarted smoking recently.  We may never figure out what exactly caused her aortic thrombosis, but certainly stopping smoking will reduce her future risk of recurrent clots.  She understands this and is going to try to quit again.      Plan:  -Continue apixaban 5 mg twice daily indefinitely  -Continue aspirin  -Touch base with vascular surgery after repeat imaging next week  -Iron studies today, continue oral iron in the meantime  -Follow-up in 6 months with [facility], can check chromogenic protein C/S/Antithrombin and perform factor V Leiden/prothrombin gene mutation testing at that time    Patient understands and agrees with the above plan and recommendation.    Jacob Cogan, MD  Hematology    30 minutes spent on the date of the encounter doing chart review, history and exam, documentation and further activities per the note

## 2023-10-12 DIAGNOSIS — D72.828 OTHER ELEVATED WHITE BLOOD CELL (WBC) COUNT: ICD-10-CM

## 2023-10-12 DIAGNOSIS — D50.0 IRON DEFICIENCY ANEMIA DUE TO CHRONIC BLOOD LOSS: Primary | ICD-10-CM

## 2023-10-19 ENCOUNTER — ANCILLARY PROCEDURE (OUTPATIENT)
Dept: CT IMAGING | Facility: CLINIC | Age: 48
End: 2023-10-19
Attending: SURGERY
Payer: COMMERCIAL

## 2023-10-19 ENCOUNTER — OFFICE VISIT (OUTPATIENT)
Dept: VASCULAR SURGERY | Facility: CLINIC | Age: 48
End: 2023-10-19
Payer: COMMERCIAL

## 2023-10-19 VITALS — SYSTOLIC BLOOD PRESSURE: 112 MMHG | OXYGEN SATURATION: 97 % | HEART RATE: 86 BPM | DIASTOLIC BLOOD PRESSURE: 77 MMHG

## 2023-10-19 DIAGNOSIS — I74.10 AORTIC THROMBUS (H): ICD-10-CM

## 2023-10-19 DIAGNOSIS — K55.069 SUPERIOR MESENTERIC ARTERY THROMBOSIS (H): Primary | ICD-10-CM

## 2023-10-19 DIAGNOSIS — K55.069 SUPERIOR MESENTERIC ARTERY THROMBOSIS (H): ICD-10-CM

## 2023-10-19 PROCEDURE — 99207 PR NO BILLABLE SERVICE THIS VISIT: CPT

## 2023-10-19 PROCEDURE — 71275 CT ANGIOGRAPHY CHEST: CPT | Mod: GC | Performed by: STUDENT IN AN ORGANIZED HEALTH CARE EDUCATION/TRAINING PROGRAM

## 2023-10-19 PROCEDURE — 74174 CTA ABD&PLVS W/CONTRAST: CPT | Mod: GC | Performed by: STUDENT IN AN ORGANIZED HEALTH CARE EDUCATION/TRAINING PROGRAM

## 2023-10-19 RX ORDER — IOPAMIDOL 755 MG/ML
100 INJECTION, SOLUTION INTRAVASCULAR ONCE
Status: COMPLETED | OUTPATIENT
Start: 2023-10-19 | End: 2023-10-19

## 2023-10-19 RX ADMIN — IOPAMIDOL 100 ML: 755 INJECTION, SOLUTION INTRAVASCULAR at 11:50

## 2023-10-19 ASSESSMENT — PAIN SCALES - GENERAL: PAINLEVEL: NO PAIN (0)

## 2023-10-19 NOTE — DISCHARGE INSTRUCTIONS

## 2023-10-19 NOTE — NURSING NOTE
Chief Complaint   Patient presents with    Follow Up     Return Vascular Patient- aortic thrombus 6 month follow up     Vitals were taken and medications reconciled.    Ayush Prater, CHRISTIE  12:29 PM

## 2023-10-19 NOTE — PROGRESS NOTES
VASCULAR SURGERY CLINIC CONSULTATION    VASCULAR SURGEON: ERIS Powers    LOCATION:  [facility]    Bridgette Veras   Medical Record #:  [id number]  YOB: 1975  Age:  47 year old     Date of Service: 10/19/2023    PRIMARY CARE PROVIDER: Cecy Morillo    Reason for visit:  Follow up aortic thrombus, SMA thrombus s/p stent 09/22    IMPRESSION:  Ms. Veras is a pleasant 47 year old female who presents for 1 year follow up of aortic &SMA thrombus s/p SMA thrombectomy w/ covered stent placement, ex lap and ileal resection. She is doing very well, remains on lifelong a/c w/ eliquis and ASA and takes these regularly. Workup with hematology for cause has been negative thus far. She has no issues with eating, no abdominal pain after food. CTA demonstrates patent SMA stent.     RECOMMENDATION/RISKS:    - Will order mesenteric US now to establish baseline velocities with known patent stent  - Follow up 1 year with repeat mesenteric duplex at that time.       HPI:  Bridgette Veras is a 47 year old female who was seen today for follow up of aortic and SMA thrombus s/p SMA thrombectomy w/ covered stent placement as well as ex lap and ileal resection. She reports she is doing well postoperatively. She did have a midline incisional hernia repaired earlier this year with , she is happy with the repair. She is still smoking 1-2 cigarettes a day, however she is trying to cut down to nothing again. She takes her eliquis and ASA daily.   Denies abdominal pain after eating, weight loss, nausea, vomiting, fever, chills.     REVIEW OF SYSTEMS:    A 12 point ROS was reviewed and is negative except for as above.     PHH:    Past Medical History:   Diagnosis Date    Cervical high risk HPV (human papillomavirus) test positive 08/04/2016    type 16    Chickenpox     Chlamydia trachomatis infection of unspecified site     POONAM 1 1998    Depressive disorder     Depressive  disorder, not elsewhere classified     History of colposcopy with cervical biopsy 2016    Bx - POONAM 1, ECC - negative    Other abnormal heart sounds as child    Murmur - resolved    Polycystic ovaries     prior to gastic bypass in , pt carried the diagnosis of PCOS/anovuolation    Streptococcus infection in conditions classified elsewhere and of unspecified site, group B 1998    In pregnancy         Past Surgical History:   Procedure Laterality Date     SECTION  ,      SECTION, TUBAL LIGATION, COMBINED  2013    Procedure: COMBINED  SECTION, TUBAL LIGATION;   Section, Tubal Ligation;  Surgeon: Paul Allen MD;  Location: [facility] OR    DAVINCI HERNIORRHAPHY VENTRAL N/A 2023    Procedure: HERNIORRHAPHY, VENTRAL, ROBOT-ASSISTED WITH MESH;  Surgeon: Herminio Becerra MD;  Location: [facility] OR    GASTRIC BYPASS  2005    GASTRIC BYPASS      HC REMOVAL GALLBLADDER  2006    Dr. Inman @ [facility]    HC REPAIR ING HERNIA,5+Y/O,REDUCIBL  age 20 ()    [facility]    LAPAROTOMY EXPLORATORY N/A 2022    Procedure: Exploratory LAPAROTOMY, Superior Mesenteric Artery Thrombectomy, Retrograde Superior Mesenteric Artery Stenting;  Surgeon: Balta Ernst MD;  Location: [facility] OR    LAPAROTOMY EXPLORATORY N/A 09/10/2022    Procedure: LAPAROTOMY, SMALL BOWEL RESECTION, INCISIONAL WOUND CLOSURE;  Surgeon: Tariq Lancaster MD;  Location: [facility] OR    LAPAROTOMY EXPLORATORY N/A 2022    Procedure: EXPLORATORY LAPAROTOMY, EVACUATION OF RECTUS SHEATH HEMATOMA, ABDOMINAL WASHOUT;  Surgeon: Nathan Barber MD;  Location: [facility] OR    PICC DOUBLE LUMEN PLACEMENT Right 09/10/2022    5FR DL PICC. Basilic vein.    SURGICAL HISTORY OF -       PE tubes    SURGICAL HISTORY OF -   10/2006    removal of pannus ans breast lift    TONSILLECTOMY  1981    Z  DELIVERY ONLY  1998    Failure to progress    [facility] NONSPECIFIC PROCEDURE   2006    Laser ablation genital condyloma/ Bx labial lesion    ZZC RAD RESEC TONSIL/PILLARS          ALLERGIES:     Allergies   Allergen Reactions    Codeine Nausea and Vomiting     Pt tolerated oxycodone .    Darvocet [Acetaminophen] Nausea and Vomiting     Pt tolerated oxycodone .    Propoxyphene Nausea        MEDS:    Current Outpatient Medications   Medication    aspirin 81 MG EC tablet    atorvastatin (LIPITOR) 20 MG tablet    cyanocolbalamin (VITAMIN  B-12) 500 MCG tablet    cyclobenzaprine (FLEXERIL) 10 MG tablet    ELIQUIS ANTICOAGULANT 5 MG tablet    ferrous sulfate (IRON) 325 (65 FE) MG tablet    LORazepam (ATIVAN) 1 MG tablet    Multiple Vitamin (MULTIVITAMIN PO)    ondansetron (ZOFRAN ODT) 4 MG ODT tab    saccharomyces boulardii (FLORASTOR) 250 MG capsule    vitamin D3 (CHOLECALCIFEROL) 50 mcg (2000 units) tablet    oxyCODONE (ROXICODONE) 5 MG tablet     No current facility-administered medications for this visit.        SOCIAL HABITS:    Tobacco Use      Smoking status: Former        Packs/day: .5        Types: Cigarettes        Quit date: 3/27/2023        Years since quittin.5      Smokeless tobacco: Never       Alcohol Use: Not on file       History   Drug Use Unknown        FAMILY HISTORY:    Family History   Problem Relation Age of Onset    Hypertension Mother     Depression Mother     Asthma Father     Gastrointestinal Disease Father         PSC    Hypertension Brother     Depression Brother     Cancer Maternal Grandmother         Lung    Depression Maternal Grandmother     Alcohol/Drug Maternal Grandfather         alcohol    Cardiovascular Maternal Grandfather         MI    Respiratory Paternal Grandmother     Cerebrovascular Disease Paternal Grandfather     Diabetes Other     C.A.D. No family hx of     Breast Cancer No family hx of     Cancer - colorectal No family hx of     Anesthesia Reaction No family hx of     Deep Vein Thrombosis (DVT) No family hx of        PE:  /77  (BP Location: Left arm, Patient Position: Chair, Cuff Size: Adult Regular)   Pulse 86   SpO2 97%   Wt Readings from Last 1 Encounters:   10/11/23 79.1 kg (174 lb 6.4 oz)     There is no height or weight on file to calculate BMI.    EXAM:  GENERAL: This is a well-developed 47 year old female who appears her stated age  EYES: Grossly normal.  MOUTH: Buccal mucosa grossly normal  CARDIAC:  Not assessed  CHEST/LUNG:  Nonlabored breathing on room air  GASTROINTESINAL (ABDOMEN):Soft, non-tender, no pulsatile mass Well healed abdominal incision.No hernia.  MUSCULOSKELETAL: Grossly normal and both lower extremities are intact.  HEME/LYMPH: No lymphedema  NEUROLOGIC: Focally intact, Alert and oriented x 3.   PSYCH: appropriate affect  INTEGUMENT: No open lesions or ulcers  Pulse Exam:    Radial: Left +2   Right  +2      DP: Left +2   Right  +2       DIAGNOSTIC STUDIES:   CTA:   Exam: Computed tomographic angiography of the chest, abdomen and  pelvis without and with contrast dated 10/19/2023 12:13 PM     Clinical information: Dx: Aortic thrombus (H) [I74.10 (ICD-10-CM)];  Superior mesenteric artery thrombosis (H24) [K55.069 (ICD-10-CM)]     Technique: Axial images through the chest and abdomen obtained before  the administration of intravenous contrast media and following the  injection of contrast media  in the arterial phase.      Contrast: ISOVUE 370 100cc     DLP: 2535 mGy*cm     Comparison: 4/13/2023, 90 2022     Findings:       Thoracic aortic diameters:       Sinuses of Valsalva: 3.3 cm    Sinotubular ridge: 2.4 cm    Ascending aorta:  2.8 cm    Arch: 2.5 cm    Proximal descending thoracic aorta: 2.4 cm    Mid descending thoracic aorta: 2.4 cm    Descending thoracic aorta at the diaphragm: 2.2 cm     Infrarenal abdominal aorta: 1.8 cm    Bifurcation: 1.5 cm.      Similar appearance of small intraluminal thrombus along the ventral  aspect of lower thoracic aorta. There is normal branching pattern of  the great  vessels. Origins of the brachiocephalic artery, left common  carotid artery and left subclavian artery show no focal abnormality.  The proximal pulmonary vasculature appears normal.   Chronic occlusion of the celiac trunk is reconstituted flow from the  GDA. Patent SMA stent. Similar appearance of soft tissue density along  the SMA. The DERICK is patent. The renal arteries are patent bilaterally.  The splenic vein, portal vein and SMV are patent. Filling defect  extending from the left gonadal vein into the left renal vein is  likely mixing artifact.      Chest and abdomen:      No hilar, mediastinal or axillary lymphadenopathy is seen. Similar  appearance of numerous thin wall cysts. Multiple scattered solid  pulmonary nodules, such as 4 mm solid pulmonary nodule in the right  upper lobe on image 40, series 4 are similar to prior. No focal  airspace opacities. No pleural effusion or pneumothorax.     Diffusely hypoenhancing liver parenchyma. Presumed 1.5 cm hemangioma  in segment 7 is similar to prior. Postsurgical changes of  cholecystectomy and Raj-en-Y gastric bypass. Otherwise spleen,  adrenal glands, pancreas, kidneys and bones show no focal  abnormalities. Normal caliber large and small bowel.                                                                       Impression:     1. Similar appearance of small intramural thrombus along the ventral  aspect of the distal thoracic aorta. Minimal residual thrombus when  compared to the study from 9/8/2022.  2. Patent SMA stent. Persistent soft tissue density along the proximal  SMA.  3. Chronic occlusion of the celiac trunk.  4. Filling defect extending from the left gonadal vein into the left  renal vein is favored to be mixing artifact, though thrombus cannot be  entirely excluded.        I have personally reviewed the examination and initial interpretation  and I agree with the findings.     TG VILLAREAL MD     I have reviewed the above imaging and my  interpretation is patent SMA stent, some residual thrombus along  descending thoracic aorta however decreased compared to prior.     LABS:      Sodium   Date Value Ref Range Status   10/11/2023 139 135 - 145 mmol/L Final     Comment:     Reference intervals for this test were updated on 09/26/2023 to more accurately reflect our healthy population. There may be differences in the flagging of prior results with similar values performed with this method. Interpretation of those prior results can be made in the context of the updated reference intervals.    02/21/2023 139 136 - 145 mmol/L Final   09/15/2022 138 136 - 145 mmol/L Final   08/04/2016 139 133 - 144 mmol/L Final   03/07/2011 135 133 - 144 mmol/L Final   11/02/2010 138 133 - 144 mmol/L Final     Urea Nitrogen   Date Value Ref Range Status   10/11/2023 11.6 6.0 - 20.0 mg/dL Final   02/21/2023 9.4 6.0 - 20.0 mg/dL Final   09/15/2022 21.4 (H) 6.0 - 20.0 mg/dL Final   02/07/2022 10 7 - 30 mg/dL Final   08/04/2016 9 7 - 30 mg/dL Final   03/07/2011 11 5 - 24 mg/dL Final   11/02/2010 7 5 - 24 mg/dL Final     Hemoglobin   Date Value Ref Range Status   10/11/2023 14.1 11.7 - 15.7 g/dL Final   03/13/2023 13.4 11.7 - 15.7 g/dL Final   11/22/2022 13.0 11.7 - 15.7 g/dL Final   09/20/2016 11.1 (L) 11.7 - 15.7 g/dL Final   08/04/2016 9.6 (L) 11.7 - 15.7 g/dL Final   09/17/2013 13.0 11.7 - 15.7 g/dL Final     Platelet Count   Date Value Ref Range Status   10/11/2023 359 150 - 450 10e3/uL Final   03/13/2023 270 150 - 450 10e3/uL Final   11/22/2022 313 150 - 450 10e3/uL Final   08/04/2016 327 150 - 450 10e9/L Final   01/04/2013 290 150 - 450 10e9/L Final   03/07/2011 276 150 - 450 10e9/L Final     INR   Date Value Ref Range Status   09/12/2022 1.13 0.85 - 1.15 Final   09/11/2022 1.25 (H) 0.85 - 1.15 Final   09/09/2022 1.70 (H) 0.85 - 1.15 Final   03/29/2005 1.03 0.86 - 1.14 Final       60 minutes spent on the day of encounter doing chart review, history and exam, documentation,  and further activities as noted.     Kasie Pan MD  VASCULAR SURGERY PGY3

## 2023-10-19 NOTE — PATIENT INSTRUCTIONS
Thank you so much for choosing us for your care. It was a pleasure to see you at the vascular clinic today.     Follow-up recommendations: We will see you back in 1 year. Please do not hesitate to reach out to us in the meantime with any concerns. Our schedulers will get in touch with you to set up your follow-up visit.    Additional testing/imaging ordered today: Mesenteric ultrasound to be completed now and again in 1 year.      Our scheduling team will get in touch with you to set up any follow-up testing/imaging and/or appointments. Please be aware that any testing/imaging recommended today will need to completed prior to your next visit with the provider. If testing/imaging is not completed prior to your next visit, your visit may be rescheduled.        If you have any questions, please contact our clinic directly at [phone] and ask for the nurse. We also encourage the use of [employer] to communicate with your HealthCare Provider.      If you have an urgent need after business hours (8:00 am to 4:30 pm) please call [phone], option 4, and ask for the vascular attending on call. For non-urgent after hours needs, please call the vascular clinic at [phone]. For scheduling needs, please call our clinic directly at [phone].

## 2023-10-20 ENCOUNTER — MYC MEDICAL ADVICE (OUTPATIENT)
Dept: FAMILY MEDICINE | Facility: CLINIC | Age: 48
End: 2023-10-20
Payer: COMMERCIAL

## 2023-10-20 ENCOUNTER — MYC MEDICAL ADVICE (OUTPATIENT)
Dept: HEMATOLOGY | Facility: CLINIC | Age: 48
End: 2023-10-20
Payer: COMMERCIAL

## 2023-10-24 ENCOUNTER — VIRTUAL VISIT (OUTPATIENT)
Dept: FAMILY MEDICINE | Facility: CLINIC | Age: 48
End: 2023-10-24
Payer: COMMERCIAL

## 2023-10-24 DIAGNOSIS — N92.0 MENORRHAGIA WITH REGULAR CYCLE: Primary | ICD-10-CM

## 2023-10-24 PROCEDURE — 99213 OFFICE O/P EST LOW 20 MIN: CPT | Mod: VID | Performed by: FAMILY MEDICINE

## 2023-10-24 ASSESSMENT — ANXIETY QUESTIONNAIRES
6. BECOMING EASILY ANNOYED OR IRRITABLE: NOT AT ALL
7. FEELING AFRAID AS IF SOMETHING AWFUL MIGHT HAPPEN: SEVERAL DAYS
1. FEELING NERVOUS, ANXIOUS, OR ON EDGE: SEVERAL DAYS
GAD7 TOTAL SCORE: 5
3. WORRYING TOO MUCH ABOUT DIFFERENT THINGS: SEVERAL DAYS
IF YOU CHECKED OFF ANY PROBLEMS ON THIS QUESTIONNAIRE, HOW DIFFICULT HAVE THESE PROBLEMS MADE IT FOR YOU TO DO YOUR WORK, TAKE CARE OF THINGS AT HOME, OR GET ALONG WITH OTHER PEOPLE: SOMEWHAT DIFFICULT
4. TROUBLE RELAXING: SEVERAL DAYS
5. BEING SO RESTLESS THAT IT IS HARD TO SIT STILL: NOT AT ALL
2. NOT BEING ABLE TO STOP OR CONTROL WORRYING: SEVERAL DAYS
GAD7 TOTAL SCORE: 5

## 2023-10-24 ASSESSMENT — PATIENT HEALTH QUESTIONNAIRE - PHQ9
10. IF YOU CHECKED OFF ANY PROBLEMS, HOW DIFFICULT HAVE THESE PROBLEMS MADE IT FOR YOU TO DO YOUR WORK, TAKE CARE OF THINGS AT HOME, OR GET ALONG WITH OTHER PEOPLE: SOMEWHAT DIFFICULT
SUM OF ALL RESPONSES TO PHQ QUESTIONS 1-9: 3
SUM OF ALL RESPONSES TO PHQ QUESTIONS 1-9: 3

## 2023-10-24 NOTE — PROGRESS NOTES
Bridgette is a 47 year old who is being evaluated via a billable video visit.      How would you like to obtain your AVS? MyChart  If the video visit is dropped, the invitation should be resent by: Text to cell phone: [phone]  Will anyone else be joining your video visit? No          A/P:      ICD-10-CM    1. Menorrhagia with regular cycle  N92.0 norethindrone (MICRONOR) 0.35 MG tablet        Reviewed options for menorrhagia.  Given h/o superior mesenteric thrombus would avoid estrogen.  Reviewed progesterone only options.  Pt reluctant to consider IUD.  Not interested in Depo due to concern for weight gain.  Reviewed micronor and concern for irregular bleeding/spotting.  Pt prefers to try this option.  Script sent.  She will update me in 3 months.  If not working well will plan IUD at that time.  Based on previous exams, cervical os difficult to visualized on exam, will refer to GYN for IUD if needed.    Subjective   Bridgette is a 47 year old, presenting for the following health issues:  Abnormal Bleeding Problem      HPI     Discuss birth control options for heavy periods     Does get a monthly period.  Has been super heavy on blood thinner    Previously menses would last 3-4 days, very light.    Since starting the blood thinner still bleeding 3-4 days.  1st day not so bad, 2nd and 3rd day using super pads and tampon every 1.5-2 hours.    Has always suffered from low iron following her gastric bypass but hematologist is concerned now that menses re contributing and recommends treatment        Review of Systems         Objective           Vitals:  No vitals were obtained today due to virtual visit.    Physical Exam   GENERAL: Healthy, alert and no distress  EYES: Eyes grossly normal to inspection.  No discharge or erythema, or obvious scleral/conjunctival abnormalities.  RESP: No audible wheeze, cough, or visible cyanosis.  No visible retractions or increased work of breathing.    SKIN: Visible skin clear. No  significant rash, abnormal pigmentation or lesions.  NEURO: Cranial nerves grossly intact.  Mentation and speech appropriate for age.  PSYCH: Mentation appears normal, affect normal/bright, judgement and insight intact, normal speech and appearance well-groomed.    EPic reviewed            Video-Visit Details    Type of service:  Video Visit     Originating Location (pt. Location): Home    Distant Location (provider location):  On-site  Platform used for Video Visit: Patient Education Systems

## 2023-10-25 ENCOUNTER — ANCILLARY PROCEDURE (OUTPATIENT)
Dept: ULTRASOUND IMAGING | Facility: CLINIC | Age: 48
End: 2023-10-25
Payer: COMMERCIAL

## 2023-10-25 DIAGNOSIS — I74.10 AORTIC THROMBUS (H): ICD-10-CM

## 2023-10-25 DIAGNOSIS — K55.069 SUPERIOR MESENTERIC ARTERY THROMBOSIS (H): ICD-10-CM

## 2023-10-25 PROCEDURE — 93975 VASCULAR STUDY: CPT | Performed by: RADIOLOGY

## 2023-10-26 RX ORDER — ACETAMINOPHEN AND CODEINE PHOSPHATE 120; 12 MG/5ML; MG/5ML
0.35 SOLUTION ORAL DAILY
Qty: 84 TABLET | Refills: 0 | Status: SHIPPED | OUTPATIENT
Start: 2023-10-26 | End: 2024-01-15

## 2023-11-07 ENCOUNTER — IMMUNIZATION (OUTPATIENT)
Dept: FAMILY MEDICINE | Facility: CLINIC | Age: 48
End: 2023-11-07
Payer: COMMERCIAL

## 2023-11-07 DIAGNOSIS — Z23 NEED FOR PROPHYLACTIC VACCINATION AND INOCULATION AGAINST INFLUENZA: Primary | ICD-10-CM

## 2023-11-07 DIAGNOSIS — Z23 HIGH PRIORITY FOR 2019-NCOV VACCINE: ICD-10-CM

## 2023-11-07 PROCEDURE — 90471 IMMUNIZATION ADMIN: CPT

## 2023-11-07 PROCEDURE — 90686 IIV4 VACC NO PRSV 0.5 ML IM: CPT

## 2023-11-07 PROCEDURE — 99207 PR NO CHARGE LOS: CPT

## 2023-11-07 PROCEDURE — 91320 SARSCV2 VAC 30MCG TRS-SUC IM: CPT

## 2023-11-07 PROCEDURE — 90480 ADMN SARSCOV2 VAC 1/ONLY CMP: CPT

## 2023-12-18 ENCOUNTER — PATIENT OUTREACH (OUTPATIENT)
Dept: CARE COORDINATION | Facility: CLINIC | Age: 48
End: 2023-12-18
Payer: COMMERCIAL

## 2024-01-14 ENCOUNTER — MYC MEDICAL ADVICE (OUTPATIENT)
Dept: FAMILY MEDICINE | Facility: CLINIC | Age: 49
End: 2024-01-14
Payer: COMMERCIAL

## 2024-01-14 DIAGNOSIS — N92.0 MENORRHAGIA WITH REGULAR CYCLE: ICD-10-CM

## 2024-01-15 ENCOUNTER — PATIENT OUTREACH (OUTPATIENT)
Dept: CARE COORDINATION | Facility: CLINIC | Age: 49
End: 2024-01-15
Payer: COMMERCIAL

## 2024-01-15 DIAGNOSIS — N92.0 MENORRHAGIA WITH REGULAR CYCLE: ICD-10-CM

## 2024-01-15 RX ORDER — ACETAMINOPHEN AND CODEINE PHOSPHATE 120; 12 MG/5ML; MG/5ML
0.35 SOLUTION ORAL DAILY
Qty: 28 TABLET | Refills: 0 | Status: SHIPPED | OUTPATIENT
Start: 2024-01-15 | End: 2024-02-05

## 2024-01-15 RX ORDER — ACETAMINOPHEN AND CODEINE PHOSPHATE 120; 12 MG/5ML; MG/5ML
0.35 SOLUTION ORAL DAILY
Qty: 84 TABLET | OUTPATIENT
Start: 2024-01-15

## 2024-01-29 ASSESSMENT — ENCOUNTER SYMPTOMS
MYALGIAS: 0
SHORTNESS OF BREATH: 0
EYE PAIN: 0
NERVOUS/ANXIOUS: 1
ABDOMINAL PAIN: 0
DIARRHEA: 0
DYSURIA: 0
HEMATURIA: 0
CONSTIPATION: 0
FREQUENCY: 0
SORE THROAT: 0
JOINT SWELLING: 0
COUGH: 0
BREAST MASS: 0
PARESTHESIAS: 0
DIZZINESS: 0
HEADACHES: 0
NAUSEA: 0
FEVER: 0
CHILLS: 0
PALPITATIONS: 0
HEMATOCHEZIA: 0
HEARTBURN: 0
WEAKNESS: 0
ARTHRALGIAS: 0

## 2024-02-05 ENCOUNTER — PATIENT OUTREACH (OUTPATIENT)
Dept: CARE COORDINATION | Facility: CLINIC | Age: 49
End: 2024-02-05

## 2024-02-05 ENCOUNTER — OFFICE VISIT (OUTPATIENT)
Dept: FAMILY MEDICINE | Facility: CLINIC | Age: 49
End: 2024-02-05
Payer: COMMERCIAL

## 2024-02-05 VITALS
BODY MASS INDEX: 30.09 KG/M2 | TEMPERATURE: 97.4 F | SYSTOLIC BLOOD PRESSURE: 122 MMHG | DIASTOLIC BLOOD PRESSURE: 70 MMHG | RESPIRATION RATE: 16 BRPM | HEIGHT: 63 IN | HEART RATE: 81 BPM | OXYGEN SATURATION: 98 % | WEIGHT: 169.8 LBS

## 2024-02-05 DIAGNOSIS — K55.069 SUPERIOR MESENTERIC ARTERY THROMBOSIS (H): ICD-10-CM

## 2024-02-05 DIAGNOSIS — N92.0 MENORRHAGIA WITH REGULAR CYCLE: ICD-10-CM

## 2024-02-05 DIAGNOSIS — Z12.4 CERVICAL CANCER SCREENING: ICD-10-CM

## 2024-02-05 DIAGNOSIS — Z98.84 S/P GASTRIC BYPASS: ICD-10-CM

## 2024-02-05 DIAGNOSIS — Z00.00 ROUTINE GENERAL MEDICAL EXAMINATION AT A HEALTH CARE FACILITY: Primary | ICD-10-CM

## 2024-02-05 DIAGNOSIS — I74.10 AORTIC THROMBUS (H): ICD-10-CM

## 2024-02-05 DIAGNOSIS — Z12.31 ENCOUNTER FOR SCREENING MAMMOGRAM FOR BREAST CANCER: ICD-10-CM

## 2024-02-05 DIAGNOSIS — G25.81 RESTLESS LEGS SYNDROME (RLS): ICD-10-CM

## 2024-02-05 DIAGNOSIS — R87.810 CERVICAL HIGH RISK HPV (HUMAN PAPILLOMAVIRUS) TEST POSITIVE: ICD-10-CM

## 2024-02-05 DIAGNOSIS — Z12.11 SCREEN FOR COLON CANCER: ICD-10-CM

## 2024-02-05 LAB
ALBUMIN SERPL BCG-MCNC: 4.2 G/DL (ref 3.5–5.2)
ALP SERPL-CCNC: 81 U/L (ref 40–150)
ALT SERPL W P-5'-P-CCNC: 11 U/L (ref 0–50)
ANION GAP SERPL CALCULATED.3IONS-SCNC: 10 MMOL/L (ref 7–15)
AST SERPL W P-5'-P-CCNC: 18 U/L (ref 0–45)
BILIRUB SERPL-MCNC: 0.3 MG/DL
BUN SERPL-MCNC: 13.4 MG/DL (ref 6–20)
CALCIUM SERPL-MCNC: 9.4 MG/DL (ref 8.6–10)
CHLORIDE SERPL-SCNC: 103 MMOL/L (ref 98–107)
CHOLEST SERPL-MCNC: 118 MG/DL
CREAT SERPL-MCNC: 0.7 MG/DL (ref 0.51–0.95)
DEPRECATED HCO3 PLAS-SCNC: 26 MMOL/L (ref 22–29)
EGFRCR SERPLBLD CKD-EPI 2021: >90 ML/MIN/1.73M2
ERYTHROCYTE [DISTWIDTH] IN BLOOD BY AUTOMATED COUNT: 13.9 % (ref 10–15)
FASTING STATUS PATIENT QL REPORTED: YES
FERRITIN SERPL-MCNC: 12 NG/ML (ref 6–175)
FOLATE SERPL-MCNC: 12 NG/ML (ref 4.6–34.8)
GLUCOSE SERPL-MCNC: 88 MG/DL (ref 70–99)
HCT VFR BLD AUTO: 41 % (ref 35–47)
HDLC SERPL-MCNC: 65 MG/DL
HGB BLD-MCNC: 13.1 G/DL (ref 11.7–15.7)
IRON BINDING CAPACITY (ROCHE): 363 UG/DL (ref 240–430)
IRON SATN MFR SERPL: 14 % (ref 15–46)
IRON SERPL-MCNC: 52 UG/DL (ref 37–145)
LDLC SERPL CALC-MCNC: 39 MG/DL
MCH RBC QN AUTO: 26.7 PG (ref 26.5–33)
MCHC RBC AUTO-ENTMCNC: 32 G/DL (ref 31.5–36.5)
MCV RBC AUTO: 84 FL (ref 78–100)
NONHDLC SERPL-MCNC: 53 MG/DL
PLATELET # BLD AUTO: 365 10E3/UL (ref 150–450)
POTASSIUM SERPL-SCNC: 4 MMOL/L (ref 3.4–5.3)
PROT SERPL-MCNC: 7.1 G/DL (ref 6.4–8.3)
PTH-INTACT SERPL-MCNC: 27 PG/ML (ref 15–65)
RBC # BLD AUTO: 4.91 10E6/UL (ref 3.8–5.2)
SODIUM SERPL-SCNC: 139 MMOL/L (ref 135–145)
TRIGL SERPL-MCNC: 69 MG/DL
VIT B12 SERPL-MCNC: 745 PG/ML (ref 232–1245)
VIT D+METAB SERPL-MCNC: 50 NG/ML (ref 20–50)
WBC # BLD AUTO: 11.7 10E3/UL (ref 4–11)

## 2024-02-05 PROCEDURE — 36415 COLL VENOUS BLD VENIPUNCTURE: CPT | Performed by: FAMILY MEDICINE

## 2024-02-05 PROCEDURE — 80061 LIPID PANEL: CPT | Performed by: FAMILY MEDICINE

## 2024-02-05 PROCEDURE — 87624 HPV HI-RISK TYP POOLED RSLT: CPT | Performed by: FAMILY MEDICINE

## 2024-02-05 PROCEDURE — G0124 SCREEN C/V THIN LAYER BY MD: HCPCS | Performed by: PATHOLOGY

## 2024-02-05 PROCEDURE — 85027 COMPLETE CBC AUTOMATED: CPT | Performed by: FAMILY MEDICINE

## 2024-02-05 PROCEDURE — 90715 TDAP VACCINE 7 YRS/> IM: CPT | Performed by: FAMILY MEDICINE

## 2024-02-05 PROCEDURE — 82607 VITAMIN B-12: CPT | Performed by: FAMILY MEDICINE

## 2024-02-05 PROCEDURE — 82728 ASSAY OF FERRITIN: CPT | Performed by: FAMILY MEDICINE

## 2024-02-05 PROCEDURE — 99000 SPECIMEN HANDLING OFFICE-LAB: CPT | Performed by: FAMILY MEDICINE

## 2024-02-05 PROCEDURE — 82746 ASSAY OF FOLIC ACID SERUM: CPT | Performed by: FAMILY MEDICINE

## 2024-02-05 PROCEDURE — 84425 ASSAY OF VITAMIN B-1: CPT | Mod: 90 | Performed by: FAMILY MEDICINE

## 2024-02-05 PROCEDURE — 99214 OFFICE O/P EST MOD 30 MIN: CPT | Mod: 25 | Performed by: FAMILY MEDICINE

## 2024-02-05 PROCEDURE — 83540 ASSAY OF IRON: CPT | Performed by: FAMILY MEDICINE

## 2024-02-05 PROCEDURE — 83550 IRON BINDING TEST: CPT | Performed by: FAMILY MEDICINE

## 2024-02-05 PROCEDURE — 83970 ASSAY OF PARATHORMONE: CPT | Performed by: FAMILY MEDICINE

## 2024-02-05 PROCEDURE — 99396 PREV VISIT EST AGE 40-64: CPT | Mod: 25 | Performed by: FAMILY MEDICINE

## 2024-02-05 PROCEDURE — 82306 VITAMIN D 25 HYDROXY: CPT | Performed by: FAMILY MEDICINE

## 2024-02-05 PROCEDURE — 80053 COMPREHEN METABOLIC PANEL: CPT | Performed by: FAMILY MEDICINE

## 2024-02-05 PROCEDURE — 90471 IMMUNIZATION ADMIN: CPT | Performed by: FAMILY MEDICINE

## 2024-02-05 PROCEDURE — G0145 SCR C/V CYTO,THINLAYER,RESCR: HCPCS | Performed by: FAMILY MEDICINE

## 2024-02-05 RX ORDER — ATORVASTATIN CALCIUM 20 MG/1
20 TABLET, FILM COATED ORAL DAILY
Qty: 90 TABLET | Refills: 3 | Status: SHIPPED | OUTPATIENT
Start: 2024-02-05

## 2024-02-05 RX ORDER — ACETAMINOPHEN AND CODEINE PHOSPHATE 120; 12 MG/5ML; MG/5ML
0.35 SOLUTION ORAL DAILY
Qty: 84 TABLET | Refills: 0 | Status: SHIPPED | OUTPATIENT
Start: 2024-02-05 | End: 2024-05-09

## 2024-02-05 RX ORDER — CYCLOBENZAPRINE HCL 10 MG
10 TABLET ORAL
Qty: 30 TABLET | Refills: 0 | Status: SHIPPED | OUTPATIENT
Start: 2024-02-05

## 2024-02-05 ASSESSMENT — ENCOUNTER SYMPTOMS
SORE THROAT: 0
NERVOUS/ANXIOUS: 1
HEMATURIA: 0
DIZZINESS: 0
JOINT SWELLING: 0
PALPITATIONS: 0
EYE PAIN: 0
SHORTNESS OF BREATH: 0
ABDOMINAL PAIN: 0
CONSTIPATION: 0
DYSURIA: 0
FEVER: 0
WEAKNESS: 0
FREQUENCY: 0
MYALGIAS: 0
COUGH: 0
DIARRHEA: 0
NAUSEA: 0
CHILLS: 0
HEADACHES: 0
ARTHRALGIAS: 0

## 2024-02-05 NOTE — PATIENT INSTRUCTIONS
"Eating Healthy Foods: Care Instructions  With every meal, you can make healthy food choices. Try to eat a variety of fruits, vegetables, whole grains, lean proteins, and low-fat dairy products. This can help you get the right balance of nutrients, including vitamins and minerals. Small changes add up over time. You can start by adding one healthy food to your meals each day.    Try to make half your plate fruits and vegetables, one-fourth whole grains, and one-fourth lean proteins. Try including dairy with your meals.   Eat more fruits and vegetables. Try to have them with most meals and snacks.   Foods for healthy eating    Fruits    These can be fresh, frozen, canned, or dried.  Try to choose whole fruit rather than fruit juice.  Eat a variety of colors.    Vegetables    These can be fresh, frozen, canned, or dried.  Beans, peas, and lentils count too.    Whole grains    Choose whole-grain breads, cereals, and noodles.  Try brown rice.    Lean proteins    These can include lean meat, poultry, fish, and eggs.  You can also have tofu, beans, peas, lentils, nuts, and seeds.    Dairy    Try milk, yogurt, and cheese.  Choose low-fat or fat-free when you can.  If you need to, use lactose-free milk or fortified plant-based milk products, such as soy milk.    Water    Drink water when you're thirsty.  Limit sugar-sweetened drinks, including soda, fruit drinks, and sports drinks.  Where can you learn more?  Go to https://www.[employer].net/patiented  Enter T756 in the search box to learn more about \"Eating Healthy Foods: Care Instructions.\"  Current as of: February 28, 2023               Content Version: 13.8    5058-9926 [employer].   Care instructions adapted under license by your healthcare professional. If you have questions about a medical condition or this instruction, always ask your healthcare professional. [employer] disclaims any warranty or liability for your use of this " information.      Preventive Care Advice   This is general advice given by our system to help you stay healthy. However, your care team may have specific advice just for you. Please talk to your care team about your preventive care needs.  Nutrition  Eat 5 or more servings of fruits and vegetables each day.  Try wheat bread, brown rice and whole grain pasta (instead of white bread, rice, and pasta).  Get enough calcium and vitamin D. Check the label on foods and aim for 100% of the RDA (recommended daily allowance).  Lifestyle  Exercise at least 150 minutes each week  (30 minutes a day, 5 days a week).  Do muscle strengthening activities 2 days a week. These help control your weight and prevent disease.  No smoking.  Wear sunscreen to prevent skin cancer.  Have a dental exam and cleaning every 6 months.  Yearly exams  See your health care team every year to talk about:  Any changes in your health.  Any medicines your care team has prescribed.  Preventive care, family planning, and ways to prevent chronic diseases.  Shots (vaccines)   HPV shots (up to age 26), if you've never had them before.  Hepatitis B shots (up to age 59), if you've never had them before.  COVID-19 shot: Get this shot when it's due.  Flu shot: Get a flu shot every year.  Tetanus shot: Get a tetanus shot every 10 years.  Pneumococcal, hepatitis A, and RSV shots: Ask your care team if you need these based on your risk.  Shingles shot (for age 50 and up)  General health tests  Diabetes screening:  Starting at age 35, Get screened for diabetes at least every 3 years.  If you are younger than age 35, ask your care team if you should be screened for diabetes.  Cholesterol test: At age 39, start having a cholesterol test every 5 years, or more often if advised.  Bone density scan (DEXA): At age 50, ask your care team if you should have this scan for osteoporosis (brittle bones).  Hepatitis C: Get tested at least once in your life.  STIs (sexually  transmitted infections)  Before age 24: Ask your care team if you should be screened for STIs.  After age 24: Get screened for STIs if you're at risk. You are at risk for STIs (including HIV) if:  You are sexually active with more than one person.  You don't use condoms every time.  You or a partner was diagnosed with a sexually transmitted infection.  If you are at risk for HIV, ask about PrEP medicine to prevent HIV.  Get tested for HIV at least once in your life, whether you are at risk for HIV or not.  Cancer screening tests  Cervical cancer screening: If you have a cervix, begin getting regular cervical cancer screening tests starting at age 21.  Breast cancer scan (mammogram): If you've ever had breasts, begin having regular mammograms starting at age 40. This is a scan to check for breast cancer.  Colon cancer screening: It is important to start screening for colon cancer at age 45.  Have a colonoscopy test every 10 years (or more often if you're at risk) Or, ask your provider about stool tests like a FIT test every year or Cologuard test every 3 years.  To learn more about your testing options, visit:   https://www.[employer]/227117.pdf.  For help making a decision, visit:   https://bit.ly/nb89736.  Prostate cancer screening test: If you have a prostate, ask your care team if a prostate cancer screening test (PSA) at age 55 is right for you.  Lung cancer screening: If you are a current or former smoker ages 50 to 80, ask your care team if ongoing lung cancer screenings are right for you.  For informational purposes only. Not to replace the advice of your health care provider. Copyright   2023 [employer]. All rights reserved. Clinically reviewed by the [facility] Transitions Program. [employer] [id number] - REV 01/24.

## 2024-02-05 NOTE — PROGRESS NOTES
Preventive Care Visit  [facility] RACHEL Morillo DO, Family Medicine  Feb 5, 2024      ICD-10-CM    1. Routine general medical examination at a health care facility  Z00.00       2. Menorrhagia with regular cycle  N92.0 norethindrone (MICRONOR) 0.35 MG tablet     Ferritin     Ob/Gyn  Referral     Ferritin      3. Restless legs syndrome (RLS)  G25.81 cyclobenzaprine (FLEXERIL) 10 MG tablet      4. Superior mesenteric artery thrombosis (H24)  K55.069 atorvastatin (LIPITOR) 20 MG tablet      5. Aortic thrombus (H)  I74.10       6. Screen for colon cancer  Z12.11 Colonoscopy Screening  Referral      7. Cervical cancer screening  Z12.4 Pap Screen with HPV - recommended age 30 - 65 years      8. Cervical high risk HPV (human papillomavirus) test positive  R87.810 Pap Screen with HPV - recommended age 30 - 65 years      9. S/P gastric bypass  Z98.84 Lipid panel reflex to direct LDL Fasting     Comprehensive metabolic panel (BMP + Alb, Alk Phos, ALT, AST, Total. Bili, TP)     Folate     Vitamin B1 whole blood     Vitamin B12     CBC with platelets     Iron and iron binding capacity     Vitamin D Deficiency     Parathyroid Hormone Intact     Lipid panel reflex to direct LDL Fasting     Comprehensive metabolic panel (BMP + Alb, Alk Phos, ALT, AST, Total. Bili, TP)     Folate     Vitamin B1 whole blood     Vitamin B12     CBC with platelets     Iron and iron binding capacity     Vitamin D Deficiency     Parathyroid Hormone Intact      10. Encounter for screening mammogram for breast cancer  Z12.31 *MA Screening Digital Bilateral        Menorrhagia:  on chronic anticoagulation s/p aortic and SMA thrombus.  Discussed IUD vs progesterone only OCP this fall.  Pt opted for progesterone only pill at that time.  Ongoing prolonged bleeding although lighter then previous.  Still reluctant to consider IUD, interested in surgical option.  Referred to gyn to discuss    RLS:  chronic, like now  related to low ferritin in light of ongoing bleeding.  Labs today.  Has found flexeril helpful in the past.  Aware of importance of adequate ferritin levels to improve symptoms.    SMA/aortic thrombus:  lifelong anticoagulation.  Managed by hematology.   Appointment this spring.    S/p gastric bypass:  due for bariatric labs      Subjective   Bridgette is a 48 year old, presenting for the following:  Physical      Health Care Directive  Patient does not have a Health Care Directive or Living Will: Discussed advance care planning with patient; information given to patient to review.    Healthy Habits:     Getting at least 3 servings of Calcium per day:  Yes    Bi-annual eye exam:  Yes    Dental care twice a year:  Yes    Sleep apnea or symptoms of sleep apnea:  Daytime drowsiness    Diet:  Regular (no restrictions)    Frequency of exercise:  2-3 days/week    Duration of exercise:  Less than 15 minutes    Taking medications regularly:  Yes    Medication side effects:  Not applicable    Additional concerns today:  No    Concerns:  Heavy periods - had a very heavy period in Nov right after starting OCP.  In December menses was lighter but lasted for about 2 weeks.  In Jan started on 1/13 and is still bleeding, 1st 2 weeks were not as heavy.  Light now, panty liner is fine.   Would like to do something different for her periods.  Still not really interested in IUD, wondering about surgical options.        Restless legs - worse the last few months.  Took flexeril years ago and saw benefit from it.  Refilled at the last visit.  Does find it helpful but not perfect.  Has now run out.  Knows ferritin is likely an issues as well.            1/29/2024   Social Factors   Worry food won't last until get money to buy more No   Food not last or not have enough money for food? No   Do you have housing?  Yes   Are you worried about losing your housing? No   Lack of transportation? No   Unable to get utilities (heat,electricity)? No        Today's PHQ-2 Score:       2024     8:37 AM   PHQ-2 (  [employer])   Q1: Little interest or pleasure in doing things 0   Q2: Feeling down, depressed or hopeless 0   PHQ-2 Score 0   Q1: Little interest or pleasure in doing things Not at all   Q2: Feeling down, depressed or hopeless Not at all   PHQ-2 Score 0           2024   Substance Use   Alcohol more than 3/day or more than 7/wk No     Social History     Tobacco Use    Smoking status: Every Day     Packs/day: 0.50     Years: 10.00     Additional pack years: 0.00     Total pack years: 5.00     Types: Cigarettes     Last attempt to quit: 2/15/2023     Years since quittin.9    Smokeless tobacco: Never   Vaping Use    Vaping Use: Never used   Substance Use Topics    Alcohol use: Not Currently     Comment: rarely- quit with pregnancy    Drug use: No            No data to display                 Annual screen by mutual decision with patient      History of abnormal Pap smear: YES - updated in Problem List and Health Maintenance accordingly        Latest Ref Rng & Units 2023    10:01 AM 2022    11:12 AM 2017    11:10 AM   PAP / HPV   PAP  Negative for Intraepithelial Lesion or Malignancy (NILM)  Negative for Intraepithelial Lesion or Malignancy (NILM)     HPV 16 DNA Negative Positive  Positive  Positive    HPV 18 DNA Negative Negative  Negative  Negative    Other HR HPV Negative Negative  Negative  Negative      The ASCVD Risk score (Esmer DK, et al., 2019) failed to calculate for the following reasons:    The valid total cholesterol range is 130 to 320 mg/dL       Reviewed and updated as needed this visit by Provider                    Past Medical History:   Diagnosis Date    Cervical high risk HPV (human papillomavirus) test positive 2016    type 16    Chickenpox     Chlamydia trachomatis infection of unspecified site     POONAM 1     Depressive disorder     Depressive disorder, not elsewhere classified     History of blood  transfusion Sep 2022    Surgery    History of colposcopy with cervical biopsy 2016    Bx - POONAM 1, ECC - negative    Other abnormal heart sounds as child    Murmur - resolved    Polycystic ovaries     prior to gastic bypass in , pt carried the diagnosis of PCOS/anovuolation    Streptococcus infection in conditions classified elsewhere and of unspecified site, group B 1998    In pregnancy     Past Surgical History:   Procedure Laterality Date     SECTION  ,      SECTION, TUBAL LIGATION, COMBINED  2013    Procedure: COMBINED  SECTION, TUBAL LIGATION;   Section, Tubal Ligation;  Surgeon: Paul Allen MD;  Location: [facility] OR    DAVINCI HERNIORRHAPHY VENTRAL N/A 2023    Procedure: HERNIORRHAPHY, VENTRAL, ROBOT-ASSISTED WITH MESH;  Surgeon: Herminio Becerra MD;  Location: [facility] OR    GASTRIC BYPASS  2005    GASTRIC BYPASS      GI SURGERY  Sep 2022    HC REMOVAL GALLBLADDER  2006    Dr. Inman @ [facility]    HC REPAIR ING HERNIA,5+Y/O,REDUCIBL  age 20 ()    [facility]    LAPAROTOMY EXPLORATORY N/A 2022    Procedure: Exploratory LAPAROTOMY, Superior Mesenteric Artery Thrombectomy, Retrograde Superior Mesenteric Artery Stenting;  Surgeon: Balta Ernst MD;  Location: [facility] OR    LAPAROTOMY EXPLORATORY N/A 09/10/2022    Procedure: LAPAROTOMY, SMALL BOWEL RESECTION, INCISIONAL WOUND CLOSURE;  Surgeon: Tariq Lancaster MD;  Location: [facility] OR    LAPAROTOMY EXPLORATORY N/A 2022    Procedure: EXPLORATORY LAPAROTOMY, EVACUATION OF RECTUS SHEATH HEMATOMA, ABDOMINAL WASHOUT;  Surgeon: Nathan Barber MD;  Location: [facility] OR    PICC DOUBLE LUMEN PLACEMENT Right 09/10/2022    5FR DL PICC. Basilic vein.    SURGICAL HISTORY OF -       PE tubes    SURGICAL HISTORY OF -   10/2006    removal of pannus ans breast lift    TONSILLECTOMY  1981    VASCULAR SURGERY  Sep 2022    ZZC  DELIVERY ONLY  1998    Failure  "to progress    [facility] NONSPECIFIC PROCEDURE  03/09/2006    Laser ablation genital condyloma/ Bx labial lesion    [facility] RAD RESEC TONSIL/PILLARS       Review of Systems   Constitutional:  Negative for chills and fever.   HENT:  Negative for congestion, ear pain, hearing loss and sore throat.    Eyes:  Negative for pain and visual disturbance.   Respiratory:  Negative for cough and shortness of breath.    Cardiovascular:  Negative for chest pain and palpitations.   Gastrointestinal:  Negative for abdominal pain, constipation, diarrhea and nausea.   Genitourinary:  Negative for dysuria, frequency, genital sores, hematuria, pelvic pain, urgency, vaginal bleeding and vaginal discharge.   Musculoskeletal:  Negative for arthralgias, joint swelling and myalgias.   Skin:  Negative for rash.   Neurological:  Negative for dizziness, weakness and headaches.   Psychiatric/Behavioral:  The patient is nervous/anxious.           Objective    Exam  There were no vitals taken for this visit.   Estimated body mass index is 30.41 kg/m  as calculated from the following:    Height as of 7/5/23: 1.613 m (5' 3.5\").    Weight as of 10/11/23: 79.1 kg (174 lb 6.4 oz).    Physical Exam  GENERAL: alert and no distress  EYES: Eyes grossly normal to inspection, PERRL and conjunctivae and sclerae normal  HENT: ear canals and TM's normal, nose and mouth without ulcers or lesions  NECK: no adenopathy, no asymmetry, masses, or scars  RESP: lungs clear to auscultation - no rales, rhonchi or wheezes  BREAST: normal without masses, tenderness or nipple discharge and no palpable axillary masses or adenopathy  CV: regular rate and rhythm, normal S1 S2, no S3 or S4, no murmur, click or rub, no peripheral edema  ABDOMEN: soft, nontender, no hepatosplenomegaly, no masses and bowel sounds normal   (female): normal female external genitalia, normal urethral meatus, vaginal mucosa pink, moist, well rugated, and normal cervix/adnexa/uterus without masses or " discharge.  Menstrual blood present  MS: no gross musculoskeletal defects noted, no edema  NEURO: Normal strength and tone, mentation intact and speech normal  PSYCH: mentation appears normal, affect normal/bright      Signed Electronically by: Cecy Morillo DO      Answers submitted by the patient for this visit:  Annual Preventive Visit (Submitted on 1/29/2024)  Chief Complaint: Annual Exam:  Blood in stool: No  heartburn: No  peripheral edema: No  mood changes: No  Skin sensation changes: No  tenderness: No  breast mass: No  breast discharge: No

## 2024-02-07 ENCOUNTER — MYC MEDICAL ADVICE (OUTPATIENT)
Dept: HEMATOLOGY | Facility: CLINIC | Age: 49
End: 2024-02-07
Payer: COMMERCIAL

## 2024-02-07 DIAGNOSIS — D50.0 IRON DEFICIENCY ANEMIA DUE TO CHRONIC BLOOD LOSS: Primary | ICD-10-CM

## 2024-02-08 DIAGNOSIS — D50.0 IRON DEFICIENCY ANEMIA DUE TO CHRONIC BLOOD LOSS: Primary | ICD-10-CM

## 2024-02-08 DIAGNOSIS — Z98.84 S/P GASTRIC BYPASS: ICD-10-CM

## 2024-02-08 LAB
BKR LAB AP GYN ADEQUACY: ABNORMAL
BKR LAB AP GYN INTERPRETATION: ABNORMAL
BKR LAB AP GYN OTHER FINDINGS: ABNORMAL
BKR LAB AP HPV REFLEX: ABNORMAL
BKR LAB AP PREVIOUS ABNL DX: ABNORMAL
BKR LAB AP PREVIOUS ABNORMAL: ABNORMAL
PATH REPORT.COMMENTS IMP SPEC: ABNORMAL
PATH REPORT.COMMENTS IMP SPEC: ABNORMAL
PATH REPORT.RELEVANT HX SPEC: ABNORMAL
VIT B1 PYROPHOSHATE BLD-SCNC: 161 NMOL/L

## 2024-02-08 RX ORDER — MEPERIDINE HYDROCHLORIDE 25 MG/ML
25 INJECTION INTRAMUSCULAR; INTRAVENOUS; SUBCUTANEOUS EVERY 30 MIN PRN
Status: CANCELLED | OUTPATIENT
Start: 2024-02-08

## 2024-02-08 RX ORDER — METHYLPREDNISOLONE SODIUM SUCCINATE 125 MG/2ML
125 INJECTION, POWDER, LYOPHILIZED, FOR SOLUTION INTRAMUSCULAR; INTRAVENOUS
Status: CANCELLED
Start: 2024-02-08

## 2024-02-08 RX ORDER — HEPARIN SODIUM,PORCINE 10 UNIT/ML
5-20 VIAL (ML) INTRAVENOUS DAILY PRN
Status: CANCELLED | OUTPATIENT
Start: 2024-02-08

## 2024-02-08 RX ORDER — HEPARIN SODIUM (PORCINE) LOCK FLUSH IV SOLN 100 UNIT/ML 100 UNIT/ML
5 SOLUTION INTRAVENOUS
Status: CANCELLED | OUTPATIENT
Start: 2024-02-08

## 2024-02-08 RX ORDER — DIPHENHYDRAMINE HYDROCHLORIDE 50 MG/ML
50 INJECTION INTRAMUSCULAR; INTRAVENOUS
Status: CANCELLED
Start: 2024-02-08

## 2024-02-08 RX ORDER — ALBUTEROL SULFATE 0.83 MG/ML
2.5 SOLUTION RESPIRATORY (INHALATION)
Status: CANCELLED | OUTPATIENT
Start: 2024-02-08

## 2024-02-08 RX ORDER — EPINEPHRINE 1 MG/ML
0.3 INJECTION, SOLUTION INTRAMUSCULAR; SUBCUTANEOUS EVERY 5 MIN PRN
Status: CANCELLED | OUTPATIENT
Start: 2024-02-08

## 2024-02-08 RX ORDER — ALBUTEROL SULFATE 90 UG/1
1-2 AEROSOL, METERED RESPIRATORY (INHALATION)
Status: CANCELLED
Start: 2024-02-08

## 2024-02-12 ENCOUNTER — PATIENT OUTREACH (OUTPATIENT)
Dept: FAMILY MEDICINE | Facility: CLINIC | Age: 49
End: 2024-02-12
Payer: COMMERCIAL

## 2024-02-12 DIAGNOSIS — R87.810 CERVICAL HIGH RISK HPV (HUMAN PAPILLOMAVIRUS) TEST POSITIVE: Primary | ICD-10-CM

## 2024-02-12 NOTE — TELEPHONE ENCOUNTER
2/5/24 NIL pap, +HR HPV 16 with EM cells present. Plan: colp with possible EMB with OBGYN due before 5/5/24

## 2024-02-19 ENCOUNTER — OFFICE VISIT (OUTPATIENT)
Dept: OBGYN | Facility: CLINIC | Age: 49
End: 2024-02-19
Attending: FAMILY MEDICINE
Payer: COMMERCIAL

## 2024-02-19 ENCOUNTER — PATIENT OUTREACH (OUTPATIENT)
Dept: CARE COORDINATION | Facility: CLINIC | Age: 49
End: 2024-02-19

## 2024-02-19 VITALS
RESPIRATION RATE: 18 BRPM | HEART RATE: 98 BPM | DIASTOLIC BLOOD PRESSURE: 86 MMHG | SYSTOLIC BLOOD PRESSURE: 152 MMHG | TEMPERATURE: 98.7 F | HEIGHT: 63 IN | WEIGHT: 166 LBS | BODY MASS INDEX: 29.41 KG/M2

## 2024-02-19 DIAGNOSIS — R03.0 ELEVATED BLOOD PRESSURE READING WITHOUT DIAGNOSIS OF HYPERTENSION: Primary | ICD-10-CM

## 2024-02-19 DIAGNOSIS — N92.0 MENORRHAGIA WITH REGULAR CYCLE: ICD-10-CM

## 2024-02-19 PROCEDURE — 99212 OFFICE O/P EST SF 10 MIN: CPT | Performed by: ADVANCED PRACTICE MIDWIFE

## 2024-02-19 NOTE — NURSING NOTE
"Initial BP (!) 152/86 (BP Location: Right arm, Patient Position: Chair, Cuff Size: Adult Regular)   Pulse 98   Temp 98.7  F (37.1  C) (Tympanic)   Resp 18   Ht 1.6 m (5' 3\")   Wt 75.3 kg (166 lb)   BMI 29.41 kg/m   Estimated body mass index is 29.41 kg/m  as calculated from the following:    Height as of this encounter: 1.6 m (5' 3\").    Weight as of this encounter: 75.3 kg (166 lb). .    "

## 2024-02-19 NOTE — PROGRESS NOTES
"S:  Bridgette has a history of thrombosis on blood thinners.  She had always had a history of light periods.  Now she has \"very, very heavy periods\" resulting in anemia where she needs iron transfusions.  She tried progesterone only pills. Her bleeding continues to be heavy.  She has a history of tubal ligation.  She does not have hypertension.  She does not want to try depo due to concerns with weight gain.  She has history of bariatric surgery.    O:  Appears well  /86  A/P  (R03.0) Elevated blood pressure reading without diagnosis of hypertension  (primary encounter diagnosis)  Plan: Please continue to assess. Follow up with PCP.      (N92.0) Menorrhagia with regular cycle  Plan: Discussed Mirena IUD as an option.  Discussed option of a consult with MD to discuss possible ablation.  She is interested in an ablation and will make an MD consult.        "

## 2024-02-21 ENCOUNTER — INFUSION THERAPY VISIT (OUTPATIENT)
Dept: INFUSION THERAPY | Facility: CLINIC | Age: 49
End: 2024-02-21
Attending: STUDENT IN AN ORGANIZED HEALTH CARE EDUCATION/TRAINING PROGRAM
Payer: COMMERCIAL

## 2024-02-21 VITALS — SYSTOLIC BLOOD PRESSURE: 118 MMHG | TEMPERATURE: 98.3 F | HEART RATE: 85 BPM | DIASTOLIC BLOOD PRESSURE: 72 MMHG

## 2024-02-21 DIAGNOSIS — Z98.84 S/P GASTRIC BYPASS: Primary | ICD-10-CM

## 2024-02-21 DIAGNOSIS — D50.0 IRON DEFICIENCY ANEMIA DUE TO CHRONIC BLOOD LOSS: ICD-10-CM

## 2024-02-21 PROCEDURE — 96376 TX/PRO/DX INJ SAME DRUG ADON: CPT

## 2024-02-21 PROCEDURE — 258N000003 HC RX IP 258 OP 636: Performed by: STUDENT IN AN ORGANIZED HEALTH CARE EDUCATION/TRAINING PROGRAM

## 2024-02-21 PROCEDURE — 96366 THER/PROPH/DIAG IV INF ADDON: CPT

## 2024-02-21 PROCEDURE — 96365 THER/PROPH/DIAG IV INF INIT: CPT

## 2024-02-21 PROCEDURE — 250N000011 HC RX IP 250 OP 636: Performed by: STUDENT IN AN ORGANIZED HEALTH CARE EDUCATION/TRAINING PROGRAM

## 2024-02-21 RX ORDER — METHYLPREDNISOLONE SODIUM SUCCINATE 125 MG/2ML
125 INJECTION, POWDER, LYOPHILIZED, FOR SOLUTION INTRAMUSCULAR; INTRAVENOUS
Start: 2024-02-21

## 2024-02-21 RX ORDER — MEPERIDINE HYDROCHLORIDE 25 MG/ML
25 INJECTION INTRAMUSCULAR; INTRAVENOUS; SUBCUTANEOUS EVERY 30 MIN PRN
OUTPATIENT
Start: 2024-02-21

## 2024-02-21 RX ORDER — DIPHENHYDRAMINE HYDROCHLORIDE 50 MG/ML
50 INJECTION INTRAMUSCULAR; INTRAVENOUS
Start: 2024-02-21

## 2024-02-21 RX ORDER — HEPARIN SODIUM (PORCINE) LOCK FLUSH IV SOLN 100 UNIT/ML 100 UNIT/ML
5 SOLUTION INTRAVENOUS
OUTPATIENT
Start: 2024-02-21

## 2024-02-21 RX ORDER — ALBUTEROL SULFATE 0.83 MG/ML
2.5 SOLUTION RESPIRATORY (INHALATION)
OUTPATIENT
Start: 2024-02-21

## 2024-02-21 RX ORDER — HEPARIN SODIUM,PORCINE 10 UNIT/ML
5-20 VIAL (ML) INTRAVENOUS DAILY PRN
OUTPATIENT
Start: 2024-02-21

## 2024-02-21 RX ORDER — EPINEPHRINE 1 MG/ML
0.3 INJECTION, SOLUTION, CONCENTRATE INTRAVENOUS EVERY 5 MIN PRN
OUTPATIENT
Start: 2024-02-21

## 2024-02-21 RX ORDER — ALBUTEROL SULFATE 90 UG/1
1-2 AEROSOL, METERED RESPIRATORY (INHALATION)
Start: 2024-02-21

## 2024-02-21 RX ADMIN — SODIUM CHLORIDE 250 ML: 9 INJECTION, SOLUTION INTRAVENOUS at 08:48

## 2024-02-21 RX ADMIN — SODIUM CHLORIDE 25 MG: 9 INJECTION, SOLUTION INTRAVENOUS at 08:48

## 2024-02-21 RX ADMIN — SODIUM CHLORIDE 1000 MG: 9 INJECTION, SOLUTION INTRAVENOUS at 10:48

## 2024-02-21 NOTE — PROGRESS NOTES
Infusion Nursing Note:  Bridgette Veras presents today for Infed.    Patient seen by provider today: No   present during visit today: Not Applicable.    Note: N/A.    Intravenous Access:  Peripheral IV placed.    Treatment Conditions:  Not Applicable.    Post Infusion Assessment:  Patient tolerated infusion without incident.  Blood return noted pre and post infusion.  Site patent and intact, free from redness, edema or discomfort.  No evidence of extravasations.  Access discontinued per protocol.     Discharge Plan:   Patient discharged in stable condition accompanied by: self.  Departure Mode: Ambulatory.      Jonathan Millan RN

## 2024-02-27 ENCOUNTER — ANESTHESIA EVENT (OUTPATIENT)
Dept: GASTROENTEROLOGY | Facility: CLINIC | Age: 49
End: 2024-02-27
Payer: COMMERCIAL

## 2024-02-27 ASSESSMENT — LIFESTYLE VARIABLES: TOBACCO_USE: 1

## 2024-02-27 NOTE — ANESTHESIA PREPROCEDURE EVALUATION
Anesthesia Pre-Procedure Evaluation    Patient: Bridgette Veras   MRN: [id number] : 1975        Procedure : Procedure(s):  Colonoscopy          Past Medical History:   Diagnosis Date    Cervical high risk HPV (human papillomavirus) test positive 2016    type 16    Chickenpox     Chlamydia trachomatis infection of unspecified site     POONAM 1     Depressive disorder     Depressive disorder, not elsewhere classified     History of blood transfusion Sep 2022    Surgery    History of colposcopy with cervical biopsy 2016    Bx - POONAM 1, ECC - negative    Other abnormal heart sounds as child    Murmur - resolved    Polycystic ovaries     prior to gastic bypass in , pt carried the diagnosis of PCOS/anovuolation    Streptococcus infection in conditions classified elsewhere and of unspecified site, group B 1998    In pregnancy      Past Surgical History:   Procedure Laterality Date     SECTION  1998     SECTION, TUBAL LIGATION, COMBINED  2013    Procedure: COMBINED  SECTION, TUBAL LIGATION;   Section, Tubal Ligation;  Surgeon: Paul Allen MD;  Location: [facility]    DAVINCI HERNIORRHAPHY VENTRAL N/A 2023    Procedure: HERNIORRHAPHY, VENTRAL, ROBOT-ASSISTED WITH MESH;  Surgeon: Herminio Becerra MD;  Location: [facility] OR    GASTRIC BYPASS  2005    GASTRIC BYPASS      GI SURGERY  Sep 2022    HC REMOVAL GALLBLADDER  2006    Dr. Inman @ [facility]    HC REPAIR ING HERNIA,5+Y/O,REDUCIBL  age 20 ()    [facility]    LAPAROTOMY EXPLORATORY N/A 2022    Procedure: Exploratory LAPAROTOMY, Superior Mesenteric Artery Thrombectomy, Retrograde Superior Mesenteric Artery Stenting;  Surgeon: Balta Ernst MD;  [facility]    LAPAROTOMY EXPLORATORY N/A 09/10/2022    Procedure: LAPAROTOMY, SMALL BOWEL RESECTION, INCISIONAL WOUND CLOSURE;  Surgeon: Tariq Lancaster MD;  [facility]    LAPAROTOMY EXPLORATORY N/A  2022    Procedure: EXPLORATORY LAPAROTOMY, EVACUATION OF RECTUS SHEATH HEMATOMA, ABDOMINAL WASHOUT;  Surgeon: Nathan Barber MD;  [facility]    PICC DOUBLE LUMEN PLACEMENT Right 09/10/2022    5FR DL PICC. Basilic vein.    SURGICAL HISTORY OF -       PE tubes    SURGICAL HISTORY OF -   10/2006    removal of pannus ans breast lift    TONSILLECTOMY  1981    VASCULAR SURGERY  Sep 2022    Z  DELIVERY ONLY  1998    Failure to progress    [facility] NONSPECIFIC PROCEDURE  2006    Laser ablation genital condyloma/ Bx labial lesion    [facility] RAD RESEC TONSIL/PILLARS        Allergies   Allergen Reactions    Codeine Nausea and Vomiting     Pt tolerated oxycodone .    Darvocet [Acetaminophen] Nausea and Vomiting     Pt tolerated oxycodone .    Propoxyphene Nausea      Social History     Tobacco Use    Smoking status: Every Day     Packs/day: 0.50     Years: 10.00     Additional pack years: 0.00     Total pack years: 5.00     Types: Cigarettes     Last attempt to quit: 2/15/2023     Years since quittin.0    Smokeless tobacco: Never   Substance Use Topics    Alcohol use: Not Currently     Comment: rarely- quit with pregnancy      Wt Readings from Last 1 Encounters:   24 75.3 kg (166 lb)        Anesthesia Evaluation   Pt has had prior anesthetic. Type: General and MAC.    No history of anesthetic complications       ROS/MED HX  ENT/Pulmonary:     (+)                tobacco use, Current use,                       Neurologic:  - neg neurologic ROS     Cardiovascular:       METS/Exercise Tolerance: >4 METS    Hematologic: Comments: Stopped eliquis 3/1/24    (+) History of blood clots,    pt is anticoagulated, anemia, history of blood transfusion,         Musculoskeletal:  - neg musculoskeletal ROS     GI/Hepatic: Comment: S/p gastric bypass    (+)          cholecystitis/cholelithiasis,          Renal/Genitourinary:  - neg Renal ROS     Endo:     (+)               Obesity,        Psychiatric/Substance Use:     (+) psychiatric history depression       Infectious Disease:       Malignancy:  - neg malignancy ROS     Other:  - neg other ROS          Physical Exam    Airway  airway exam normal      Mallampati: II   TM distance: > 3 FB   Neck ROM: full   Mouth opening: > 3 cm    Respiratory Devices and Support         Dental       (+) Minor Abnormalities - some fillings, tiny chips      Cardiovascular   cardiovascular exam normal       Rhythm and rate: regular and normal     Pulmonary   pulmonary exam normal        breath sounds clear to auscultation           OUTSIDE LABS:  CBC:   Lab Results   Component Value Date    WBC 11.7 (H) 02/05/2024    WBC 16.3 (H) 10/11/2023    HGB 13.1 02/05/2024    HGB 14.1 10/11/2023    HCT 41.0 02/05/2024    HCT 42.6 10/11/2023     02/05/2024     10/11/2023     BMP:   Lab Results   Component Value Date     02/05/2024     10/11/2023    POTASSIUM 4.0 02/05/2024    POTASSIUM 4.1 10/11/2023    CHLORIDE 103 02/05/2024    CHLORIDE 104 10/11/2023    CO2 26 02/05/2024    CO2 24 10/11/2023    BUN 13.4 02/05/2024    BUN 11.6 10/11/2023    CR 0.70 02/05/2024    CR 0.72 10/11/2023    GLC 88 02/05/2024    GLC 65 (L) 10/11/2023     COAGS:   Lab Results   Component Value Date    PTT 60 (H) 09/09/2022    INR 1.13 09/12/2022    FIBR 501 (H) 09/09/2022     POC:   Lab Results   Component Value Date    HCG Negative 06/09/2023     HEPATIC:   Lab Results   Component Value Date    ALBUMIN 4.2 02/05/2024    PROTTOTAL 7.1 02/05/2024    ALT 11 02/05/2024    AST 18 02/05/2024    ALKPHOS 81 02/05/2024    BILITOTAL 0.3 02/05/2024     OTHER:   Lab Results   Component Value Date    PH 7.44 09/09/2022    LACT 0.9 09/09/2022    A1C 5.8 (H) 02/07/2022    MANNY 9.4 02/05/2024    PHOS 4.1 09/15/2022    MAG 2.1 09/15/2022    LIPASE 20 09/07/2022    TSH 1.30 11/02/2010    SED 11 03/07/2011       Anesthesia Plan    ASA Status:  3    NPO Status:  NPO Appropriate    Anesthesia  "Type: General.     - Airway: Native airway   Induction: Intravenous, Propofol.   Maintenance: TIVA.        Consents    Anesthesia Plan(s) and associated risks, benefits, and realistic alternatives discussed. Questions answered and patient/representative(s) expressed understanding.     - Discussed: Risks, Benefits and Alternatives for BOTH SEDATION and the PROCEDURE were discussed     - Discussed with:  Patient      - Extended Intubation/Ventilatory Support Discussed: No.      - Patient is DNR/DNI Status: No     Use of blood products discussed: No .     Postoperative Care    Pain management: Oral pain medications.   PONV prophylaxis: Ondansetron (or other 5HT-3), Dexamethasone or Solumedrol, Background Propofol Infusion     Comments:               LOUIE Ibarra CRNA    I have reviewed the pertinent notes and labs in the chart from the past 30 days and (re)examined the patient.  Any updates or changes from those notes are reflected in this note.              # Overweight: Estimated body mass index is 29.41 kg/m  as calculated from the following:    Height as of 2/19/24: 1.6 m (5' 3\").    Weight as of 2/19/24: 75.3 kg (166 lb).      "

## 2024-03-04 ENCOUNTER — HOSPITAL ENCOUNTER (OUTPATIENT)
Facility: CLINIC | Age: 49
Discharge: HOME | End: 2024-03-04
Attending: SURGERY | Admitting: SURGERY
Payer: COMMERCIAL

## 2024-03-04 ENCOUNTER — ANESTHESIA (OUTPATIENT)
Dept: GASTROENTEROLOGY | Facility: CLINIC | Age: 49
End: 2024-03-04
Payer: COMMERCIAL

## 2024-03-04 VITALS
HEIGHT: 63 IN | RESPIRATION RATE: 16 BRPM | SYSTOLIC BLOOD PRESSURE: 120 MMHG | DIASTOLIC BLOOD PRESSURE: 75 MMHG | BODY MASS INDEX: 29.41 KG/M2 | WEIGHT: 166 LBS | HEART RATE: 59 BPM | OXYGEN SATURATION: 100 % | TEMPERATURE: 98 F

## 2024-03-04 LAB — COLONOSCOPY: NORMAL

## 2024-03-04 PROCEDURE — 250N000009 HC RX 250: Performed by: SURGERY

## 2024-03-04 PROCEDURE — 250N000011 HC RX IP 250 OP 636: Performed by: NURSE ANESTHETIST, CERTIFIED REGISTERED

## 2024-03-04 PROCEDURE — 258N000003 HC RX IP 258 OP 636: Performed by: SURGERY

## 2024-03-04 PROCEDURE — 88305 TISSUE EXAM BY PATHOLOGIST: CPT | Mod: TC | Performed by: SURGERY

## 2024-03-04 PROCEDURE — 45380 COLONOSCOPY AND BIOPSY: CPT | Performed by: SURGERY

## 2024-03-04 PROCEDURE — 88305 TISSUE EXAM BY PATHOLOGIST: CPT | Mod: 26 | Performed by: PATHOLOGY

## 2024-03-04 PROCEDURE — 370N000017 HC ANESTHESIA TECHNICAL FEE, PER MIN: Performed by: SURGERY

## 2024-03-04 PROCEDURE — 250N000009 HC RX 250: Performed by: NURSE ANESTHETIST, CERTIFIED REGISTERED

## 2024-03-04 RX ORDER — SODIUM CHLORIDE, SODIUM LACTATE, POTASSIUM CHLORIDE, CALCIUM CHLORIDE 600; 310; 30; 20 MG/100ML; MG/100ML; MG/100ML; MG/100ML
INJECTION, SOLUTION INTRAVENOUS CONTINUOUS
Status: DISCONTINUED | OUTPATIENT
Start: 2024-03-04 | End: 2024-03-04 | Stop reason: HOSPADM

## 2024-03-04 RX ORDER — SODIUM CHLORIDE 9 MG/ML
INJECTION, SOLUTION INTRAVENOUS CONTINUOUS
Status: DISCONTINUED | OUTPATIENT
Start: 2024-03-04 | End: 2024-03-04 | Stop reason: HOSPADM

## 2024-03-04 RX ORDER — PROPOFOL 10 MG/ML
INJECTION, EMULSION INTRAVENOUS CONTINUOUS PRN
Status: DISCONTINUED | OUTPATIENT
Start: 2024-03-04 | End: 2024-03-04

## 2024-03-04 RX ORDER — NALOXONE HYDROCHLORIDE 0.4 MG/ML
0.1 INJECTION, SOLUTION INTRAMUSCULAR; INTRAVENOUS; SUBCUTANEOUS
Status: DISCONTINUED | OUTPATIENT
Start: 2024-03-04 | End: 2024-03-04 | Stop reason: HOSPADM

## 2024-03-04 RX ORDER — PROPOFOL 10 MG/ML
INJECTION, EMULSION INTRAVENOUS PRN
Status: DISCONTINUED | OUTPATIENT
Start: 2024-03-04 | End: 2024-03-04

## 2024-03-04 RX ORDER — LIDOCAINE 40 MG/G
CREAM TOPICAL
Status: DISCONTINUED | OUTPATIENT
Start: 2024-03-04 | End: 2024-03-04 | Stop reason: HOSPADM

## 2024-03-04 RX ORDER — ONDANSETRON 2 MG/ML
4 INJECTION INTRAMUSCULAR; INTRAVENOUS EVERY 30 MIN PRN
Status: DISCONTINUED | OUTPATIENT
Start: 2024-03-04 | End: 2024-03-04 | Stop reason: HOSPADM

## 2024-03-04 RX ORDER — ONDANSETRON 4 MG/1
4 TABLET, ORALLY DISINTEGRATING ORAL EVERY 30 MIN PRN
Status: DISCONTINUED | OUTPATIENT
Start: 2024-03-04 | End: 2024-03-04 | Stop reason: HOSPADM

## 2024-03-04 RX ORDER — LIDOCAINE HYDROCHLORIDE 20 MG/ML
INJECTION, SOLUTION INFILTRATION; PERINEURAL PRN
Status: DISCONTINUED | OUTPATIENT
Start: 2024-03-04 | End: 2024-03-04

## 2024-03-04 RX ADMIN — SODIUM CHLORIDE, POTASSIUM CHLORIDE, SODIUM LACTATE AND CALCIUM CHLORIDE: 600; 310; 30; 20 INJECTION, SOLUTION INTRAVENOUS at 06:55

## 2024-03-04 RX ADMIN — LIDOCAINE HYDROCHLORIDE 0.1 ML: 10 INJECTION, SOLUTION EPIDURAL; INFILTRATION; INTRACAUDAL; PERINEURAL at 06:55

## 2024-03-04 RX ADMIN — PROPOFOL 150 MCG/KG/MIN: 10 INJECTION, EMULSION INTRAVENOUS at 07:32

## 2024-03-04 RX ADMIN — PROPOFOL 100 MG: 10 INJECTION, EMULSION INTRAVENOUS at 07:32

## 2024-03-04 RX ADMIN — LIDOCAINE HYDROCHLORIDE 40 MG: 20 INJECTION, SOLUTION INFILTRATION; PERINEURAL at 07:32

## 2024-03-04 ASSESSMENT — ACTIVITIES OF DAILY LIVING (ADL)
ADLS_ACUITY_SCORE: 37
ADLS_ACUITY_SCORE: 37

## 2024-03-04 NOTE — LETTER
Bridgette Veras  [address] [place] [address]    March 12, 2024    Dear Bridgette,  This letter is written to inform you of the results of your recent colonoscopy.  Your examination showed polyp(s) in your cecum, transverse colon, and sigmoid colon. All polyps were removed in their entirety and sent for review by a pathologist. As you will see on the pathology report below, the tissue(s) were hyperplastic polyps. Your examination was otherwise without abnormality.    Final Diagnosis   A. Colon, appendiceal orifice, polypectomy:  --Hyperplastic polyp.     B. Colon, transverse, polypectomy:  --Hyperplastic polyp.     C. Colon, rectosigmoid, polypectomies:  --Hyperplastic polyps.       {Polyp/Disorder information:[id number]}    Given these findings, your personal history of *** and your family history of ***, I recommend that you {Follow-up recommendations:298657}     Please remember that this recommendation is made with the understanding that you are not experiencing persistent changes in bowel function, bleeding per rectum, and/or significant abdominal pain. If you experience these symptoms, please contact your primary care provider for a further evaluation.     If you have any questions or concerns about the results of your colonoscopy or the appropriate follow-up, please contact my assistant at [phone].    Sincerely,        [facility] DO Fox [facility]  ___

## 2024-03-04 NOTE — ANESTHESIA POSTPROCEDURE EVALUATION
Patient: Bridgette Veras    Procedure: Procedure(s):  COLONOSCOPY, WITH POLYPECTOMY AND BIOPSY       Anesthesia Type:  General    Note:  Disposition: Outpatient   Postop Pain Control: Uneventful            Sign Out: Well controlled pain   PONV: No   Neuro/Psych: Uneventful            Sign Out: Acceptable/Baseline neuro status   Airway/Respiratory: Uneventful            Sign Out: Acceptable/Baseline resp. status   CV/Hemodynamics: Uneventful            Sign Out: Acceptable CV status; No obvious hypovolemia; No obvious fluid overload   Other NRE: NONE   DID A NON-ROUTINE EVENT OCCUR? No           Last vitals:  Vitals Value Taken Time   /75 03/04/24 0830   Temp     Pulse 59 03/04/24 0830   Resp 16 03/04/24 0830   SpO2 100 % 03/04/24 0830       Electronically Signed By: Ciro Cheng CRNA, APRN CRNA  March 4, 2024  9:03 AM

## 2024-03-04 NOTE — DISCHARGE INSTRUCTIONS
"** can resume anticoagulation in 24 hrs.     Learning About Colonoscopy  What is a colonoscopy?     A colonoscopy is a test (also called a procedure) that lets a doctor look inside your large intestine. The doctor uses a thin, lighted tube called a colonoscope. The doctor uses it to look for small growths called polyps, colon or rectal cancer (colorectal cancer), or other problems like bleeding.  During the procedure, the doctor can take samples of tissue. The samples can then be checked for cancer or other conditions. The doctor can also take out polyps.  How is a colonoscopy done?  This procedure is done in a doctor's office or a clinic or hospital. You will get medicine to help you relax and not feel pain. Some people find that they don't remember having the test because of the medicine.  The doctor gently moves the colonoscope, or scope, through the colon. The scope is also a small video camera. It lets the doctor see the colon and take pictures.  How do you prepare for the procedure?  You need to clean out your colon before the procedure so the doctor can see your colon. This depends on which \"colon prep\" your doctor recommends.  To clean out your colon, you'll do a \"colon prep\" before the test. This means you stop eating solid foods and drink only clear liquids. You can have water, tea, coffee, clear juices, clear broths, flavored ice pops, and gelatin (such as Jell-O). Do not drink anything red or purple.  The day or night before the procedure, you drink a large amount of a special liquid. This causes loose, frequent stools. You will go to the bathroom a lot. Your doctor may have you drink part of the liquid the evening before and the rest on the day of the test. It's very important to drink all of the liquid. If you have problems drinking it, call your doctor.  Arrange to have someone take you home after the test.  What can you expect after a colonoscopy?  Your doctor will tell you when you can eat and do " "your usual activities.  Drink a lot of fluid after the test to replace the fluids you may have lost during the colon prep. But don't drink alcohol.  Your doctor will talk to you about when you'll need your next colonoscopy. The results of your test and your risk for colorectal cancer will help your doctor decide how often you need to be checked.  After the test, you may be bloated or have gas pains. You may need to pass gas. If a biopsy was done or a polyp was removed, you may have streaks of blood in your stool (feces) for a few days. Check with your doctor to see when it is safe to take aspirin and nonsteroidal anti-inflammatory drugs (NSAIDs) again.  Problems such as heavy rectal bleeding may not occur until several weeks after the test. This isn't common. But it can happen after polyps are removed.  Follow-up care is a key part of your treatment and safety. Be sure to make and go to all appointments, and call your doctor if you are having problems. It's also a good idea to know your test results and keep a list of the medicines you take.  Where can you learn more?  Go to https://www.[employer].net/patiented  Enter Z368 in the search box to learn more about \"Learning About Colonoscopy.\"  Current as of: February 28, 2023               Content Version: 13.8    0741-2507 [employer].   Care instructions adapted under license by your healthcare professional. If you have questions about a medical condition or this instruction, always ask your healthcare professional. [employer] disclaims any warranty or liability for your use of this information.      "

## 2024-03-04 NOTE — ANESTHESIA CARE TRANSFER NOTE
Patient: Bridgette Veras    Procedure: Procedure(s):  COLONOSCOPY, WITH POLYPECTOMY AND BIOPSY       Diagnosis: Screen for colon cancer [Z12.11]  Diagnosis Additional Information: No value filed.    Anesthesia Type:   General     Note:    Oropharynx: oropharynx clear of all foreign objects and spontaneously breathing  Level of Consciousness: awake  Oxygen Supplementation: nasal cannula  Level of Supplemental Oxygen (L/min / FiO2): 2  Independent Airway: airway patency satisfactory and stable  Dentition: dentition unchanged  Vital Signs Stable: post-procedure vital signs reviewed and stable  Report to RN Given: handoff report given  Patient transferred to: Phase II    Handoff Report: Identifed the Patient, Identified the Reponsible Provider, Reviewed the pertinent medical history, Discussed the surgical course, Reviewed Intra-OP anesthesia mangement and issues during anesthesia, Set expectations for post-procedure period and Allowed opportunity for questions and acknowledgement of understanding    Vitals:  Vitals Value Taken Time   BP     Temp     Pulse     Resp     SpO2 99 % 03/04/24 0804   Vitals shown include unfiled device data.    Electronically Signed By: Ciro Cheng CRNA, APRN CRNA  March 4, 2024  8:05 AM

## 2024-03-04 NOTE — H&P
General Surgery H&P  Bridgette Veras MRN# [id number]   Age/Sex: 48 year old female YOB: 1975     Reason for visit: Colonoscopy       Referring physician: Dr. Morillo                   Assessment and Plan:   Assessment:  Colonoscopy  Hx of SMA thrombosis  Hx of anticoagulation    Plan:  -To the OR for colonoscopy  -Risk and benefits of procedure explained detail to the patient.  Risks include infection, bleeding, damage to the surrounding structures and possible perforation of the hollow organs.  Patient verbalized understanding provided consent to undergo the procedure above.          Chief Complaint:   Presenting for colonoscopy     History is obtained from the patient    HPI:   Bridgette Veras is a 48 year old female who presents for colonoscopy.  Patient tolerated the prep well.  First colonoscopy.  Family history shows no history of colon cancer.  Off anticoagulation. No new complaints.           Past Medical History:     Past Medical History:   Diagnosis Date    Cervical high risk HPV (human papillomavirus) test positive 2016    type 16    Chickenpox     Chlamydia trachomatis infection of unspecified site     POONAM 1     Depressive disorder     Depressive disorder, not elsewhere classified     History of blood transfusion Sep 2022    Surgery    History of colposcopy with cervical biopsy 2016    Bx - POONAM 1, ECC - negative    Other abnormal heart sounds as child    Murmur - resolved    Polycystic ovaries     prior to gastic bypass in , pt carried the diagnosis of PCOS/anovuolation    Streptococcus infection in conditions classified elsewhere and of unspecified site, group B     In pregnancy              Past Surgical History:     Past Surgical History:   Procedure Laterality Date     SECTION  1998     SECTION, TUBAL LIGATION, COMBINED  2013    Procedure: COMBINED  SECTION, TUBAL LIGATION;   Section, Tubal Ligation;  Surgeon:  Paul Allen MD;  Location: [facility] OR    DAVINCI HERNIORRHAPHY VENTRAL N/A 2023    Procedure: HERNIORRHAPHY, VENTRAL, ROBOT-ASSISTED WITH MESH;  Surgeon: Herminio Becerra MD;  Location: [facility] OR    GASTRIC BYPASS  2005    GASTRIC BYPASS      GI SURGERY  Sep 2022    HC REMOVAL GALLBLADDER  2006    Dr. Inman @ [facility]    HC REPAIR ING HERNIA,5+Y/O,REDUCIBL  age 20 ()    [facility]    LAPAROTOMY EXPLORATORY N/A 2022    Procedure: Exploratory LAPAROTOMY, Superior Mesenteric Artery Thrombectomy, Retrograde Superior Mesenteric Artery Stenting;  Surgeon: Balta Ernst MD;  [facility]    LAPAROTOMY EXPLORATORY N/A 09/10/2022    Procedure: LAPAROTOMY, SMALL BOWEL RESECTION, INCISIONAL WOUND CLOSURE;  Surgeon: Tariq Lancaster MD;  [facility]    LAPAROTOMY EXPLORATORY N/A 2022    Procedure: EXPLORATORY LAPAROTOMY, EVACUATION OF RECTUS SHEATH HEMATOMA, ABDOMINAL WASHOUT;  Surgeon: Nathan Barber MD;  [facility]    PICC DOUBLE LUMEN PLACEMENT Right 09/10/2022    5FR DL PICC. Basilic vein.    SURGICAL HISTORY OF -       PE tubes    SURGICAL HISTORY OF -   10/2006    removal of pannus ans breast lift    TONSILLECTOMY  1981    VASCULAR SURGERY  Sep 2022    ZZC  DELIVERY ONLY  1998    Failure to progress    [facility] NONSPECIFIC PROCEDURE  2006    Laser ablation genital condyloma/ Bx labial lesion    [facility] RAD RESEC TONSIL/PILLARS               Social History:    reports that she has been smoking cigarettes. She has a 5 pack-year smoking history. She has never used smokeless tobacco. She reports that she does not currently use alcohol. She reports that she does not use drugs.           Family History:     Family History   Problem Relation Age of Onset    Hypertension Mother     Depression Mother     Asthma Father     Gastrointestinal Disease Father         PSC    Hypertension Brother     Depression Brother     Cancer Maternal  Grandmother         Lung    Depression Maternal Grandmother     Alcohol/Drug Maternal Grandfather         alcohol    Cardiovascular Maternal Grandfather         MI    Respiratory Paternal Grandmother     Cerebrovascular Disease Paternal Grandfather     Diabetes Other     C.A.D. No family hx of     Breast Cancer No family hx of     Cancer - colorectal No family hx of     Anesthesia Reaction No family hx of     Deep Vein Thrombosis (DVT) No family hx of               Allergies:     Allergies   Allergen Reactions    Codeine Nausea and Vomiting     Pt tolerated oxycodone 2022.    Darvocet [Acetaminophen] Nausea and Vomiting     Pt tolerated oxycodone 2022.    Propoxyphene Nausea              Medications:     Prior to Admission medications    Medication Sig Start Date End Date Taking? Authorizing Provider   aspirin 81 MG EC tablet Take 1 tablet (81 mg) by mouth daily 10/11/23   Willow Bradshaw APRN CNP   atorvastatin (LIPITOR) 20 MG tablet Take 1 tablet (20 mg) by mouth daily 2/5/24   Cecy Morillo DO   cyanocolbalamin (VITAMIN  B-12) 500 MCG tablet Take 500 mcg by mouth daily     Reported, Patient   cyclobenzaprine (FLEXERIL) 10 MG tablet Take 1 tablet (10 mg) by mouth nightly as needed for muscle spasms 2/5/24   Cecy Morillo DO   ELIQUIS ANTICOAGULANT 5 MG tablet Take 1 tablet (5 mg) by mouth 2 times daily for 360 days 3/13/23 3/7/24  Cogan, Jacob, MD   ferrous sulfate (IRON) 325 (65 FE) MG tablet Take 325 mg by mouth daily (with breakfast)     Reported, Patient   Multiple Vitamin (MULTIVITAMIN PO) Take 1 tablet by mouth daily    Reported, Patient   norethindrone (MICRONOR) 0.35 MG tablet Take 1 tablet (0.35 mg) by mouth daily 2/5/24   Cecy Morillo DO   saccharomyces boulardii (FLORASTOR) 250 MG capsule Take 1 capsule (250 mg) by mouth 2 times daily 10/3/22   Tariq Lancaster MD   vitamin D3 (CHOLECALCIFEROL) 50 mcg (2000 units) tablet Take 1 tablet by mouth daily    Unknown, Entered By History             "  Review of Systems:   A 12 point Review of Systems is negative other than noted in the HPI            Physical Exam:   Patient Vitals for the past 24 hrs:   BP Temp Temp src Pulse Resp SpO2 Height Weight   03/04/24 0631 122/86 98  F (36.7  C) Oral 79 16 100 % 1.6 m (5' 3\") 75.3 kg (166 lb)        No intake or output data in the 24 hours ending 03/04/24 0632   Constitutional:   awake, alert, cooperative, no apparent distress, and appears stated age       Eyes:   PERRL, conjunctiva/corneas clear, EOM's intact; no scleral edema or icterus noted        ENT:   Normocephalic, without obvious abnormality, atraumatic, Lips, mucosa, and tongue normal        Hematologic / Lymphatic:   No lymphadenopathy       Lungs:   Normal respiratory effort, no accessory muscle use, breath sounds bilaterally on auscultation       Cardiovascular:   Regular rate and rhythm       Abdomen:   Soft, nondistended, nontender to palpation       Musculoskeletal:   No obvious swelling, bruising or deformity       Skin:   Skin color and texture normal for patient, no rashes or lesions              Data:          DO Hamzah Newman DO  General Surgeon  [facility]  Surgery [address] [address]?  Office: [phone]  Employed by - [facility]  Pager: [phone]         "

## 2024-03-08 NOTE — RESULT ENCOUNTER NOTE
Called patient with results. Your colonoscopy results show no cancer.  Due to the findings of the polyps, recommendation is to repeat colonoscopy in 10 years.      Hamzah Mcallister DO  General Surgeon  [facility]  Surgery [facility] - [address] [address]?  Office: [phone]  Employed by - [facility]  Pager: [phone]

## 2024-03-14 ENCOUNTER — OFFICE VISIT (OUTPATIENT)
Dept: OBGYN | Facility: CLINIC | Age: 49
End: 2024-03-14
Payer: COMMERCIAL

## 2024-03-14 VITALS
SYSTOLIC BLOOD PRESSURE: 100 MMHG | BODY MASS INDEX: 28.7 KG/M2 | HEART RATE: 101 BPM | TEMPERATURE: 98.2 F | RESPIRATION RATE: 18 BRPM | DIASTOLIC BLOOD PRESSURE: 70 MMHG | WEIGHT: 162 LBS | HEIGHT: 63 IN

## 2024-03-14 DIAGNOSIS — B97.7 HIGH RISK HPV INFECTION: Primary | ICD-10-CM

## 2024-03-14 DIAGNOSIS — R87.618 UNEXPLAINED ENDOMETRIAL CELLS ON CERVICAL PAP SMEAR: ICD-10-CM

## 2024-03-14 PROCEDURE — 88305 TISSUE EXAM BY PATHOLOGIST: CPT | Performed by: PATHOLOGY

## 2024-03-14 PROCEDURE — 57454 BX/CURETT OF CERVIX W/SCOPE: CPT | Performed by: STUDENT IN AN ORGANIZED HEALTH CARE EDUCATION/TRAINING PROGRAM

## 2024-03-14 PROCEDURE — 81025 URINE PREGNANCY TEST: CPT | Performed by: STUDENT IN AN ORGANIZED HEALTH CARE EDUCATION/TRAINING PROGRAM

## 2024-03-14 NOTE — PROGRESS NOTES
[facility] Colpscopy and Endometrial Biopsy Procedure Note    Bridgette Veras  1975  [id number]    Pap Hx:   8/4/16 NIL Pap, +HR HPV 16. Plan colp  9/20/16 [place] Bx POONAM 1, ECC Negative. Plan cotest in 1 year.   9/25/17 NIL Pap, +HR HPV 16. Plan colp  11/8/18 Lost to follow-up for pap tracking  2/7/22 NIL pap, +HR HPV 16. Plan: colposcopy due before 5/7/22  3/30/22 Colpo ECC no endocervical tissue identified, small squamous atypia. Plan: cotest due 2/7/23 per provider  2/21/23 NIL pap, +HR HPV 16. Plan: colposcopy due before 5/21/23 5/1/23 Colpo unsatisfactory due to poor visualization. Bx and ECC neg. Plan: cotest in 1 year  2/5/24 NIL pap, +HR HPV 16 with EM cells present.     The patient was counseled on the risks (including risk of pain and bleeding), benefits (diagnosis of lesion severity and depth in invasion). Verbal and written consent were obtained.  A UPT was negative prior to the procedure.    Technique: The patient was placed in the dorsal lithotomy position.  A speculum was placed in the vagina and the cervix visualized. Acetic acid and then Lugol solution were applied to the upper vagina and cervix and then visualized using the colposcope. No acetowhite staining was noted. No decreased Lugol's solution up take noted. The squamocolumnar junction and transformation zone were NOT fully visualized and colposcopy was satisfactory. Colposcopic impression: unsatisfactory colposcopy.   Biopsies were taken at 12, 7, 4 o'clock. Endocervical curettage was performed. A single tooth tenaculum was applied to the anterior lip of the cervix. The endometrial pipelle was advanced through the cervix without difficulty and a sample collected. The patient tolerated the procedure well.  She was given post op instructions which included activity and pelvic restrictions.      A/P: s/p colposcopy procedure per ASCCP guidelines.   Will MyChart to discuss results with patient.     Pt will return to discuss AUB  bleeding (frequent, prolonged, and even more heavy menses after starting anticoagulation) as she is currently on Micronor for her irregular menses and heavy menstrual bleeding.  She is considering Mirena IUD. Need TSH checked.    Lynne Batista MD  Obstetrics and Gynecology  [facility]   03/14/2024

## 2024-03-14 NOTE — NURSING NOTE
"Initial /70 (BP Location: Left arm, Patient Position: Chair, Cuff Size: Adult Regular)   Pulse 101   Temp 98.2  F (36.8  C) (Tympanic)   Resp 18   Ht 1.6 m (5' 3\")   Wt 73.5 kg (162 lb)   LMP 03/04/2024   BMI 28.70 kg/m   Estimated body mass index is 28.7 kg/m  as calculated from the following:    Height as of this encounter: 1.6 m (5' 3\").    Weight as of this encounter: 73.5 kg (162 lb). .    "

## 2024-03-18 LAB
PATH REPORT.COMMENTS IMP SPEC: NORMAL
PATH REPORT.FINAL DX SPEC: NORMAL
PATH REPORT.GROSS SPEC: NORMAL
PATH REPORT.MICROSCOPIC SPEC OTHER STN: NORMAL
PATH REPORT.RELEVANT HX SPEC: NORMAL
PHOTO IMAGE: NORMAL

## 2024-03-25 DIAGNOSIS — I74.10 AORTIC THROMBUS (H): Primary | ICD-10-CM

## 2024-03-25 NOTE — CONFIDENTIAL NOTE
[id number]  Bridgette Veras  48 year old female  [facility] Diagnosis: phantom aortic thrombosis  [facility] Provider: Cogan    Bridgette Veras is [facility] at [facility] for their history of phantom aortic thrombosis.     They were last evaluated by our team on 10/23 with the plan to continue apixaban 5 mg twice daily indefinitely and return to clinic in 6 months.    Prescription updated and refills given.     This message will not be routed to  for scheduling. She is scheduled 4/9 with Dr. Cogan.     Leann Cm  MSN, RN, PHN  [facility]  Office: [phone]  Fax: [phone]

## 2024-04-03 ENCOUNTER — PATIENT OUTREACH (OUTPATIENT)
Dept: OBGYN | Facility: CLINIC | Age: 49
End: 2024-04-03
Payer: COMMERCIAL

## 2024-04-03 NOTE — TELEPHONE ENCOUNTER
3/14/24 Colpo bx neg, ECC neg but no endocervical tissue identified, EMB neg. Plan: cotest in 1 year

## 2024-04-08 NOTE — PROGRESS NOTES
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  There was also irregular peripheral clot seen within the descending thoracic aorta and the superior aspect of the abdominal aorta.  There was a free-floating thrombus seen in the SMA proximally that appeared to extend inferiorly from the celiac artery.  She underwent an emergent ex lap and a SMA thrombectomy and stenting.     She reports no prior known history of blood clots or bleeding.  She had 2 pregnancies with C-sections, no known miscarriages or pregnancy complications such as preeclampsia.  No family history of blood clotting.    Saw vascular surgery 10/20/22. CTA at that time showed improvement in overall SMA clot burden; decreased adherent clot burden in descending thoracic aorta with small residual adherent thrombus in mid desending thoracic aorta; and evolving multifocal splenic infarcts. They switched hr from plavix to ASA and planned for AC until evaluated by hematology. Recent diarrhea, concern for CDiff. CT A/P showed infectious/inflammatory pancolitis, no e/o bowel ischemia.    She presents for initial outpatient evaluation by hematology today after being seen as a consult in the hospital.  She is doing much better, without any notable residual symptoms.  Continues on apixaban without any reports of bleeding.  Also taking Plavix without any issue.   Denies any bleeding or bruising, no melena.  No fevers, night sweats, weight loss, rashes, adenopathy.  Denies aquagenic pruritus or erythromelalgia.  No hematuria.  No prior personal or family history of blood clotting.  She has not had any prior miscarriages.  Serologies for beta-2 glycoprotein antibodies and cardiolipin antibodies were negative.  Lupus anticoagulant testing negative as well.  She received IV iron while in the hospital, and currently takes oral iron daily.    March 13, 2023: Has completed 6 months of therapeutic apixaban.  Antiphospholipid antibody testing unremarkable. Continues on aspirin and apixaban. Notes that periods are heavier, but no other bleeding. Developed hernia, pending surgical plan. Had 3 IV iron infusions in early January, felt better in terms of energy after, though feels symptoms returning. Briefly quit smoking but still having a few (1-2 per day), hoping to quit entirely. Continues on Chantix. Continues on oral iron.  Has questions regarding whether it would be worth pursuing genetic testing for possible thrombophilia, given that she has children (adult son in his 20s, 9-year-old daughter).    October 11, 2023: Had quit smoking for 5 months, but then had a death in family so restarted. Stopped Chantix - not sure if it helped her quit. No fevers or other constitutional symptoms.  Continues to have menstrual periods, heavier since being on anticoagulation.  Due for repeat imaging with vascular surgery next week to check on the status of the aortic thrombus.    April 9, 2024: Smoking continues, planning to restart Chantix soon.  She received another course of IV iron in February.  Significant menorrhagia continues, she is seeing gynecology next week.  It seems that the issue of hormonal contraception has been raised, but there was concern given her thrombosis history.  She is interested in further thrombophilia testing in case it has any implications for her children (26-year-old  male son 10-year-old female daughter).      Medications:  Current Outpatient Medications   Medication Sig Dispense Refill     apixaban ANTICOAGULANT (ELIQUIS) 5 MG tablet Take 1 tablet (5 mg) by mouth 2 times daily 60 tablet 0     aspirin 81 MG EC tablet Take 1 tablet (81 mg) by mouth daily 90 tablet 3     atorvastatin (LIPITOR) 20 MG tablet Take 1 tablet (20 mg) by mouth daily 90 tablet 3     cyanocolbalamin (VITAMIN  B-12) 500 MCG tablet Take 500 mcg by mouth daily        cyclobenzaprine (FLEXERIL) 10 MG tablet Take 1 tablet (10 mg) by mouth nightly as needed for muscle spasms 30 tablet 0     ferrous sulfate (IRON) 325 (65 FE) MG tablet Take 325 mg by mouth daily (with breakfast)        Multiple Vitamin (MULTIVITAMIN PO) Take 1 tablet by mouth daily       norethindrone (MICRONOR) 0.35 MG tablet Take 1 tablet (0.35 mg) by mouth daily 84 tablet 0     saccharomyces boulardii (FLORASTOR) 250 MG capsule Take 1 capsule (250 mg) by mouth 2 times daily 60 capsule 3     vitamin D3 (CHOLECALCIFEROL) 50 mcg (2000 units) tablet Take 1 tablet by mouth daily          Assessment:  Bridgette Veras is a 47 year old woman with a 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    The etiology of her phantom aortic thrombosis remains unclear.  Will perform additional thrombophilia testing today. She will need indefinite anticoagulation, preferably therapeutic anticoagulation as the limited data on phantom aortic thrombosis suggests that therapeutic anticoagulation is superior to prophylactic dose.    She continues to smoke, and we discussed that quitting will reduce her future thrombosis risk.  But even if she is to quit, I think she will need to continue anticoagulation due to the severity of her thromboses.    She continues to have significant menorrhagia.  We discussed that while she is on anticoagulation, it is safe for her to receive hormonal contraceptives to help  with regulating her menses.  I am happy to speak further with her gynecology team about this if needed.    Plan:  -Continue apixaban 5 mg twice daily indefinitely  -Continue aspirin (needs to continue this for stent)  -Thrombophilia testing today  -Continue oral iron, recheck labs today  -Follow-up in 6 months    Patient understands and agrees with the above plan and recommendation.    Jacob Cogan, MD  Hematology    40 minutes spent on the date of the encounter doing chart review, history and exam, documentation and further activities per the note    The longitudinal plan of care for the diagnosis(es)/condition(s) as documented were addressed during this visit. Due to the added complexity in care, I will continue to support Bridgette in the subsequent management and with ongoing continuity of care.

## 2024-04-09 ENCOUNTER — OFFICE VISIT (OUTPATIENT)
Dept: HEMATOLOGY | Facility: CLINIC | Age: 49
End: 2024-04-09
Attending: STUDENT IN AN ORGANIZED HEALTH CARE EDUCATION/TRAINING PROGRAM
Payer: COMMERCIAL

## 2024-04-09 VITALS
WEIGHT: 164 LBS | OXYGEN SATURATION: 100 % | HEIGHT: 63 IN | SYSTOLIC BLOOD PRESSURE: 118 MMHG | DIASTOLIC BLOOD PRESSURE: 78 MMHG | HEART RATE: 68 BPM | BODY MASS INDEX: 29.06 KG/M2 | TEMPERATURE: 98.1 F

## 2024-04-09 DIAGNOSIS — I74.10 AORTIC THROMBUS (H): Primary | ICD-10-CM

## 2024-04-09 DIAGNOSIS — D50.0 IRON DEFICIENCY ANEMIA DUE TO CHRONIC BLOOD LOSS: ICD-10-CM

## 2024-04-09 LAB
ALBUMIN SERPL BCG-MCNC: 4 G/DL (ref 3.5–5.2)
ALP SERPL-CCNC: 77 U/L (ref 40–150)
ALT SERPL W P-5'-P-CCNC: 17 U/L (ref 0–50)
ANION GAP SERPL CALCULATED.3IONS-SCNC: 11 MMOL/L (ref 7–15)
AST SERPL W P-5'-P-CCNC: 20 U/L (ref 0–45)
BILIRUB SERPL-MCNC: 0.3 MG/DL
BUN SERPL-MCNC: 8.3 MG/DL (ref 6–20)
CALCIUM SERPL-MCNC: 9 MG/DL (ref 8.6–10)
CHLORIDE SERPL-SCNC: 106 MMOL/L (ref 98–107)
CREAT SERPL-MCNC: 0.78 MG/DL (ref 0.51–0.95)
DEPRECATED HCO3 PLAS-SCNC: 22 MMOL/L (ref 22–29)
EGFRCR SERPLBLD CKD-EPI 2021: >90 ML/MIN/1.73M2
ERYTHROCYTE [DISTWIDTH] IN BLOOD BY AUTOMATED COUNT: 20.4 % (ref 10–15)
FACTOR 2 INTERPRETATION: NORMAL
FACTOR V INTERPRETATION: NORMAL
FERRITIN SERPL-MCNC: 113 NG/ML (ref 6–175)
GLUCOSE SERPL-MCNC: 80 MG/DL (ref 70–99)
HCT VFR BLD AUTO: 43.4 % (ref 35–47)
HGB BLD-MCNC: 14.2 G/DL (ref 11.7–15.7)
IRON BINDING CAPACITY (ROCHE): 241 UG/DL (ref 240–430)
IRON SATN MFR SERPL: 39 % (ref 15–46)
IRON SERPL-MCNC: 95 UG/DL (ref 37–145)
LAB DIRECTOR COMMENTS: NORMAL
LAB DIRECTOR DISCLAIMER: NORMAL
LAB DIRECTOR INTERPRETATION: NORMAL
LAB DIRECTOR METHODOLOGY: NORMAL
LAB DIRECTOR RESULTS: NORMAL
MCH RBC QN AUTO: 28.2 PG (ref 26.5–33)
MCHC RBC AUTO-ENTMCNC: 32.7 G/DL (ref 31.5–36.5)
MCV RBC AUTO: 86 FL (ref 78–100)
PLATELET # BLD AUTO: 324 10E3/UL (ref 150–450)
POTASSIUM SERPL-SCNC: 3.9 MMOL/L (ref 3.4–5.3)
PROT SERPL-MCNC: 6.4 G/DL (ref 6.4–8.3)
RBC # BLD AUTO: 5.04 10E6/UL (ref 3.8–5.2)
SODIUM SERPL-SCNC: 139 MMOL/L (ref 135–145)
SPECIMEN DESCRIPTION: NORMAL
WBC # BLD AUTO: 11 10E3/UL (ref 4–11)

## 2024-04-09 PROCEDURE — G0452 MOLECULAR PATHOLOGY INTERPR: HCPCS | Mod: 26 | Performed by: STUDENT IN AN ORGANIZED HEALTH CARE EDUCATION/TRAINING PROGRAM

## 2024-04-09 PROCEDURE — 81240 F2 GENE: CPT | Performed by: STUDENT IN AN ORGANIZED HEALTH CARE EDUCATION/TRAINING PROGRAM

## 2024-04-09 PROCEDURE — 99215 OFFICE O/P EST HI 40 MIN: CPT | Performed by: STUDENT IN AN ORGANIZED HEALTH CARE EDUCATION/TRAINING PROGRAM

## 2024-04-09 PROCEDURE — 83550 IRON BINDING TEST: CPT | Performed by: STUDENT IN AN ORGANIZED HEALTH CARE EDUCATION/TRAINING PROGRAM

## 2024-04-09 PROCEDURE — 85303 CLOT INHIBIT PROT C ACTIVITY: CPT | Performed by: STUDENT IN AN ORGANIZED HEALTH CARE EDUCATION/TRAINING PROGRAM

## 2024-04-09 PROCEDURE — 82374 ASSAY BLOOD CARBON DIOXIDE: CPT | Performed by: STUDENT IN AN ORGANIZED HEALTH CARE EDUCATION/TRAINING PROGRAM

## 2024-04-09 PROCEDURE — 82728 ASSAY OF FERRITIN: CPT | Performed by: STUDENT IN AN ORGANIZED HEALTH CARE EDUCATION/TRAINING PROGRAM

## 2024-04-09 PROCEDURE — 85306 CLOT INHIBIT PROT S FREE: CPT | Performed by: STUDENT IN AN ORGANIZED HEALTH CARE EDUCATION/TRAINING PROGRAM

## 2024-04-09 PROCEDURE — 83540 ASSAY OF IRON: CPT | Performed by: STUDENT IN AN ORGANIZED HEALTH CARE EDUCATION/TRAINING PROGRAM

## 2024-04-09 PROCEDURE — 85027 COMPLETE CBC AUTOMATED: CPT | Performed by: STUDENT IN AN ORGANIZED HEALTH CARE EDUCATION/TRAINING PROGRAM

## 2024-04-09 PROCEDURE — 85300 ANTITHROMBIN III ACTIVITY: CPT | Performed by: STUDENT IN AN ORGANIZED HEALTH CARE EDUCATION/TRAINING PROGRAM

## 2024-04-09 PROCEDURE — G2211 COMPLEX E/M VISIT ADD ON: HCPCS | Performed by: STUDENT IN AN ORGANIZED HEALTH CARE EDUCATION/TRAINING PROGRAM

## 2024-04-09 PROCEDURE — 36415 COLL VENOUS BLD VENIPUNCTURE: CPT | Performed by: STUDENT IN AN ORGANIZED HEALTH CARE EDUCATION/TRAINING PROGRAM

## 2024-04-09 PROCEDURE — 99213 OFFICE O/P EST LOW 20 MIN: CPT | Performed by: STUDENT IN AN ORGANIZED HEALTH CARE EDUCATION/TRAINING PROGRAM

## 2024-04-11 LAB
AT III ACT/NOR PPP CHRO: 101 % (ref 85–135)
PROT C ACT/NOR PPP CHRO: 92 % (ref 70–170)
PROT S FREE AG ACT/NOR PPP IA: 50 % (ref 55–125)

## 2024-04-14 ENCOUNTER — HEALTH MAINTENANCE LETTER (OUTPATIENT)
Age: 49
End: 2024-04-14

## 2024-04-18 ENCOUNTER — OFFICE VISIT (OUTPATIENT)
Dept: OBGYN | Facility: CLINIC | Age: 49
End: 2024-04-18
Payer: COMMERCIAL

## 2024-04-18 VITALS
BODY MASS INDEX: 28.7 KG/M2 | DIASTOLIC BLOOD PRESSURE: 80 MMHG | TEMPERATURE: 97.8 F | HEART RATE: 92 BPM | WEIGHT: 162 LBS | SYSTOLIC BLOOD PRESSURE: 129 MMHG | RESPIRATION RATE: 18 BRPM | HEIGHT: 63 IN

## 2024-04-18 DIAGNOSIS — N92.1 MENORRHAGIA WITH IRREGULAR CYCLE: Primary | ICD-10-CM

## 2024-04-18 PROCEDURE — 99213 OFFICE O/P EST LOW 20 MIN: CPT | Performed by: STUDENT IN AN ORGANIZED HEALTH CARE EDUCATION/TRAINING PROGRAM

## 2024-04-18 NOTE — NURSING NOTE
"Initial /80 (BP Location: Right arm, Patient Position: Chair, Cuff Size: Adult Regular)   Pulse 92   Temp 97.8  F (36.6  C) (Tympanic)   Resp 18   Ht 1.6 m (5' 3\")   Wt 73.5 kg (162 lb)   LMP 03/31/2024   BMI 28.70 kg/m   Estimated body mass index is 28.7 kg/m  as calculated from the following:    Height as of this encounter: 1.6 m (5' 3\").    Weight as of this encounter: 73.5 kg (162 lb). .    "

## 2024-04-18 NOTE — PROGRESS NOTES
[facility]  Gynecology Office Note    Assessment and Plan:   Bridgette Veras, 48 year old , presents for heavy menstrual bleeding with irregular menses consultation.     TSH ordered to be obtained with her next blood draw.  Pelvic ultrasound ordered to rule out any structural abnormalities causing her heavy and irregular menses.  Prolactin level not ordered since patient is on Micronor.  Endometrial biopsy was negative for any precancerous or cancerous lesion.  Assuming TSH and pelvic ultrasound are normal, we discussed medical treatment therapy options -progestin only method, including progestin only pills, Depo-Provera shot, Nexplanon, and levonorgestrel IUD.  Patient is already on Micronor with some but not enough symptoms relief.  She is not interested in Depo-Provera shot due to the fear of weight gain.  I recommended against Nexplanon due to its known common side effect of irregular spotting.  I recommended levonorgestrel IUD.  We also discussed surgical management options, including endometrial ablation and hysterectomy.    Patient would like to schedule another visit to place a Mirena IUD.  Lynne Batista MD  Obstetrics and Gynecology  [facility]   2024     -----------------------------------------------------------------------------------------------------------------------------------    HPI: Bridgette Veras, 48 year old , presents for heavy menstrual bleeding with irregular menses consultation.     She used to have monthly and regular period in high school. Gained a lot of weight with her first pregnancy. After that pregnancy, she would go 6 months without a period. She was diagnosed then with PCOS. Pending on her weight, she would have monthly and regular period with lower weights. With heavier weight ~270lb, she would have no bleeding again for months. Denied cramping with bleeding.    After the aortic thrombosis leading to SMA  thrombosis, she has been on Eliquis, which is when the very heavy bleeding started. She was started on Micronor in 2023, which seems to help her bleeding some. Most recently, she had heavy and prolonged bleeding with clots. EMB on 3/14/2024 was negative.    2023: heavy for 4 days  -: heavy for 1 day and light the next  -: med/light  2/15-: heavy w/ clots  3/15-: light  3/3-: heavy  On the heaviest day, she would fill a pad and a tampon every hour. On a not very heavy day, she would fill a pad every 2 hours.    ROS: A 10 pt ROS was completed and found to be otherwise negative unless mentioned in the HPI.     OBHx: . CS x2. Denied PPH.  OB History    Para Term  AB Living   2 2 2 0 0 2   SAB IAB Ectopic Multiple Live Births   0 0 0 0 2      # Outcome Date GA Lbr Kulwinder/2nd Weight Sex Type Anes PTL Lv   2 Term 15 39w3d  2.58 kg (5 lb 11 oz) F CS-LTranv Spinal N BELINDA      Birth Comments: Passed hearing and pulse oximetry      Name: JEAN-CLAUDE DRISCOLL      Apgar1: 9  Apgar5: 9   1 Term 98 40w0d 13:00 3.884 kg (8 lb 9 oz) M -SEC   BELINDA      Birth Comments: arerst of dilatation     GYN Hx:   Patient's last menstrual period was 2024.  Menarche: 14 years old  Contraception: she used oral birth control pills in the past. S/p tubal ligation now.  Pap smear:  16 NIL Pap, +HR HPV 16. Plan colp  16 [place] Bx POONAM 1, ECC Negative. Plan cotest in 1 year.   17 NIL Pap, +HR HPV 16. Plan colp  18 Lost to follow-up for pap tracking  22 NIL pap, +HR HPV 16. Plan: colposcopy due before 5/7/22  3/30/22 Colpo ECC no endocervical tissue identified, small squamous atypia. Plan: cotest due 2/7/23 per provider  23 NIL pap, +HR HPV 16. Plan: colposcopy due before 23 Colpo unsatisfactory due to poor visualization. Bx and ECC neg. Plan: cotest in 1 year  24 NIL pap, +HR HPV 16 with EM cells present. Plan: colp with possible EMB with  OBGYN due before 5/5/24  3/14/24 Colpo bx neg, ECC neg but no endocervical tissue identified, EMB neg. Plan: cotest in 1 year     Past Medical History:   Diagnosis Date    Cervical high risk HPV (human papillomavirus) test positive 08/04/2016    type 16    Chickenpox     Chlamydia trachomatis infection of unspecified site     POONAM 1 1998    Depressive disorder     Depressive disorder, not elsewhere classified     History of blood transfusion Sep 2022    Surgery    History of colposcopy with cervical biopsy 09/20/2016    Bx - POONAM 1, ECC - negative    Other abnormal heart sounds as child    Murmur - resolved    Polycystic ovaries     prior to gastic bypass in June, 2005, pt carried the diagnosis of PCOS/anovuolation    Streptococcus infection in conditions classified elsewhere and of unspecified site, group B 1998    In pregnancy     Patient Active Problem List    Diagnosis Date Noted    Ventral hernia without obstruction or gangrene 05/04/2023     Priority: Medium     Added automatically from request for surgery [id number]      Mixed obsessional thoughts and acts 03/02/2023     Priority: Medium    Iron deficiency anemia due to chronic blood loss 12/14/2022     Priority: Medium    Mesenteric thrombosis (H24) 09/08/2022     Priority: Medium    Superior mesenteric artery thrombosis (H24) 09/08/2022     Priority: Medium    Aortic thrombus (H) 09/08/2022     Priority: Medium    S/P gastric bypass 09/25/2017     Priority: Medium    Cervical high risk HPV (human papillomavirus) test positive 08/04/2016     Priority: Medium     8/4/16 NIL Pap, +HR HPV 16. Plan colp  9/20/16 [place] Bx POONAM 1, ECC Negative. Plan cotest in 1 year.   9/25/17 NIL Pap, +HR HPV 16. Plan colp  11/8/18 Lost to follow-up for pap tracking  2/7/22 NIL pap, +HR HPV 16. Plan: colposcopy due before 5/7/22  3/30/22 Colpo ECC no endocervical tissue identified, small squamous atypia. Plan: cotest due 2/7/23 per provider  2/21/23 NIL pap, +HR HPV 16. Plan: colposcopy  due before 5/21/23 5/1/23 Colpo unsatisfactory due to poor visualization. Bx and ECC neg. Plan: cotest in 1 year  2/5/24 NIL pap, +HR HPV 16 with EM cells present. Plan: colp with possible EMB with OBGYN due before 5/5/24  3/14/24 Colpo bx neg, ECC neg but no endocervical tissue identified, EMB neg. Plan: cotest in 1 year      Encounter for sterilization 08/02/2013     Priority: Medium     Problem list name updated by automated process. Provider to review      Adhesion of abdominal wall 01/20/2013     Priority: Medium     suspected by position of uterus in the first trimester      Health Care Home 01/08/2013     Priority: Medium     EMERGENCY CARE PLAN  April 16, 2013: No current Care Coordination follow up planned. Please refer if Care Coordination services are needed.    Presenting Problem Signs and Symptoms Treatment Plan   Questions or concerns   during clinic hours   I will call my clinic directly:  [place] [address]  [phone].   Questions or concerns outside clinic hours   I will call the 24 hour nurse line at   [phone] or [facility].   Need to schedule an appointment   I will call the 24 hour scheduling team at [phone] or my clinic directly at [phone].    Same day treatment     I will call my clinic first, nurse line if after hours, urgent care and express care if needed.     Clinic care coordination services (regular clinic hours)     I will call a clinic care coordinator directly:     Ty Liang RN  Mon, Tues, Fri - [phone]  Wed, Thurs - [phone]    Kath Montoya :    [phone]    Or call my clinic at [phone] and ask to speak with care coordination.     Crisis Services: Behavioral or Mental Health  [facility] 24 Hour Phone Line  [phone]    [facility] 24 Hour Crisis Services  [phone]    [facility] (Behavioral Health Providers) [facility] [phone]    [facility]   [phone]      "    Emergency treatment -- Immediately    [facility]All 911           Otalgia 08/22/2011     Priority: Medium     3/10/11-Pa Aranda MD,  of  Department of Otolaryngology [phone].  Otalgia, suspect temporomandibular joint (TMJ).  Referral to TMJ clinic.  Maxillofacial CT scan for sinuses and TMJ is ordered at this visit.  Recommended trial of steroid due to severe discomfort. Rx for medrol dose pack.        CARDIOVASCULAR SCREENING; LDL GOAL LESS THAN 160 10/31/2010     Priority: Medium    Family history of liver disease 07/11/2010     Priority: Medium    PCOS (polycystic ovarian syndrome) 08/27/2008     Priority: Medium    Hypertrophic and atrophic condition of skin 10/18/2006     Priority: Medium     October 18, 2006  Gastric bypass June 8, 2005 at 257 pounds, Height 5' 4\" (BMI 44).  Now at BMI 25, 150 pounds. Stable at current weight for 9 months  Secondary pannus, and breast sagging post-surgery - scheduled for abdominoplasty and breast lift by plastic surgery  Problem list name updated by automated process. Provider to review      Esophageal reflux 07/17/2006     Priority: Medium    Viral warts 03/20/2006     Priority: Medium     3/2/06 Dr. Hamilton @ [facility]: First appeared 2005. Tried Aldara and cryotherapy- ineffective. 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 Plan: surgical laser therapy, possible biopsy.  3/9/06 Dr. Hamilton: Procedure: laser ablation to vulva and perianal condyloma/ Bx of rt labial lesion/condyloma.   Problem list name updated by automated process. Provider to review      Calculus of gallbladder 03/02/2006     Priority: Medium     3/29/06 Dr. Inman @ [facility] ([phone]): Pt doing well after cholecystectomy of 3/16/06.   5/24/06 Dr. Inman: Has had some recurrent epigastric discomfort. CT in ER was unremarkable. Imp: Likely irritation in stomach pouch. Plan: d/c Advil, start PPI.  If not better, consider EGD.   Problem list name updated by " automated process. Provider to review      Headache 2005     Priority: Medium     Problem list name updated by automated process. Provider to review      HISTORY OF SMOKING 06/10/2003     Priority: Medium     Past Surgical History:   Procedure Laterality Date     SECTION  1998. Failure to progress     SECTION, TUBAL LIGATION, COMBINED  2013    Procedure: COMBINED  SECTION, TUBAL LIGATION;   Section, Tubal Ligation;  Surgeon: Paul Allen MD;  Location: [facility] OR    COLONOSCOPY N/A 2024    Procedure: COLONOSCOPY, WITH POLYPECTOMY AND BIOPSY;  Surgeon: Hamzah Mcallister DO;  Location: [facility] GI    DAVINCI HERNIORRHAPHY VENTRAL N/A 2023    Procedure: HERNIORRHAPHY, VENTRAL, ROBOT-ASSISTED WITH MESH;  Surgeon: Herminio Becerra MD;  Location: [facility] OR    GASTRIC BYPASS  2005    GI SURGERY  Sep 2022    HC REMOVAL GALLBLADDER  2006    Dr. Inman @ [facility]    HC REPAIR ING HERNIA,5+Y/O,REDUCIBL  age 20 ()    [facility]    LAPAROTOMY EXPLORATORY N/A 2022    Procedure: Exploratory LAPAROTOMY, Superior Mesenteric Artery Thrombectomy, Retrograde Superior Mesenteric Artery Stenting;  Surgeon: Balta Ernst MD;  Location: [facility] OR    LAPAROTOMY EXPLORATORY N/A 09/10/2022    Procedure: LAPAROTOMY, SMALL BOWEL RESECTION, INCISIONAL WOUND CLOSURE;  Surgeon: Tariq Lancaster MD;  Location: [facility] OR    LAPAROTOMY EXPLORATORY N/A 2022    Procedure: EXPLORATORY LAPAROTOMY, EVACUATION OF RECTUS SHEATH HEMATOMA, ABDOMINAL WASHOUT;  Surgeon: Nathan Barber MD;  Location: [facility] OR    PICC DOUBLE LUMEN PLACEMENT Right 09/10/2022    5FR DL PICC. Basilic vein.    SURGICAL HISTORY OF -       PE tubes    SURGICAL HISTORY OF -   10/2006    removal of pannus ans breast lift    TONSILLECTOMY  1981    [facility] NONSPECIFIC PROCEDURE  2006    Laser ablation genital condyloma/ Bx labial lesion    [facility] RAD RESEC TONSIL/PILLARS     Medications:  "  Current Outpatient Medications   Medication Sig Dispense Refill    apixaban ANTICOAGULANT (ELIQUIS) 5 MG tablet Take 1 tablet (5 mg) by mouth 2 times daily 60 tablet 0    aspirin 81 MG EC tablet Take 1 tablet (81 mg) by mouth daily 90 tablet 3    atorvastatin (LIPITOR) 20 MG tablet Take 1 tablet (20 mg) by mouth daily 90 tablet 3    cyanocolbalamin (VITAMIN  B-12) 500 MCG tablet Take 500 mcg by mouth daily       cyclobenzaprine (FLEXERIL) 10 MG tablet Take 1 tablet (10 mg) by mouth nightly as needed for muscle spasms 30 tablet 0    ferrous sulfate (IRON) 325 (65 FE) MG tablet Take 325 mg by mouth daily (with breakfast)       Multiple Vitamin (MULTIVITAMIN PO) Take 1 tablet by mouth daily      norethindrone (MICRONOR) 0.35 MG tablet Take 1 tablet (0.35 mg) by mouth daily 84 tablet 0    saccharomyces boulardii (FLORASTOR) 250 MG capsule Take 1 capsule (250 mg) by mouth 2 times daily 60 capsule 3    vitamin D3 (CHOLECALCIFEROL) 50 mcg (2000 units) tablet Take 1 tablet by mouth daily        Allergies   Allergen Reactions    Codeine Nausea and Vomiting     Pt tolerated oxycodone 2022.    Darvocet [Acetaminophen] Nausea and Vomiting     Pt tolerated oxycodone 2022.    Propoxyphene Nausea     Social History: smokes 0.5 pack/day since 20 years old. Denied alcohol or recreational drug use.  Family History: mom's mom - lung cancer. Denied FH of breast, ovaria, colon, or pancreatic cancer.  Physical Exam:   Vitals:    04/18/24 1335   BP: 129/80   BP Location: Right arm   Patient Position: Chair   Cuff Size: Adult Regular   Pulse: 92   Resp: 18   Temp: 97.8  F (36.6  C)   TempSrc: Tympanic   Weight: 73.5 kg (162 lb)   Height: 1.6 m (5' 3\")      Estimated body mass index is 28.7 kg/m  as calculated from the following:    Height as of this encounter: 1.6 m (5' 3\").    Weight as of this encounter: 73.5 kg (162 lb).    General appearance: well-hydrated, A&O x 3, no apparent distress  Lungs: Equal expansion bilaterally, no " accessory muscle use  Heart: No heaves or thrills.   Constitutional: See vitals    Labs/Imaging:   Component      Latest Ref Rng 4/9/2024  11:31 AM   WBC      4.0 - 11.0 10e3/uL 11.0    RBC Count      3.80 - 5.20 10e6/uL 5.04    Hemoglobin      11.7 - 15.7 g/dL 14.2    Hematocrit      35.0 - 47.0 % 43.4    MCV      78 - 100 fL 86    MCH      26.5 - 33.0 pg 28.2    MCHC      31.5 - 36.5 g/dL 32.7    RDW      10.0 - 15.0 % 20.4 (H)    Platelet Count      150 - 450 10e3/uL 324    Iron      37 - 145 ug/dL 95    Iron Binding Capacity      240 - 430 ug/dL 241    Iron Sat Index      15 - 46 % 39    Ferritin      6 - 175 ng/mL 113       Case Report   Surgical Pathology Report                         Case: [id number]                                   Authorizing Provider:  Lynne Batista MD             Collected:           03/14/2024 11:43 AM           Ordering Location:     [facility]  Received:            03/14/2024 03:56 PM                                  [facility]                                                               Pathologist:           Arelis Newberry MD                                                           Specimens:   A) - Endometrium, emb                                                                                B) - Endocervix, ecc                                                                                C) - Cervix, cervical biopsy                                                              Final Diagnosis   A. Endometrium, biopsy:  -Proliferative pattern endometrium.  -Benign superficial squamous and endocervical epithelium.     B. Endocervix, curettage:  -No endocervical epithelium identified.'  -Extremely scant, superficial, minute fragments of benign squamous epithelium.     C. Cervix, biopsy:  -Benign squamous mucosa.  -Transformation zone not identified.

## 2024-04-25 DIAGNOSIS — I74.10 AORTIC THROMBUS (H): ICD-10-CM

## 2024-04-25 NOTE — TELEPHONE ENCOUNTER
Bridgette Veras is followed at the [facility] for their history of superior mesenteric artery thrombus.     They were last evaluated by our team on 04/09/2024 with the plan to  continue on AC indefinitely and follow up in 6 months .    Prescription updated and refills given.     Cal VILLASENOR RN  [facility]  Office: [phone]  Clinic: [phone]  Fax: [phone]

## 2024-05-03 ENCOUNTER — ANCILLARY PROCEDURE (OUTPATIENT)
Dept: ULTRASOUND IMAGING | Facility: CLINIC | Age: 49
End: 2024-05-03
Attending: STUDENT IN AN ORGANIZED HEALTH CARE EDUCATION/TRAINING PROGRAM
Payer: COMMERCIAL

## 2024-05-03 DIAGNOSIS — N92.1 MENORRHAGIA WITH IRREGULAR CYCLE: ICD-10-CM

## 2024-05-03 PROCEDURE — 76830 TRANSVAGINAL US NON-OB: CPT | Mod: TC | Performed by: RADIOLOGY

## 2024-05-03 PROCEDURE — 76856 US EXAM PELVIC COMPLETE: CPT | Mod: TC | Performed by: RADIOLOGY

## 2024-05-09 ENCOUNTER — OFFICE VISIT (OUTPATIENT)
Dept: OBGYN | Facility: CLINIC | Age: 49
End: 2024-05-09
Payer: COMMERCIAL

## 2024-05-09 VITALS
HEIGHT: 63 IN | HEART RATE: 71 BPM | DIASTOLIC BLOOD PRESSURE: 73 MMHG | TEMPERATURE: 97.6 F | WEIGHT: 164 LBS | BODY MASS INDEX: 29.06 KG/M2 | RESPIRATION RATE: 18 BRPM | SYSTOLIC BLOOD PRESSURE: 139 MMHG

## 2024-05-09 DIAGNOSIS — N92.1 MENORRHAGIA WITH IRREGULAR CYCLE: Primary | ICD-10-CM

## 2024-05-09 DIAGNOSIS — Z30.430 ENCOUNTER FOR IUD INSERTION: ICD-10-CM

## 2024-05-09 DIAGNOSIS — Z30.430 ENCOUNTER FOR INSERTION OF INTRAUTERINE CONTRACEPTIVE DEVICE: ICD-10-CM

## 2024-05-09 DIAGNOSIS — Z30.430 ENCOUNTER FOR INSERTION OF MIRENA IUD: ICD-10-CM

## 2024-05-09 PROCEDURE — 58300 INSERT INTRAUTERINE DEVICE: CPT | Performed by: STUDENT IN AN ORGANIZED HEALTH CARE EDUCATION/TRAINING PROGRAM

## 2024-05-09 PROCEDURE — 81025 URINE PREGNANCY TEST: CPT | Performed by: STUDENT IN AN ORGANIZED HEALTH CARE EDUCATION/TRAINING PROGRAM

## 2024-05-09 NOTE — PROGRESS NOTES
Mirena IUS placement Note  Indication: AUB - heavy menstrual bleeding with irregular menses     Consent:  Risks (infection, minor bleeding, uterine perforation, IUS expulsion, IUS migration) and benefits of treatment were discussed.  Patient's questions were elicited and answered.  and Written consent signed and scanned into medical record.     Counseling: Patient is aware of the fact that with any hormonal therapy, spotting can be normal within the first 3-6 months and different therapy we can use to help with that.  No sexual intercourse for 1-2 weeks to avoid infection.    Procedure details: UPT was negative.  Medium Graves speculum was placed.  The cervix was cleansed with Betadine swabs x3.  Single-tooth tenaculum was placed horizontally on the anterior cervical lip.  The uterus was found to be at 8cm.  The Mirena IUS was placed per  instruction with its strings trimmed at 3 cm.  The patient tolerated the procedure well without complications.  EBL 5 ml.    Mirena IUS information:  GTIN: [id number]  S/N [id number]  Expiration date: 2026 June  Lot: [id number]    Lynne Batista MD  Obstetrics and Gynecology  [facility]   05/09/2024 1:58 PM

## 2024-05-09 NOTE — NURSING NOTE
"Initial /73 (BP Location: Left arm, Patient Position: Chair, Cuff Size: Adult Regular)   Pulse 71   Temp 97.6  F (36.4  C) (Tympanic)   Resp 18   Ht 1.6 m (5' 3\")   Wt 74.4 kg (164 lb)   LMP 03/31/2024   BMI 29.05 kg/m   Estimated body mass index is 29.05 kg/m  as calculated from the following:    Height as of this encounter: 1.6 m (5' 3\").    Weight as of this encounter: 74.4 kg (164 lb). .      "

## 2024-05-10 ENCOUNTER — ANCILLARY PROCEDURE (OUTPATIENT)
Dept: MAMMOGRAPHY | Facility: CLINIC | Age: 49
End: 2024-05-10
Attending: FAMILY MEDICINE
Payer: COMMERCIAL

## 2024-05-10 DIAGNOSIS — Z12.31 ENCOUNTER FOR SCREENING MAMMOGRAM FOR BREAST CANCER: ICD-10-CM

## 2024-05-10 PROCEDURE — 77067 SCR MAMMO BI INCL CAD: CPT | Mod: TC | Performed by: RADIOLOGY

## 2024-07-31 ENCOUNTER — TELEPHONE (OUTPATIENT)
Dept: VASCULAR SURGERY | Facility: CLINIC | Age: 49
End: 2024-07-31
Payer: COMMERCIAL

## 2024-07-31 NOTE — TELEPHONE ENCOUNTER
The pt is scheduled for her Annual Aortic thrombus follow-up on 10/22/24 with AMADOR SORIANO. The pt is wanting to schedule the ordered imaging for US Mesenteric Arteries Complete ordered by   on 10/22/24 but the order is needing to be extended. Will someone please extend the order.  Rolando Luque on 7/31/2024 at 6:08 PM

## 2024-08-01 NOTE — TELEPHONE ENCOUNTER
Sona I apologize I can get the pt rescheduled but I will need the imaging extended it looks like [facility] has availability on 10/22 on the sane day the pt is wanting to schedule her imaging.  Rolando Luque on 8/1/2024 at 12:56 PM

## 2024-08-02 NOTE — TELEPHONE ENCOUNTER
The pt is scheduled on 10/22 at the [facility] for imaging and with David Luque on 8/2/2024 at 9:48 AM     The pt was on the phone during scheduling of the above appointments and agreed to the dates times and locations of the appointments.

## 2024-09-28 DIAGNOSIS — I74.10 AORTIC THROMBUS (H): ICD-10-CM

## 2024-09-28 DIAGNOSIS — K55.069 SUPERIOR MESENTERIC ARTERY THROMBOSIS (H): ICD-10-CM

## 2024-09-30 RX ORDER — ASPIRIN 81 MG/1
81 TABLET ORAL DAILY
Qty: 90 TABLET | Refills: 3 | Status: SHIPPED | OUTPATIENT
Start: 2024-09-30

## 2024-10-17 DIAGNOSIS — I74.10 AORTIC THROMBUS (H): ICD-10-CM

## 2024-10-17 NOTE — PROGRESS NOTES
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     They were last evaluated by our team on 4-9-24 with the plan to remain on long term anticoagulation and return to clinic in 6 months.    Prescription updated and refills given.     This message will not be routed to  for scheduling.  Patient is scheduled to see Dr. Cogan on 10/22/24.  Prisca Maldonado, MSN, RN, PHN -Nurse Clinician, [facility] [phone]

## 2024-10-21 NOTE — PROGRESS NOTES
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  There was also irregular peripheral clot seen within the descending thoracic aorta and the superior aspect of the abdominal aorta.  There was a free-floating thrombus seen in the SMA proximally that appeared to extend inferiorly from the celiac artery.  She underwent an emergent ex lap and a SMA thrombectomy and stenting.     She reports no prior known history of blood clots or bleeding.  She had 2 pregnancies with C-sections, no known miscarriages or pregnancy complications such as preeclampsia.  No family history of blood clotting.    Saw vascular surgery 10/20/22. CTA at that time showed improvement in overall SMA clot burden; decreased adherent clot burden in descending thoracic aorta with small residual adherent thrombus in mid desending thoracic aorta; and evolving multifocal splenic infarcts. They switched hr from plavix to ASA and planned for AC until evaluated by hematology. Recent diarrhea, concern for CDiff. CT A/P showed infectious/inflammatory pancolitis, no e/o bowel ischemia.    She presents for initial outpatient evaluation by hematology today after being seen as a consult in the hospital.  She is doing much better, without any notable residual symptoms.  Continues on apixaban without any reports of bleeding.  Also taking Plavix without any issue.   Denies any bleeding or bruising, no melena.  No fevers, night sweats, weight loss, rashes, adenopathy.  Denies aquagenic pruritus or erythromelalgia.  No hematuria.  No prior personal or family history of blood clotting.  She has not had any prior miscarriages.  Serologies for beta-2 glycoprotein antibodies and cardiolipin antibodies were negative.  Lupus anticoagulant testing negative as well.  She received IV iron while in the hospital, and currently takes oral iron daily.    March 13, 2023: Has completed 6 months of therapeutic apixaban.  Antiphospholipid antibody testing unremarkable. Continues on aspirin and apixaban. Notes that periods are heavier, but no other bleeding. Developed hernia, pending surgical plan. Had 3 IV iron infusions in early January, felt better in terms of energy after, though feels symptoms returning. Briefly quit smoking but still having a few (1-2 per day), hoping to quit entirely. Continues on Chantix. Continues on oral iron.  Has questions regarding whether it would be worth pursuing genetic testing for possible thrombophilia, given that she has children (adult son in his 20s, 9-year-old daughter).    October 11, 2023: Had quit smoking for 5 months, but then had a death in family so restarted. Stopped Chantix - not sure if it helped her quit. No fevers or other constitutional symptoms.  Continues to have menstrual periods, heavier since being on anticoagulation.  Due for repeat imaging with vascular surgery next week to check on the status of the aortic thrombus.    April 9, 2024: Smoking continues, planning to restart Chantix soon.  She received another course of IV iron in February.  Significant menorrhagia continues, she is seeing gynecology next week.  It seems that the issue of hormonal contraception has been raised, but there was concern given her thrombosis history.  She is interested in further thrombophilia testing in case it has any implications for her children (26-year-old  male son 10-year-old female daughter).    October 22, 2024: Had IUD placed. Less uterine bleeding. No bleeding in general. Continues to smoke.    Medications:  Current Outpatient Medications   Medication Sig Dispense Refill    apixaban ANTICOAGULANT (ELIQUIS) 5 MG tablet Take 1 tablet (5 mg) by mouth 2 times daily. 180 tablet 0    aspirin 81 MG EC tablet Take 1 tablet (81 mg) by mouth daily. 90 tablet 3    atorvastatin (LIPITOR) 20 MG tablet Take 1 tablet (20 mg) by mouth daily 90 tablet 3    cyanocolbalamin (VITAMIN  B-12) 500 MCG tablet Take 500 mcg by mouth daily       cyclobenzaprine (FLEXERIL) 10 MG tablet Take 1 tablet (10 mg) by mouth nightly as needed for muscle spasms 30 tablet 0    ferrous sulfate (IRON) 325 (65 FE) MG tablet Take 325 mg by mouth daily (with breakfast)       levonorgestrel (MIRENA) 52 MG (20 mcg/day) IUD 1 each by Intrauterine route once      Multiple Vitamin (MULTIVITAMIN PO) Take 1 tablet by mouth daily      saccharomyces boulardii (FLORASTOR) 250 MG capsule Take 1 capsule (250 mg) by mouth 2 times daily 60 capsule 3    vitamin D3 (CHOLECALCIFEROL) 50 mcg (2000 units) tablet Take 1 tablet by mouth daily          Assessment:  Bridgette Veras is a 47 year old woman with a 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    She continues to do well on anticoagulation and antiplatelet therapy without bleeding.  Thrombophilia workup was notable only for a slightly low protein S level.  While I do not think this confirms a protein S deficiency, it may provide some support for my sense that she has an underlying thrombophilia that, in addition to her active smoking, predisposes her to a risk of significant future thrombosis without at least anticoagulation.  Given her history of arterial thrombosis, I have also opted to place her on indefinite antiplatelet therapy.    If she is to develop bleeding in the future, if cost becomes an  issue, or if she is to develop another thrombosis despite these measures, we will need to reconsider her regimen.  Otherwise, I am not expecting major changes to her treatment, and we discussed that I would be okay with her consolidating her care with her PCP and the PCP refilling these medications for her annually.  She should also have her iron stores checked annually.  If questions or concerns arise, I am happy to see her at any point in the future.    Plan:  -Continue apixaban 5 mg twice daily indefinitely  -Continue aspirin (needs to continue this for stent)  -Stop oral iron, recheck annually  -Follow-up as needed    Patient understands and agrees with the above plan and recommendation.    Jacob Cogan, MD  Hematology    30 minutes spent on the date of the encounter doing chart review, history and exam, documentation and further activities per the note    The longitudinal plan of care for the diagnosis(es)/condition(s) as documented were addressed during this visit. Due to the added complexity in care, I will continue to support Bridgette in the subsequent management and with ongoing continuity of care.    This note was completed in part using dictation via the Dragon voice recognition software. Some word and grammatical errors may occur and must be interpreted in the appropriate clinical context. If there are any questions pertaining to this issue, please contact me for further clarification.

## 2024-10-22 ENCOUNTER — ANCILLARY PROCEDURE (OUTPATIENT)
Dept: ULTRASOUND IMAGING | Facility: CLINIC | Age: 49
End: 2024-10-22
Payer: COMMERCIAL

## 2024-10-22 ENCOUNTER — OFFICE VISIT (OUTPATIENT)
Dept: HEMATOLOGY | Facility: CLINIC | Age: 49
End: 2024-10-22
Attending: STUDENT IN AN ORGANIZED HEALTH CARE EDUCATION/TRAINING PROGRAM
Payer: COMMERCIAL

## 2024-10-22 ENCOUNTER — OFFICE VISIT (OUTPATIENT)
Dept: VASCULAR SURGERY | Facility: CLINIC | Age: 49
End: 2024-10-22
Payer: COMMERCIAL

## 2024-10-22 VITALS
HEART RATE: 88 BPM | HEIGHT: 63 IN | BODY MASS INDEX: 27.7 KG/M2 | DIASTOLIC BLOOD PRESSURE: 80 MMHG | WEIGHT: 156.3 LBS | OXYGEN SATURATION: 99 % | SYSTOLIC BLOOD PRESSURE: 122 MMHG | TEMPERATURE: 98.1 F

## 2024-10-22 VITALS
DIASTOLIC BLOOD PRESSURE: 89 MMHG | SYSTOLIC BLOOD PRESSURE: 121 MMHG | OXYGEN SATURATION: 96 % | HEART RATE: 93 BPM | BODY MASS INDEX: 27.52 KG/M2 | WEIGHT: 155.3 LBS

## 2024-10-22 DIAGNOSIS — K55.069 SUPERIOR MESENTERIC ARTERY THROMBOSIS (H): ICD-10-CM

## 2024-10-22 DIAGNOSIS — Z95.9 STATUS POST ARTERIAL STENT: ICD-10-CM

## 2024-10-22 DIAGNOSIS — I74.10 AORTIC THROMBUS (H): ICD-10-CM

## 2024-10-22 DIAGNOSIS — K55.069 SUPERIOR MESENTERIC ARTERY THROMBOSIS (H): Primary | ICD-10-CM

## 2024-10-22 DIAGNOSIS — I74.10 AORTIC THROMBUS (H): Primary | ICD-10-CM

## 2024-10-22 PROCEDURE — G2211 COMPLEX E/M VISIT ADD ON: HCPCS | Performed by: STUDENT IN AN ORGANIZED HEALTH CARE EDUCATION/TRAINING PROGRAM

## 2024-10-22 PROCEDURE — 99213 OFFICE O/P EST LOW 20 MIN: CPT | Performed by: STUDENT IN AN ORGANIZED HEALTH CARE EDUCATION/TRAINING PROGRAM

## 2024-10-22 PROCEDURE — 93975 VASCULAR STUDY: CPT | Mod: GC | Performed by: STUDENT IN AN ORGANIZED HEALTH CARE EDUCATION/TRAINING PROGRAM

## 2024-10-22 PROCEDURE — 99204 OFFICE O/P NEW MOD 45 MIN: CPT | Performed by: NURSE PRACTITIONER

## 2024-10-22 PROCEDURE — 99214 OFFICE O/P EST MOD 30 MIN: CPT | Performed by: STUDENT IN AN ORGANIZED HEALTH CARE EDUCATION/TRAINING PROGRAM

## 2024-10-22 ASSESSMENT — PAIN SCALES - GENERAL: PAINLEVEL: NO PAIN (0)

## 2024-10-22 NOTE — NURSING NOTE
Chief Complaint   Patient presents with    Follow Up     RETURN VASCULAR PATIENT - Annual Aortic thrombus follow-up     Vitals were taken and medications reconciled.    Ayush Prater, CHRISTIE  12:10 PM      Patient given water

## 2024-10-22 NOTE — PROGRESS NOTES
VASCULAR SURGERY PROGRESS NOTE    LOCATION: [facility]     Bridgette Veras  Medical Record #:  [id number]  YOB: 1975  Age:  48 year old     Date of Service: 10/22/2024    PRIMARY CARE PROVIDER: Cecy Morillo    Reason for visit: Follow up SMA thrombus s/p stent 09/22     IMPRESSION / RECOMMENDATION:   Bridgette Veras is a pleasant 48-year-old patient who presents for 1 year follow-up from aortic and SMA thrombosis s/p SMA thrombectomy with covered stent placement, ex lap and ileal resection. Repeat mesenteric ultrasound demonstrates elevated velocities within the stent and distal to the stent measuring up to 330 cm/s, significant increase compared to last year. Asymptomatic.    -Imaging reviewed with Dr. Arzola, recommend CTA within the next 2 weeks for objective assessment of SMA stent. If CTA demonstrates significant stenosis, we will schedule follow-up with vascular surgeon, Dr. Woody.  -Repeat mesenteric ultrasound in 6 months to assess for interval changes  -Smoking cessation  -Continue lifelong Eliquis, aspirin and statin    Should patient experience new symptoms of nausea, vomiting, abdominal discomfort or pain, she was directed to seek care immediately. Patient verbalized clear understanding of instructions. All questions were answered and support provided. She has our contact information and knows to reach out with any additional questions or concerns.       Willow Bradshaw CNP  Vascular Surgery  Pager: [phone]  [email or web address]  Send message or 10 digit call back number Securely via [employer] with the [employer] Web Console (learn more here)    45 minutes spent on the date of the encounter doing chart review, review of outside records, review of test results, interpretation of tests, patient visit, documentation and discussion with other provider(s)         HPI:  Bridgette Veras is a 48 year old female with past medical history significant for aortic and  SMA thrombosis s/p SMA thrombectomy w/ covered stent placement as well as ex lap and ileal resection on 2022. She is still smoking at least half a pack of cigarettes per day, has been smoking since the age of 19. We discussed the increased risk of in-stent stenosis secondary to active smoking and patient understands the risk.  Denies nausea vomiting, no postprandial pain, reports good appetite, without abdominal tenderness. Compliant with medications.    REVIEW OF SYSTEMS:    A 12 point ROS was reviewed and is negative except for what is listed above in HPI.    PHH:    Past Medical History:   Diagnosis Date    Cervical high risk HPV (human papillomavirus) test positive 2016    type 16    Chickenpox     Chlamydia trachomatis infection of unspecified site     POONAM 1     Depressive disorder     Depressive disorder, not elsewhere classified     History of blood transfusion Sep 2022    Surgery    History of colposcopy with cervical biopsy 2016    Bx - POONAM 1, ECC - negative    Other abnormal heart sounds as child    Murmur - resolved    Polycystic ovaries     prior to gastic bypass in , pt carried the diagnosis of PCOS/anovuolation    Streptococcus infection in conditions classified elsewhere and of unspecified site, group B 1998    In pregnancy          Past Surgical History:   Procedure Laterality Date     SECTION  1998. Failure to progress     SECTION, TUBAL LIGATION, COMBINED  2013    Procedure: COMBINED  SECTION, TUBAL LIGATION;   Section, Tubal Ligation;  Surgeon: Paul Allen MD;  Location: [facility] OR    COLONOSCOPY N/A 2024    Procedure: COLONOSCOPY, WITH POLYPECTOMY AND BIOPSY;  Surgeon: Hamzah Mcallister DO;  Location: [facility] GI    DAVINCI HERNIORRHAPHY VENTRAL N/A 2023    Procedure: HERNIORRHAPHY, VENTRAL, ROBOT-ASSISTED WITH MESH;  Surgeon: Herminio Becerra MD;  Location: [facility] OR    GASTRIC BYPASS  2005    GI SURGERY  Sep  2022    HC REMOVAL GALLBLADDER  03/16/2006    Dr. Inman @ [facility]    HC REPAIR ING HERNIA,5+Y/O,REDUCIBL  age 20 (1997)    [facility]    LAPAROTOMY EXPLORATORY N/A 09/08/2022    Procedure: Exploratory LAPAROTOMY, Superior Mesenteric Artery Thrombectomy, Retrograde Superior Mesenteric Artery Stenting;  Surgeon: Balta Ernst MD;  Location: [facility] OR    LAPAROTOMY EXPLORATORY N/A 09/10/2022    Procedure: LAPAROTOMY, SMALL BOWEL RESECTION, INCISIONAL WOUND CLOSURE;  Surgeon: Tariq Lancaster MD;  Location: [facility] OR    LAPAROTOMY EXPLORATORY N/A 09/09/2022    Procedure: EXPLORATORY LAPAROTOMY, EVACUATION OF RECTUS SHEATH HEMATOMA, ABDOMINAL WASHOUT;  Surgeon: Nathan Barber MD;  Location: [facility] OR    PICC DOUBLE LUMEN PLACEMENT Right 09/10/2022    5FR DL PICC. Basilic vein.    SURGICAL HISTORY OF -   1982    PE tubes    SURGICAL HISTORY OF -   10/2006    removal of pannus ans breast lift    TONSILLECTOMY  1981    ZZ NONSPECIFIC PROCEDURE  03/09/2006    Laser ablation genital condyloma/ Bx labial lesion    [facility] RAD RESEC TONSIL/PILLARS         ALLERGIES:  Codeine, Darvocet [acetaminophen], and Propoxyphene    MEDS:    Current Outpatient Medications:     apixaban ANTICOAGULANT (ELIQUIS) 5 MG tablet, Take 1 tablet (5 mg) by mouth 2 times daily., Disp: 180 tablet, Rfl: 1    aspirin 81 MG EC tablet, Take 1 tablet (81 mg) by mouth daily., Disp: 90 tablet, Rfl: 3    atorvastatin (LIPITOR) 20 MG tablet, Take 1 tablet (20 mg) by mouth daily, Disp: 90 tablet, Rfl: 3    cyanocolbalamin (VITAMIN  B-12) 500 MCG tablet, Take 500 mcg by mouth daily , Disp: , Rfl:     cyclobenzaprine (FLEXERIL) 10 MG tablet, Take 1 tablet (10 mg) by mouth nightly as needed for muscle spasms, Disp: 30 tablet, Rfl: 0    levonorgestrel (MIRENA) 52 MG (20 mcg/day) IUD, 1 each by Intrauterine route once, Disp: , Rfl:     Multiple Vitamin (MULTIVITAMIN PO), Take 1 tablet by mouth daily, Disp: , Rfl:     saccharomyces boulardii (FLORASTOR)  250 MG capsule, Take 1 capsule (250 mg) by mouth 2 times daily, Disp: 60 capsule, Rfl: 3    vitamin D3 (CHOLECALCIFEROL) 50 mcg (2000 units) tablet, Take 1 tablet by mouth daily, Disp: , Rfl:     SOCIAL HABITS:    History   Smoking Status    Every Day    Types: Cigarettes   Smokeless Tobacco    Never     Social History    Substance and Sexual Activity      Alcohol use: Not Currently        Comment: rarely- quit with pregnancy      History   Drug Use No       FAMILY HISTORY:    Family History   Problem Relation Age of Onset    Hypertension Mother     Depression Mother     Asthma Father     Gastrointestinal Disease Father         PSC    Hypertension Brother     Depression Brother     Cancer Maternal Grandmother         Lung    Depression Maternal Grandmother     Alcohol/Drug Maternal Grandfather         alcohol    Cardiovascular Maternal Grandfather         MI    Respiratory Paternal Grandmother     Cerebrovascular Disease Paternal Grandfather     Diabetes Other     C.A.D. No family hx of     Breast Cancer No family hx of     Cancer - colorectal No family hx of     Anesthesia Reaction No family hx of     Deep Vein Thrombosis (DVT) No family hx of        PE:  /89 (BP Location: Right arm, Patient Position: Chair, Cuff Size: Adult Regular)   Pulse 93   Wt 70.4 kg (155 lb 4.8 oz)   SpO2 96%   BMI 27.52 kg/m    Wt Readings from Last 1 Encounters:   10/22/24 70.4 kg (155 lb 4.8 oz)     Body mass index is 27.52 kg/m .    EXAM:  General: No apparent distress. Answers questions appropriately.   Neurologic: Focally intact, Alert and oriented x 3.   Eyes: Grossly normal.   Cardiac:  RRR  Pulmonary: Non labored breathing with normal respiratory effort  Abdominal: Soft, non distended, non tender to deep palpation. No pulsatile mass.   Musculoskeletal/Extremity: Palpable radial pulses bilaterally, 5/5 gross bilateral upper and lower extremity strength.  No edema. No open wounds or abrasions.       DIAGNOSTIC STUDIES:      Images:  ABDOMINAL COMPLETE WITH DOPPLER ULTRASOUND 10/22/2024     CLINICAL HISTORY: Superior mesenteric artery thrombosis, aortic  thrombus.     COMPARISONS: 10/25/2023     REFERRING PROVIDER: Dr. Pan     TECHNIQUE: Grayscale, color Doppler, and Doppler waveform ultrasound  evaluation of the aorta, celiac artery, superior mesenteric artery,  and inferior mesenteric artery.     FINDINGS:  AORTA, supraceliac: 86/24 cm/s, biphasic  AORTA, infrarenal proximal: 86/0 cm/s, triphasic  AORTA, infrarenal distal: 74/0 cm/s, biphasic     CELIAC ARTERY, origin: No flow     Hepatic artery: Nonvisualized     SUPERIOR MESENTERIC ARTERY, origin (proximal stent): 312/102 cm/s,  monophasic  SUPERIOR MESENTERIC ARTERY, proximal (mid stent): 274/97 cm/s,  monophasic  SUPERIOR MESENTERIC ARTERY, (distal Stent): 308/104 cm/s, monophasic  SUPERIOR MESENTERIC ARTERY, distal to stent: 330/102 cm/s, monophasic  SUPERIOR MESENTERIC ARTERY, mid: 84/14 cm/s, monophasic  SUPERIOR MESENTERIC ARTERY, distal: 113/25 cm/s, monophasic     Splenic artery at the hilum: 70/40 2 cm/s, monophasic     INFERIOR MESENTERIC ARTERY, origin: 239/21 cm/s, triphasic                                                                      IMPRESSION: 1. Elevated velocities in the patent superior mesenteric  artery within the stent and distal to the stent measuring up to 330  cm/s, increased from prior and suggestive of greater than 50%  stenosis.      2. 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 Hepatic artery is obscured and splenic artery  is patent.     Guidelines:   Peak Systolic Velocity Criteria:       Celiac Artery > 50% stenosis: > 240 cm/s       Celiac Artery > 70% stenosis: > 320 cm/s          Superior Mesenteric Artery > 50% stenosis: > 295 cm/s       Superior Mesenteric Artery > 70% stenosis: > 400 cm/s     Winston et al. Mesenteric/celiac duplex ultrasound interpretation  criteria revisited. Journal of  Vascular Surgery. 2012; 55:428-36.     I have personally reviewed the examination and initial interpretation  and I agree with the findings.     TG VILLAREAL MD     LABS:      Sodium   Date Value Ref Range Status   04/09/2024 139 135 - 145 mmol/L Final     Comment:     Reference intervals for this test were updated on 09/26/2023 to more accurately reflect our healthy population. There may be differences in the flagging of prior results with similar values performed with this method. Interpretation of those prior results can be made in the context of the updated reference intervals.    02/05/2024 139 135 - 145 mmol/L Final     Comment:     Reference intervals for this test were updated on 09/26/2023 to more accurately reflect our healthy population. There may be differences in the flagging of prior results with similar values performed with this method. Interpretation of those prior results can be made in the context of the updated reference intervals.    10/11/2023 139 135 - 145 mmol/L Final     Comment:     Reference intervals for this test were updated on 09/26/2023 to more accurately reflect our healthy population. There may be differences in the flagging of prior results with similar values performed with this method. Interpretation of those prior results can be made in the context of the updated reference intervals.    08/04/2016 139 133 - 144 mmol/L Final   03/07/2011 135 133 - 144 mmol/L Final   11/02/2010 138 133 - 144 mmol/L Final     Urea Nitrogen   Date Value Ref Range Status   04/09/2024 8.3 6.0 - 20.0 mg/dL Final   02/05/2024 13.4 6.0 - 20.0 mg/dL Final   10/11/2023 11.6 6.0 - 20.0 mg/dL Final   02/07/2022 10 7 - 30 mg/dL Final   08/04/2016 9 7 - 30 mg/dL Final   03/07/2011 11 5 - 24 mg/dL Final   11/02/2010 7 5 - 24 mg/dL Final     Hemoglobin   Date Value Ref Range Status   04/09/2024 14.2 11.7 - 15.7 g/dL Final   02/05/2024 13.1 11.7 - 15.7 g/dL Final   10/11/2023 14.1 11.7 - 15.7 g/dL Final    09/20/2016 11.1 (L) 11.7 - 15.7 g/dL Final   08/04/2016 9.6 (L) 11.7 - 15.7 g/dL Final   09/17/2013 13.0 11.7 - 15.7 g/dL Final     Platelet Count   Date Value Ref Range Status   04/09/2024 324 150 - 450 10e3/uL Final   02/05/2024 365 150 - 450 10e3/uL Final   10/11/2023 359 150 - 450 10e3/uL Final   08/04/2016 327 150 - 450 10e9/L Final   01/04/2013 290 150 - 450 10e9/L Final   03/07/2011 276 150 - 450 10e9/L Final     INR   Date Value Ref Range Status   09/12/2022 1.13 0.85 - 1.15 Final   09/11/2022 1.25 (H) 0.85 - 1.15 Final   09/09/2022 1.70 (H) 0.85 - 1.15 Final   03/29/2005 1.03 0.86 - 1.14 Final

## 2024-10-22 NOTE — LETTER
10/22/2024       RE: Bridgette Veras  [address]  [place] [address]     Dear Colleague,    Thank you for referring your patient, Bridgette Veras, to the [facility] [place] at [facility]. Please see a copy of my visit note below.        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 Repeat mesenteric ultrasound demonstrates elevated velocities within the stent and distal to the stent measuring up to 330 cm/s, significant increase compared to last year. Asymptomatic.    -Imaging reviewed with Dr. Arzola, recommend CTA within the next 2 weeks for objective assessment of SMA stent. If CTA demonstrates significant stenosis, we will schedule follow-up with vascular surgeon, Dr. Woody.  -Repeat mesenteric ultrasound in 6 months to assess for interval changes  -Smoking cessation  -Continue lifelong Eliquis, aspirin and statin    Should patient experience new symptoms of nausea, vomiting, abdominal discomfort or pain, she was directed to seek care immediately. Patient verbalized clear understanding of instructions. All questions were answered and support provided. She has our contact information and knows to reach out with any additional questions or concerns.       Willow Bradshaw, CNP  Vascular Surgery  Pager: [phone]  [email or web address]  Send message or 10 digit call back number Securely via [employer] with the Vocera Web Console (learn more here)    45  minutes spent on the date of the encounter doing chart review, review of outside records, review of test results, interpretation of tests, patient visit, documentation and discussion with other provider(s)         HPI:  Bridgette Veras is a 48 year old female with past medical history significant for 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 She is still smoking at least half a pack of cigarettes per day, has been smoking since the age of 19. We discussed the increased risk of in-stent stenosis secondary to active smoking and patient understands the risk.  Denies nausea vomiting, no postprandial pain, reports good appetite, without abdominal tenderness. Compliant with medications.    REVIEW OF SYSTEMS:    A 12 point ROS was reviewed and is negative except for what is listed above in HPI.    PHH:    Past Medical History:   Diagnosis Date     Cervical high risk HPV (human papillomavirus) test positive 2016    type 16     Chickenpox      Chlamydia trachomatis infection of unspecified site      POONAM 1      Depressive disorder      Depressive disorder, not elsewhere classified      History of blood transfusion Sep 2022    Surgery     History of colposcopy with cervical biopsy 2016    Bx - POONAM 1, ECC - negative     Other abnormal heart sounds as child    Murmur - resolved     Polycystic ovaries     prior to gastic bypass in , pt carried the diagnosis of PCOS/anovuolation     Streptococcus infection in conditions classified elsewhere and of unspecified site, group B     In pregnancy          Past Surgical History:   Procedure Laterality Date      SECTION  1998. Failure to progress      SECTION, TUBAL LIGATION, COMBINED  2013    Procedure: COMBINED  SECTION, TUBAL LIGATION;   Section, Tubal Ligation;  Surgeon: Paul Allen MD;  Location: [facility] OR     COLONOSCOPY  N/A 03/04/2024    Procedure: COLONOSCOPY, WITH POLYPECTOMY AND BIOPSY;  Surgeon: Hamzah Mcallister DO;  Location: [facility]     DAVINCI HERNIORRHAPHY VENTRAL N/A 06/09/2023    Procedure: HERNIORRHAPHY, VENTRAL, ROBOT-ASSISTED WITH MESH;  Surgeon: Herminio Becerra MD;  Location: [facility] OR     GASTRIC BYPASS  06/2005     GI SURGERY  Sep 2022     HC REMOVAL GALLBLADDER  03/16/2006    Dr. Inman @ [facility]     HC REPAIR ING HERNIA,5+Y/O,REDUCIBL  age 20 (1997)    [facility]     LAPAROTOMY EXPLORATORY N/A 09/08/2022    Procedure: Exploratory LAPAROTOMY, Superior Mesenteric Artery Thrombectomy, Retrograde Superior Mesenteric Artery Stenting;  Surgeon: Balta Ernst MD;  Location: [facility] OR     LAPAROTOMY EXPLORATORY N/A 09/10/2022    Procedure: LAPAROTOMY, SMALL BOWEL RESECTION, INCISIONAL WOUND CLOSURE;  Surgeon: Tariq Lancaster MD;  Location: [facility] OR     LAPAROTOMY EXPLORATORY N/A 09/09/2022    Procedure: EXPLORATORY LAPAROTOMY, EVACUATION OF RECTUS SHEATH HEMATOMA, ABDOMINAL WASHOUT;  Surgeon: Nathan Barber MD;  Location: [facility] OR     PICC DOUBLE LUMEN PLACEMENT Right 09/10/2022    5FR DL PICC. Basilic vein.     SURGICAL HISTORY OF -   1982    PE tubes     SURGICAL HISTORY OF -   10/2006    removal of pannus ans breast lift     TONSILLECTOMY  1981     [facility] NONSPECIFIC PROCEDURE  03/09/2006    Laser ablation genital condyloma/ Bx labial lesion     [facility] RAD RESEC TONSIL/PILLARS         ALLERGIES:  Codeine, Darvocet [acetaminophen], and Propoxyphene    MEDS:    Current Outpatient Medications:      apixaban ANTICOAGULANT (ELIQUIS) 5 MG tablet, Take 1 tablet (5 mg) by mouth 2 times daily., Disp: 180 tablet, Rfl: 1     aspirin 81 MG EC tablet, Take 1 tablet (81 mg) by mouth daily., Disp: 90 tablet, Rfl: 3     atorvastatin (LIPITOR) 20 MG tablet, Take 1 tablet (20 mg) by mouth daily, Disp: 90 tablet, Rfl: 3     cyanocolbalamin (VITAMIN  B-12) 500 MCG tablet, Take 500 mcg by mouth daily , Disp: , Rfl:       cyclobenzaprine (FLEXERIL) 10 MG tablet, Take 1 tablet (10 mg) by mouth nightly as needed for muscle spasms, Disp: 30 tablet, Rfl: 0     levonorgestrel (MIRENA) 52 MG (20 mcg/day) IUD, 1 each by Intrauterine route once, Disp: , Rfl:      Multiple Vitamin (MULTIVITAMIN PO), Take 1 tablet by mouth daily, Disp: , Rfl:      saccharomyces boulardii (FLORASTOR) 250 MG capsule, Take 1 capsule (250 mg) by mouth 2 times daily, Disp: 60 capsule, Rfl: 3     vitamin D3 (CHOLECALCIFEROL) 50 mcg (2000 units) tablet, Take 1 tablet by mouth daily, Disp: , Rfl:     SOCIAL HABITS:    History   Smoking Status     Every Day     Types: Cigarettes   Smokeless Tobacco     Never     Social History    Substance and Sexual Activity      Alcohol use: Not Currently        Comment: rarely- quit with pregnancy      History   Drug Use No       FAMILY HISTORY:    Family History   Problem Relation Age of Onset     Hypertension Mother      Depression Mother      Asthma Father      Gastrointestinal Disease Father         PSC     Hypertension Brother      Depression Brother      Cancer Maternal Grandmother         Lung     Depression Maternal Grandmother      Alcohol/Drug Maternal Grandfather         alcohol     Cardiovascular Maternal Grandfather         MI     Respiratory Paternal Grandmother      Cerebrovascular Disease Paternal Grandfather      Diabetes Other      C.A.D. No family hx of      Breast Cancer No family hx of      Cancer - colorectal No family hx of      Anesthesia Reaction No family hx of      Deep Vein Thrombosis (DVT) No family hx of        PE:  /89 (BP Location: Right arm, Patient Position: Chair, Cuff Size: Adult Regular)   Pulse 93   Wt 70.4 kg (155 lb 4.8 oz)   SpO2 96%   BMI 27.52 kg/m    Wt Readings from Last 1 Encounters:   10/22/24 70.4 kg (155 lb 4.8 oz)     Body mass index is 27.52 kg/m .    EXAM:  General: No apparent distress. Answers questions appropriately.   Neurologic: Focally intact, Alert and oriented x  3.   Eyes: Grossly normal.   Cardiac:  RRR  Pulmonary: Non labored breathing with normal respiratory effort  Abdominal: Soft, non distended, non tender to deep palpation. No pulsatile mass.   Musculoskeletal/Extremity: Palpable radial pulses bilaterally, 5/5 gross bilateral upper and lower extremity strength.  No edema. No open wounds or abrasions.       DIAGNOSTIC STUDIES:     Images:  ABDOMINAL COMPLETE WITH DOPPLER ULTRASOUND 10/22/2024     CLINICAL HISTORY: Superior mesenteric artery thrombosis, aortic  thrombus.     COMPARISONS: 10/25/2023     REFERRING PROVIDER: Dr. Pan     TECHNIQUE: Grayscale, color Doppler, and Doppler waveform ultrasound  evaluation of the aorta, celiac artery, superior mesenteric artery,  and inferior mesenteric artery.     FINDINGS:  AORTA, supraceliac: 86/24 cm/s, biphasic  AORTA, infrarenal proximal: 86/0 cm/s, triphasic  AORTA, infrarenal distal: 74/0 cm/s, biphasic     CELIAC ARTERY, origin: No flow     Hepatic artery: Nonvisualized     SUPERIOR MESENTERIC ARTERY, origin (proximal stent): 312/102 cm/s,  monophasic  SUPERIOR MESENTERIC ARTERY, proximal (mid stent): 274/97 cm/s,  monophasic  SUPERIOR MESENTERIC ARTERY, (distal Stent): 308/104 cm/s, monophasic  SUPERIOR MESENTERIC ARTERY, distal to stent: 330/102 cm/s, monophasic  SUPERIOR MESENTERIC ARTERY, mid: 84/14 cm/s, monophasic  SUPERIOR MESENTERIC ARTERY, distal: 113/25 cm/s, monophasic     Splenic artery at the hilum: 70/40 2 cm/s, monophasic     INFERIOR MESENTERIC ARTERY, origin: 239/21 cm/s, triphasic                                                                      IMPRESSION: 1. Elevated velocities in the patent superior mesenteric  artery within the stent and distal to the stent measuring up to 330  cm/s, increased from prior and suggestive of greater than 50%  stenosis.      2. 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 Hepatic artery is obscured and splenic artery  is  patent.     Guidelines:   Peak Systolic Velocity Criteria:       Celiac Artery > 50% stenosis: > 240 cm/s       Celiac Artery > 70% stenosis: > 320 cm/s          Superior Mesenteric Artery > 50% stenosis: > 295 cm/s       Superior Mesenteric Artery > 70% stenosis: > 400 cm/s     Winston et al. Mesenteric/celiac duplex ultrasound interpretation  criteria revisited. Journal of Vascular Surgery. 2012; 55:428-36.     I have personally reviewed the examination and initial interpretation  and I agree with the findings.     TG VILLAREAL MD     LABS:      Sodium   Date Value Ref Range Status   04/09/2024 139 135 - 145 mmol/L Final     Comment:     Reference intervals for this test were updated on 09/26/2023 to more accurately reflect our healthy population. There may be differences in the flagging of prior results with similar values performed with this method. Interpretation of those prior results can be made in the context of the updated reference intervals.    02/05/2024 139 135 - 145 mmol/L Final     Comment:     Reference intervals for this test were updated on 09/26/2023 to more accurately reflect our healthy population. There may be differences in the flagging of prior results with similar values performed with this method. Interpretation of those prior results can be made in the context of the updated reference intervals.    10/11/2023 139 135 - 145 mmol/L Final     Comment:     Reference intervals for this test were updated on 09/26/2023 to more accurately reflect our healthy population. There may be differences in the flagging of prior results with similar values performed with this method. Interpretation of those prior results can be made in the context of the updated reference intervals.    08/04/2016 139 133 - 144 mmol/L Final   03/07/2011 135 133 - 144 mmol/L Final   11/02/2010 138 133 - 144 mmol/L Final     Urea Nitrogen   Date Value Ref Range Status   04/09/2024 8.3 6.0 - 20.0 mg/dL Final    02/05/2024 13.4 6.0 - 20.0 mg/dL Final   10/11/2023 11.6 6.0 - 20.0 mg/dL Final   02/07/2022 10 7 - 30 mg/dL Final   08/04/2016 9 7 - 30 mg/dL Final   03/07/2011 11 5 - 24 mg/dL Final   11/02/2010 7 5 - 24 mg/dL Final     Hemoglobin   Date Value Ref Range Status   04/09/2024 14.2 11.7 - 15.7 g/dL Final   02/05/2024 13.1 11.7 - 15.7 g/dL Final   10/11/2023 14.1 11.7 - 15.7 g/dL Final   09/20/2016 11.1 (L) 11.7 - 15.7 g/dL Final   08/04/2016 9.6 (L) 11.7 - 15.7 g/dL Final   09/17/2013 13.0 11.7 - 15.7 g/dL Final     Platelet Count   Date Value Ref Range Status   04/09/2024 324 150 - 450 10e3/uL Final   02/05/2024 365 150 - 450 10e3/uL Final   10/11/2023 359 150 - 450 10e3/uL Final   08/04/2016 327 150 - 450 10e9/L Final   01/04/2013 290 150 - 450 10e9/L Final   03/07/2011 276 150 - 450 10e9/L Final     INR   Date Value Ref Range Status   09/12/2022 1.13 0.85 - 1.15 Final   09/11/2022 1.25 (H) 0.85 - 1.15 Final   09/09/2022 1.70 (H) 0.85 - 1.15 Final   03/29/2005 1.03 0.86 - 1.14 Final         Again, thank you for allowing me to participate in the care of your patient.      Sincerely,    LOUIE Sterling CNP

## 2024-10-24 NOTE — PATIENT INSTRUCTIONS
Thank you so much for choosing us for your care. It was a pleasure to see you at the vascular clinic today.     Follow-up recommendations: We will see you back in 6 months. Please do not hesitate to reach out to us in the meantime with any concerns. Our schedulers will get in touch with you to set up your follow-up visit.     Additional testing/imaging ordered today: Repeat mesenteric ultrasound in 6 months        Our scheduling team will get in touch with you to set up any follow-up testing/imaging and/or appointments. Please be aware that any testing/imaging recommended today will need to completed prior to your next visit with the provider. If testing/imaging is not completed prior to your next visit, your visit may be rescheduled.        If you have any questions, please contact our clinic directly at [phone] and ask for the nurse. We also encourage the use of [employer] to communicate with your HealthCare Provider.        If you have an urgent need after business hours (8:00 am to 4:30 pm) please call [phone], option 4, and ask for the vascular attending on call. For non-urgent after hours needs, please call the vascular nurse at [phone] and leave a detailed voicemail. For scheduling needs, please call our clinic directly at [phone].

## 2024-10-25 ENCOUNTER — MYC MEDICAL ADVICE (OUTPATIENT)
Dept: FAMILY MEDICINE | Facility: CLINIC | Age: 49
End: 2024-10-25
Payer: COMMERCIAL

## 2024-10-25 DIAGNOSIS — K55.069 SUPERIOR MESENTERIC ARTERY THROMBOSIS (H): ICD-10-CM

## 2024-10-25 DIAGNOSIS — Z95.9 STATUS POST ARTERIAL STENT: Primary | ICD-10-CM

## 2024-10-25 DIAGNOSIS — F40.240 CLAUSTROPHOBIA: Primary | ICD-10-CM

## 2024-10-25 RX ORDER — LORAZEPAM 1 MG/1
TABLET ORAL
Qty: 1 TABLET | Refills: 0 | Status: SHIPPED | OUTPATIENT
Start: 2024-10-25

## 2024-10-25 NOTE — PROGRESS NOTES
Called pt and scheduled STAT CTA for 10/30 @ 0940. Our team will review results once received and will determine POC at that time. Pt in agreement.    LOLIS SunshineN, RN  RN Care Coordinator  [facility] Vascular Surgery - [facility] phone: [phone]  Fax: [phone]

## 2024-10-30 ENCOUNTER — MYC MEDICAL ADVICE (OUTPATIENT)
Dept: SURGERY | Facility: CLINIC | Age: 49
End: 2024-10-30

## 2024-10-30 ENCOUNTER — ANCILLARY PROCEDURE (OUTPATIENT)
Dept: CT IMAGING | Facility: CLINIC | Age: 49
End: 2024-10-30
Attending: NURSE PRACTITIONER
Payer: COMMERCIAL

## 2024-10-30 DIAGNOSIS — Z95.9 STATUS POST ARTERIAL STENT: ICD-10-CM

## 2024-10-30 DIAGNOSIS — K55.069 SUPERIOR MESENTERIC ARTERY THROMBOSIS (H): ICD-10-CM

## 2024-10-30 PROCEDURE — 74174 CTA ABD&PLVS W/CONTRAST: CPT | Mod: TC | Performed by: RADIOLOGY

## 2024-10-30 RX ORDER — IOPAMIDOL 755 MG/ML
72 INJECTION, SOLUTION INTRAVASCULAR ONCE
Status: COMPLETED | OUTPATIENT
Start: 2024-10-30 | End: 2024-10-30

## 2024-10-30 RX ADMIN — IOPAMIDOL 72 ML: 755 INJECTION, SOLUTION INTRAVASCULAR at 09:46

## 2024-11-04 ENCOUNTER — MYC MEDICAL ADVICE (OUTPATIENT)
Dept: FAMILY MEDICINE | Facility: CLINIC | Age: 49
End: 2024-11-04
Payer: COMMERCIAL

## 2024-11-04 DIAGNOSIS — Z20.818 EXPOSURE TO PERTUSSIS: Primary | ICD-10-CM

## 2024-11-04 RX ORDER — AZITHROMYCIN 250 MG/1
TABLET, FILM COATED ORAL
Qty: 6 TABLET | Refills: 0 | Status: SHIPPED | OUTPATIENT
Start: 2024-11-04 | End: 2024-11-09

## 2024-12-23 ENCOUNTER — IMMUNIZATION (OUTPATIENT)
Dept: FAMILY MEDICINE | Facility: CLINIC | Age: 49
End: 2024-12-23
Payer: COMMERCIAL

## 2024-12-23 PROCEDURE — 90656 IIV3 VACC NO PRSV 0.5 ML IM: CPT

## 2024-12-23 PROCEDURE — 90480 ADMN SARSCOV2 VAC 1/ONLY CMP: CPT

## 2024-12-23 PROCEDURE — 91320 SARSCV2 VAC 30MCG TRS-SUC IM: CPT

## 2024-12-23 PROCEDURE — 90471 IMMUNIZATION ADMIN: CPT

## 2025-01-05 ASSESSMENT — PATIENT HEALTH QUESTIONNAIRE - PHQ9
SUM OF ALL RESPONSES TO PHQ QUESTIONS 1-9: 3
10. IF YOU CHECKED OFF ANY PROBLEMS, HOW DIFFICULT HAVE THESE PROBLEMS MADE IT FOR YOU TO DO YOUR WORK, TAKE CARE OF THINGS AT HOME, OR GET ALONG WITH OTHER PEOPLE: NOT DIFFICULT AT ALL
SUM OF ALL RESPONSES TO PHQ QUESTIONS 1-9: 3

## 2025-01-06 ENCOUNTER — PATIENT OUTREACH (OUTPATIENT)
Dept: CARE COORDINATION | Facility: CLINIC | Age: 50
End: 2025-01-06

## 2025-01-06 ENCOUNTER — OFFICE VISIT (OUTPATIENT)
Dept: FAMILY MEDICINE | Facility: CLINIC | Age: 50
End: 2025-01-06
Payer: COMMERCIAL

## 2025-01-06 VITALS
OXYGEN SATURATION: 99 % | TEMPERATURE: 97.6 F | HEIGHT: 64 IN | SYSTOLIC BLOOD PRESSURE: 124 MMHG | RESPIRATION RATE: 16 BRPM | WEIGHT: 160.6 LBS | DIASTOLIC BLOOD PRESSURE: 72 MMHG | HEART RATE: 81 BPM | BODY MASS INDEX: 27.42 KG/M2

## 2025-01-06 DIAGNOSIS — K43.9 ABDOMINAL WALL HERNIA: Primary | ICD-10-CM

## 2025-01-06 DIAGNOSIS — K55.069 SUPERIOR MESENTERIC ARTERY THROMBOSIS: ICD-10-CM

## 2025-01-06 DIAGNOSIS — G47.00 INSOMNIA, UNSPECIFIED TYPE: ICD-10-CM

## 2025-01-06 DIAGNOSIS — F17.200 NICOTINE DEPENDENCE, UNCOMPLICATED, UNSPECIFIED NICOTINE PRODUCT TYPE: ICD-10-CM

## 2025-01-06 PROBLEM — I74.10 AORTIC THROMBUS (H): Status: RESOLVED | Noted: 2022-09-08 | Resolved: 2025-01-06

## 2025-01-06 PROCEDURE — 99214 OFFICE O/P EST MOD 30 MIN: CPT | Performed by: FAMILY MEDICINE

## 2025-01-06 RX ORDER — ATORVASTATIN CALCIUM 20 MG/1
20 TABLET, FILM COATED ORAL DAILY
Qty: 90 TABLET | Refills: 0 | Status: SHIPPED | OUTPATIENT
Start: 2025-01-06

## 2025-01-06 RX ORDER — HYDROXYZINE HYDROCHLORIDE 25 MG/1
TABLET, FILM COATED ORAL
Qty: 60 TABLET | Refills: 0 | Status: SHIPPED | OUTPATIENT
Start: 2025-01-06

## 2025-01-06 RX ORDER — VARENICLINE TARTRATE 0.5 MG/1
TABLET, FILM COATED ORAL
Qty: 95 TABLET | Refills: 0 | Status: SHIPPED | OUTPATIENT
Start: 2025-01-06

## 2025-01-06 RX ORDER — VARENICLINE TARTRATE 1 MG/1
1 TABLET, FILM COATED ORAL 2 TIMES DAILY
Qty: 60 TABLET | Refills: 1 | Status: SHIPPED | OUTPATIENT
Start: 2025-02-04

## 2025-01-06 ASSESSMENT — ANXIETY QUESTIONNAIRES
GAD7 TOTAL SCORE: 7
1. FEELING NERVOUS, ANXIOUS, OR ON EDGE: SEVERAL DAYS
4. TROUBLE RELAXING: SEVERAL DAYS
5. BEING SO RESTLESS THAT IT IS HARD TO SIT STILL: SEVERAL DAYS
7. FEELING AFRAID AS IF SOMETHING AWFUL MIGHT HAPPEN: SEVERAL DAYS
GAD7 TOTAL SCORE: 7
GAD7 TOTAL SCORE: 7
3. WORRYING TOO MUCH ABOUT DIFFERENT THINGS: SEVERAL DAYS
IF YOU CHECKED OFF ANY PROBLEMS ON THIS QUESTIONNAIRE, HOW DIFFICULT HAVE THESE PROBLEMS MADE IT FOR YOU TO DO YOUR WORK, TAKE CARE OF THINGS AT HOME, OR GET ALONG WITH OTHER PEOPLE: SOMEWHAT DIFFICULT
8. IF YOU CHECKED OFF ANY PROBLEMS, HOW DIFFICULT HAVE THESE MADE IT FOR YOU TO DO YOUR WORK, TAKE CARE OF THINGS AT HOME, OR GET ALONG WITH OTHER PEOPLE?: SOMEWHAT DIFFICULT
2. NOT BEING ABLE TO STOP OR CONTROL WORRYING: SEVERAL DAYS
7. FEELING AFRAID AS IF SOMETHING AWFUL MIGHT HAPPEN: SEVERAL DAYS
6. BECOMING EASILY ANNOYED OR IRRITABLE: SEVERAL DAYS

## 2025-01-06 NOTE — PROGRESS NOTES
ICD-10-CM    1. Abdominal wall hernia  K43.9 Adult Gen Surg  Referral      2. Insomnia, unspecified type  G47.00 hydrOXYzine HCl (ATARAX) 25 MG tablet     Behavioral Sleep Medicine  Referral      3. Superior mesenteric artery thrombosis  K55.069 atorvastatin (LIPITOR) 20 MG tablet      4. Nicotine dependence, uncomplicated, unspecified nicotine product type  F17.200 varenicline (CHANTIX) 0.5 MG tablet     varenicline (CHANTIX) 1 MG tablet        Hernia:  previously repaired 6/2023.  Recurrent.  Not terribly bothersome now but becoming more so.  Referral to surgery to discuss repair      Insomnia:  long standing.  Reviewed sleep hygiene practices.  Discussed CBT for sleep and first line management and referral placed.  Trial of hydroxyzine as well.  Reviewed side effects.  Ideally not a long term strategy    H/o SMA thrombosis:  continue statin, refilled    Nicotine abuse:  has quit successfully with chantix in the past and well tolerated. New scripts sent and side effects reviewed.        Kristen Angeles is a 49 year old, presenting for the following health issues:  Hernia        1/6/2025    10:06 AM   Additional Questions   Roomed by Korina EVANGELISTA   Accompanied by Self      History of Present Illness       Reason for visit:  Hernia seen on CT  Symptom onset:  More than a month   She is taking medications regularly.       Has been doing ultrasound to follow-up the vascular concerns.  Dopplers were concerning so was sent for CT.  CT showed vascular issues were fine but showed ovarian cysts and hernia.  Feels like the hernia is gradually getting larger.        No significant symptoms at this time.  Was initially repaired in 6/2023.       stopped iron supplement end of October on recommendation of hematology.  Stopped in October.   Hematology is turning Eliquis over to primary for prescriptions.   Anticipate indefinite treatment    Discuss smoking cessation  - would like to restart the Chantix.  Has  "been effective for her in the past.  Last used the med prior to her hernia surgery in 6/2023       Insomnia   Feels like insomnia has been getting progressively worse.  Will sleep better for a couple of weeks and then reverts back  Has tried tylenol PM but not helpful.    This has been an issue for a couple of years.  Has trouble both falling asleep and staying asleep.  If she takes meds takes then it takes about 2 hours to fall asleep.  Regularly takes 3-4 hours to fall asleep without meds.  Bed at 9PM and not asleep till 1AM  feels like she then wakes every 1-2 hours and takes her about 30-45 minutes to fall back to sleep.  Gets out of bed at 6:30AM.   Feels this is every night.      Occasionally will have racing thoughts that keep her up but not at baseline.  Feels like most days her thoughts are not keeping her up but brain is going as she cannot fall asleep.    Never naps during the day.  Does drink tea, last cup around 3PM.  Not currently exercising but in the past exercised AM or midday.    Bedtime routine includes getting herself and her child ready for bed.  In the past would listen to TV on in the bedroom, has since turned that off.  Will sometimes read in bed but usually not.   Generally will then lay in bed, toss and turn, might get up walk around.   Will lay there for quite a while before getting up and the gets right back in bed.  When she wakes in the middle of the night will go to the bathroom and then get a drink before laying back down.            Review of Systems  Constitutional, HEENT, cardiovascular, pulmonary, gi and gu systems are negative, except as otherwise noted.      Objective    /72   Pulse 81   Temp 97.6  F (36.4  C) (Tympanic)   Resp 16   Ht 1.613 m (5' 3.5\")   Wt 72.8 kg (160 lb 9.6 oz)   SpO2 99%   BMI 28.00 kg/m    Body mass index is 28 kg/m .  Physical Exam   GENERAL: alert and no distress  EYES: Eyes grossly normal to inspection, PERRL and conjunctivae and sclerae " normal  RESP: lungs clear to auscultation - no rales, rhonchi or wheezes  CV: regular rate and rhythm, normal S1 S2, no S3 or S4, no murmur, click or rub, no peripheral edema  MS: no gross musculoskeletal defects noted, no edema  PSYCH: mentation appears normal, affect normal/bright    Epic reviewed        Signed Electronically by: Cecy Morillo DO

## 2025-01-14 DIAGNOSIS — I74.10 AORTIC THROMBUS (H): ICD-10-CM

## 2025-01-14 NOTE — PROGRESS NOTES
Fax request received from [facility]. Pt last seen fall of 2024 and was told to continue Eliquis indefinitely.    Page Patterson  BSN, RN, PHN   [facility]   Office: [phone]  Fax: [phone]

## 2025-02-10 ENCOUNTER — OFFICE VISIT (OUTPATIENT)
Dept: SURGERY | Facility: CLINIC | Age: 50
End: 2025-02-10
Attending: FAMILY MEDICINE
Payer: COMMERCIAL

## 2025-02-10 ENCOUNTER — TELEPHONE (OUTPATIENT)
Dept: SURGERY | Facility: CLINIC | Age: 50
End: 2025-02-10

## 2025-02-10 VITALS
DIASTOLIC BLOOD PRESSURE: 80 MMHG | WEIGHT: 160 LBS | BODY MASS INDEX: 27.31 KG/M2 | HEART RATE: 80 BPM | SYSTOLIC BLOOD PRESSURE: 127 MMHG | HEIGHT: 64 IN

## 2025-02-10 DIAGNOSIS — K43.2 RECURRENT VENTRAL INCISIONAL HERNIA: Primary | ICD-10-CM

## 2025-02-10 PROCEDURE — 99244 OFF/OP CNSLTJ NEW/EST MOD 40: CPT | Performed by: SURGERY

## 2025-02-10 NOTE — PROGRESS NOTES
Patient seen in consultation for recurrent incisional hernia by Cecy Morillo    HPI:  Patient is a 49 year old female  with complaints of hernia bulge mid abdomen  Had robotic incisional hernia repair in - multiple defects 20 x 6 cm with 25 x 10cm IPOM mesh (op report available) did have 1 hour of lysis of adhesions  Has noticed more bulge in area over time and now also gets discomfort, started noticing more maybe 5-6 months ago  Had gastric bypass laparoscopic  or so, had to have laparotomy for SMA thrombus in  and hernia related to that. On blood thinner due to SMA stent  nothing makes the episode better.    Review Of Systems    Skin: negative  Ears/Nose/Throat: negative  Respiratory: No shortness of breath, dyspnea on exertion, cough, or hemoptysis. Using chantix to try to stop smoking  Cardiovascular: SMA throbosis history as above. No cardiac issues  Gastrointestinal: negative  Genitourinary: negative  Musculoskeletal: negative  Neurologic: negative  Hematologic/Lymphatic/Immunologic: negative  Endocrine: negative      Past Medical History:   Diagnosis Date    Cervical high risk HPV (human papillomavirus) test positive 2016    type 16    Chickenpox     Chlamydia trachomatis infection of unspecified site     POONAM 1     Depressive disorder     Depressive disorder, not elsewhere classified     History of blood transfusion Sep 2022    Surgery    History of colposcopy with cervical biopsy 2016    Bx - POONAM 1, ECC - negative    Other abnormal heart sounds as child    Murmur - resolved    Polycystic ovaries     prior to gastic bypass in , pt carried the diagnosis of PCOS/anovuolation    Streptococcus infection in conditions classified elsewhere and of unspecified site, group B 1998    In pregnancy       Past Surgical History:   Procedure Laterality Date     SECTION  1998. Failure to progress     SECTION, TUBAL LIGATION, COMBINED  2013    Procedure:  COMBINED  SECTION, TUBAL LIGATION;   Section, Tubal Ligation;  Surgeon: Paul Allen MD;  Location: [facility]    COLONOSCOPY N/A 2024    Procedure: COLONOSCOPY, WITH POLYPECTOMY AND BIOPSY;  Surgeon: Hamzah Mcallister DO;  Location: [facility]    DAVINCI HERNIORRHAPHY VENTRAL N/A 2023    Procedure: HERNIORRHAPHY, VENTRAL, ROBOT-ASSISTED WITH MESH;  Surgeon: Herminio Becerra MD;  Location: [facility] OR    GASTRIC BYPASS  2005    GI SURGERY  Sep 2022    HC REMOVAL GALLBLADDER  2006    Dr. Inman @ [facility]    HC REPAIR ING HERNIA,5+Y/O,REDUCIBL  age 20 ()    [facility]    LAPAROTOMY EXPLORATORY N/A 2022    Procedure: Exploratory LAPAROTOMY, Superior Mesenteric Artery Thrombectomy, Retrograde Superior Mesenteric Artery Stenting;  Surgeon: Balta Ernst MD;  Location: [facility]    LAPAROTOMY EXPLORATORY N/A 09/10/2022    Procedure: LAPAROTOMY, SMALL BOWEL RESECTION, INCISIONAL WOUND CLOSURE;  Surgeon: Tariq Lancaster MD;  Location: [facility]    LAPAROTOMY EXPLORATORY N/A 2022    Procedure: EXPLORATORY LAPAROTOMY, EVACUATION OF RECTUS SHEATH HEMATOMA, ABDOMINAL WASHOUT;  Surgeon: Nathan Barber MD;  Location: [facility]    PICC DOUBLE LUMEN PLACEMENT Right 09/10/2022    5FR DL PICC. Basilic vein.    SURGICAL HISTORY OF -       PE tubes    SURGICAL HISTORY OF -   10/2006    removal of pannus ans breast lift    TONSILLECTOMY  1981    [facility] NONSPECIFIC PROCEDURE  2006    Laser ablation genital condyloma/ Bx labial lesion    [facility] RAD RESEC TONSIL/PILLARS         Social History     Socioeconomic History    Marital status:      Spouse name: Not on file    Number of children: 2    Years of education: Not on file    Highest education level: Not on file   Occupational History     Employer: UNEMPLOYED   Tobacco Use    Smoking status: Every Day     Current packs/day: 0.50     Average packs/day: 0.5 packs/day for 10.0 years (5.0 ttl pk-yrs)     Types:  Cigarettes     Passive exposure: Never    Smokeless tobacco: Never   Vaping Use    Vaping status: Never Used   Substance and Sexual Activity    Alcohol use: Not Currently     Comment: rarely- quit with pregnancy    Drug use: No    Sexual activity: Yes     Partners: Male     Birth control/protection: Female Surgical, Pill     Comment: Status post tubal ligation   Other Topics Concern    Parent/sibling w/ CABG, MI or angioplasty before 65F 55M? No   Social History Narrative    Not on file     Social Drivers of Health     Financial Resource Strain: Low Risk  (1/29/2024)    Financial Resource Strain     Within the past 12 months, have you or your family members you live with been unable to get utilities (heat, electricity) when it was really needed?: No   Food Insecurity: Low Risk  (1/29/2024)    Food Insecurity     Within the past 12 months, did you worry that your food would run out before you got money to buy more?: No     Within the past 12 months, did the food you bought just not last and you didn t have money to get more?: No   Transportation Needs: Low Risk  (1/29/2024)    Transportation Needs     Within the past 12 months, has lack of transportation kept you from medical appointments, getting your medicines, non-medical meetings or appointments, work, or from getting things that you need?: No   Physical Activity: Not on file   Stress: Not on file   Social Connections: Not on file   Interpersonal Safety: Low Risk  (2/5/2024)    Interpersonal Safety     Do you feel physically and emotionally safe where you currently live?: Yes     Within the past 12 months, have you been hit, slapped, kicked or otherwise physically hurt by someone?: No     Within the past 12 months, have you been humiliated or emotionally abused in other ways by your partner or ex-partner?: No   Housing Stability: Low Risk  (1/29/2024)    Housing Stability     Do you have housing? : Yes     Are you worried about losing your housing?: No  "      Current Outpatient Medications   Medication Sig Dispense Refill    apixaban ANTICOAGULANT (ELIQUIS) 5 MG tablet Take 1 tablet (5 mg) by mouth 2 times daily. 180 tablet 1    aspirin 81 MG EC tablet Take 1 tablet (81 mg) by mouth daily. 90 tablet 3    atorvastatin (LIPITOR) 20 MG tablet Take 1 tablet (20 mg) by mouth daily. 90 tablet 0    cyanocolbalamin (VITAMIN  B-12) 500 MCG tablet Take 500 mcg by mouth daily       hydrOXYzine HCl (ATARAX) 25 MG tablet Take 1-2 tablets at bedtime as needed for insomnia 60 tablet 0    levonorgestrel (MIRENA) 52 MG (20 mcg/day) IUD 1 each by Intrauterine route once      Multiple Vitamin (MULTIVITAMIN PO) Take 1 tablet by mouth daily      varenicline (CHANTIX) 0.5 MG tablet Take 0.5 mg (1 tablet) once a day for 3 days, THEN 0.5 mg (1 tablet) twice daily for 4 days, THEN 1.0 mg (2 tablets) twice daily 95 tablet 0    varenicline (CHANTIX) 1 MG tablet Take 1 tablet (1 mg) by mouth 2 times daily. 60 tablet 1    vitamin D3 (CHOLECALCIFEROL) 50 mcg (2000 units) tablet Take 1 tablet by mouth daily         Medications and history reviewed    Physical exam:  Vitals: /80   Pulse 80   Ht 1.619 m (5' 3.75\")   Wt 72.6 kg (160 lb)   BMI 27.68 kg/m    BMI= Body mass index is 27.68 kg/m .    Constitutional: healthy, alert, and no distress  Head: Normocephalic. No masses, lesions, tenderness or abnormalities  Gastrointestinal: Abdomen soft, non-tender. BS normal. No masses, organomegaly.  Well-healed midline laparotomy scar.  Periumbilical mostly superior to the umbilicus is palpable soft hernia, nontender.  : Deferred  Musculoskeletal: extremities normal- no gross deformities noted, gait normal, and normal muscle tone  Skin: no suspicious lesions or rashes  Psychiatric: mentation appears normal and affect normal/bright        Imaging shows:  Personally reviewed    About 6 cm wide diastasis periumbilical area, looks like eventrating mesh in the area. See intact mesh otherwise along " length of previous repair    CTA ABDOMEN AND PELVIS WITH CONTRAST  10/30/2024 10:04 AM      HISTORY:  Superior mesenteric artery thrombosis. Status post SMA  stenting. Worsening SMA stenosis on recent ultrasound.     COMPARISON: 10/19/2023     TECHNIQUE: CT angiogram of the abdomen and pelvis was obtained  following the administration of 72 cc intravenous contrast. Images are  viewed in multiple planes and 3-D reconstructions were also performed.  Radiation dose for this scan was reduced using automated exposure  control, adjustment of the mA and/or kV according to patient size, or  iterative reconstruction technique.     FINDINGS:   VASCULAR EXAM:  Abdominal aorta: The abdominal aorta is of normal caliber without  aneurysmal dilatation or significant stenosis. A stent in the superior  mesenteric artery is patent without significant stenosis. 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 The inferior mesenteric artery is patent  without significant stenosis. The renal arteries are patent without  significant stenoses.     The iliac arteries and proximal femoral arteries are patent without  significant stenoses.     SOFT TISSUE EXAM: The lung bases are clear.     The patient is status post a cholecystectomy. The solid organs in the  abdomen are unremarkable.     There is wide-based anterior abdominal wall hernia containing small  bowel.     There are postsurgical changes involving the stomach.     There is a 4.2 cm cyst in the right ovary. There is a 2 cm cyst in the  left ovary. There is an IUD.                                                                      IMPRESSION: The superior mesenteric artery stent is patent without  significant stenosis.    Assessment:     ICD-10-CM    1. Recurrent ventral incisional hernia  K43.2 Adult Gen Surg  Referral     Case Request: HERNIORRHAPHY, RECURRENT VENTRAL INCISIONAL, ROBOT-ASSISTED, LAPAROSCOPIC, USING DA YNES XI possible open        Plan: Recurrent incisional  hernia, previously had IPOM and fairly extensive lysis of adhesions.  Would want to try and do this repair in another layer to try and stay out of scar tissue and adhesions.  Would approach as a robotic retrorectus E TEP.  Discussed possibility of potentially having to go into the abdomen for redo IPOM if unable to create the retrorectus pocket.  Given defect with about 6 cm unlikely would need to do any TAR closure.  Small chance would have to approach as an open repair though it is possible.  Because of this would want to schedule this in the hospital setting at [facility].  She is okay with this.  Before surgery would want to quit smoking and she will need to be off her Eliquis preoperatively.  She is currently working on quitting and confident that she would be able to be fully quit before surgery which she is thinking about having done in June.  Risks of surgery discussed including, but not limited to bleeding, infection, recurrence, damage to intra-abdominal contents such as nerves, blood vessels, bowel or other organs.  Risks of anesthesia also discussed.  Although mesh is a better long term repair if it gets infected it must be removed. Mesh problems such as fracture, erosion or adhesions are possible.  If there is evidence of an infection at time of surgery it will be cancelled and rescheduled to when well.    Discussed massaging hernia back in and using ice if becomes more painful.  If not able to reduce then go to emergency room.  Smokers have increased risk of infection and hernia recurrence so I advise quitting. Quitting even a week or two before surgery still has benefit and would encourage to continue through post op period if not for good.      Fred Borja MD

## 2025-02-10 NOTE — LETTER
2/10/2025      Bridgette Veras  [address] [place] [address]      Dear Colleague,    Thank you for referring your patient, Bridgette Veras, to the [facility]. Please see a copy of my visit note below.    Patient seen in consultation for recurrent incisional hernia by Cecy Morillo    HPI:  Patient is a 49 year old female  with complaints of hernia bulge mid abdomen  Had robotic incisional hernia repair in 2023- multiple defects 20 x 6 cm with 25 x 10cm IPOM mesh (op report available) did have 1 hour of lysis of adhesions  Has noticed more bulge in area over time and now also gets discomfort, started noticing more maybe 5-6 months ago  Had gastric bypass laparoscopic 2005 or so, had to have laparotomy for SMA thrombus in 2022 and hernia related to that. On blood thinner due to SMA stent  nothing makes the episode better.    Review Of Systems    Skin: negative  Ears/Nose/Throat: negative  Respiratory: No shortness of breath, dyspnea on exertion, cough, or hemoptysis. Using chantix to try to stop smoking  Cardiovascular: SMA throbosis history as above. No cardiac issues  Gastrointestinal: negative  Genitourinary: negative  Musculoskeletal: negative  Neurologic: negative  Hematologic/Lymphatic/Immunologic: negative  Endocrine: negative      Past Medical History:   Diagnosis Date     Cervical high risk HPV (human papillomavirus) test positive 08/04/2016    type 16     Chickenpox      Chlamydia trachomatis infection of unspecified site      POONAM 1 1998     Depressive disorder      Depressive disorder, not elsewhere classified      History of blood transfusion Sep 2022    Surgery     History of colposcopy with cervical biopsy 09/20/2016    Bx - POONAM 1, ECC - negative     Other abnormal heart sounds as child    Murmur - resolved     Polycystic ovaries     prior to gastic bypass in June, 2005, pt carried the diagnosis of PCOS/anovuolation     Streptococcus infection in conditions  classified elsewhere and of unspecified site, group B 1998    In pregnancy       Past Surgical History:   Procedure Laterality Date      SECTION  1998. Failure to progress      SECTION, TUBAL LIGATION, COMBINED  2013    Procedure: COMBINED  SECTION, TUBAL LIGATION;   Section, Tubal Ligation;  Surgeon: Paul Allen MD;  Location: [facility]     COLONOSCOPY N/A 2024    Procedure: COLONOSCOPY, WITH POLYPECTOMY AND BIOPSY;  Surgeon: Hamzah Mcallister DO;  Location: [facility]     DAVINCI HERNIORRHAPHY VENTRAL N/A 2023    Procedure: HERNIORRHAPHY, VENTRAL, ROBOT-ASSISTED WITH MESH;  Surgeon: Herminio Becerra MD;  Location: [facility] OR     GASTRIC BYPASS  2005     GI SURGERY  Sep 2022     HC REMOVAL GALLBLADDER  2006    Dr. Inman @ [facility]     HC REPAIR ING HERNIA,5+Y/O,REDUCIBL  age 20 ()    [facility]     LAPAROTOMY EXPLORATORY N/A 2022    Procedure: Exploratory LAPAROTOMY, Superior Mesenteric Artery Thrombectomy, Retrograde Superior Mesenteric Artery Stenting;  Surgeon: Balta Ernts MD;  Location: [facility]     LAPAROTOMY EXPLORATORY N/A 09/10/2022    Procedure: LAPAROTOMY, SMALL BOWEL RESECTION, INCISIONAL WOUND CLOSURE;  Surgeon: Tariq Lancaster MD;  [facility]: [facility]     LAPAROTOMY EXPLORATORY N/A 2022    Procedure: EXPLORATORY LAPAROTOMY, EVACUATION OF RECTUS SHEATH HEMATOMA, ABDOMINAL WASHOUT;  Surgeon: Nathan Barber MD;  Location: [facility]     PICC DOUBLE LUMEN PLACEMENT Right 09/10/2022    5FR DL PICC. Basilic vein.     SURGICAL HISTORY OF -       PE tubes     SURGICAL HISTORY OF -   10/2006    removal of pannus ans breast lift     TONSILLECTOMY  1981     [facility] NONSPECIFIC PROCEDURE  2006    Laser ablation genital condyloma/ Bx labial lesion     [facility] RAD RESEC TONSIL/PILLARS         Social History     Socioeconomic History     Marital status:      Spouse name: Not on file     Number of children: 2      Years of education: Not on file     Highest education level: Not on file   Occupational History     Employer: UNEMPLOYED   Tobacco Use     Smoking status: Every Day     Current packs/day: 0.50     Average packs/day: 0.5 packs/day for 10.0 years (5.0 ttl pk-yrs)     Types: Cigarettes     Passive exposure: Never     Smokeless tobacco: Never   Vaping Use     Vaping status: Never Used   Substance and Sexual Activity     Alcohol use: Not Currently     Comment: rarely- quit with pregnancy     Drug use: No     Sexual activity: Yes     Partners: Male     Birth control/protection: Female Surgical, Pill     Comment: Status post tubal ligation   Other Topics Concern     Parent/sibling w/ CABG, MI or angioplasty before 65F 55M? No   Social History Narrative     Not on file     Social Drivers of Health     Financial Resource Strain: Low Risk  (1/29/2024)    Financial Resource Strain      Within the past 12 months, have you or your family members you live with been unable to get utilities (heat, electricity) when it was really needed?: No   Food Insecurity: Low Risk  (1/29/2024)    Food Insecurity      Within the past 12 months, did you worry that your food would run out before you got money to buy more?: No      Within the past 12 months, did the food you bought just not last and you didn t have money to get more?: No   Transportation Needs: Low Risk  (1/29/2024)    Transportation Needs      Within the past 12 months, has lack of transportation kept you from medical appointments, getting your medicines, non-medical meetings or appointments, work, or from getting things that you need?: No   Physical Activity: Not on file   Stress: Not on file   Social Connections: Not on file   Interpersonal Safety: Low Risk  (2/5/2024)    Interpersonal Safety      Do you feel physically and emotionally safe where you currently live?: Yes      Within the past 12 months, have you been hit, slapped, kicked or otherwise physically hurt by someone?:  "No      Within the past 12 months, have you been humiliated or emotionally abused in other ways by your partner or ex-partner?: No   Housing Stability: Low Risk  (1/29/2024)    Housing Stability      Do you have housing? : Yes      Are you worried about losing your housing?: No       Current Outpatient Medications   Medication Sig Dispense Refill     apixaban ANTICOAGULANT (ELIQUIS) 5 MG tablet Take 1 tablet (5 mg) by mouth 2 times daily. 180 tablet 1     aspirin 81 MG EC tablet Take 1 tablet (81 mg) by mouth daily. 90 tablet 3     atorvastatin (LIPITOR) 20 MG tablet Take 1 tablet (20 mg) by mouth daily. 90 tablet 0     cyanocolbalamin (VITAMIN  B-12) 500 MCG tablet Take 500 mcg by mouth daily        hydrOXYzine HCl (ATARAX) 25 MG tablet Take 1-2 tablets at bedtime as needed for insomnia 60 tablet 0     levonorgestrel (MIRENA) 52 MG (20 mcg/day) IUD 1 each by Intrauterine route once       Multiple Vitamin (MULTIVITAMIN PO) Take 1 tablet by mouth daily       varenicline (CHANTIX) 0.5 MG tablet Take 0.5 mg (1 tablet) once a day for 3 days, THEN 0.5 mg (1 tablet) twice daily for 4 days, THEN 1.0 mg (2 tablets) twice daily 95 tablet 0     varenicline (CHANTIX) 1 MG tablet Take 1 tablet (1 mg) by mouth 2 times daily. 60 tablet 1     vitamin D3 (CHOLECALCIFEROL) 50 mcg (2000 units) tablet Take 1 tablet by mouth daily         Medications and history reviewed    Physical exam:  Vitals: /80   Pulse 80   Ht 1.619 m (5' 3.75\")   Wt 72.6 kg (160 lb)   BMI 27.68 kg/m    BMI= Body mass index is 27.68 kg/m .    Constitutional: healthy, alert, and no distress  Head: Normocephalic. No masses, lesions, tenderness or abnormalities  Gastrointestinal: Abdomen soft, non-tender. BS normal. No masses, organomegaly.  Well-healed midline laparotomy scar.  Periumbilical mostly superior to the umbilicus is palpable soft hernia, nontender.  : Deferred  Musculoskeletal: extremities normal- no gross deformities noted, gait normal, and " normal muscle tone  Skin: no suspicious lesions or rashes  Psychiatric: mentation appears normal and affect normal/bright        Imaging shows:  Personally reviewed    About 6 cm wide diastasis periumbilical area, looks like eventrating mesh in the area. See intact mesh otherwise along length of previous repair    CTA ABDOMEN AND PELVIS WITH CONTRAST  10/30/2024 10:04 AM      HISTORY:  Superior mesenteric artery thrombosis. Status post SMA  stenting. Worsening SMA stenosis on recent ultrasound.     COMPARISON: 10/19/2023     TECHNIQUE: CT angiogram of the abdomen and pelvis was obtained  following the administration of 72 cc intravenous contrast. Images are  viewed in multiple planes and 3-D reconstructions were also performed.  Radiation dose for this scan was reduced using automated exposure  control, adjustment of the mA and/or kV according to patient size, or  iterative reconstruction technique.     FINDINGS:   VASCULAR EXAM:  Abdominal aorta: The abdominal aorta is of normal caliber without  aneurysmal dilatation or significant stenosis. A stent in the superior  mesenteric artery is patent without significant stenosis. 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 The inferior mesenteric artery is patent  without significant stenosis. The renal arteries are patent without  significant stenoses.     The iliac arteries and proximal femoral arteries are patent without  significant stenoses.     SOFT TISSUE EXAM: The lung bases are clear.     The patient is status post a cholecystectomy. The solid organs in the  abdomen are unremarkable.     There is wide-based anterior abdominal wall hernia containing small  bowel.     There are postsurgical changes involving the stomach.     There is a 4.2 cm cyst in the right ovary. There is a 2 cm cyst in the  left ovary. There is an IUD.                                                                      IMPRESSION: The superior mesenteric artery stent is patent  without  significant stenosis.    Assessment:     ICD-10-CM    1. Recurrent ventral incisional hernia  K43.2 Adult Gen Surg  Referral     Case Request: HERNIORRHAPHY, RECURRENT VENTRAL INCISIONAL, ROBOT-ASSISTED, LAPAROSCOPIC, USING DA YNES XI possible open        Plan: Recurrent incisional hernia, previously had IPOM and fairly extensive lysis of adhesions.  Would want to try and do this repair in another layer to try and stay out of scar tissue and adhesions.  Would approach as a robotic retrorectus E TEP.  Discussed possibility of potentially having to go into the abdomen for redo IPOM if unable to create the retrorectus pocket.  Given defect with about 6 cm unlikely would need to do any TAR closure.  Small chance would have to approach as an open repair though it is possible.  Because of this would want to schedule this in the hospital setting at [facility].  She is okay with this.  Before surgery would want to quit smoking and she will need to be off her Eliquis preoperatively.  She is currently working on quitting and confident that she would be able to be fully quit before surgery which she is thinking about having done in June.  Risks of surgery discussed including, but not limited to bleeding, infection, recurrence, damage to intra-abdominal contents such as nerves, blood vessels, bowel or other organs.  Risks of anesthesia also discussed.  Although mesh is a better long term repair if it gets infected it must be removed. Mesh problems such as fracture, erosion or adhesions are possible.  If there is evidence of an infection at time of surgery it will be cancelled and rescheduled to when well.    Discussed massaging hernia back in and using ice if becomes more painful.  If not able to reduce then go to emergency room.  Smokers have increased risk of infection and hernia recurrence so I advise quitting. Quitting even a week or two before surgery still has benefit and would encourage to continue  through post op period if not for good.      Fred Borja MD      Again, thank you for allowing me to participate in the care of your patient.        Sincerely,        Fred Borja MD    Electronically signed

## 2025-02-12 NOTE — TELEPHONE ENCOUNTER
Type of surgery: HERNIORRHAPHY, RECURRENT VENTRAL INCISIONAL, ROBOT-ASSISTED, LAPAROSCOPIC, USING DA YNES XI possible open (N/A)   Location of surgery: [facility] OR  Date and time of surgery: 7/10  Surgeon:Huong   Pre-Op Appt Date: 6/23  Post-Op Appt Date: 7/21   Packet sent out: Yes  Pre-cert/Authorization completed:  Not Applicable  Date:

## 2025-02-17 SDOH — HEALTH STABILITY: PHYSICAL HEALTH: ON AVERAGE, HOW MANY DAYS PER WEEK DO YOU ENGAGE IN MODERATE TO STRENUOUS EXERCISE (LIKE A BRISK WALK)?: 4 DAYS

## 2025-02-17 SDOH — HEALTH STABILITY: PHYSICAL HEALTH: ON AVERAGE, HOW MANY MINUTES DO YOU ENGAGE IN EXERCISE AT THIS LEVEL?: 20 MIN

## 2025-02-17 ASSESSMENT — SOCIAL DETERMINANTS OF HEALTH (SDOH): HOW OFTEN DO YOU GET TOGETHER WITH FRIENDS OR RELATIVES?: ONCE A WEEK

## 2025-02-20 ENCOUNTER — TELEPHONE (OUTPATIENT)
Dept: VASCULAR SURGERY | Facility: CLINIC | Age: 50
End: 2025-02-20

## 2025-02-20 ENCOUNTER — OFFICE VISIT (OUTPATIENT)
Dept: FAMILY MEDICINE | Facility: CLINIC | Age: 50
End: 2025-02-20
Payer: COMMERCIAL

## 2025-02-20 VITALS
TEMPERATURE: 97.5 F | OXYGEN SATURATION: 99 % | SYSTOLIC BLOOD PRESSURE: 138 MMHG | HEART RATE: 76 BPM | BODY MASS INDEX: 27.21 KG/M2 | RESPIRATION RATE: 16 BRPM | WEIGHT: 159.4 LBS | DIASTOLIC BLOOD PRESSURE: 80 MMHG | HEIGHT: 64 IN

## 2025-02-20 DIAGNOSIS — G47.00 INSOMNIA, UNSPECIFIED TYPE: ICD-10-CM

## 2025-02-20 DIAGNOSIS — Z98.84 S/P GASTRIC BYPASS: ICD-10-CM

## 2025-02-20 DIAGNOSIS — Z12.4 CERVICAL CANCER SCREENING: ICD-10-CM

## 2025-02-20 DIAGNOSIS — K55.069 SUPERIOR MESENTERIC ARTERY THROMBOSIS: ICD-10-CM

## 2025-02-20 DIAGNOSIS — Z00.00 ROUTINE GENERAL MEDICAL EXAMINATION AT A HEALTH CARE FACILITY: Primary | ICD-10-CM

## 2025-02-20 LAB
ALBUMIN SERPL BCG-MCNC: 4.2 G/DL (ref 3.5–5.2)
ALP SERPL-CCNC: 78 U/L (ref 40–150)
ALT SERPL W P-5'-P-CCNC: 18 U/L (ref 0–50)
ANION GAP SERPL CALCULATED.3IONS-SCNC: 8 MMOL/L (ref 7–15)
AST SERPL W P-5'-P-CCNC: 19 U/L (ref 0–45)
BILIRUB SERPL-MCNC: 0.4 MG/DL
BUN SERPL-MCNC: 10.1 MG/DL (ref 6–20)
CALCIUM SERPL-MCNC: 9.7 MG/DL (ref 8.8–10.4)
CHLORIDE SERPL-SCNC: 104 MMOL/L (ref 98–107)
CHOLEST SERPL-MCNC: 131 MG/DL
CREAT SERPL-MCNC: 0.79 MG/DL (ref 0.51–0.95)
EGFRCR SERPLBLD CKD-EPI 2021: >90 ML/MIN/1.73M2
ERYTHROCYTE [DISTWIDTH] IN BLOOD BY AUTOMATED COUNT: 13.9 % (ref 10–15)
FASTING STATUS PATIENT QL REPORTED: YES
FASTING STATUS PATIENT QL REPORTED: YES
FERRITIN SERPL-MCNC: 27 NG/ML (ref 6–175)
FOLATE SERPL-MCNC: 11.9 NG/ML (ref 4.6–34.8)
GLUCOSE SERPL-MCNC: 95 MG/DL (ref 70–99)
HCO3 SERPL-SCNC: 27 MMOL/L (ref 22–29)
HCT VFR BLD AUTO: 48.1 % (ref 35–47)
HDLC SERPL-MCNC: 70 MG/DL
HGB BLD-MCNC: 15.6 G/DL (ref 11.7–15.7)
IRON BINDING CAPACITY (ROCHE): 332 UG/DL (ref 240–430)
IRON SATN MFR SERPL: 42 % (ref 15–46)
IRON SERPL-MCNC: 138 UG/DL (ref 37–145)
LDLC SERPL CALC-MCNC: 47 MG/DL
MCH RBC QN AUTO: 29.1 PG (ref 26.5–33)
MCHC RBC AUTO-ENTMCNC: 32.4 G/DL (ref 31.5–36.5)
MCV RBC AUTO: 90 FL (ref 78–100)
NONHDLC SERPL-MCNC: 61 MG/DL
PLATELET # BLD AUTO: 293 10E3/UL (ref 150–450)
POTASSIUM SERPL-SCNC: 4.7 MMOL/L (ref 3.4–5.3)
PROT SERPL-MCNC: 6.7 G/DL (ref 6.4–8.3)
PTH-INTACT SERPL-MCNC: 27 PG/ML (ref 15–65)
RBC # BLD AUTO: 5.36 10E6/UL (ref 3.8–5.2)
SODIUM SERPL-SCNC: 139 MMOL/L (ref 135–145)
TRIGL SERPL-MCNC: 69 MG/DL
VIT B12 SERPL-MCNC: 463 PG/ML (ref 232–1245)
VIT D+METAB SERPL-MCNC: 34 NG/ML (ref 20–50)
WBC # BLD AUTO: 11.2 10E3/UL (ref 4–11)

## 2025-02-20 PROCEDURE — 87624 HPV HI-RISK TYP POOLED RSLT: CPT | Performed by: FAMILY MEDICINE

## 2025-02-20 PROCEDURE — 83970 ASSAY OF PARATHORMONE: CPT | Performed by: FAMILY MEDICINE

## 2025-02-20 PROCEDURE — 83540 ASSAY OF IRON: CPT | Performed by: FAMILY MEDICINE

## 2025-02-20 PROCEDURE — G0145 SCR C/V CYTO,THINLAYER,RESCR: HCPCS | Performed by: FAMILY MEDICINE

## 2025-02-20 PROCEDURE — 90746 HEPB VACCINE 3 DOSE ADULT IM: CPT | Performed by: FAMILY MEDICINE

## 2025-02-20 PROCEDURE — 82728 ASSAY OF FERRITIN: CPT | Performed by: FAMILY MEDICINE

## 2025-02-20 PROCEDURE — 82607 VITAMIN B-12: CPT | Performed by: FAMILY MEDICINE

## 2025-02-20 PROCEDURE — 82306 VITAMIN D 25 HYDROXY: CPT | Performed by: FAMILY MEDICINE

## 2025-02-20 PROCEDURE — 3075F SYST BP GE 130 - 139MM HG: CPT | Performed by: FAMILY MEDICINE

## 2025-02-20 PROCEDURE — 3079F DIAST BP 80-89 MM HG: CPT | Performed by: FAMILY MEDICINE

## 2025-02-20 PROCEDURE — 99000 SPECIMEN HANDLING OFFICE-LAB: CPT | Performed by: FAMILY MEDICINE

## 2025-02-20 PROCEDURE — 85027 COMPLETE CBC AUTOMATED: CPT | Performed by: FAMILY MEDICINE

## 2025-02-20 PROCEDURE — 80053 COMPREHEN METABOLIC PANEL: CPT | Performed by: FAMILY MEDICINE

## 2025-02-20 PROCEDURE — 99214 OFFICE O/P EST MOD 30 MIN: CPT | Mod: 25 | Performed by: FAMILY MEDICINE

## 2025-02-20 PROCEDURE — 82746 ASSAY OF FOLIC ACID SERUM: CPT | Performed by: FAMILY MEDICINE

## 2025-02-20 PROCEDURE — 83550 IRON BINDING TEST: CPT | Performed by: FAMILY MEDICINE

## 2025-02-20 PROCEDURE — 84425 ASSAY OF VITAMIN B-1: CPT | Mod: 90 | Performed by: FAMILY MEDICINE

## 2025-02-20 PROCEDURE — 80061 LIPID PANEL: CPT | Performed by: FAMILY MEDICINE

## 2025-02-20 PROCEDURE — 90677 PCV20 VACCINE IM: CPT | Performed by: FAMILY MEDICINE

## 2025-02-20 PROCEDURE — 99459 PELVIC EXAMINATION: CPT | Performed by: FAMILY MEDICINE

## 2025-02-20 PROCEDURE — 90471 IMMUNIZATION ADMIN: CPT | Performed by: FAMILY MEDICINE

## 2025-02-20 PROCEDURE — 90472 IMMUNIZATION ADMIN EACH ADD: CPT | Performed by: FAMILY MEDICINE

## 2025-02-20 PROCEDURE — 36415 COLL VENOUS BLD VENIPUNCTURE: CPT | Performed by: FAMILY MEDICINE

## 2025-02-20 PROCEDURE — 99396 PREV VISIT EST AGE 40-64: CPT | Mod: 25 | Performed by: FAMILY MEDICINE

## 2025-02-20 RX ORDER — TRAZODONE HYDROCHLORIDE 50 MG/1
25-50 TABLET ORAL AT BEDTIME
Qty: 30 TABLET | Refills: 0 | Status: SHIPPED | OUTPATIENT
Start: 2025-02-20

## 2025-02-20 NOTE — PROGRESS NOTES
Preventive Care Visit  [facility] RACHEL Morillo DO, Family Medicine  Feb 20, 2025        ICD-10-CM    1. Routine general medical examination at a health care facility  Z00.00       2. Insomnia, unspecified type  G47.00 traZODone (DESYREL) 50 MG tablet      3. S/P gastric bypass  Z98.84 Lipid panel reflex to direct LDL Fasting     Comprehensive metabolic panel (BMP + Alb, Alk Phos, ALT, AST, Total. Bili, TP)     Ferritin     CBC with platelets     Iron and iron binding capacity     Vitamin B12     Folate     Parathyroid Hormone Intact     Vitamin D Deficiency     Vitamin B1 whole blood     Vitamin B1 whole blood     Vitamin D Deficiency     Parathyroid Hormone Intact     Folate     Vitamin B12     Iron and iron binding capacity     CBC with platelets     Ferritin     Comprehensive metabolic panel (BMP + Alb, Alk Phos, ALT, AST, Total. Bili, TP)     Lipid panel reflex to direct LDL Fasting      4. Superior mesenteric artery thrombosis  K55.069       5. Cervical cancer screening  Z12.4 HPV and Gynecologic Cytology Panel - Recommended Age 30 - 65 Years     Gynecologic Cytology (PAP)        Insomnia:  trial of trazodone as ordered.  Sleep CBT appointment upcoming.  Reviewed side effects, michael for 8 hours in bed if taking.    S/p gastric bypass:  nutritional studies today, continue vitamins    H/o SMA thrombosis:  on asa, anticoagulated, on statin.  Working on tob cessation.      Screening reviewed and ordered as appropriate.  Vaccinations reviewed and up to date.  Diet and exercise discussed.      Kristen Angeles is a 49 year old, presenting for the following:  Physical        2/20/2025     8:28 AM   Additional Questions   Roomed by Korina EVANGELISTA   Accompanied by Self           HPI  Concerns:  Follow up insomnia   Has been taking the hydroxyzine only on weekends.  Has trouble falling asleep still but does not wake during the night.  Finds that she cannot take it when she needs to get up for work.  Does  have appointment coming up for sleep therapy but would like something different in the meantime       Was doing well with her smoking cessation until her mother passed away unexpectedly 2 weeks ago, now working her way back down.        Health Care Directive  Patient does not have a Health Care Directive: Discussed advance care planning with patient; however, patient declined at this time.      2/17/2025   General Health   How would you rate your overall physical health? Good   Feel stress (tense, anxious, or unable to sleep) Rather much   (!) STRESS CONCERN      2/17/2025   Nutrition   Three or more servings of calcium each day? (!) I DON'T KNOW   Diet: Regular (no restrictions)   How many servings of fruit and vegetables per day? (!) 2-3   How many sweetened beverages each day? 0-1         2/17/2025   Exercise   Days per week of moderate/strenous exercise 4 days   Average minutes spent exercising at this level 20 min         2/17/2025   Social Factors   Frequency of gathering with friends or relatives Once a week   Worry food won't last until get money to buy more No   Food not last or not have enough money for food? No   Do you have housing? (Housing is defined as stable permanent housing and does not include staying ouside in a car, in a tent, in an abandoned building, in an overnight shelter, or couch-surfing.) Yes   Are you worried about losing your housing? No   Lack of transportation? No   Unable to get utilities (heat,electricity)? No         2/17/2025   Dental   Dentist two times every year? Yes              Today's PHQ-2 Score:       1/5/2025     2:09 PM   PHQ-2 ( 1999 [employer])   Q1: Little interest or pleasure in doing things 0   Q2: Feeling down, depressed or hopeless 0   PHQ-2 Score 0    Q1: Little interest or pleasure in doing things Not at all   Q2: Feeling down, depressed or hopeless Not at all   PHQ-2 Score 0       Patient-reported         2/17/2025   Substance Use   Alcohol more than 3/day or more  than 7/wk No   Do you use any other substances recreationally? No     Social History     Tobacco Use    Smoking status: Every Day     Current packs/day: 0.00     Average packs/day: 0.5 packs/day for 10.0 years (5.0 ttl pk-yrs)     Types: Cigarettes     Last attempt to quit: 2/15/2023     Years since quittin.0     Passive exposure: Never    Smokeless tobacco: Never   Vaping Use    Vaping status: Never Used   Substance Use Topics    Alcohol use: Not Currently     Comment: rarely- quit with pregnancy    Drug use: No           5/10/2024   LAST FHS-7 RESULTS   1st degree relative breast or ovarian cancer No   Any relative bilateral breast cancer No   Any male have breast cancer No   Any ONE woman have BOTH breast AND ovarian cancer No   Any woman with breast cancer before 50yrs No   2 or more relatives with breast AND/OR ovarian cancer No   2 or more relatives with breast AND/OR bowel cancer No        Mammogram Screening - Mammogram every 1-2 years updated in Health Maintenance based on mutual decision making        2025   STI Screening   New sexual partner(s) since last STI/HIV test? No     History of abnormal Pap smear: YES - reflected in Problem List and Health Maintenance accordingly        Latest Ref Rng & Units 2025     9:20 AM 2024     9:25 AM 2023    10:01 AM   PAP / HPV   PAP  Negative for Intraepithelial Lesion or Malignancy (NILM)  Negative for Intraepithelial Lesion or Malignancy (NILM)  Negative for Intraepithelial Lesion or Malignancy (NILM)    HPV 16 DNA Negative Positive  Positive  Positive    HPV 18 DNA Negative Negative  Negative  Negative    Other HR HPV Negative Negative  Negative  Negative      ASCVD Risk   The 10-year ASCVD risk score (Esmer HINES, et al., 2019) is: 1.3%    Values used to calculate the score:      Age: 49 years      Sex: Female      Is Non- : No      Diabetic: No      Tobacco smoker: Yes      Systolic Blood Pressure: 138 mmHg       Is BP treated: No      HDL Cholesterol: 70 mg/dL      Total Cholesterol: 131 mg/dL       Reviewed and updated as needed this visit by Provider   Tobacco  Allergies  Meds                Past Medical History:   Diagnosis Date    Cervical high risk HPV (human papillomavirus) test positive 2016    type 16    Chickenpox     Chlamydia trachomatis infection of unspecified site     POONAM 1     Depressive disorder     Depressive disorder, not elsewhere classified     History of blood transfusion Sep 2022    Surgery    History of colposcopy with cervical biopsy 2016    Bx - POONAM 1, ECC - negative    Other abnormal heart sounds as child    Murmur - resolved    Polycystic ovaries     prior to gastic bypass in , pt carried the diagnosis of PCOS/anovuolation    Streptococcus infection in conditions classified elsewhere and of unspecified site, group B 1998    In pregnancy     Past Surgical History:   Procedure Laterality Date     SECTION  1998. Failure to progress     SECTION, TUBAL LIGATION, COMBINED  2013    Procedure: COMBINED  SECTION, TUBAL LIGATION;   Section, Tubal Ligation;  Surgeon: Paul Allen MD;  Location: [facility]    COLONOSCOPY N/A 2024    Procedure: COLONOSCOPY, WITH POLYPECTOMY AND BIOPSY;  Surgeon: Hamzah Mcallister DO;  Location: [facility]    DAVINCI HERNIORRHAPHY VENTRAL N/A 2023    Procedure: HERNIORRHAPHY, VENTRAL, ROBOT-ASSISTED WITH MESH;  Surgeon: Herminio Becerra MD;  Location: [facility] OR    GASTRIC BYPASS  2005    GI SURGERY  Sep 2022    GYN SURGERY      HC REMOVAL GALLBLADDER  2006    Dr. Inman @ [facility]    HC REPAIR ING HERNIA,5+Y/O,REDUCIBL  age 20 ()    [facility]    LAPAROTOMY EXPLORATORY N/A 2022    Procedure: Exploratory LAPAROTOMY, Superior Mesenteric Artery Thrombectomy, Retrograde Superior Mesenteric Artery Stenting;  Surgeon: Balta Ernst MD;  Location: [facility] OR    LAPAROTOMY  "EXPLORATORY N/A 09/10/2022    Procedure: LAPAROTOMY, SMALL BOWEL RESECTION, INCISIONAL WOUND CLOSURE;  Surgeon: Tariq Lancaster MD;  Location: [facility] OR    LAPAROTOMY EXPLORATORY N/A 09/09/2022    Procedure: EXPLORATORY LAPAROTOMY, EVACUATION OF RECTUS SHEATH HEMATOMA, ABDOMINAL WASHOUT;  Surgeon: Nathan Barber MD;  Location: [facility] OR    PICC DOUBLE LUMEN PLACEMENT Right 09/10/2022    5FR DL PICC. Basilic vein.    SURGICAL HISTORY OF -   1982    PE tubes    SURGICAL HISTORY OF -   10/2006    removal of pannus ans breast lift    TONSILLECTOMY  1981    VASCULAR SURGERY  Sep 2022    ZZ NONSPECIFIC PROCEDURE  03/09/2006    Laser ablation genital condyloma/ Bx labial lesion    Z RAD RESEC TONSIL/PILLARS           Review of Systems  Constitutional, HEENT, cardiovascular, pulmonary, gi and gu systems are negative, except as otherwise noted.     Objective    Exam  /80   Pulse 76   Temp 97.5  F (36.4  C) (Tympanic)   Resp 16   Ht 1.613 m (5' 3.5\")   Wt 72.3 kg (159 lb 6.4 oz)   SpO2 99%   BMI 27.79 kg/m     Estimated body mass index is 27.79 kg/m  as calculated from the following:    Height as of this encounter: 1.613 m (5' 3.5\").    Weight as of this encounter: 72.3 kg (159 lb 6.4 oz).    Physical Exam  GENERAL: alert and no distress  EYES: Eyes grossly normal to inspection, PERRL and conjunctivae and sclerae normal  NECK: no adenopathy, no asymmetry, masses, or scars  RESP: lungs clear to auscultation - no rales, rhonchi or wheezes  CV: regular rate and rhythm, normal S1 S2, no S3 or S4, no murmur, click or rub, no peripheral edema  ABDOMEN: soft, nontender, no hepatosplenomegaly, no masses and bowel sounds normal   (female) w/bimanual: normal female external genitalia, normal urethral meatus, normal vaginal mucosa, and normal cervix/adnexa/uterus without masses or discharge  IUD strings visualized  MS: no gross musculoskeletal defects noted, no edema  NEURO: Normal strength and tone, mentation " intact and speech normal  PSYCH: mentation appears normal, affect normal/bright        Signed Electronically by: Cecy Morillo DO

## 2025-02-20 NOTE — PATIENT INSTRUCTIONS
Patient Education   Preventive Care Advice   This is general advice given by our system to help you stay healthy. However, your care team may have specific advice just for you. Please talk to your care team about your preventive care needs.  Nutrition  Eat 5 or more servings of fruits and vegetables each day.  Try wheat bread, brown rice and whole grain pasta (instead of white bread, rice, and pasta).  Get enough calcium and vitamin D. Check the label on foods and aim for 100% of the RDA (recommended daily allowance).  Lifestyle  Exercise at least 150 minutes each week  (30 minutes a day, 5 days a week).  Do muscle strengthening activities 2 days a week. These help control your weight and prevent disease.  No smoking.  Wear sunscreen to prevent skin cancer.  Have a dental exam and cleaning every 6 months.  Yearly exams  See your health care team every year to talk about:  Any changes in your health.  Any medicines your care team has prescribed.  Preventive care, family planning, and ways to prevent chronic diseases.  Shots (vaccines)   HPV shots (up to age 26), if you've never had them before.  Hepatitis B shots (up to age 59), if you've never had them before.  COVID-19 shot: Get this shot when it's due.  Flu shot: Get a flu shot every year.  Tetanus shot: Get a tetanus shot every 10 years.  Pneumococcal, hepatitis A, and RSV shots: Ask your care team if you need these based on your risk.  Shingles shot (for age 50 and up)  General health tests  Diabetes screening:  Starting at age 35, Get screened for diabetes at least every 3 years.  If you are younger than age 35, ask your care team if you should be screened for diabetes.  Cholesterol test: At age 39, start having a cholesterol test every 5 years, or more often if advised.  Bone density scan (DEXA): At age 50, ask your care team if you should have this scan for osteoporosis (brittle bones).  Hepatitis C: Get tested at least once in your life.  STIs (sexually  transmitted infections)  Before age 24: Ask your care team if you should be screened for STIs.  After age 24: Get screened for STIs if you're at risk. You are at risk for STIs (including HIV) if:  You are sexually active with more than one person.  You don't use condoms every time.  You or a partner was diagnosed with a sexually transmitted infection.  If you are at risk for HIV, ask about PrEP medicine to prevent HIV.  Get tested for HIV at least once in your life, whether you are at risk for HIV or not.  Cancer screening tests  Cervical cancer screening: If you have a cervix, begin getting regular cervical cancer screening tests starting at age 21.  Breast cancer scan (mammogram): If you've ever had breasts, begin having regular mammograms starting at age 40. This is a scan to check for breast cancer.  Colon cancer screening: It is important to start screening for colon cancer at age 45.  Have a colonoscopy test every 10 years (or more often if you're at risk) Or, ask your provider about stool tests like a FIT test every year or Cologuard test every 3 years.  To learn more about your testing options, visit:   .  For help making a decision, visit:   https://bit.ly/kc70075.  Prostate cancer screening test: If you have a prostate, ask your care team if a prostate cancer screening test (PSA) at age 55 is right for you.  Lung cancer screening: If you are a current or former smoker ages 50 to 80, ask your care team if ongoing lung cancer screenings are right for you.  For informational purposes only. Not to replace the advice of your health care provider. Copyright   2023 [facility]. All rights reserved. Clinically reviewed by the [facility] Transitions Program. [employer] [id number] - REV 01/24.  Learning About Stress  What is stress?     Stress is your body's response to a hard situation. Your body can have a physical, emotional, or mental response. Stress is a fact of life for most people, and it  affects everyone differently. What causes stress for you may not be stressful for someone else.  A lot of things can cause stress. You may feel stress when you go on a job interview, take a test, or run a race. This kind of short-term stress is normal and even useful. It can help you if you need to work hard or react quickly. For example, stress can help you finish an important job on time.  Long-term stress is caused by ongoing stressful situations or events. Examples of long-term stress include long-term health problems, ongoing problems at work, or conflicts in your family. Long-term stress can harm your health.  How does stress affect your health?  When you are stressed, your body responds as though you are in danger. It makes hormones that speed up your heart, make you breathe faster, and give you a burst of energy. This is called the fight-or-flight stress response. If the stress is over quickly, your body goes back to normal and no harm is done.  But if stress happens too often or lasts too long, it can have bad effects. Long-term stress can make you more likely to get sick, and it can make symptoms of some diseases worse. If you tense up when you are stressed, you may develop neck, shoulder, or low back pain. Stress is linked to high blood pressure and heart disease.  Stress also harms your emotional health. It can make you charles, tense, or depressed. Your relationships may suffer, and you may not do well at work or school.  What can you do to manage stress?  You can try these things to help manage stress:   Do something active. Exercise or activity can help reduce stress. Walking is a great way to get started. Even everyday activities such as housecleaning or yard work can help.  Try yoga or ayaz chi. These techniques combine exercise and meditation. You may need some training at first to learn them.  Do something you enjoy. For example, listen to music or go to a movie. Practice your hobby or do volunteer  "work.  Meditate. This can help you relax, because you are not worrying about what happened before or what may happen in the future.  Do guided imagery. Imagine yourself in any setting that helps you feel calm. You can use online videos, books, or a teacher to guide you.  Do breathing exercises. For example:  From a standing position, bend forward from the waist with your knees slightly bent. Let your arms dangle close to the floor.  Breathe in slowly and deeply as you return to a standing position. Roll up slowly and lift your head last.  Hold your breath for just a few seconds in the standing position.  Breathe out slowly and bend forward from the waist.  Let your feelings out. Talk, laugh, cry, and express anger when you need to. Talking with supportive friends or family, a counselor, or a moni leader about your feelings is a healthy way to relieve stress. Avoid discussing your feelings with people who make you feel worse.  Write. It may help to write about things that are bothering you. This helps you find out how much stress you feel and what is causing it. When you know this, you can find better ways to cope.  What can you do to prevent stress?  You might try some of these things to help prevent stress:  Manage your time. This helps you find time to do the things you want and need to do.  Get enough sleep. Your body recovers from the stresses of the day while you are sleeping.  Get support. Your family, friends, and community can make a difference in how you experience stress.  Limit your news feed. Avoid or limit time on social media or news that may make you feel stressed.  Do something active. Exercise or activity can help reduce stress. Walking is a great way to get started.  Where can you learn more?  Go to https://www.[employer].net/patiented  Enter N032 in the search box to learn more about \"Learning About Stress.\"  Current as of: October 24, 2023  Content Version: 14.3    2024 [employer]. "   Care instructions adapted under license by your healthcare professional. If you have questions about a medical condition or this instruction, always ask your healthcare professional. [employer] disclaims any warranty or liability for your use of this information.

## 2025-02-20 NOTE — TELEPHONE ENCOUNTER
Left Voicemail (1st Attempt) and Sent Mychart (1st Attempt) for the patient to call back and schedule the followin Momth Aortic thrombus follow-up     [facility]  Provider: Willow Bradshaw CNP  Appointment type: Return Vascular Patient  Appointment mode: In Person  Return date: 2025    Specialty phone number: [phone] or [phone]    Referred to [facility]: No    Is Imaging Needed: Yes, US    Additional Notes: Please schedule imaging prior to Provider visit.  -Rolando Luque on 2025 at 1:08 PM

## 2025-02-23 LAB — VIT B1 PYROPHOSHATE BLD-SCNC: 161 NMOL/L

## 2025-02-24 LAB
HPV HR 12 DNA CVX QL NAA+PROBE: NEGATIVE
HPV16 DNA CVX QL NAA+PROBE: POSITIVE
HPV18 DNA CVX QL NAA+PROBE: NEGATIVE
HUMAN PAPILLOMA VIRUS FINAL DIAGNOSIS: ABNORMAL

## 2025-02-26 LAB
BKR AP ASSOCIATED HPV REPORT: NORMAL
BKR LAB AP GYN ADEQUACY: NORMAL
BKR LAB AP GYN INTERPRETATION: NORMAL
BKR LAB AP PREVIOUS ABNL DX: NORMAL
BKR LAB AP PREVIOUS ABNORMAL: NORMAL
PATH REPORT.COMMENTS IMP SPEC: NORMAL
PATH REPORT.COMMENTS IMP SPEC: NORMAL
PATH REPORT.RELEVANT HX SPEC: NORMAL

## 2025-02-27 ENCOUNTER — PATIENT OUTREACH (OUTPATIENT)
Dept: FAMILY MEDICINE | Facility: CLINIC | Age: 50
End: 2025-02-27
Payer: COMMERCIAL

## 2025-03-27 ENCOUNTER — OFFICE VISIT (OUTPATIENT)
Dept: OBGYN | Facility: CLINIC | Age: 50
End: 2025-03-27
Payer: COMMERCIAL

## 2025-03-27 VITALS
HEIGHT: 64 IN | SYSTOLIC BLOOD PRESSURE: 112 MMHG | BODY MASS INDEX: 27.14 KG/M2 | RESPIRATION RATE: 18 BRPM | DIASTOLIC BLOOD PRESSURE: 76 MMHG | TEMPERATURE: 97 F | WEIGHT: 159 LBS | HEART RATE: 81 BPM

## 2025-03-27 DIAGNOSIS — B97.7 HIGH RISK HPV INFECTION: Primary | ICD-10-CM

## 2025-03-27 NOTE — PROGRESS NOTES
[facility] Colpscopy Procedure Note    Bridgette Veras  1975  [id number]    Pap Hx:   8/4/16 NIL Pap, +HR HPV 16. Plan colp  9/20/16 [place] Bx POONAM 1, ECC Negative. Plan cotest in 1 year.   9/25/17 NIL Pap, +HR HPV 16. Plan colp  11/8/18 Lost to follow-up for pap tracking  2/7/22 NIL pap, +HR HPV 16. Plan: colposcopy due before 5/7/22  3/30/22 Colpo ECC no endocervical tissue identified, small squamous atypia. Plan: cotest due 2/7/23 per provider  2/21/23 NIL pap, +HR HPV 16. Plan: colposcopy due before 5/21/23 5/1/23 Colpo unsatisfactory due to poor visualization. Bx and ECC neg. Plan: cotest in 1 year  2/5/24 NIL pap, +HR HPV 16 with EM cells present. Plan: colp with possible EMB with OBGYN due before 5/5/24  3/14/24 Colpo bx neg, ECC neg but no endocervical tissue identified, EMB neg. Plan: cotest in 1 year  2/20/25 NIL pap, + HR HPV 16.     The patient was counseled on the risks (including risk of pain and bleeding), benefits (diagnosis of lesion severity and depth in invasion). Verbal and written consent were obtained.  A UPT was negative prior to the procedure.    Technique: The patient was placed in the dorsal lithotomy position.  A medium Grave's speculum was placed in the vagina and the cervix with IUD strings visualized. Acetic acid was applied to the upper vagina and cervix and then visualized using the colposcope. No acetowhite staining was noted.  Lugol's solution was also placed on the cervix with no decreased up take noted. This patient's cervix was very tilted towards her left side, which required placing Allis clamp to fully visualize the whole cervix. The squamocolumnar junction and transformation zone were fully visualized and colposcopy was satisfactory. Colposcopic impression: normal colposcopy.   Biopsies were taken at 6, 11, 2 o'clock. Endocervical curettage was performed. Silver nitrate used to assure excellent hemostasis.  The patient tolerated the procedure well.  She was given  post op instructions which included activity and pelvic restrictions.      A/P: s/p colposcopy procedure per ASCCP guidelines.   Will MyChart to discuss results with patient.     Lynne Batista MD  Obstetrics and Gynecology  [facility]   03/27/2025

## 2025-03-27 NOTE — NURSING NOTE
"Initial /76 (BP Location: Left arm, Patient Position: Chair, Cuff Size: Adult Regular)   Pulse 81   Temp 97  F (36.1  C) (Tympanic)   Resp 18   Ht 1.613 m (5' 3.5\")   Wt 72.1 kg (159 lb)   BMI 27.72 kg/m   Estimated body mass index is 27.72 kg/m  as calculated from the following:    Height as of this encounter: 1.613 m (5' 3.5\").    Weight as of this encounter: 72.1 kg (159 lb). .    "

## 2025-04-07 DIAGNOSIS — F17.200 NICOTINE DEPENDENCE, UNCOMPLICATED, UNSPECIFIED NICOTINE PRODUCT TYPE: ICD-10-CM

## 2025-04-08 RX ORDER — VARENICLINE TARTRATE 1 MG/1
1 TABLET, FILM COATED ORAL 2 TIMES DAILY
Qty: 60 TABLET | Refills: 1 | Status: SHIPPED | OUTPATIENT
Start: 2025-04-08

## 2025-04-10 ENCOUNTER — PATIENT OUTREACH (OUTPATIENT)
Dept: CARE COORDINATION | Facility: CLINIC | Age: 50
End: 2025-04-10
Payer: COMMERCIAL

## 2025-04-23 ENCOUNTER — ANCILLARY PROCEDURE (OUTPATIENT)
Dept: ULTRASOUND IMAGING | Facility: CLINIC | Age: 50
End: 2025-04-23
Attending: NURSE PRACTITIONER
Payer: COMMERCIAL

## 2025-04-23 ENCOUNTER — OFFICE VISIT (OUTPATIENT)
Dept: VASCULAR SURGERY | Facility: CLINIC | Age: 50
End: 2025-04-23
Payer: COMMERCIAL

## 2025-04-23 VITALS
OXYGEN SATURATION: 97 % | HEART RATE: 81 BPM | DIASTOLIC BLOOD PRESSURE: 79 MMHG | BODY MASS INDEX: 28.35 KG/M2 | SYSTOLIC BLOOD PRESSURE: 123 MMHG | WEIGHT: 162.6 LBS

## 2025-04-23 DIAGNOSIS — K55.069 SUPERIOR MESENTERIC ARTERY THROMBOSIS: Primary | ICD-10-CM

## 2025-04-23 DIAGNOSIS — Z79.01 CURRENT USE OF LONG TERM ANTICOAGULATION: ICD-10-CM

## 2025-04-23 DIAGNOSIS — K55.069 SUPERIOR MESENTERIC ARTERY THROMBOSIS: ICD-10-CM

## 2025-04-23 DIAGNOSIS — Z95.9 STATUS POST ARTERIAL STENT: ICD-10-CM

## 2025-04-23 DIAGNOSIS — Z98.84 S/P GASTRIC BYPASS: ICD-10-CM

## 2025-04-23 DIAGNOSIS — F17.200 CURRENT SMOKER: ICD-10-CM

## 2025-04-23 DIAGNOSIS — F32.89 OTHER DEPRESSION: ICD-10-CM

## 2025-04-23 DIAGNOSIS — K43.9 VENTRAL HERNIA WITHOUT OBSTRUCTION OR GANGRENE: ICD-10-CM

## 2025-04-23 DIAGNOSIS — R01.1 HEART MURMUR: ICD-10-CM

## 2025-04-23 PROCEDURE — 93975 VASCULAR STUDY: CPT | Performed by: RADIOLOGY

## 2025-04-23 ASSESSMENT — PAIN SCALES - GENERAL: PAINLEVEL_OUTOF10: NO PAIN (0)

## 2025-04-23 NOTE — PROGRESS NOTES
VASCULAR SURGERY PROGRESS NOTE    LOCATION: [facility]     Bridgette Veras  Medical Record #:  [id number]  YOB: 1975  Age:  49 year old     Date of Service: 4/23/2025    PRIMARY CARE PROVIDER: Cecy Morillo    Reason for visit:  Follow up SMA thrombus s/p stent 09/22     Impression/Shared Medical Decision Making:     SMA thrombosis s/p stent  Depression  Heart murmur  Current smoker  Ventral hernia without obstruction or gangrene  S/p gastric bypass  Ex lap and ileal resection 2022  Current use of long-term anticoagulation    Bridgette Veras is a pleasant 49-year-old patient who is s/p SMA thrombectomy with covered stent placement, ex lap and ileal resection in September, 2022. Mesenteric ultrasound redemonstrates elevated velocities within the stent and distal to the stent measuring at 504 cm/s, this is significantly, higher compared to last year, which was measured at 330 cm/s.  Remains completely asymptomatic, unfortunately continues to smoke.    Recommendations/Plan:     Will review the mesenteric duplex with vascular surgeon, Dr. Woody, and determine if she needs further interventions. I will reach out to patient if intervention is warranted.   Continue with statin, aspirin (antiplatelet therapy), Eliquis. Patient has upcoming hernia surgery on July 10, recommend bridging with enoxaparin. Please connect with hematology ort PCP for bridging or further recommendation, do not stop anticoagulation or antiplatelet therapy.  Smoking cessation, we discussed that she is at high risk for stent thrombosis, smoking predisposes patient to clot formation. Verbalized clear understanding on the above, she remembers the SMA thrombosis event from September 2022 and would like to avoid recurrence. Noted that she is committed to smoking cessation.  Follow-up in 6 months with a repeat mesenteric ultrasound and clinic visit    During this visit we also discussed that mesenteric ischemia  is a life threatening condition. If she develops sudden, severe abdominal pain, nausea, vomiting, abdominal distention, bloody stools, diarrhea, postprandial pain, food fear - she was directed to the emergency department. Information/Education: patient able to teach back. Patient agreeable to plan of care: yes.     All questions were answered and support provided. She has our contact information and knows to reach out with any additional questions or concerns.     It was a pleasure seeing Ms. Veras in clinic today.    Willow Bradshaw CNP  Vascular Surgery  Pager: [phone]  [email or web address]  Send message or 10 digit call back number Securely via [employer] with the [employer] Web Console (learn more here)    35 minutes spent on the date of the encounter doing chart review, review of outside records, review of test results, interpretation of tests, patient visit, documentation and discussion with other provider(s).      HPI:  Bridgette Veras is a 49 year old female who on 9/8/2022 presented with diffuse abdominal pain and imaging findings of aortic thrombus extending into the celiac artery and SMA. She underwent SMA thrombectomy, stenting and subsequent bowel resection. Now longitudinally followed by vascular surgery with annual surveillance. Unfortunately she continues to smoke and the velocities distal to stent continue to rise, we discussed during this clinic the importance of smoking cessation and she verbalized clear understanding.  Without abdominal pain, without postprandial pain, no food fear, denies nausea, vomiting. No pain elicited with deep palpation. Compliant with Eliquis, aspirin, statin, normotensive. She has a hernia for which is planning surgery on July 10, recommended that she is bridged with enoxaparin if needed, she knows not to stop antiplatelet therapy or anticoagulation unless directed by hematology. From a vascular surgery perspective, she will require lifelong aspirin, Eliquis and  statin given stent in SMA. No other concerns.    REVIEW OF SYSTEMS:    A 12 point ROS was reviewed and is negative except for what is listed above in HPI.    PHH:    Past Medical History:   Diagnosis Date    Cervical high risk HPV (human papillomavirus) test positive 2016    type 16    Chickenpox     Chlamydia trachomatis infection of unspecified site     POONAM 1     Depressive disorder     Depressive disorder, not elsewhere classified     History of blood transfusion Sep 2022    Surgery    History of colposcopy with cervical biopsy 2016    Bx - POONAM 1, ECC - negative    Other abnormal heart sounds as child    Murmur - resolved    Polycystic ovaries     prior to gastic bypass in , pt carried the diagnosis of PCOS/anovuolation    Streptococcus infection in conditions classified elsewhere and of unspecified site, group B 1998    In pregnancy          Past Surgical History:   Procedure Laterality Date     SECTION  1998. Failure to progress     SECTION, TUBAL LIGATION, COMBINED  2013    Procedure: COMBINED  SECTION, TUBAL LIGATION;   Section, Tubal Ligation;  Surgeon: Paul Allen MD;  Location: [facility]    COLONOSCOPY N/A 2024    Procedure: COLONOSCOPY, WITH POLYPECTOMY AND BIOPSY;  Surgeon: Hamzah Mcallister DO;  Location: [facility]    DAVINCI HERNIORRHAPHY VENTRAL N/A 2023    Procedure: HERNIORRHAPHY, VENTRAL, ROBOT-ASSISTED WITH MESH;  Surgeon: Herminio Becerra MD;  Location: [facility] OR    GASTRIC BYPASS  2005    GI SURGERY  Sep 2022    GYN SURGERY      HC REMOVAL GALLBLADDER  2006    Dr. Inman @ [facility]    HC REPAIR ING HERNIA,5+Y/O,REDUCIBL  age 20 ()    [facility]    LAPAROTOMY EXPLORATORY N/A 2022    Procedure: Exploratory LAPAROTOMY, Superior Mesenteric Artery Thrombectomy, Retrograde Superior Mesenteric Artery Stenting;  Surgeon: Balta Ernst MD;  Location: [facility] OR    LAPAROTOMY EXPLORATORY N/A  09/10/2022    Procedure: LAPAROTOMY, SMALL BOWEL RESECTION, INCISIONAL WOUND CLOSURE;  Surgeon: Tariq Lancaster MD;  Location: [facility] OR    LAPAROTOMY EXPLORATORY N/A 09/09/2022    Procedure: EXPLORATORY LAPAROTOMY, EVACUATION OF RECTUS SHEATH HEMATOMA, ABDOMINAL WASHOUT;  Surgeon: Nathan Barber MD;  Location: [facility] OR    PICC DOUBLE LUMEN PLACEMENT Right 09/10/2022    5FR DL PICC. Basilic vein.    SURGICAL HISTORY OF -   1982    PE tubes    SURGICAL HISTORY OF -   10/2006    removal of pannus ans breast lift    TONSILLECTOMY  1981    VASCULAR SURGERY  Sep 2022    [facility] NONSPECIFIC PROCEDURE  03/09/2006    Laser ablation genital condyloma/ Bx labial lesion    [facility] RAD RESEC TONSIL/PILLARS         ALLERGIES:  Codeine, Darvocet [acetaminophen], and Propoxyphene    MEDS:    Current Outpatient Medications:     apixaban ANTICOAGULANT (ELIQUIS) 5 MG tablet, Take 1 tablet (5 mg) by mouth 2 times daily., Disp: 180 tablet, Rfl: 1    aspirin 81 MG EC tablet, Take 1 tablet (81 mg) by mouth daily., Disp: 90 tablet, Rfl: 3    atorvastatin (LIPITOR) 20 MG tablet, Take 1 tablet (20 mg) by mouth daily., Disp: 90 tablet, Rfl: 0    cyanocolbalamin (VITAMIN  B-12) 500 MCG tablet, Take 500 mcg by mouth daily , Disp: , Rfl:     levonorgestrel (MIRENA) 52 MG (20 mcg/day) IUD, 1 each by Intrauterine route once, Disp: , Rfl:     Multiple Vitamin (MULTIVITAMIN PO), Take 1 tablet by mouth daily, Disp: , Rfl:     traZODone (DESYREL) 50 MG tablet, TAKE 1/2 TO 1 TABLET(25 TO 50 MG) BY MOUTH AT BEDTIME, Disp: 90 tablet, Rfl: 1    varenicline (CHANTIX) 1 MG tablet, TAKE 1 TABLET(1 MG) BY MOUTH TWICE DAILY, Disp: 60 tablet, Rfl: 1    vitamin D3 (CHOLECALCIFEROL) 50 mcg (2000 units) tablet, Take 1 tablet by mouth daily, Disp: , Rfl:     SOCIAL HABITS:    History   Smoking Status    Every Day    Types: Cigarettes   Smokeless Tobacco    Never     Social History    Substance and Sexual Activity      Alcohol use: Not Currently        Comment:  rarely- quit with pregnancy      History   Drug Use No       FAMILY HISTORY:    Family History   Problem Relation Age of Onset    Hypertension Mother     Depression Mother     Asthma Father     Gastrointestinal Disease Father         PSC    Hypertension Brother     Depression Brother     Cancer Maternal Grandmother         Lung    Depression Maternal Grandmother     Alcohol/Drug Maternal Grandfather         alcohol    Cardiovascular Maternal Grandfather         MI    Respiratory Paternal Grandmother     Cerebrovascular Disease Paternal Grandfather     Diabetes Other     C.A.D. No family hx of     Breast Cancer No family hx of     Cancer - colorectal No family hx of     Anesthesia Reaction No family hx of     Deep Vein Thrombosis (DVT) No family hx of        PE:  /79 (BP Location: Left arm, Patient Position: Sitting, Cuff Size: Adult Regular)   Pulse 81   Wt 162 lb 9.6 oz   SpO2 97%   BMI 28.35 kg/m    Wt Readings from Last 1 Encounters:   04/23/25 162 lb 9.6 oz     Body mass index is 28.35 kg/m .    EXAM:  General: No apparent distress. Answers questions appropriately.   Neurologic: Focally intact, Alert and oriented x 3.   Eyes: Grossly normal.   Cardiac:  RRR  Pulmonary: Non labored breathing with normal respiratory effort  Abdominal: Soft, non distended, non tender to deep palpation. No pulsatile mass.   Musculoskeletal/Extremity: Palpable 1+ pulses bilaterally, 5/5 gross bilateral upper and lower extremity strength.  No edema.     DIAGNOSTIC STUDIES:     Images:  US Mesenteric Arteries Complete    Result Date: 4/23/2025  ULTRASOUND MESENTERIC ARTERIES COMPLETE 4/23/2025 8:50 AM CLINICAL HISTORY: Superior mesenteric artery thrombosis. Status post arterial stent. Exploratory laparotomy, superior mesenteric artery thrombectomy, retrograde superior mesenteric artery stenting 9/8/2022. Washout 9/9/2022. Small bowel resection 9/10/2022. COMPARISONS: CTA abdomen pelvis 10/30/2024. Ultrasound mesenteric  arteries complete 10/22/2024. REFERRING PROVIDER: JAMESON CROWELL TECHNIQUE: Aorta and mesenteric arteries evaluated with grayscale, color Doppler, and spectral pulsed wave Doppler ultrasound. FINDINGS: Aorta, supraceliac: 117/22 cm/s, monophasic Celiac artery: Origin: Occluded, unchanged Hepatic artery, proximal: 48/24 cm/s, monophasic, retrograde Splenic artery, proximal: 72/25 cm/s, monophasic, antegrade Superior mesenteric artery: Stent, proximal, superior mesenteric artery origin: 174/48 cm/s, monophasic Stent, mid, proximal superior mesenteric artery: 247/84 cm/s, monophasic Stent, distal, proximal superior mesenteric artery: 256/89 cm/s, monophasic (previously 308/104 cm/s), 2.19 velocity ratio from aorta Mid, distal to stent: 504/132 cm/s, monophasic, 1.97 velocity ratio Mid: 229/54 cm/s, monophasic Distal: 60/0 cm/s, multiphasic Aorta, infrarenal, proximal: 63/0 cm/s, multiphasic Inferior mesenteric artery, origin: 129/14 cm/s, multiphasic Aorta, infrarenal, distal: 105/0 cm/s, multiphasic     IMPRESSION: 1. Superior mesenteric artery stent patent. 2. Superior mesenteric artery elevated velocity distal to the stent to 504 cm/s, may be due to the turning of the artery at the site. Velocity ratio less than 2 across the distal edge of the stent. If clinically indicated, further evaluation with CTA could be performed. 3. Celiac artery origin occlusion unchanged. Expected retrograde flow in the hepatic artery to provide flow to the splenic artery.     LABS:      Sodium   Date Value Ref Range Status   02/20/2025 139 135 - 145 mmol/L Final   04/09/2024 139 135 - 145 mmol/L Final     Comment:     Reference intervals for this test were updated on 09/26/2023 to more accurately reflect our healthy population. There may be differences in the flagging of prior results with similar values performed with this method. Interpretation of those prior results can be made in the context of the updated reference intervals.     02/05/2024 139 135 - 145 mmol/L Final     Comment:     Reference intervals for this test were updated on 09/26/2023 to more accurately reflect our healthy population. There may be differences in the flagging of prior results with similar values performed with this method. Interpretation of those prior results can be made in the context of the updated reference intervals.    08/04/2016 139 133 - 144 mmol/L Final   03/07/2011 135 133 - 144 mmol/L Final   11/02/2010 138 133 - 144 mmol/L Final     Urea Nitrogen   Date Value Ref Range Status   02/20/2025 10.1 6.0 - 20.0 mg/dL Final   04/09/2024 8.3 6.0 - 20.0 mg/dL Final   02/05/2024 13.4 6.0 - 20.0 mg/dL Final   02/07/2022 10 7 - 30 mg/dL Final   08/04/2016 9 7 - 30 mg/dL Final   03/07/2011 11 5 - 24 mg/dL Final   11/02/2010 7 5 - 24 mg/dL Final     Hemoglobin   Date Value Ref Range Status   02/20/2025 15.6 11.7 - 15.7 g/dL Final   04/09/2024 14.2 11.7 - 15.7 g/dL Final   02/05/2024 13.1 11.7 - 15.7 g/dL Final   09/20/2016 11.1 (L) 11.7 - 15.7 g/dL Final   08/04/2016 9.6 (L) 11.7 - 15.7 g/dL Final   09/17/2013 13.0 11.7 - 15.7 g/dL Final     Platelet Count   Date Value Ref Range Status   02/20/2025 293 150 - 450 10e3/uL Final   04/09/2024 324 150 - 450 10e3/uL Final   02/05/2024 365 150 - 450 10e3/uL Final   08/04/2016 327 150 - 450 10e9/L Final   01/04/2013 290 150 - 450 10e9/L Final   03/07/2011 276 150 - 450 10e9/L Final     INR   Date Value Ref Range Status   09/12/2022 1.13 0.85 - 1.15 Final   09/11/2022 1.25 (H) 0.85 - 1.15 Final   09/09/2022 1.70 (H) 0.85 - 1.15 Final   03/29/2005 1.03 0.86 - 1.14 Final

## 2025-04-23 NOTE — NURSING NOTE
Chief Complaint   Patient presents with    Follow Up     4/23/2025 visit with Willow Bradshaw APRN CNP for RETURN VASCULAR PATIENT - 6 Month Aortic thrombus follow-up       Vitals were taken and medications reconciled.    Marvin Vanegas, Visit Facilitator  9:37 AM

## 2025-04-23 NOTE — LETTER
4/23/2025       RE: Bridgette Veras  [address]  [place] [address]     Dear Colleague,    Thank you for referring your patient, Bridgette Veras, to the [facility] [place] at [facility]. Please see a copy of my visit note below.        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 Mesenteric ultrasound redemonstrates elevated velocities within the stent and distal to the stent measuring at 504 cm/s, this is significantly, higher compared to last year, which was measured at 330 cm/s.  Remains completely asymptomatic, unfortunately continues to smoke.    Recommendations/Plan:     Will review the mesenteric duplex with vascular surgeon, Dr. Woody, and determine if she needs further interventions. I will reach out to patient if intervention is warranted.   Continue with statin, aspirin (antiplatelet therapy), Eliquis. Patient has upcoming hernia surgery on July 10, recommend bridging with enoxaparin. Please connect with hematology ort PCP for bridging or further recommendation, do not stop anticoagulation or antiplatelet therapy.  Smoking cessation, we discussed that she is at high risk for stent thrombosis, smoking predisposes patient to clot formation. Verbalized clear  understanding on the above, she remembers the SMA thrombosis event from September 2022 and would like to avoid recurrence. Noted that she is committed to smoking cessation.  Follow-up in 6 months with a repeat mesenteric ultrasound and clinic visit    During this visit we also discussed that mesenteric ischemia is a life threatening condition. If she develops sudden, severe abdominal pain, nausea, vomiting, abdominal distention, bloody stools, diarrhea, postprandial pain, food fear - she was directed to the emergency department. Information/Education: patient able to teach back. Patient agreeable to plan of care: yes.     All questions were answered and support provided. She has our contact information and knows to reach out with any additional questions or concerns.     It was a pleasure seeing Ms. Veras in clinic today.    Willow Bradshaw, [facility]  Vascular Surgery  Pager: [phone]  [email or web address]  Send message or 10 digit call back number Securely via [employer] with the [employer] Web Console (learn more here)    35 minutes spent on the date of the encounter doing chart review, review of outside records, review of test results, interpretation of tests, patient visit, documentation and discussion with other provider(s).      HPI:  Bridgette Veras is a 49 year old female who on 9/8/2022 presented with diffuse abdominal pain and imaging findings of aortic thrombus extending into the celiac artery and SMA. She underwent SMA thrombectomy, stenting and subsequent bowel resection. Now longitudinally followed by vascular surgery with annual surveillance. Unfortunately she continues to smoke and the velocities distal to stent continue to rise, we discussed during this clinic the importance of smoking cessation and she verbalized clear understanding.  Without abdominal pain, without postprandial pain, no food fear, denies nausea, vomiting. No pain elicited with deep palpation. Compliant with Eliquis, aspirin,  statin, normotensive. She has a hernia for which is planning surgery on July 10, recommended that she is bridged with enoxaparin if needed, she knows not to stop antiplatelet therapy or anticoagulation unless directed by hematology. From a vascular surgery perspective, she will require lifelong aspirin, Eliquis and statin given stent in SMA. No other concerns.    REVIEW OF SYSTEMS:    A 12 point ROS was reviewed and is negative except for what is listed above in HPI.    PHH:    Past Medical History:   Diagnosis Date     Cervical high risk HPV (human papillomavirus) test positive 2016    type 16     Chickenpox      Chlamydia trachomatis infection of unspecified site      POONAM 1      Depressive disorder      Depressive disorder, not elsewhere classified      History of blood transfusion Sep 2022    Surgery     History of colposcopy with cervical biopsy 2016    Bx - POONAM 1, ECC - negative     Other abnormal heart sounds as child    Murmur - resolved     Polycystic ovaries     prior to gastic bypass in , pt carried the diagnosis of PCOS/anovuolation     Streptococcus infection in conditions classified elsewhere and of unspecified site, group B 1998    In pregnancy          Past Surgical History:   Procedure Laterality Date      SECTION  1998. Failure to progress      SECTION, TUBAL LIGATION, COMBINED  2013    Procedure: COMBINED  SECTION, TUBAL LIGATION;   Section, Tubal Ligation;  Surgeon: Paul Allen MD;  Location: [facility] OR     COLONOSCOPY N/A 2024    Procedure: COLONOSCOPY, WITH POLYPECTOMY AND BIOPSY;  Surgeon: Hamzah Mcallister DO;  Location: [facility] GI     DAVINCI HERNIORRHAPHY VENTRAL N/A 2023    Procedure: HERNIORRHAPHY, VENTRAL, ROBOT-ASSISTED WITH MESH;  Surgeon: Herminio Becerra MD;  Location: [facility] OR     GASTRIC BYPASS  2005     GI SURGERY  Sep 2022     GYN SURGERY       HC REMOVAL GALLBLADDER  2006    Dr. Inman @  [facility]     HC REPAIR ING HERNIA,5+Y/O,REDUCIBL  age 20 (1997)    [facility]     LAPAROTOMY EXPLORATORY N/A 09/08/2022    Procedure: Exploratory LAPAROTOMY, Superior Mesenteric Artery Thrombectomy, Retrograde Superior Mesenteric Artery Stenting;  Surgeon: Balta Ernst MD;  Location: [facility] OR     LAPAROTOMY EXPLORATORY N/A 09/10/2022    Procedure: LAPAROTOMY, SMALL BOWEL RESECTION, INCISIONAL WOUND CLOSURE;  Surgeon: Tariq Lancaster MD;  Location: [facility] OR     LAPAROTOMY EXPLORATORY N/A 09/09/2022    Procedure: EXPLORATORY LAPAROTOMY, EVACUATION OF RECTUS SHEATH HEMATOMA, ABDOMINAL WASHOUT;  Surgeon: Nathan Barber MD;  Location: [facility] OR     PICC DOUBLE LUMEN PLACEMENT Right 09/10/2022    5FR DL PICC. Basilic vein.     SURGICAL HISTORY OF -   1982    PE tubes     SURGICAL HISTORY OF -   10/2006    removal of pannus ans breast lift     TONSILLECTOMY  1981     VASCULAR SURGERY  Sep 2022     ZZ NONSPECIFIC PROCEDURE  03/09/2006    Laser ablation genital condyloma/ Bx labial lesion     [facility] RAD RESEC TONSIL/PILLARS         ALLERGIES:  Codeine, Darvocet [acetaminophen], and Propoxyphene    MEDS:    Current Outpatient Medications:      apixaban ANTICOAGULANT (ELIQUIS) 5 MG tablet, Take 1 tablet (5 mg) by mouth 2 times daily., Disp: 180 tablet, Rfl: 1     aspirin 81 MG EC tablet, Take 1 tablet (81 mg) by mouth daily., Disp: 90 tablet, Rfl: 3     atorvastatin (LIPITOR) 20 MG tablet, Take 1 tablet (20 mg) by mouth daily., Disp: 90 tablet, Rfl: 0     cyanocolbalamin (VITAMIN  B-12) 500 MCG tablet, Take 500 mcg by mouth daily , Disp: , Rfl:      levonorgestrel (MIRENA) 52 MG (20 mcg/day) IUD, 1 each by Intrauterine route once, Disp: , Rfl:      Multiple Vitamin (MULTIVITAMIN PO), Take 1 tablet by mouth daily, Disp: , Rfl:      traZODone (DESYREL) 50 MG tablet, TAKE 1/2 TO 1 TABLET(25 TO 50 MG) BY MOUTH AT BEDTIME, Disp: 90 tablet, Rfl: 1     varenicline (CHANTIX) 1 MG tablet, TAKE 1 TABLET(1 MG) BY MOUTH  TWICE DAILY, Disp: 60 tablet, Rfl: 1     vitamin D3 (CHOLECALCIFEROL) 50 mcg (2000 units) tablet, Take 1 tablet by mouth daily, Disp: , Rfl:     SOCIAL HABITS:    History   Smoking Status     Every Day     Types: Cigarettes   Smokeless Tobacco     Never     Social History    Substance and Sexual Activity      Alcohol use: Not Currently        Comment: rarely- quit with pregnancy      History   Drug Use No       FAMILY HISTORY:    Family History   Problem Relation Age of Onset     Hypertension Mother      Depression Mother      Asthma Father      Gastrointestinal Disease Father         PSC     Hypertension Brother      Depression Brother      Cancer Maternal Grandmother         Lung     Depression Maternal Grandmother      Alcohol/Drug Maternal Grandfather         alcohol     Cardiovascular Maternal Grandfather         MI     Respiratory Paternal Grandmother      Cerebrovascular Disease Paternal Grandfather      Diabetes Other      C.A.D. No family hx of      Breast Cancer No family hx of      Cancer - colorectal No family hx of      Anesthesia Reaction No family hx of      Deep Vein Thrombosis (DVT) No family hx of        PE:  /79 (BP Location: Left arm, Patient Position: Sitting, Cuff Size: Adult Regular)   Pulse 81   Wt 162 lb 9.6 oz   SpO2 97%   BMI 28.35 kg/m    Wt Readings from Last 1 Encounters:   04/23/25 162 lb 9.6 oz     Body mass index is 28.35 kg/m .    EXAM:  General: No apparent distress. Answers questions appropriately.   Neurologic: Focally intact, Alert and oriented x 3.   Eyes: Grossly normal.   Cardiac:  RRR  Pulmonary: Non labored breathing with normal respiratory effort  Abdominal: Soft, non distended, non tender to deep palpation. No pulsatile mass.   Musculoskeletal/Extremity: Palpable 1+ pulses bilaterally, 5/5 gross bilateral upper and lower extremity strength.  No edema.     DIAGNOSTIC STUDIES:     Images:  US Mesenteric Arteries Complete    Result Date: 4/23/2025  ULTRASOUND  MESENTERIC ARTERIES COMPLETE 4/23/2025 8:50 AM CLINICAL HISTORY: Superior mesenteric artery thrombosis. Status post arterial stent. Exploratory laparotomy, superior mesenteric artery thrombectomy, retrograde superior mesenteric artery stenting 9/8/2022. Washout 9/9/2022. Small bowel resection 9/10/2022. COMPARISONS: CTA abdomen pelvis 10/30/2024. Ultrasound mesenteric arteries complete 10/22/2024. REFERRING PROVIDER: JAMESON CROWELL TECHNIQUE: Aorta and mesenteric arteries evaluated with grayscale, color Doppler, and spectral pulsed wave Doppler ultrasound. FINDINGS: Aorta, supraceliac: 117/22 cm/s, monophasic Celiac artery: Origin: Occluded, unchanged Hepatic artery, proximal: 48/24 cm/s, monophasic, retrograde Splenic artery, proximal: 72/25 cm/s, monophasic, antegrade Superior mesenteric artery: Stent, proximal, superior mesenteric artery origin: 174/48 cm/s, monophasic Stent, mid, proximal superior mesenteric artery: 247/84 cm/s, monophasic Stent, distal, proximal superior mesenteric artery: 256/89 cm/s, monophasic (previously 308/104 cm/s), 2.19 velocity ratio from aorta Mid, distal to stent: 504/132 cm/s, monophasic, 1.97 velocity ratio Mid: 229/54 cm/s, monophasic Distal: 60/0 cm/s, multiphasic Aorta, infrarenal, proximal: 63/0 cm/s, multiphasic Inferior mesenteric artery, origin: 129/14 cm/s, multiphasic Aorta, infrarenal, distal: 105/0 cm/s, multiphasic     IMPRESSION: 1. Superior mesenteric artery stent patent. 2. Superior mesenteric artery elevated velocity distal to the stent to 504 cm/s, may be due to the turning of the artery at the site. Velocity ratio less than 2 across the distal edge of the stent. If clinically indicated, further evaluation with CTA could be performed. 3. Celiac artery origin occlusion unchanged. Expected retrograde flow in the hepatic artery to provide flow to the splenic artery.     LABS:      Sodium   Date Value Ref Range Status   02/20/2025 139 135 - 145 mmol/L Final   04/09/2024  139 135 - 145 mmol/L Final     Comment:     Reference intervals for this test were updated on 09/26/2023 to more accurately reflect our healthy population. There may be differences in the flagging of prior results with similar values performed with this method. Interpretation of those prior results can be made in the context of the updated reference intervals.    02/05/2024 139 135 - 145 mmol/L Final     Comment:     Reference intervals for this test were updated on 09/26/2023 to more accurately reflect our healthy population. There may be differences in the flagging of prior results with similar values performed with this method. Interpretation of those prior results can be made in the context of the updated reference intervals.    08/04/2016 139 133 - 144 mmol/L Final   03/07/2011 135 133 - 144 mmol/L Final   11/02/2010 138 133 - 144 mmol/L Final     Urea Nitrogen   Date Value Ref Range Status   02/20/2025 10.1 6.0 - 20.0 mg/dL Final   04/09/2024 8.3 6.0 - 20.0 mg/dL Final   02/05/2024 13.4 6.0 - 20.0 mg/dL Final   02/07/2022 10 7 - 30 mg/dL Final   08/04/2016 9 7 - 30 mg/dL Final   03/07/2011 11 5 - 24 mg/dL Final   11/02/2010 7 5 - 24 mg/dL Final     Hemoglobin   Date Value Ref Range Status   02/20/2025 15.6 11.7 - 15.7 g/dL Final   04/09/2024 14.2 11.7 - 15.7 g/dL Final   02/05/2024 13.1 11.7 - 15.7 g/dL Final   09/20/2016 11.1 (L) 11.7 - 15.7 g/dL Final   08/04/2016 9.6 (L) 11.7 - 15.7 g/dL Final   09/17/2013 13.0 11.7 - 15.7 g/dL Final     Platelet Count   Date Value Ref Range Status   02/20/2025 293 150 - 450 10e3/uL Final   04/09/2024 324 150 - 450 10e3/uL Final   02/05/2024 365 150 - 450 10e3/uL Final   08/04/2016 327 150 - 450 10e9/L Final   01/04/2013 290 150 - 450 10e9/L Final   03/07/2011 276 150 - 450 10e9/L Final     INR   Date Value Ref Range Status   09/12/2022 1.13 0.85 - 1.15 Final   09/11/2022 1.25 (H) 0.85 - 1.15 Final   09/09/2022 1.70 (H) 0.85 - 1.15 Final   03/29/2005 1.03 0.86 - 1.14 Final          Again, thank you for allowing me to participate in the care of your patient.      Sincerely,    LOUIE Sterling CNP

## 2025-04-23 NOTE — PATIENT INSTRUCTIONS
Thank you so much for choosing us for your care. It was a pleasure to see you at the vascular clinic today.     Follow-up recommendations: We will see you back in 6 months. Please do not hesitate to reach out to us in the meantime with any concerns. Our schedulers will get in touch with you to set up your follow-up visit.     Additional testing/imaging ordered today: Repeat mesenteric ultrasound in 6 months        Our scheduling team will get in touch with you to set up any follow-up testing/imaging and/or appointments. Please be aware that any testing/imaging recommended today will need to completed prior to your next visit with the provider. If testing/imaging is not completed prior to your next visit, your visit may be rescheduled.        If you have any questions, please contact our clinic directly at [phone] and ask for the nurse. We also encourage the use of e[employer]io to communicate with your HealthCare Provider.        If you have an urgent need after business hours (8:00 am to 4:30 pm) please call [phone], option 4, and ask for the vascular attending on call. For non-urgent after hours needs, please call the vascular nurse at [phone] and leave a detailed voicemail. For scheduling needs, please call our clinic directly at [phone].

## 2025-05-08 ENCOUNTER — PATIENT OUTREACH (OUTPATIENT)
Dept: CARE COORDINATION | Facility: CLINIC | Age: 50
End: 2025-05-08
Payer: COMMERCIAL

## 2025-05-09 ENCOUNTER — ANCILLARY PROCEDURE (OUTPATIENT)
Dept: CT IMAGING | Facility: CLINIC | Age: 50
End: 2025-05-09
Attending: NURSE PRACTITIONER
Payer: COMMERCIAL

## 2025-05-09 DIAGNOSIS — Z95.9 STATUS POST ARTERIAL STENT: ICD-10-CM

## 2025-05-09 DIAGNOSIS — K55.069 SUPERIOR MESENTERIC ARTERY THROMBOSIS: ICD-10-CM

## 2025-05-09 PROCEDURE — 71275 CT ANGIOGRAPHY CHEST: CPT | Mod: TC | Performed by: RADIOLOGY

## 2025-05-09 PROCEDURE — 74174 CTA ABD&PLVS W/CONTRAST: CPT | Mod: TC | Performed by: RADIOLOGY

## 2025-05-09 RX ORDER — IOPAMIDOL 755 MG/ML
72 INJECTION, SOLUTION INTRAVASCULAR ONCE
Status: COMPLETED | OUTPATIENT
Start: 2025-05-09 | End: 2025-05-09

## 2025-05-09 RX ADMIN — IOPAMIDOL 72 ML: 755 INJECTION, SOLUTION INTRAVASCULAR at 09:34

## 2025-05-15 ENCOUNTER — DOCUMENTATION ONLY (OUTPATIENT)
Dept: VASCULAR SURGERY | Facility: CLINIC | Age: 50
End: 2025-05-15
Payer: COMMERCIAL

## 2025-05-15 NOTE — PROGRESS NOTES
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 There is a stable proximal superior mesenteric arterial stent with minimal intimal thickening along the midportion of the stent but no stenosis. Stent is widely patent. Patent renal arteries without significant stenosis. Thoracic and abdominal aorta negative for aneurysm or dissection.    Follow-up as previously established, within 6 months with a repeat mesenteric duplex.    Willow Bradshaw CNP  Vascular Surgery  Pager: [phone]  [email or web address]  Send message or 10 digit call back number Securely via [employer] with the [employer] Web Console (learn more here)    Please page me directly with any questions/concerns during regular weekday hours before 3PM. If there is no response, if it is a weekend, or if it is during evening hours, then please use the [employer] system to page the first-call resident on call for vascular surgery.

## 2025-06-15 DIAGNOSIS — K55.069 SUPERIOR MESENTERIC ARTERY THROMBOSIS: ICD-10-CM

## 2025-06-18 RX ORDER — ATORVASTATIN CALCIUM 20 MG/1
20 TABLET, FILM COATED ORAL DAILY
Qty: 90 TABLET | Refills: 3 | Status: SHIPPED | OUTPATIENT
Start: 2025-06-18

## 2025-06-23 ENCOUNTER — OFFICE VISIT (OUTPATIENT)
Dept: FAMILY MEDICINE | Facility: CLINIC | Age: 50
End: 2025-06-23
Payer: COMMERCIAL

## 2025-06-23 VITALS
HEART RATE: 65 BPM | WEIGHT: 170.4 LBS | TEMPERATURE: 97.2 F | DIASTOLIC BLOOD PRESSURE: 72 MMHG | BODY MASS INDEX: 30.19 KG/M2 | RESPIRATION RATE: 14 BRPM | SYSTOLIC BLOOD PRESSURE: 124 MMHG | OXYGEN SATURATION: 99 % | HEIGHT: 63 IN

## 2025-06-23 DIAGNOSIS — F41.1 GAD (GENERALIZED ANXIETY DISORDER): ICD-10-CM

## 2025-06-23 DIAGNOSIS — K55.069 SUPERIOR MESENTERIC ARTERY THROMBOSIS: ICD-10-CM

## 2025-06-23 DIAGNOSIS — Z01.818 PREOP GENERAL PHYSICAL EXAM: Primary | ICD-10-CM

## 2025-06-23 DIAGNOSIS — K43.9 VENTRAL HERNIA WITHOUT OBSTRUCTION OR GANGRENE: ICD-10-CM

## 2025-06-23 LAB
ERYTHROCYTE [DISTWIDTH] IN BLOOD BY AUTOMATED COUNT: 13 % (ref 10–15)
FERRITIN SERPL-MCNC: 35 NG/ML (ref 6–175)
HCT VFR BLD AUTO: 41.4 % (ref 35–47)
HGB BLD-MCNC: 13.5 G/DL (ref 11.7–15.7)
MCH RBC QN AUTO: 29.6 PG (ref 26.5–33)
MCHC RBC AUTO-ENTMCNC: 32.6 G/DL (ref 31.5–36.5)
MCV RBC AUTO: 91 FL (ref 78–100)
PLATELET # BLD AUTO: 276 10E3/UL (ref 150–450)
RBC # BLD AUTO: 4.56 10E6/UL (ref 3.8–5.2)
WBC # BLD AUTO: 8.3 10E3/UL (ref 4–11)

## 2025-06-23 PROCEDURE — 82728 ASSAY OF FERRITIN: CPT | Performed by: FAMILY MEDICINE

## 2025-06-23 PROCEDURE — 36415 COLL VENOUS BLD VENIPUNCTURE: CPT | Performed by: FAMILY MEDICINE

## 2025-06-23 PROCEDURE — 3074F SYST BP LT 130 MM HG: CPT | Performed by: FAMILY MEDICINE

## 2025-06-23 PROCEDURE — 85027 COMPLETE CBC AUTOMATED: CPT | Performed by: FAMILY MEDICINE

## 2025-06-23 PROCEDURE — 3078F DIAST BP <80 MM HG: CPT | Performed by: FAMILY MEDICINE

## 2025-06-23 PROCEDURE — 99214 OFFICE O/P EST MOD 30 MIN: CPT | Performed by: FAMILY MEDICINE

## 2025-06-23 NOTE — H&P (VIEW-ONLY)
Preoperative Evaluation  [facility]  [id number]  [facility] [address]  Phone: [phone]  Primary Provider: Cecy Morillo DO  Pre-op Performing Provider: Cecy Morillo DO  Jun 23, 2025 6/23/2025   Surgical Information   What procedure is being done? hernia surgery   Facility or Hospital where procedure/surgery will be performed: [facility]   Who is doing the procedure / surgery? bruna   Date of surgery / procedure: july 10, 2025   Time of surgery / procedure: unknown   Where do you plan to recover after surgery? at home with family     Fax number for surgical facility: Note does not need to be faxed, will be available electronically in Epic.    Assessment & Plan     The proposed surgical procedure is considered INTERMEDIATE risk.      ICD-10-CM    1. Preop general physical exam  Z01.818 CBC with platelets     Ferritin     CBC with platelets     Ferritin      2. Ventral hernia without obstruction or gangrene  K43.9 CBC with platelets     Ferritin     CBC with platelets     Ferritin      3. Superior mesenteric artery thrombosis  K55.069 enoxaparin ANTICOAGULANT (LOVENOX) 40 MG/0.4ML syringe      4. DESMOND (generalized anxiety disorder)  F41.1 sertraline (ZOLOFT) 50 MG tablet        SMA thrombosis:  per last vascular note, pt will need to continue ASA and ideally anticoagulant for surgery.  They recommend bridging if Eliquis needs to be held.  Reviewed with Dr Cogan, pt's hematologist, who did not feel bridge was needed but could consider 40mg lovenox daily while off Eliquis.  Follow-up with vascular provider Willow Bradshaw who does feel bridge is necessary.  Plan communicated to pt as below.  Continue 81mg ASA as well.  Reviewed with Dr Borja       Risks and Recommendations  The patient has the following additional risks and recommendations for perioperative complications:   - No identified additional risk factors other than previously addressed    Antiplatelet or  Anticoagulation Medication Instructions   - apixaban (Eliquis), edoxaban (Savaysa), rivaroxaban (Xarelto): Bleeding risk is moderate or high for this procedure AND CrCl  (>=) 50 mL/min. DO NOT TAKE 2 days before surgery.     Hold Eliquis on 7/8, 7/9 and 7/10 (day of surgery).  You can resume your Eliquis as instructed by your surgeon, generally the day after surgery.     Since your vascular team wants you to bridge this hold Dr Cogan recommend 40mg of lovenox on 7/8 and 7/9.  You would hold this injection 24 hours before surgery (so no dose on 7/10).  There is no need for this medicine after surgery as long as you are able to resume you Eliquis on 7/11.    Continue ASA 81mg daily through surgery    Additional Medication Instructions  We reviewed the medication list and there are no chronic medications that need to be adjusted for this procedure.    Recommendation  Approval given to proceed with proposed procedure, without further diagnostic evaluation.        Kristen Angeles is a 49 year old, presenting for the following:  Pre-Op Exam          6/23/2025     3:22 PM   Additional Questions   Roomed by Korina EVANGELISTA   Accompanied by Self      HPI: Symptomatic ventral hernia, planned repair      Did take Chantix but not really sure if it was helpful.  Did continue to smoke while on the medication.  Still has a significant desire to smoke.  Avoiding now so she can have her surgery and working on maintaining her cessation to decrease risk of recurrent thrombosis of the SMA.  Aware of the risks involved    Very anxious about the upcoming surgery.           6/23/2025   Pre-Op Questionnaire   Have you ever had a heart attack or stroke? No   Have you ever had surgery on your heart or blood vessels, such as a stent placement, a coronary artery bypass, or surgery on an artery in your head, neck, heart, or legs? SMA stent 2022   Do you have chest pain with activity? No   Do you have a history of heart failure? No   Do you currently  have a cold, bronchitis or symptoms of other infection? No   Do you have a cough, shortness of breath, or wheezing? No   Do you or anyone in your family have previous history of blood clots? (!) YES  - personal h/o SMA clot   Do you or does anyone in your family have a serious bleeding problem such as prolonged bleeding following surgeries or cuts? No   Have you ever had problems with anemia or been told to take iron pills? (!) YES    Have you had any abnormal blood loss such as black, tarry or bloody stools, or abnormal vaginal bleeding? (!) YES heavy menstrual bleeding on anticoagulation   Have you ever had a blood transfusion? (!) YES   Have you ever had a transfusion reaction? No   Are you willing to have a blood transfusion if it is medically needed before, during, or after your surgery? Yes   Have you or any of your relatives ever had problems with anesthesia? No   Do you have sleep apnea, excessive snoring or daytime drowsiness? No   Do you have any artifical heart valves or other implanted medical devices like a pacemaker, defibrillator, or continuous glucose monitor? No   Do you have artificial joints? No   Are you allergic to latex? No     Advance Care Planning    Discussed advance care planning with patient; informed AVS has link to [facility].    Preoperative Review of    reviewed - single doses of lorazepam, not regular use      Status of Chronic Conditions:  See problem list for active medical problems.  Problems all longstanding and stable, except as noted/documented.  See ROS for pertinent symptoms related to these conditions.    Patient Active Problem List    Diagnosis Date Noted    Encounter for insertion of Mirena IUD 05/09/2024     Priority: Medium     5/9/24 mirena insertion lot# [id number] exp 6/30/2026      Ventral hernia without obstruction or gangrene 05/04/2023     Priority: Medium     Added automatically from request for surgery [id number]      Mixed obsessional thoughts and acts  "03/02/2023     Priority: Medium    Iron deficiency anemia due to chronic blood loss 12/14/2022     Priority: Medium    Mesenteric thrombosis 09/08/2022     Priority: Medium    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 Plan colp  9/20/16 [place] Bx POONAM 1, ECC Negative. Plan cotest in 1 year.   9/25/17 NIL Pap, +HR HPV 16. Plan colp  11/8/18 Lost to follow-up for pap tracking  2/7/22 NIL pap, +HR HPV 16. Plan: colposcopy due before 5/7/22  3/30/22 Colpo ECC no endocervical tissue identified, small squamous atypia. Plan: cotest due 2/7/23 per provider  2/21/23 NIL pap, +HR HPV 16. Plan: colposcopy due before 5/21/23 5/1/23 Colpo unsatisfactory due to poor visualization. Bx and ECC neg. Plan: cotest in 1 year  2/5/24 NIL pap, +HR HPV 16 with EM cells present. Plan: colp with possible EMB with OBGYN due before 5/5/24  3/14/24 Colpo bx neg, ECC neg but no endocervical tissue identified, EMB neg. Plan: cotest in 1 year  2/20/25 NIL pap, + HR HPV 16. Plan colp, due before 5/20/25   3/27/25 [place]: Per provider notes: \"biopsy is negative for precancerous or cancerous cells. The official recommendation is to repeat pap smear in 1 year. However, we can repeat the pap smear and colposcopy in 1 year at the same time to make it easier for you.\"         Encounter for sterilization 08/02/2013     Priority: Medium     Problem list name updated by automated process. Provider to review      Adhesion of abdominal wall 01/20/2013     Priority: Medium     suspected by position of uterus in the first trimester      Health Care Home 01/08/2013     Priority: Medium     EMERGENCY CARE PLAN  April 16, 2013: No current Care Coordination follow up planned. Please refer if Care Coordination services are needed.    Presenting Problem Signs and " "Symptoms Treatment Plan   Questions or concerns   during clinic hours   I will call my clinic directly:  [address] [address]  [phone].   Questions or concerns outside clinic hours   I will call the 24 hour nurse line at   [phone] or [phone].   Need to schedule an appointment   I will call the 24 hour scheduling team at [phone] or my clinic directly at [phone].    Same day treatment     I will call my clinic first, nurse line if after hours, urgent care and express care if needed.     Clinic care coordination services (regular clinic hours)     I will call a clinic care coordinator directly:     Ty Liang RN  Mon, Tues, Fri - [phone]  Wed, Thurs - [phone]    Kath Montoya :    [phone]    Or call my clinic at [phone] and ask to speak with care coordination.     Crisis Services: Behavioral or Mental Health  [facility] 24 Hour Phone Line  [phone]    [facility] 24 Hour Crisis Services  [phone]    [facility] (Behavioral Health Providers) [facility] [phone]    [facility]   [phone]         Emergency treatment -- Immediately    [facility]All 911           Otalgia 08/22/2011     Priority: Medium     3/10/11-Pa Aranda MD, [facility] Department of Otolaryngology [phone].  Otalgia, suspect temporomandibular joint (TMJ).  Referral to TMJ clinic.  Maxillofacial CT scan for sinuses and TMJ is ordered at this visit.  Recommended trial of steroid due to severe discomfort. Rx for medrol dose pack.        CARDIOVASCULAR SCREENING; LDL GOAL LESS THAN 160 10/31/2010     Priority: Medium    Family history of liver disease 07/11/2010     Priority: Medium    PCOS (polycystic ovarian syndrome) 08/27/2008     Priority: Medium    Hypertrophic and atrophic condition of skin 10/18/2006     Priority: Medium     October 18, 2006  Gastric bypass June 8, 2005 at 257 pounds, Height 5' 4\" (BMI 44).  Now at BMI " 25, 150 pounds. Stable at current weight for 9 months  Secondary pannus, and breast sagging post-surgery - scheduled for abdominoplasty and breast lift by plastic surgery  Problem list name updated by automated process. Provider to review      Esophageal reflux 07/17/2006     Priority: Medium    Viral warts 03/20/2006     Priority: Medium     3/2/06 Dr. Hamilton [facility]: First appeared 2005. Tried Aldara and cryotherapy- ineffective. 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 Plan: surgical laser therapy, possible biopsy.  3/9/06 Dr. Hamilton: Procedure: laser ablation to vulva and perianal condyloma/ Bx of rt labial lesion/condyloma.   Problem list name updated by automated process. Provider to review      Calculus of gallbladder 03/02/2006     Priority: Medium     3/29/06 Dr. Inman @ [facility] ([phone]): Pt doing well after cholecystectomy of 3/16/06.   5/24/06 Dr. Inman: Has had some recurrent epigastric discomfort. CT in ER was unremarkable. Imp: Likely irritation in stomach pouch. Plan: d/c Advil, start PPI.  If not better, consider EGD.   Problem list name updated by automated process. Provider to review      Headache 03/01/2005     Priority: Medium     Problem list name updated by automated process. Provider to review      HISTORY OF SMOKING 06/10/2003     Priority: Medium      Past Medical History:   Diagnosis Date    Cervical high risk HPV (human papillomavirus) test positive 08/04/2016    type 16    Chickenpox     Chlamydia trachomatis infection of unspecified site     POONAM 1 1998    Depressive disorder     Depressive disorder, not elsewhere classified     History of blood transfusion Sep 2022    Surgery    History of colposcopy with cervical biopsy 09/20/2016    Bx - POONAM 1, ECC - negative    Other abnormal heart sounds as child    Murmur - resolved    Polycystic ovaries     prior to gastic bypass in June, 2005, pt carried the diagnosis of PCOS/anovuolation     Streptococcus infection in conditions classified elsewhere and of unspecified site, group B 1998    In pregnancy     Past Surgical History:   Procedure Laterality Date     SECTION  1998. Failure to progress     SECTION, TUBAL LIGATION, COMBINED  2013    Procedure: COMBINED  SECTION, TUBAL LIGATION;   Section, Tubal Ligation;  Surgeon: Pual Allen MD;  Location: [facility] OR    COLONOSCOPY N/A 2024    Procedure: COLONOSCOPY, WITH POLYPECTOMY AND BIOPSY;  Surgeon: Hamzah Mcallister DO;  Location: [facility] GI    DAVINCI HERNIORRHAPHY VENTRAL N/A 2023    Procedure: HERNIORRHAPHY, VENTRAL, ROBOT-ASSISTED WITH MESH;  Surgeon: Herminio Becerra MD;  Location: [facility] OR    GASTRIC BYPASS  2005    GI SURGERY  Sep 2022    GYN SURGERY      HC REMOVAL GALLBLADDER  2006    Dr. Inman @ [facility]    HC REPAIR ING HERNIA,5+Y/O,REDUCIBL  age 20 ()    [facility]    LAPAROTOMY EXPLORATORY N/A 2022    Procedure: Exploratory LAPAROTOMY, Superior Mesenteric Artery Thrombectomy, Retrograde Superior Mesenteric Artery Stenting;  Surgeon: Balta Ernst MD;  Location: [facility] OR    LAPAROTOMY EXPLORATORY N/A 09/10/2022    Procedure: LAPAROTOMY, SMALL BOWEL RESECTION, INCISIONAL WOUND CLOSURE;  Surgeon: Tariq Lancaster MD;  Location: [facility] OR    LAPAROTOMY EXPLORATORY N/A 2022    Procedure: EXPLORATORY LAPAROTOMY, EVACUATION OF RECTUS SHEATH HEMATOMA, ABDOMINAL WASHOUT;  Surgeon: Nathan Barber MD;  Location: [facility] OR    PICC DOUBLE LUMEN PLACEMENT Right 09/10/2022    5FR DL PICC. Basilic vein.    SURGICAL HISTORY OF -       PE tubes    SURGICAL HISTORY OF -   10/2006    removal of pannus ans breast lift    TONSILLECTOMY  1981    VASCULAR SURGERY  Sep 2022    ZZC NONSPECIFIC PROCEDURE  2006    Laser ablation genital condyloma/ Bx labial lesion    ZZC RAD RESEC TONSIL/PILLARS       Current Outpatient Medications   Medication Sig Dispense Refill     apixaban ANTICOAGULANT (ELIQUIS) 5 MG tablet Take 1 tablet (5 mg) by mouth 2 times daily. 180 tablet 1    aspirin 81 MG EC tablet Take 1 tablet (81 mg) by mouth daily. 90 tablet 3    atorvastatin (LIPITOR) 20 MG tablet TAKE 1 TABLET(20 MG) BY MOUTH DAILY 90 tablet 3    cyanocolbalamin (VITAMIN  B-12) 500 MCG tablet Take 500 mcg by mouth daily       levonorgestrel (MIRENA) 52 MG (20 mcg/day) IUD 1 each by Intrauterine route once      Multiple Vitamin (MULTIVITAMIN PO) Take 1 tablet by mouth daily      sertraline (ZOLOFT) 50 MG tablet Take 1 tablet (50 mg) by mouth daily. Take 1/2 tablet orally daily for 1 week then increase to 1 tablet daily 30 tablet 1    traZODone (DESYREL) 50 MG tablet TAKE 1/2 TO 1 TABLET(25 TO 50 MG) BY MOUTH AT BEDTIME 90 tablet 1    vitamin D3 (CHOLECALCIFEROL) 50 mcg (2000 units) tablet Take 1 tablet by mouth daily         Allergies   Allergen Reactions    Codeine Nausea and Vomiting     Pt tolerated oxycodone .    Darvocet [Acetaminophen] Nausea and Vomiting     Pt tolerated oxycodone .    Propoxyphene Nausea        Social History     Tobacco Use    Smoking status: Former     Average packs/day: 0.5 packs/day for 10.0 years (5.0 ttl pk-yrs)     Types: Cigarettes     Start date: 5/10/2025     Quit date: 2/15/2023     Years since quittin.3     Passive exposure: Never    Smokeless tobacco: Never   Substance Use Topics    Alcohol use: Not Currently     Comment: rarely- quit with pregnancy     Family History   Problem Relation Age of Onset    Hypertension Mother     Depression Mother     Asthma Father     Gastrointestinal Disease Father         PSC    Hypertension Brother     Depression Brother     Cancer Maternal Grandmother         Lung    Depression Maternal Grandmother     Alcohol/Drug Maternal Grandfather         alcohol    Cardiovascular Maternal Grandfather         MI    Respiratory Paternal Grandmother     Cerebrovascular Disease Paternal Grandfather     Diabetes Other      "C.A.D. No family hx of     Breast Cancer No family hx of     Cancer - colorectal No family hx of     Anesthesia Reaction No family hx of     Deep Vein Thrombosis (DVT) No family hx of      History   Drug Use No             Review of Systems  Constitutional, HEENT, cardiovascular, pulmonary, gi and gu systems are negative, except as otherwise noted.    Objective    /72   Pulse 65   Temp 97.2  F (36.2  C) (Tympanic)   Resp 14   Ht 1.607 m (5' 3.25\")   Wt 77.3 kg (170 lb 6.4 oz)   SpO2 99%   BMI 29.95 kg/m     Estimated body mass index is 29.95 kg/m  as calculated from the following:    Height as of this encounter: 1.607 m (5' 3.25\").    Weight as of this encounter: 77.3 kg (170 lb 6.4 oz).  Physical Exam  GENERAL: alert and no distress  EYES: Eyes grossly normal to inspection, PERRL and conjunctivae and sclerae normal  NECK: no adenopathy, no asymmetry, masses, or scars  RESP: lungs clear to auscultation - no rales, rhonchi or wheezes  CV: regular rate and rhythm, normal S1 S2, no S3 or S4, no murmur, click or rub, no peripheral edema  ABDOMEN: soft, nontender, no hepatosplenomegaly, no masses and bowel sounds normal  MS: no gross musculoskeletal defects noted, no edema  PSYCH: mentation appears normal and anxious    Recent Labs   Lab Test 02/20/25  0946   HGB 15.6         POTASSIUM 4.7   CR 0.79        Diagnostics  Recent Results (from the past 240 hours)   CBC with platelets    Collection Time: 06/23/25  4:12 PM   Result Value Ref Range    WBC Count 8.3 4.0 - 11.0 10e3/uL    RBC Count 4.56 3.80 - 5.20 10e6/uL    Hemoglobin 13.5 11.7 - 15.7 g/dL    Hematocrit 41.4 35.0 - 47.0 %    MCV 91 78 - 100 fL    MCH 29.6 26.5 - 33.0 pg    MCHC 32.6 31.5 - 36.5 g/dL    RDW 13.0 10.0 - 15.0 %    Platelet Count 276 150 - 450 10e3/uL   Ferritin    Collection Time: 06/23/25  4:12 PM   Result Value Ref Range    Ferritin 35 6 - 175 ng/mL      No EKG required, no history of coronary heart disease, significant " arrhythmia, peripheral arterial disease or other structural heart disease.    Revised Cardiac Risk Index (RCRI)  The patient has the following serious cardiovascular risks for perioperative complications:   - No serious cardiac risks = 0 points     RCRI Interpretation: 0 points: Class I (very low risk - 0.4% complication rate)         Signed Electronically by: Cecy Morillo DO  A copy of this evaluation report is provided to the requesting physician.

## 2025-06-23 NOTE — PROGRESS NOTES
Preoperative Evaluation  [facility]  [address]  [facility] [address]  Phone: [phone]  Primary Provider: Cecy Morillo DO  Pre-op Performing Provider: Cecy Morillo DO  Jun 23, 2025   {Provider  Link to PREOP SmartSet  REQUIRED to apply standard patient instructions and medication directions to the AVS :940465}  {ROOMER review and update patient entered surgical information if needed :534416}        6/23/2025   Surgical Information   What procedure is being done? hernia surgery   Facility or Hospital where procedure/surgery [facility] be performed[facility]   [facility]   Date of surgery / procedure: july 10, 2025   Time of surgery / procedure: unknown   Where do you plan to recover after surgery? at home with family     Fax number for surgical facility: Note does not need to be faxed, will be available electronically in Epic.    {Provider Charting Preference for Preop :688141}    Kristen Angeles is a 49 year old, presenting for the following:  Pre-Op Exam          6/23/2025     3:22 PM   Additional Questions   Roomed by Korina EVANGELISTA   Accompanied by Self      HPI: ***      Did take Chantix but not really sure if it was helpful.  Did continue to smoke while on the medication.  Still has a significant desire to smoke.  Avoiding now so she can have her surgery and working     Very anxious about the upcoming surgery.           6/23/2025   Pre-Op Questionnaire   Have you ever had a heart attack or stroke? No   Have you ever had surgery on your heart or blood vessels, such as a stent placement, a coronary artery bypass, or surgery on an artery in your head, neck, heart, or legs? [facility] stent 2022   Do you have chest pain with activity? No   Do you have a history of heart failure? No   Do you currently have a cold, bronchitis or symptoms of other infection? No   Do you have a cough, shortness of breath, or wheezing? No   Do you or anyone in your family  have previous history of blood clots? (!) YES  - personal h/o SMA clot   Do you or does anyone in your family have a serious bleeding problem such as prolonged bleeding following surgeries or cuts? No   Have you ever had problems with anemia or been told to take iron pills? (!) YES    Have you had any abnormal blood loss such as black, tarry or bloody stools, or abnormal vaginal bleeding? (!) YES heavy menstrual bleeding on anticoagulation   Have you ever had a blood transfusion? (!) YES   Have you ever had a transfusion reaction? No   Are you willing to have a blood transfusion if it is medically needed before, during, or after your surgery? Yes   Have you or any of your relatives ever had problems with anesthesia? No   Do you have sleep apnea, excessive snoring or daytime drowsiness? No   Do you have any artifical heart valves or other implanted medical devices like a pacemaker, defibrillator, or continuous glucose monitor? No   Do you have artificial joints? No   Are you allergic to latex? No     Advance Care Planning  {The storyboard will display whether the patient has ACP docs on file. Hover over the Code section in the storyboard to access the ACP documents. :244912}  {(AWV REQUIRED) Advance Care Planning Reviewed:318292}    Preoperative Review of   {Mnpmpreport:326157}  {Review MNPMP for all patients per [facility] MNPMP Profile:274919}    {Chronic problem details (Optional) :520999}    Patient Active Problem List    Diagnosis Date Noted    Encounter for insertion of Mirena IUD 05/09/2024     Priority: Medium     5/9/24 mirena insertion lot# [id number] exp 6/30/2026      Ventral hernia without obstruction or gangrene 05/04/2023     Priority: Medium     Added automatically from request for surgery [id number]      Mixed obsessional thoughts and acts 03/02/2023     Priority: Medium    Iron deficiency anemia due to chronic blood loss 12/14/2022     Priority: Medium    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 Plan colp  9/20/16 [facility] Bx POONAM 1, ECC Negative. Plan cotest in 1 year.   9/25/17 NIL Pap, +HR HPV 16. Plan colp  11/8/18 Lost to follow-up for pap tracking  2/7/22 NIL pap, +HR HPV 16. Plan: colposcopy due before 5/7/22  3/30/22 [facility] ECC no endocervical tissue identified, small squamous atypia. Plan: cotest due 2/7/23 per provider  2/21/23 NIL pap, +HR HPV 16. Plan: colposcopy due before 5/21/23 5/1/23 [facility] unsatisfactory due to poor visualization. Bx and ECC neg. Plan: cotest in 1 year  2/5/24 NIL pap, +HR HPV 16 with EM cells present. Plan: colp with possible EMB with OBGYN due before 5/5/24  3/14/24 [facility] bx neg, ECC neg but no endocervical tissue identified, EMB neg. Plan: cotest in 1 year  2/20/25 NIL pap, + HR HPV 16. Plan colp, due before 5/20/25   3/27/25 [facility]: Per provider notes: \"biopsy is negative for precancerous or cancerous cells. The official recommendation is to repeat pap smear in 1 year. However, we can repeat the pap smear and colposcopy in 1 year at the same time to make it easier for you.\"         Encounter for sterilization 08/02/2013     Priority: Medium     Problem list name updated by automated process. Provider to review      Adhesion of abdominal wall 01/20/2013     Priority: Medium     suspected by position of uterus in the first trimester      Health Care Home 01/08/2013     Priority: Medium     EMERGENCY CARE PLAN  April 16, 2013: No current Care Coordination follow up planned. Please refer if Care Coordination services are needed.    Presenting Problem Signs and Symptoms Treatment Plan   Questions or concerns   during clinic hours   I will call my clinic directly:  [address], [place], [place] " "[id number]  [phone].   Questions or concerns outside clinic hours   I will call the 24 hour nurse line at   [phone] or [phone].   Need to schedule an appointment   I will call the 24 hour scheduling team at [phone] or my clinic directly at [phone].    Same day treatment     I will call my clinic first, nurse line if after hours, urgent care and express care if needed.     Clinic care coordination services (regular clinic hours)     I will call a clinic care coordinator directly:     Ty Liang RN  Mon, Tues, Fri - [phone]  Wed, Thurs - [phone]    Kath Montoya :    [phone]    Or call my clinic at [phone] and ask to speak with care coordination.     Crisis Services: Behavioral or Mental Health  [facility] 24 Hour Phone Line  [phone]    [facility] 24 Hour Crisis Services  [phone]    [facility] [phone]    [facility]   [phone]         Emergency treatment -- Immediately    [facility] 911           Otalgia 08/22/2011     Priority: Medium     3/10/11-Pa Aranda MD, [facility] Department of Otolaryngology [phone].  Otalgia, suspect temporomandibular joint (TMJ).  Referral to TMJ clinic.  Maxillofacial CT scan for sinuses and TMJ is ordered at this visit.  Recommended trial of steroid due to severe discomfort. Rx for medrol dose pack.        CARDIOVASCULAR SCREENING; LDL GOAL LESS THAN 160 10/31/2010     Priority: Medium    Family history of liver disease 07/11/2010     Priority: Medium    PCOS (polycystic ovarian syndrome) 08/27/2008     Priority: Medium    Hypertrophic and atrophic condition of skin 10/18/2006     Priority: Medium     October 18, 2006  Gastric bypass June 8, 2005 at 257 pounds, Height 5' 4\" (BMI 44).  Now at BMI 25, 150 pounds. Stable at current weight for 9 months  Secondary pannus, and breast sagging post-surgery - scheduled for abdominoplasty and breast lift by plastic " surgery  Problem list name updated by automated process. Provider to review      Esophageal reflux 07/17/2006     Priority: Medium    Viral warts 03/20/2006     Priority: Medium     3/2/06 Dr. Haimlton @ [facility]: First appeared 2005. Tried Aldara and cryotherapy- ineffective. 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 Plan: surgical laser therapy, possible biopsy.  3/9/06 Dr. Hamilton: Procedure: laser ablation to vulva and perianal condyloma/ Bx of rt labial lesion/condyloma.   Problem list name updated by automated process. Provider to review      Calculus of gallbladder 03/02/2006     Priority: Medium     3/29/06 Dr. Inman @ [facility] ([phone]): Pt doing well after cholecystectomy of 3/16/06.   5/24/06 Dr. Inman: Has had some recurrent epigastric discomfort. CT in ER was unremarkable. Imp: Likely irritation in stomach pouch. Plan: d/c Advil, start PPI.  If not better, consider EGD.   Problem list name updated by automated process. Provider to review      Headache 03/01/2005     Priority: Medium     Problem list name updated by automated process. Provider to review      HISTORY OF SMOKING 06/10/2003     Priority: Medium      Past Medical History:   Diagnosis Date    Cervical high risk HPV (human papillomavirus) test positive 08/04/2016    type 16    Chickenpox     Chlamydia trachomatis infection of unspecified site     POONAM 1 1998    Depressive disorder     Depressive disorder, not elsewhere classified     History of blood transfusion Sep 2022    Surgery    History of colposcopy with cervical biopsy 09/20/2016    Bx - POONAM 1, ECC - negative    Other abnormal heart sounds as child    Murmur - resolved    Polycystic ovaries     prior to gastic bypass in June, 2005, pt carried the diagnosis of PCOS/anovuolation    Streptococcus infection in conditions classified elsewhere and of unspecified site, group B 1998    In pregnancy     Past Surgical History:   Procedure Laterality  Date     SECTION  1998. Failure to progress     SECTION, TUBAL LIGATION, COMBINED  2013    Procedure: COMBINED  SECTION, TUBAL LIGATION;   Section, Tubal Ligation;  Surgeon: Paul Allen MD;  Location: [facility]    COLONOSCOPY N/A 2024    Procedure: COLONOSCOPY, WITH POLYPECTOMY AND BIOPSY;  Surgeon: Hamzah Mcallister DO;  Location: [facility]    DAVINCI HERNIORRHAPHY VENTRAL N/A 2023    Procedure: HERNIORRHAPHY, VENTRAL, ROBOT-ASSISTED WITH MESH;  Surgeon: Herminio Becerra MD;  Location: [facility] OR    GASTRIC BYPASS  2005    GI SURGERY  Sep 2022    GYN SURGERY      HC REMOVAL GALLBLADDER  2006    Dr. Inman @ [facility]    HC REPAIR ING HERNIA,5+Y/O,REDUCIBL  age 20 ()    [facility]    LAPAROTOMY EXPLORATORY N/A 2022    Procedure: Exploratory LAPAROTOMY, Superior Mesenteric Artery Thrombectomy, Retrograde Superior Mesenteric Artery Stenting;  Surgeon: Balta Ernst MD;  Location: [facility] OR    LAPAROTOMY EXPLORATORY N/A 09/10/2022    Procedure: LAPAROTOMY, SMALL BOWEL RESECTION, INCISIONAL WOUND CLOSURE;  Surgeon: Tariq Lancaster MD;  Location: [facility] OR    LAPAROTOMY EXPLORATORY N/A 2022    Procedure: EXPLORATORY LAPAROTOMY, EVACUATION OF RECTUS SHEATH HEMATOMA, ABDOMINAL WASHOUT;  Surgeon: Nathan Barber MD;  Location: [facility] OR    PICC DOUBLE LUMEN PLACEMENT Right 09/10/2022    5FR DL PICC. Basilic vein.    SURGICAL HISTORY OF -       PE tubes    SURGICAL HISTORY OF -   10/2006    removal of pannus ans breast lift    TONSILLECTOMY  1981    VASCULAR SURGERY  Sep 2022    Z NONSPECIFIC PROCEDURE  2006    Laser ablation genital condyloma/ Bx labial lesion    ZZ RAD RESEC TONSIL/PILLARS       Current Outpatient Medications   Medication Sig Dispense Refill    apixaban ANTICOAGULANT (ELIQUIS) 5 MG tablet Take 1 tablet (5 mg) by mouth 2 times daily. 180 tablet 1    aspirin 81 MG EC tablet Take 1 tablet (81 mg) by mouth  "daily. 90 tablet 3    atorvastatin (LIPITOR) 20 MG tablet TAKE 1 TABLET(20 MG) BY MOUTH DAILY 90 tablet 3    cyanocolbalamin (VITAMIN  B-12) 500 MCG tablet Take 500 mcg by mouth daily       levonorgestrel (MIRENA) 52 MG (20 mcg/day) IUD 1 each by Intrauterine route once      Multiple Vitamin (MULTIVITAMIN PO) Take 1 tablet by mouth daily      sertraline (ZOLOFT) 50 MG tablet Take 1 tablet (50 mg) by mouth daily. Take 1/2 tablet orally daily for 1 week then increase to 1 tablet daily 30 tablet 1    traZODone (DESYREL) 50 MG tablet TAKE 1/2 TO 1 TABLET(25 TO 50 MG) BY MOUTH AT BEDTIME 90 tablet 1    vitamin D3 (CHOLECALCIFEROL) 50 mcg (2000 units) tablet Take 1 tablet by mouth daily         Allergies   Allergen Reactions    Codeine Nausea and Vomiting     Pt tolerated oxycodone .    Darvocet [Acetaminophen] Nausea and Vomiting     Pt tolerated oxycodone .    Propoxyphene Nausea        Social History     Tobacco Use    Smoking status: Former     Average packs/day: 0.5 packs/day for 10.0 years (5.0 ttl pk-yrs)     Types: Cigarettes     Start date: 5/10/2025     Quit date: 2/15/2023     Years since quittin.3     Passive exposure: Never    Smokeless tobacco: Never   Substance Use Topics    Alcohol use: Not Currently     Comment: rarely- quit with pregnancy     {FAMILY HISTORY (Optional):209550809}  History   Drug Use No           {ROS Picklists (Optional):845279}    Objective    /72   Pulse 65   Temp 97.2  F (36.2  C) (Tympanic)   Resp 14   Ht 1.607 m (5' 3.25\")   Wt 77.3 kg (170 lb 6.4 oz)   SpO2 99%   BMI 29.95 kg/m     Estimated body mass index is 29.95 kg/m  as calculated from the following:    Height as of this encounter: 1.607 m (5' 3.25\").    Weight as of this encounter: 77.3 kg (170 lb 6.4 oz).  Physical Exam  {Exam List :416416}    Recent Labs   Lab Test 25  0946   HGB 15.6         POTASSIUM 4.7   CR 0.79        Diagnostics  {LABS:707300}   {EK}    Revised " Cardiac Risk Index (RCRI)  The patient has the following serious cardiovascular risks for perioperative complications:  {PREOP REVISED CARDIAC RISK INDEX (RCRI) :051970}     RCRI Interpretation: {REVISED CARDIAC RISK INTERPRETATION :123381}         Signed Electronically by: Cecy Morillo DO  A copy of this evaluation report is provided to the requesting physician.    {Provider Resources  Preop [facility] Preop Guidelines  Revised Cardiac Risk Index :312398}   {Email feedback regarding this note to [email or web address]   :501850}   "C.A.D. No family hx of     Breast Cancer No family hx of     Cancer - colorectal No family hx of     Anesthesia Reaction No family hx of     Deep Vein Thrombosis (DVT) No family hx of      History   Drug Use No             Review of Systems  Constitutional, HEENT, cardiovascular, pulmonary, gi and gu systems are negative, except as otherwise noted.    Objective    /72   Pulse 65   Temp 97.2  F (36.2  C) (Tympanic)   Resp 14   Ht 1.607 m (5' 3.25\")   Wt 77.3 kg (170 lb 6.4 oz)   SpO2 99%   BMI 29.95 kg/m     Estimated body mass index is 29.95 kg/m  as calculated from the following:    Height as of this encounter: 1.607 m (5' 3.25\").    Weight as of this encounter: 77.3 kg (170 lb 6.4 oz).  Physical Exam  GENERAL: alert and no distress  EYES: Eyes grossly normal to inspection, PERRL and conjunctivae and sclerae normal  NECK: no adenopathy, no asymmetry, masses, or scars  RESP: lungs clear to auscultation - no rales, rhonchi or wheezes  CV: regular rate and rhythm, normal S1 S2, no S3 or S4, no murmur, click or rub, no peripheral edema  ABDOMEN: soft, nontender, no hepatosplenomegaly, no masses and bowel sounds normal  MS: no gross musculoskeletal defects noted, no edema  PSYCH: mentation appears normal and anxious    Recent Labs   Lab Test 02/20/25  0946   HGB 15.6         POTASSIUM 4.7   CR 0.79        Diagnostics  Recent Results (from the past 240 hours)   CBC with platelets    Collection Time: 06/23/25  4:12 PM   Result Value Ref Range    WBC Count 8.3 4.0 - 11.0 10e3/uL    RBC Count 4.56 3.80 - 5.20 10e6/uL    Hemoglobin 13.5 11.7 - 15.7 g/dL    Hematocrit 41.4 35.0 - 47.0 %    MCV 91 78 - 100 fL    MCH 29.6 26.5 - 33.0 pg    MCHC 32.6 31.5 - 36.5 g/dL    RDW 13.0 10.0 - 15.0 %    Platelet Count 276 150 - 450 10e3/uL   Ferritin    Collection Time: 06/23/25  4:12 PM   Result Value Ref Range    Ferritin 35 6 - 175 ng/mL      No EKG required, no history of coronary heart disease, significant " arrhythmia, peripheral arterial disease or other structural heart disease.    Revised Cardiac Risk Index (RCRI)  The patient has the following serious cardiovascular risks for perioperative complications:   - No serious cardiac risks = 0 points     RCRI Interpretation: 0 points: Class I (very low risk - 0.4% complication rate)         Signed Electronically by: Cecy Morillo DO  A copy of this evaluation report is provided to the requesting physician.

## 2025-06-23 NOTE — PATIENT INSTRUCTIONS
How to Take Your Medication Before Surgery  Preoperative Medication Instructions   {Medication HOLD Times for PREOP:523605}       Patient Education   Preparing for Your Surgery  For Adults  Getting started  In most cases, a nurse will call to review your health history and instructions. They will give you an arrival time based on your scheduled surgery time. Please be ready to share:  Your doctor's clinic name and phone number  Your medical, surgical, and anesthesia history  A list of allergies and sensitivities  A list of medicines, including herbal treatments and over-the-counter drugs  Whether the patient has a legal guardian (ask how to send us the papers in advance)  Note: You may not receive a call if you were seen at our PAC (Preoperative Assessment Center).  Please tell us if you're pregnant--or if there's any chance you might be pregnant. Some surgeries may injure a fetus (unborn baby), so they require a pregnancy test. Surgeries that are safe for a fetus don't always need a test, and you can choose whether to have one.   Preparing for surgery  Within 10 to 30 days of surgery: Have a pre-op exam (sometimes called an H&P, or History and Physical). This can be done at a clinic or pre-operative center.  If you're having a , you may not need this exam. Talk to your care team.  At your pre-op exam, talk to your care team about all medicines you take. (This includes CBD oil and any drugs, such as THC, marijuana, and other forms of cannabis.) If you need to stop any medicine before surgery, ask when to start taking it again.  This is for your safety. Many medicines and drugs can make you bleed too much during surgery. Some change how well surgery (anesthesia) drugs work.  Call your insurance company to let them know you're having surgery. (If you don't have insurance, call [phone].)  Call your clinic if there's any change in your health. This includes a scrape or scratch near the surgery site, or any  signs of a cold (sore throat, runny nose, cough, rash, fever).  Eating and drinking guidelines  For your safety: Unless your surgeon tells you otherwise, follow the guidelines below.  Eat and drink as normal until 8 hours before you arrive for surgery. After that, no food or milk. You can spit out gum when you arrive.  Drink clear liquids until 2 hours before you arrive. These are liquids you can see through, like water, Gatorade, and Propel Water. They also include plain black coffee and tea (no cream or milk).  No alcohol for 24 hours before you arrive. The night before surgery, stop any drinks that contain THC.  If your care team tells you to take medicine on the morning of surgery, it's okay to take it with a sip of water. No other medicines or drugs are allowed (including CBD oil)--follow your care team's instructions.  If you have questions the day of surgery, call your hospital or surgery center.   Preventing infection  Shower or bathe the night before and the morning of surgery. Follow the instructions your clinic gave you. (If no instructions, use regular soap.)  Don't shave or clip hair near your surgery site. We'll remove the hair if needed.  Don't smoke or vape the morning of surgery. No chewing tobacco for 6 hours before you arrive. A nicotine patch is okay. You may spit out nicotine gum when you arrive.  For some surgeries, the surgeon will tell you to fully quit smoking and nicotine.  We will make every effort to keep you safe from infection. We will:  Clean our hands often with soap and water (or an alcohol-based hand rub).  Clean the skin at your surgery site with a special soap that kills germs.  Give you a special gown to keep you warm. (Cold raises the risk of infection.)  Wear hair covers, masks, gowns, and gloves during surgery.  Give antibiotic medicine, if prescribed. Not all surgeries need this medicine.  What to bring on the day of surgery  Photo ID and insurance card  Copy of your health  care directive, if you have one  Glasses and hearing aids (bring cases)  You can't wear contacts during surgery  Inhaler and eye drops, if you use them (tell us about these when you arrive)  CPAP machine or breathing device, if you use them  A few personal items, if spending the night  If you have . . .  A pacemaker, ICD (cardiac defibrillator), or other implant: Bring the ID card.  An implanted stimulator: Bring the remote control.  A legal guardian: Bring a copy of the certified (court-stamped) guardianship papers.  Please remove any jewelry, including body piercings. Leave jewelry and other valuables at home.  If you're going home the day of surgery  You must have a support person drive you home. They should stay with you overnight, and they may need to help with your self-care.  If you don't have a support person, please tells us as soon as possible. We can help.  After surgery  If it's hard to control your pain or you need more pain medicine, please call your surgeon's office.  Questions?   If you have any questions for your care team, list them here:   ____________________________________________________________________________________________________________________________________________________________________________________________________________________________________________________________  For informational purposes only. Not to replace the advice of your health care provider. Copyright   2003, 2019 [facility]. All rights reserved. Clinically reviewed by Dennys Scanlon MD. [employer] [id number] - REV 02/25.      health care provider. Copyright   2003, 2019 [facility]. All rights reserved. Clinically reviewed by Dennys Scanlon MD. [employer] [id number] - REV 02/25.

## 2025-06-24 ENCOUNTER — RESULTS FOLLOW-UP (OUTPATIENT)
Dept: FAMILY MEDICINE | Facility: CLINIC | Age: 50
End: 2025-06-24

## 2025-06-26 ENCOUNTER — VIRTUAL VISIT (OUTPATIENT)
Dept: SLEEP MEDICINE | Facility: CLINIC | Age: 50
End: 2025-06-26
Payer: COMMERCIAL

## 2025-06-26 VITALS — HEIGHT: 63 IN | WEIGHT: 170 LBS | BODY MASS INDEX: 30.12 KG/M2

## 2025-06-26 DIAGNOSIS — F51.04 CHRONIC INSOMNIA: ICD-10-CM

## 2025-06-26 DIAGNOSIS — G47.00 INSOMNIA, UNSPECIFIED TYPE: ICD-10-CM

## 2025-06-26 DIAGNOSIS — F42.9 OBSESSIVE-COMPULSIVE DISORDER, UNSPECIFIED TYPE: Primary | ICD-10-CM

## 2025-06-26 ASSESSMENT — SLEEP AND FATIGUE QUESTIONNAIRES
HOW LIKELY ARE YOU TO NOD OFF OR FALL ASLEEP IN A CAR, WHILE STOPPED FOR A FEW MINUTES IN TRAFFIC: WOULD NEVER DOZE
HOW LIKELY ARE YOU TO NOD OFF OR FALL ASLEEP WHILE LYING DOWN TO REST IN THE AFTERNOON WHEN CIRCUMSTANCES PERMIT: MODERATE CHANCE OF DOZING
HOW LIKELY ARE YOU TO NOD OFF OR FALL ASLEEP WHILE SITTING AND TALKING TO SOMEONE: WOULD NEVER DOZE
HOW LIKELY ARE YOU TO NOD OFF OR FALL ASLEEP WHILE SITTING INACTIVE IN A PUBLIC PLACE: WOULD NEVER DOZE
HOW LIKELY ARE YOU TO NOD OFF OR FALL ASLEEP WHILE WATCHING TV: MODERATE CHANCE OF DOZING
HOW LIKELY ARE YOU TO NOD OFF OR FALL ASLEEP WHEN YOU ARE A PASSENGER IN A CAR FOR AN HOUR WITHOUT A BREAK: SLIGHT CHANCE OF DOZING
HOW LIKELY ARE YOU TO NOD OFF OR FALL ASLEEP WHILE SITTING QUIETLY AFTER LUNCH WITHOUT ALCOHOL: SLIGHT CHANCE OF DOZING
HOW LIKELY ARE YOU TO NOD OFF OR FALL ASLEEP WHILE SITTING AND READING: SLIGHT CHANCE OF DOZING

## 2025-06-26 ASSESSMENT — PAIN SCALES - GENERAL: PAINLEVEL_OUTOF10: NO PAIN (0)

## 2025-06-26 NOTE — PATIENT INSTRUCTIONS
Recommendations and Counselin.  Continue to follow-up with primary care provider about initiation and titration of sertraline to an effective dose in the next 6-8 weeks.  Recommend also following with primary care for management of trazodone.    2.  Consider reducing your time in bed from about 10 hours to 9 hours in bed.  For example considering getting up at 7 AM and delaying her time to bed at 10 PM.  This modest compression of her sleep schedule may help improve sleep quality while you continue to use trazodone in the short-term.    3.  Ensure that you are mobile phone has bluelight blocking features enabled fully at least 2 hours before your bedtime.  Try to avoid using your mobile phone in the bedroom.  At some point, it may be best to remove the television from your bedroom, but for now continue to use as you are keeping the screen off and audio volume low and setting a timer.    4.  In your after visit summary, read the introductory cognitive behavioral therapy for insomnia module on sleep and insomnia.  1 week prior to our next visit, keep a sleep diary using the CBT-I  application.  Record your information in the morning based on your recollection.  In general avoid watching the clock in the middle of the night.       [employer] [place] [facility] CBT-I  Introductory Module    Understanding Sleep and Insomnia    Most people have trouble sleeping at some point in their life.   Chronic insomnia means you have had trouble falling asleep and/or staying asleep for at least the past three months.  Despite allowing enough time for sleep, it is affecting how you feel. You are not alone.  It is estimated that 10-15% of adults experience chronic insomnia.     Cognitive-Behavioral Therapy for Insomnia    The [employer]  recommends Cognitive Behavioral Therapy for Insomnia (CBT-I) as the first-line treatment for insomnia.      [employer] [place] [facility] CBT-I is a five-step program involving an  initial insomnia assessment and four treatment sessions.  It is designed to be completed with the guidance of a trained health care provider. The program will teach you the skills and strategies you will need for a better night's sleep. The first step in your program involves an assessment of your insomnia and learning a bit about sleep, insomnia and CBT-I.      The Basics of Sleep    How does sleep help us?    Sleep, like food and water, is something we need every day but whose purpose is not exactly clear.  Sleep experts agree we need consistent quality sleep to function at our best. There is evidence that sleep helps maintain brain and body functions.  It helps maintain thinking ability and mood.  Sleep is a very active state that involves four stages that cycle every  minutes throughout the night. We get our deepest sleep during the first few hours of sleep.  During the last half of our sleep, we usually get the bulk of our REM (Rapid Eye Movement) sleep.  REM sleep is when most of our dreaming occurs.     Watch the following short video to learn more:  Stages of Sleep      How much sleep do we need?    Sleep needs vary from person to person. Most adults need at least 7 hours of sleep though some may need less and others more.  Sleeping too little or too much may be a health risk.  As we age, most people report their sleep gets lighter, earlier, shorter, and more restless.     What Controls Sleep?    The three things that regulate your sleep are your:     Sleep Drive  Biological Clock  Arousal System (physical and emotional states)    Together these make us feel alert during the day and promote sleep at night.    Your sleep drive depends on how long you have been awake. It is lowest when you first wake up.  Sleep drive increases as the day goes on.  The longer time you are awake the easier it is to fall asleep.  Sleeping gradually reduces your sleep drive. That is why napping in the evening or close to bed  can make it harder to sleep at night.    Your biological clock promotes wakefulness during the day and sleep at night.    Adapted from Holley et al. (2009). Evaluation and Management of Insomnia in the Psychiatric setting. Published Online: 19/1/2009; DOI: https://doi.org/10.1176/foc.7.4.yrf582JV    Watch the following short video to learn more: Two  Processes of Sleep    How does sleep change with age?    Sleep usually is lighter and shorter and earlier.  Sleep may become more restless   Increased time to fall asleep and more time awake during the night    Understanding Insomnia    What is insomnia?    Insomnia occurs when you:    Have difficulty with...  Falling Asleep  Staying Asleep  Or short amount of sleep    Have adequate time for sleep  Experience daytime problems as a result of your sleep difficulties    What causes insomnia?    Some people have a greater chance of developing insomnia due to biological, psychological or social factors. These are often referred to as predisposing factors.  A host of things can trigger insomnia such as a stressful event, jet lag, working a different shift, medication, or the onset of a medical or mental health condition. These are called precipitating factors.        What maintains insomnia?    Short-term insomnia can become chronic because of habits or conditions that perpetuate it including:    Unhelpful sleep habits such as spending more time in bed and sleeping in on weekends to try to catch up on sleep  Stress, worry or depression  Worry or fear about your insomnia  Pain or other medical conditions  Some medications  Untreated sleep disorders    As you spend more time in bed or try forcing yourself to sleep your bed becomes linked with wakefulness.  This type of  conditioning  along with unhelpful sleep habits maintains the insomnia even when the triggering event has resolved.    Sleep and Your Arousal System    Mental activity, emotions and physical symptoms can  "make your brain too active to sleep by masking the strength of your sleep drive. Your brain's arousal system can triggers insomnia and plan a role in maintaining it.  Common sources of arousal include:    Worry about sleep  An active mind concerned about unfinished tasks  Anxiety, stress and depression  Pain    The Effect of Behavioral Conditioning    When you lay awake in bed over many nights, your body actually becomes trained or 'conditioned' to be awake during the night.  It makes your bed trigger alertness instead of sleepiness.  CBT-I helps strengthen the behavioral association between sleep and your bed.    Habits that HURT sleep:    Using your bed for things other than sleep and intimacy  Shifting sleep schedules from night to night  Extending time in bed  Worrying  Worrying about sleep or trying too hard to sleep  Lack of a \"wind down\" time before bed  Long or late naps  Uncomfortable sleep environment  Alcohol  Caffeine    Developing habits that HELP your sleep:    Keeping the bed for sleep and intimacy  Maintaining a consistent sleep schedule  Daily routines including time to wind down and manage worry  Managing thoughts and expectations about sleep    Common Treatments for Insomnia    Behavioral:  Changing your behavior and habits to improve your sleep  Sleeping Pills (Prescription and OTC)  Antidepressants   \"Alternative\" remedies (Melatonin, Ashwagandha, Chamomile, Valerian, 5-HTP etc.)    How CBT-I Works     CBT-I targets negative behavioral conditioning and habits that hurt your sleep and lead to long-term insomnia     How long will it take to work?    Research shows that making significant changes in sleep habits takes time and effort. You  may see improvement within 2-3 weeks but will need to maintain these new habits over time for the best results.  Progress is not always steady. Don't lose heart if you experience minor setbacks along the way. While sleep medications may work faster, they often " come with considerable side effects and are less effective over time requiring  increasing the dose or switching to a different medication.   The evidence is clear that CBT-I is the safest and most effective long-term solution for treatment of insomnia.     Are there any side effects?    CBT-I has been shown to be a safe and effective treatment with few side effects.  The most common early side effect you may experience is a short term increase in daytime sleepiness.  It will be important for you to manage you sleepiness by avoiding driving or operating equipment if sleepy or drowsy.     Is CBT-I right for you?    CBT-I has been shown to be effective in treating insomnia across a variety of age groups and with individuals who have other health conditions.    You may be a good candidate for CBT-I if:    You believe CBT-I can help you sleep better.  You are motivated to follow a four-session behavioral treatment program based on your insomnia assessment..   You are able (or have assistance) to complete a daily sleep diary  Your Medical and/or mental health conditions are stable and treated.  You do not have untreated Sleep Apnea or Restless Leg syndrome.    There are several health conditions where CBT-I may not be indicated:    Bipolar Disorder  Seizure Disorder  Shift-work Sleep Disorder  Sleep Walking  Night Terrors  Excessive Daytime Sleepiness    Tracking your Sleep     Sleep tracking is an essential part of your CBT-I program. There are several ways to keep track of your sleep during treatment with your health care provider. Whatever method you use, it is best to record your information first thing when you get up in the morning.    CBT-i     The CBT-I  lynda is a convenient way to keep track of your sleep during treatment with your health care provider. It also has helpful information and tools that make it a great digital  to your treatment.  Download the free lynda on your Apple or Android  phone and watch the following video before using it:     CBT-i  Introduction      Go to the Settings section and turn on the setting Working with CBT-i Provider, Record information each morning by pressing the Sleep Diary icon. Do not not watch or monitor the clock in the middle of the night while keeping your sleep diary. You can customize your sleep dairy in the Settings section of your lynda to add information such as caffeine use, alcohol use or sleep medications taken.  It will be important to gather at least a week of sleep diary information before the next step of your program.         You can email your sleep diary data to yourself prior to your visit by using the [employer] User Data function found in the Settings section.  These data will be used to establish your core sleep training plan in the next module.      Mobile and Wearable Sleep Tracking Devices    There are many wearable and mobile apps that estimate the time, amount and quality of sleep.  They do so largely by recording and analyzing your body movement. Unlike a laboratory sleep study, these devices cannot accurately measure stages of sleep.  Wearable device and mobile apps can be helpful in estimating the time and amount of sleep at night. They can be used along with your CBT-i , [employer] Sleep Diary or Paper sleep diary.    CBT-I and Sleeping Pills    Sleep medications can be helpful in the short-term but often stop working in the long-term.  Sleeping pills treat symptoms and not the underlying cause of insomnia.  They also can have side effects that may last well into the day. Abruptly stopping sleeping pills can cause temporary rebound insomnia and lead to increased distress about sleeplessness.  This in turn strengthens the belief that pills are necessary for sleep.    Many patients choose to discontinue sleeping pills prior to beginning CBT-I.  However, discontinuing sleep medication is not the goal of CBT-I.  About 50% of patients  end up decreasing or discontinuing their sleep medication use through the course of treatment. You should talk to your prescribing provider before tapering or discontinuing sleep medication.     Introductory Module Homework    Keep track of your sleep every morning  until your next visit using the CBT-i  lynda, [employer] Sleep Diary web-based lynda, or paper sleep diary.

## 2025-06-26 NOTE — PROGRESS NOTES
Virtual Visit Details    Type of service:  Video Visit     Originating Location (pt. Location): Home    Distant Location (provider location):  Off-site  Platform used for Video Visit: René

## 2025-06-26 NOTE — NURSING NOTE
Current patient location: [address] [place] [address] [place] [address]    Is the patient currently in the state of [place]? YES    Visit mode: VIDEO    If the visit is dropped, the patient can be reconnected by:VIDEO VISIT: Text to cell phone:   Telephone Information:   Mobile [phone]       Will anyone else be joining the visit? NO  (If patient encounters technical issues they should call [phone]    Are changes needed to the allergy or medication list? No    Are refills needed on medications prescribed by this physician? NO    Rooming Documentation:  Questionnaire(s) completed    Reason for visit: Consult    Hamzah MENDIETA

## 2025-06-26 NOTE — PROGRESS NOTES
Visit Start Time: 10:49 AM  Visit End Time:11:45 AM     SLEEP MEDICINE CONSULTATION  Behavioral Sleep Medicine  2025    Name: Bridgette Veras MRN# [id number]   Age: 49 year old YOB: 1975     Date of Consultation: 2025  Consultation is requested by: Cecy Morillo DO  [address] [place] [address]  Primary care provider: Cecy Morillo    Reason for Sleep Consultation     Bridgette Veras is a 49 year old female seen today for a behavioral sleep medicine consultation because of insomnia    Assessment and Plan     Sleep Diagnoses:    Chronic insomnia    Co-occurring Conditions:    PCOS  Obsessive-compulsive disorder, unspecified  S/P Gastric Bypass  History of nicotine use    Clinical Impressions:    Bridgette Veras was seen for a behavioral sleep medicine consultation and possible behavioral sleep intervention and treatment. History and clinical presentation is consistent with chronic psychophysiologic insomnia.  This evaluation indicates a low pretest probability for intrinsic sleep disorder such as obstructive sleep apnea or restless leg syndrome.  A home sleep test or laboratory polysomnography is not indicated at this time.  Acute insomnia appears to have emerged in the setting of significant anxiety and health threat related to life-threatening arterial blood clots.  Although she is was eventually effectively treated, she experienced an elevation in general anxiety and increase in obsessive-compulsive thoughts and rumination.  Sleep specific worry also developed.  More recently, her mother  suddenly impacting wellbeing and mental health and further contributing to sleep disruption.  Working with her primary care provider, she has followed that trazodone, 50 mg, taking currently at 37.5 mg has been effective in improving sleep latency and reducing middle of the night insomnia.  She is looking eventually to complete a course and CBT I with the goal of  getting off sleep medication.  However at this time, she is also just initiating SSRI treatment for anxiety/OCD symptoms.  It is my opinion that effective treatment of anxiety is essential and should be a priority in the next couple of months, particularly as she goes through another surgery on .    Recommendations and Counselin.  Continue to follow-up with primary care provider about initiation and titration of sertraline to an effective dose in the next 6-8 weeks.  Recommend also following with primary care for management of trazodone.    2.  Consider reducing your time in bed from about 10 hours to 9 hours in bed.  For example considering getting up at 7 AM and delaying her time to bed at 10 PM.  This modest compression of her sleep schedule may help improve sleep quality while you continue to use trazodone in the short-term.    3.  Ensure that you are mobile phone has bluelight blocking features enabled fully at least 2 hours before your bedtime.  Try to avoid using your mobile phone in the bedroom.  At some point, it may be best to remove the television from your bedroom, but for now continue to use as you are keeping the screen off and audio volume low and setting a timer.    4.  In your after visit summary, read the introductory cognitive behavioral therapy for insomnia module on sleep and insomnia.  1 week prior to our next visit, keep a sleep diary using the CBT-I  application.  Record your information in the morning based on your recollection.  In general avoid watching the clock in the middle of the night.      Bridgette was provided information about the pathophysiology of insomnia, psychophysiological factors contributing to the onset and maintenance of insomnia and how co-occurring medical conditions and intrinsic sleep disorders can affect sleep.  Treatment options were discussed  as applicable to patient specific sleep concerns and symptoms. The benefits and potential early side  "effects of treatment including increased daytime sleepiness were discussed.     Patient was advised to consult with their prescribing provider around use of or changes to prescription sleep medication.  Patient was counseled on the importance of avoiding driving if drowsy.    Services provided are compliant with the requirements of [place] Statute SS 256B.0625 Subd. 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    Patient reports emergence of obsessive-compulsive behavior and gestures.  She states that she has always been someone with \"type and a\" personality and who appreciates detail.  However she has typically been able to manage rumination and obsessive thinking until her health rep about 2 years ago.  She reports that she has developed irrational thoughts and fears about crossing bridges and certain intersections.  She also began obsessing and worrying that since her mother  a few months ago, that she may be next.  Her fear is compounded by the fact that she is having hernia surgery in a couple of weeks.  The level of obsessive rumination is described as clinically significant and markedly affecting quality of life.  She states that she uses routines and sometimes rituals to help suppress her manage worry.  She will often try to use distraction such as focusing on use of her cell phone to help manage obsessive thinking and to a lesser degree compulsions.  She also recently started sertraline for " treatment of anxiety and depression.        Prescribed or OTC sleep medication: Trazodone, 50 mg prescribed, usually taken  at 37.5 mg.         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  FT with with episodic stress and extended work hours.    Source of Sleep Estimates:  Verbal Self-report    Time to Bed:  930-10 am.  Activity in Bed (stimulus control): use electronics, watch TV, and has tried to not have TV on.  She turns TV on to listen to it.  She watches Friends.  Sleep Latency: with medication about 15 minutes. Without medication may take 2-3 hours to fall asleep due to rumination.  Times awakened:  Once, to use the bathroom with medication, without trazodone, has multiple awakeigs.  Total Time Awake After Sleep Onset: 1 with medication, multiple without minutes  Time Up for the Day: Currently 8 am, and school year 7 am.  Total Time in Bed: 10 hours  Estimated Total Sleep Time: 7-8 hours  Sleep Efficiency Estimate: Approximately 80-85% with trazodone     Naps: None    BEHAVIORS AFFECTING SLEEP:    Habits:     Uses computers or electronics within an hour before bedtime, Has trouble winding down in the evening, Experiences excessive worry or rumination in the evening or in the middle of the night    Sleep Environment:     Bedroom is quiet, comfortable and dark, Regular bedpartner    Substance Use:      Caffeine: Black Tea   Alcohol: None reported  Nicotine: None  Other substances:: None reported      SCALES     EPWORTH SLEEPINESS SCALE      Sitting and reading (Proxy-Rptd) 1   Watching TV (Proxy-Rptd) 2   Sitting, inactive in a public place (theatre or mtg.) (Proxy-Rptd) 0    As a passenger in a car (Proxy-Rptd) 1   Lying down to rest in the afternoon when circumstance permit (Proxy-Rptd) 2   Sitting and talking to someone (Proxy-Rptd) 0   Sitting quietly after lunch without alcohol  (Proxy-Rptd) 1   In a car, while stopped for a few minutes in traffic (Proxy-Rptd) 0   TOTAL SCORE (nl <11) (Proxy-Rptd) 7     INSOMNIA SEVERITY INDEX       Difficulty falling asleep (Patient-Rptd) 2   Difficult staying asleep (Patient-Rptd) 2   Problems waking up to early (Patient-Rptd) 2   How SATISFIED/DISSATISFIED are you with your CURRENT sleep pattern? (Patient-Rptd) 3   How NOTICEABLE to others do you think your sleep pattern is in terms of your quality of life? (Patient-Rptd) 1   How WORRIED/DISTRESSED are you about your current sleep pattern? (Patient-Rptd) 3   To what extent do you consider your sleep problem to INTERFERE with your daily fuctioning(e.g. daytime fatigue, mood, ability to function at work/daily chores, concentration, mood,etc.) CURRENTLY? (Patient-Rptd) 3   INSOMNIA SEVERITY INDEX TOTAL SCORE (Patient-Rptd) 16    Absence of insomnia (0-7); sub-threshold insomnia (8-14); moderate insomnia (15-21); and severe insomnia (22-28)  PATIENT HEALTH QUESTIONNAIRE-9 (PHQ - 9)      10/24/2023     5:16 PM 1/5/2025     2:08 PM   PHQ   PHQ-9 Total Score 3 3    Q9: Thoughts of better off dead/self-harm past 2 weeks Not at all Not at all       Patient-reported        DESMOND-7      10/24/2023     5:15 PM 1/6/2025    10:05 AM   DESMOND-7 SCORE   Total Score 5 (mild anxiety) 7 (mild anxiety)   Total Score 5 7        Patient-reported                Previous Sleep Consultations/Studies     None reported    Vitals     There were no vitals taken for this visit.     Medical History     Allergies:    Allergies   Allergen Reactions    Codeine Nausea and Vomiting     Pt tolerated oxycodone 2022.    Darvocet [Acetaminophen] Nausea and Vomiting     Pt tolerated oxycodone 2022.    Propoxyphene Nausea       Medications:    Current Outpatient Medications   Medication Sig Dispense Refill    apixaban ANTICOAGULANT (ELIQUIS) 5 MG tablet Take 1 tablet (5 mg) by mouth 2 times daily. 180 tablet 1    aspirin 81 MG EC tablet Take 1 tablet  (81 mg) by mouth daily. 90 tablet 3    atorvastatin (LIPITOR) 20 MG tablet TAKE 1 TABLET(20 MG) BY MOUTH DAILY 90 tablet 3    cyanocolbalamin (VITAMIN  B-12) 500 MCG tablet Take 500 mcg by mouth daily       levonorgestrel (MIRENA) 52 MG (20 mcg/day) IUD 1 each by Intrauterine route once      Multiple Vitamin (MULTIVITAMIN PO) Take 1 tablet by mouth daily      sertraline (ZOLOFT) 50 MG tablet Take 1 tablet (50 mg) by mouth daily. Take 1/2 tablet orally daily for 1 week then increase to 1 tablet daily 30 tablet 1    traZODone (DESYREL) 50 MG tablet TAKE 1/2 TO 1 TABLET(25 TO 50 MG) BY MOUTH AT BEDTIME 90 tablet 1    vitamin D3 (CHOLECALCIFEROL) 50 mcg (2000 units) tablet Take 1 tablet by mouth daily         Problem List:  Patient Active Problem List    Diagnosis Date Noted    Encounter for insertion of Mirena IUD 05/09/2024     Priority: Medium     5/9/24 mirena insertion lot# [id number] exp 6/30/2026      Ventral hernia without obstruction or gangrene 05/04/2023     Priority: Medium     Added automatically from request for surgery [id number]      Mixed obsessional thoughts and acts 03/02/2023     Priority: Medium    Iron deficiency anemia due to chronic blood loss 12/14/2022     Priority: Medium    Mesenteric thrombosis 09/08/2022     Priority: Medium    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 Plan colp  9/20/16 [place] Bx POONAM 1, ECC Negative. Plan cotest in 1 year.   9/25/17 NIL Pap, +HR HPV 16. Plan colp  11/8/18 Lost to follow-up for pap tracking  2/7/22 NIL pap, +HR HPV 16. Plan: colposcopy due before 5/7/22  3/30/22 Colpo ECC no endocervical tissue identified, small squamous atypia. Plan: cotest due 2/7/23 per provider  2/21/23 NIL pap, +HR HPV 16. Plan: colposcopy due before 5/21/23 5/1/23 Colpo unsatisfactory  "due to poor visualization. Bx and ECC neg. Plan: cotest in 1 year  2/5/24 NIL pap, +HR HPV 16 with EM cells present. Plan: colp with possible EMB with OBGYN due before 5/5/24  3/14/24 [place] bx neg, ECC neg but no endocervical tissue identified, EMB neg. Plan: cotest in 1 year  2/20/25 NIL pap, + HR HPV 16. Plan colp, due before 5/20/25   3/27/25 [place]: Per provider notes: \"biopsy is negative for precancerous or cancerous cells. The official recommendation is to repeat pap smear in 1 year. However, we can repeat the pap smear and colposcopy in 1 year at the same time to make it easier for you.\"         Encounter for sterilization 08/02/2013     Priority: Medium     Problem list name updated by automated process. Provider to review      Adhesion of abdominal wall 01/20/2013     Priority: Medium     suspected by position of uterus in the first trimester      Health Care Home 01/08/2013     Priority: Medium     EMERGENCY CARE PLAN  April 16, 2013: No current Care Coordination follow up planned. Please refer if Care Coordination services are needed.    Presenting Problem Signs and Symptoms Treatment Plan   Questions or concerns   during clinic hours   I will call my clinic directly:  [place] [address]  [phone].   Questions or concerns outside clinic hours   I will call the 24 hour nurse line at   [phone] or [facility].   Need to schedule an appointment   I will call the 24 hour scheduling team at [phone] or my clinic directly at [phone].    Same day treatment     I will call my clinic first, nurse line if after hours, urgent care and express care if needed.     Clinic care coordination services (regular clinic hours)     I will call a clinic care coordinator directly:     Ty Liang RN  Mon, Tues, Fri - [phone]  Wed, Thurs - [phone]    Kath Montoya :    [phone]    Or call my clinic at [phone] and ask to speak with care " "coordination.     Crisis Services: Behavioral or Mental Health  C[facility]is Connection 24 Hour Phone Line  [phone]    [facility] 24 Hour Crisis Services  [phone]    [facility] ([facility] Providers) Network [phone]    [facility]   [phone]         Emergency treatment -- Immediately    CAll 911           [place] 08/22/2011     Priority: Medium     3/10/11-Pa Aranda MD,  of  Department of Otolaryngology [phone].  Otalgia, suspect temporomandibular joint (TMJ).  Referral to TMJ clinic.  Maxillofacial CT scan for sinuses and TMJ is ordered at this visit.  Recommended trial of steroid due to severe discomfort. Rx for medrol dose pack.        CARDIOVASCULAR SCREENING; LDL GOAL LESS THAN 160 10/31/2010     Priority: Medium    Family history of liver disease 07/11/2010     Priority: Medium    PCOS (polycystic ovarian syndrome) 08/27/2008     Priority: Medium    Hypertrophic and atrophic condition of skin 10/18/2006     Priority: Medium     October 18, 2006  Gastric bypass June 8, 2005 at 257 pounds, Height 5' 4\" (BMI 44).  Now at BMI 25, 150 pounds. Stable at current weight for 9 months  Secondary pannus, and breast sagging post-surgery - scheduled for abdominoplasty and breast lift by plastic surgery  Problem list name updated by automated process. Provider to review      Esophageal reflux 07/17/2006     Priority: Medium    Viral warts 03/20/2006     Priority: Medium     3/2/06 Dr. Hamilton @ [facility]: First appeared 2005. Tried Aldara and cryotherapy- ineffective. 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 Plan: surgical laser therapy, possible biopsy.  3/9/06 Dr. Hamilton: Procedure: laser ablation to vulva and perianal condyloma/ Bx of rt labial lesion/condyloma.   Problem list name updated by automated process. Provider to review      Calculus of gallbladder 03/02/2006     Priority: Medium     3/29/06 Dr. Inman @ [facility] " ([phone]): Pt doing well after cholecystectomy of 3/16/06.   5/24/06 Dr. Inman: Has had some recurrent epigastric discomfort. CT in ER was unremarkable. Imp: Likely irritation in stomach pouch. Plan: d/c Advil, start PPI.  If not better, consider EGD.   Problem list name updated by automated process. Provider to review      Headache 03/01/2005     Priority: Medium     Problem list name updated by automated process. Provider to review      HISTORY OF SMOKING 06/10/2003     Priority: Medium        Past Medical/Surgical History:  Past Medical History:   Diagnosis Date    Cervical high risk HPV (human papillomavirus) test positive 08/04/2016    type 16    Chickenpox     Chlamydia trachomatis infection of unspecified site     POONAM 1 1998    Depressive disorder     Depressive disorder, not elsewhere classified     History of blood transfusion Sep 2022    Surgery    History of colposcopy with cervical biopsy 09/20/2016    Bx - POONAM 1, ECC - negative    Other abnormal heart sounds as child    Murmur - resolved    Polycystic ovaries     prior to gastic bypass in June, 2005, pt carried the diagnosis of PCOS/anovuolation    Streptococcus infection in conditions classified elsewhere and of unspecified site, group B 1998    In pregnancy     Patient Active Problem List    Diagnosis Date Noted    Encounter for insertion of Mirena IUD 05/09/2024     Priority: Medium     5/9/24 mirena insertion lot# [id number] exp 6/30/2026      Ventral hernia without obstruction or gangrene 05/04/2023     Priority: Medium     Added automatically from request for surgery [id number]      Mixed obsessional thoughts and acts 03/02/2023     Priority: Medium    Iron deficiency anemia due to chronic blood loss 12/14/2022     Priority: Medium    Mesenteric thrombosis 09/08/2022     Priority: Medium    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 Plan colp  9/20/16 [place] Bx POONAM 1, ECC Negative. Plan cotest in 1 year.   9/25/17 NIL Pap, +HR HPV 16. Plan colp  11/8/18 Lost to follow-up for pap tracking  2/7/22 NIL pap, +HR HPV 16. Plan: colposcopy due before 5/7/22  3/30/22 Colpo ECC no endocervical tissue identified, small squamous atypia. Plan: cotest due 2/7/23 per provider  2/21/23 NIL pap, +HR HPV 16. Plan: colposcopy due before 5/21/23 5/1/23 Colpo unsatisfactory due to poor visualization. Bx and ECC neg. Plan: cotest in 1 year  2/5/24 NIL pap, +HR HPV 16 with EM cells present. Plan: colp with possible EMB with OBGYN due before 5/5/24  3/14/24 [place] bx neg, ECC neg but no endocervical tissue identified, EMB neg. Plan: cotest in 1 year  2/20/25 NIL pap, + HR HPV 16. Plan colp, due before 5/20/25   3/27/25 [place]: Per provider notes: \"biopsy is negative for precancerous or cancerous cells. The official recommendation is to repeat pap smear in 1 year. However, we can repeat the pap smear and colposcopy in 1 year at the same time to make it easier for you.\"         Encounter for sterilization 08/02/2013     Priority: Medium     Problem list name updated by automated process. Provider to review      Adhesion of abdominal wall 01/20/2013     Priority: Medium     suspected by position of uterus in the first trimester      Health Care Home 01/08/2013     Priority: Medium     EMERGENCY CARE PLAN  April 16, 2013: No current Care Coordination follow up planned. Please refer if Care Coordination services are needed.    Presenting Problem Signs and Symptoms Treatment Plan   Questions or concerns   during clinic hours   I will call my clinic directly:  [address] [address] [phone].   Questions or concerns outside clinic hours   I will call the 24 hour nurse line at   [phone] or [phone].   Need to schedule an " "appointment   I will call the 24 hour scheduling team at [phone] or my clinic directly at [phone].    Same day treatment     I will call my clinic first, nurse line if after hours, urgent care and express care if needed.     Clinic care coordination services (regular clinic hours)     I will call a clinic care coordinator directly:     Ty Liang RN  Mon, Tues, Fri - [phone]  Wed, Thurs - [phone]    Kath Montoya :    [phone]    Or call my clinic at [phone] and ask to speak with care coordination.     Crisis Services: Behavioral or Mental Health  Crisis Connection 24 Hour Phone Line  [phone]    [facility] 24 Hour Crisis Services  [phone]    [facility] ([facility]) Network [phone]    [facility]   [phone]         Emergency treatment -- Immediately    CAll 911           Otalgia 08/22/2011     Priority: Medium     3/10/11-Pa Aranda MD,  of  Department of Otolaryngology [phone].  Otalgia, suspect temporomandibular joint (TMJ).  Referral to TMJ clinic.  Maxillofacial CT scan for sinuses and TMJ is ordered at this visit.  Recommended trial of steroid due to severe discomfort. Rx for medrol dose pack.        CARDIOVASCULAR SCREENING; LDL GOAL LESS THAN 160 10/31/2010     Priority: Medium    Family history of liver disease 07/11/2010     Priority: Medium    PCOS (polycystic ovarian syndrome) 08/27/2008     Priority: Medium    Hypertrophic and atrophic condition of skin 10/18/2006     Priority: Medium     October 18, 2006  Gastric bypass June 8, 2005 at 257 pounds, Height 5' 4\" (BMI 44).  Now at BMI 25, 150 pounds. Stable at current weight for 9 months  Secondary pannus, and breast sagging post-surgery - scheduled for abdominoplasty and breast lift by plastic surgery  Problem list name updated by automated process. Provider to review      Esophageal reflux 07/17/2006     Priority: Medium    Viral warts 03/20/2006     " Priority: Medium     3/2/06 Dr. Hamilton @ [facility]: First appeared 2005. Tried Aldara and cryotherapy- ineffective. 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 Plan: surgical laser therapy, possible biopsy.  3/9/06 Dr. Hamilton: Procedure: laser ablation to vulva and perianal condyloma/ Bx of rt labial lesion/condyloma.   Problem list name updated by automated process. Provider to review      Calculus of gallbladder 03/02/2006     Priority: Medium     3/29/06 Dr. Inman @ [facility] ([phone]): Pt doing well after cholecystectomy of 3/16/06.   5/24/06 Dr. Inman: Has had some recurrent epigastric discomfort. CT in ER was unremarkable. Imp: Likely irritation in stomach pouch. Plan: d/c Advil, start PPI.  If not better, consider EGD.   Problem list name updated by automated process. Provider to review      Headache 03/01/2005     Priority: Medium     Problem list name updated by automated process. Provider to review      HISTORY OF SMOKING 06/10/2003     Priority: Medium     Patient Active Problem List   Diagnosis    Viral warts    Calculus of gallbladder    Headache    HISTORY OF SMOKING    Esophageal reflux    Hypertrophic and atrophic condition of skin    PCOS (polycystic ovarian syndrome)    Family history of liver disease    CARDIOVASCULAR SCREENING; LDL GOAL LESS THAN 160    Otalgia    Health Care Home    Adhesion of abdominal wall    Encounter for sterilization    Cervical high risk HPV (human papillomavirus) test positive    S/P gastric bypass    Mesenteric thrombosis    Superior mesenteric artery thrombosis    Iron deficiency anemia due to chronic blood loss    Mixed obsessional thoughts and acts    Ventral hernia without obstruction or gangrene    Encounter for insertion of Mirena IUD        Most Recent Labs:  Office Visit on 06/23/2025   Component Date Value Ref Range Status    WBC Count 06/23/2025 8.3  4.0 - 11.0 10e3/uL Final    RBC Count 06/23/2025 4.56  3.80 -  5.20 10e6/uL Final    Hemoglobin 06/23/2025 13.5  11.7 - 15.7 g/dL Final    Hematocrit 06/23/2025 41.4  35.0 - 47.0 % Final    MCV 06/23/2025 91  78 - 100 fL Final    MCH 06/23/2025 29.6  26.5 - 33.0 pg Final    MCHC 06/23/2025 32.6  31.5 - 36.5 g/dL Final    RDW 06/23/2025 13.0  10.0 - 15.0 % Final    Platelet Count 06/23/2025 276  150 - 450 10e3/uL Final    Ferritin 06/23/2025 35  6 - 175 ng/mL Final       Mental Health History     Prior Mental Health Diagnosis:   None reported    Mental Health Treatment:   Currently under care of primary care with the recent initiation of sertraline for treatment of anxiety and OCD symptoms.  Patient is interested in seeking out therapist as well.    Chemical Abuse/Treatment:    None reported      2/23/2023    11:15 AM   CAGE-AID Total Score   Total Score 0   Total Score MyChart 0 (A total score of 2 or greater is considered clinically significant)     Family History     Family History   Problem Relation Age of Onset    Hypertension Mother     Depression Mother     Asthma Father     Gastrointestinal Disease Father         PSC    Hypertension Brother     Depression Brother     Cancer Maternal Grandmother         Lung    Depression Maternal Grandmother     Alcohol/Drug Maternal Grandfather         alcohol    Cardiovascular Maternal Grandfather         MI    Respiratory Paternal Grandmother     Cerebrovascular Disease Paternal Grandfather     Diabetes Other     C.A.D. No family hx of     Breast Cancer No family hx of     Cancer - colorectal No family hx of     Anesthesia Reaction No family hx of     Deep Vein Thrombosis (DVT) No family hx of        Social History         Social Drivers of Health     Food Insecurity: Low Risk  (2/17/2025)    Food Insecurity     Within the past 12 months, did you worry that your food would run out before you got money to buy more?: No     Within the past 12 months, did the food you bought just not last and you didn t have money to get more?: No    Depression: Not at risk (1/6/2025)    PHQ-2     PHQ-2 Score: 0   Housing Stability: Low Risk  (2/17/2025)    Housing Stability     Do you have housing? : Yes     Are you worried about losing your housing?: No   Tobacco Use: Medium Risk (6/23/2025)    Patient History     Smoking Tobacco Use: Former     Smokeless Tobacco Use: Never     Passive Exposure: Never   Financial Resource Strain: Low Risk  (2/17/2025)    Financial Resource Strain     Within the past 12 months, have you or your family members you live with been unable to get utilities (heat, electricity) when it was really needed?: No   Alcohol Use: Not on file   Transportation Needs: Low Risk  (2/17/2025)    Transportation Needs     Within the past 12 months, has lack of transportation kept you from medical appointments, getting your medicines, non-medical meetings or appointments, work, or from getting things that you need?: No   Physical Activity: Insufficiently Active (2/17/2025)    Exercise Vital Sign     Days of Exercise per Week: 4 days     Minutes of Exercise per Session: 20 min   Interpersonal Safety: Low Risk  (2/20/2025)    Interpersonal Safety     Do you feel physically and emotionally safe where you currently live?: Yes     Within the past 12 months, have you been hit, slapped, kicked or otherwise physically hurt by someone?: No     Within the past 12 months, have you been humiliated or emotionally abused in other ways by your partner or ex-partner?: No   Stress: Stress Concern Present (2/17/2025)    [employer] - Occupational Stress Questionnaire     Feeling of Stress : Rather much   Social Connections: Unknown (2/17/2025)    Social Connection and Isolation Panel [NHANES]     Frequency of Communication with Friends and Family: Not on file     Frequency of Social Gatherings with Friends and Family: Once a week     Attends Anabaptist Services: Not on file     Active Member of Clubs or Organizations: Not on file      Attends Club or Organization Meetings: Not on file     Marital Status: Not on file   Health Literacy: Not on file         Mental Status Examination     Bridgette presented as oriented X3 with speech and language intact.  The patient was cooperative throughout the evaluation with no signs of hallucinations, delusions, loosening of associations or other thought disturbance.  Mood was normal Affect was congruent with mood. Insight and judgement were intact.  Memory appeared intact for recent and remote elements.  There was no report of suicidal ideation, intention or plan. Attention and concentration were within normal.        Enrique Mckenna PsyD, LP, [facility]  Diplomate, Behavioral Sleep Medicine  [facility]      Copy:   [place]  [id number] [place] [address]    Note: This dictation was created using voice recognition software. This document may contain an error not identified before finalizing the document. If the error changes the accuracy of the document, I would appreciate it being brought to my attention.

## 2025-06-30 ENCOUNTER — ALLIED HEALTH/NURSE VISIT (OUTPATIENT)
Dept: FAMILY MEDICINE | Facility: CLINIC | Age: 50
End: 2025-06-30
Payer: COMMERCIAL

## 2025-06-30 DIAGNOSIS — Z23 ENCOUNTER FOR IMMUNIZATION: Primary | ICD-10-CM

## 2025-06-30 PROCEDURE — 90471 IMMUNIZATION ADMIN: CPT

## 2025-06-30 PROCEDURE — 90746 HEPB VACCINE 3 DOSE ADULT IM: CPT

## 2025-06-30 PROCEDURE — 99207 PR NO CHARGE NURSE ONLY: CPT

## 2025-06-30 NOTE — PROGRESS NOTES
Prior to immunization administration, verified patients identity using patient s name and date of birth. Please see Immunization Activity for additional information.     Is the patient's temperature normal (100.5 or less)? Yes     Patient MEETS CRITERIA. PROCEED with vaccine administration.          6/30/2025   Hepatitis B   Have you had a serious reaction to a hepatitis B vaccine or to something in a hepatitis B vaccine, including yeast)? No   Are you getting kidney dialysis (either peritoneal or hemodialysis)? No   Do you have an allergy to latex? No         Patient MEETS CRITERIA. PROCEED with vaccine administration.        Patient instructed to remain in clinic for 15 minutes afterwards, and to report any adverse reactions.      Link to Ancillary Visit Immunization Standing Orders SmartSet     Screening performed by Margarita Luis CMA on 6/30/2025 at 1:55 PM.

## 2025-07-02 ENCOUNTER — VIRTUAL VISIT (OUTPATIENT)
Dept: PSYCHOLOGY | Facility: CLINIC | Age: 50
End: 2025-07-02
Attending: PSYCHOLOGIST
Payer: COMMERCIAL

## 2025-07-02 DIAGNOSIS — F42.9 OBSESSIVE-COMPULSIVE DISORDER, UNSPECIFIED TYPE: ICD-10-CM

## 2025-07-02 DIAGNOSIS — F41.1 GAD (GENERALIZED ANXIETY DISORDER): Primary | ICD-10-CM

## 2025-07-02 DIAGNOSIS — F51.04 CHRONIC INSOMNIA: ICD-10-CM

## 2025-07-02 PROCEDURE — 90791 PSYCH DIAGNOSTIC EVALUATION: CPT | Mod: 95 | Performed by: STUDENT IN AN ORGANIZED HEALTH CARE EDUCATION/TRAINING PROGRAM

## 2025-07-02 RX ORDER — ENOXAPARIN SODIUM 100 MG/ML
40 INJECTION SUBCUTANEOUS EVERY MORNING
Qty: 0.8 ML | Refills: 0 | Status: SHIPPED | OUTPATIENT
Start: 2025-07-02 | End: 2025-07-04
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
## 2025-07-09 ENCOUNTER — ANESTHESIA EVENT (OUTPATIENT)
Dept: SURGERY | Facility: CLINIC | Age: 50
End: 2025-07-09
Payer: COMMERCIAL

## 2025-07-10 ENCOUNTER — ANESTHESIA (OUTPATIENT)
Dept: SURGERY | Facility: CLINIC | Age: 50
End: 2025-07-10
Payer: COMMERCIAL

## 2025-07-10 ENCOUNTER — HOSPITAL ENCOUNTER (OUTPATIENT)
Facility: CLINIC | Age: 50
Discharge: HOME | End: 2025-07-10
Attending: SURGERY | Admitting: SURGERY
Payer: COMMERCIAL

## 2025-07-10 VITALS
SYSTOLIC BLOOD PRESSURE: 112 MMHG | RESPIRATION RATE: 16 BRPM | BODY MASS INDEX: 30.11 KG/M2 | HEART RATE: 74 BPM | WEIGHT: 170 LBS | DIASTOLIC BLOOD PRESSURE: 70 MMHG | OXYGEN SATURATION: 99 % | TEMPERATURE: 98.6 F

## 2025-07-10 DIAGNOSIS — K43.2 RECURRENT VENTRAL INCISIONAL HERNIA: Primary | ICD-10-CM

## 2025-07-10 LAB — GLUCOSE BLDC GLUCOMTR-MCNC: 102 MG/DL (ref 70–99)

## 2025-07-10 PROCEDURE — 258N000003 HC RX IP 258 OP 636: Performed by: NURSE ANESTHETIST, CERTIFIED REGISTERED

## 2025-07-10 PROCEDURE — 250N000009 HC RX 250

## 2025-07-10 PROCEDURE — 250N000011 HC RX IP 250 OP 636: Performed by: NURSE ANESTHETIST, CERTIFIED REGISTERED

## 2025-07-10 PROCEDURE — 250N000009 HC RX 250: Performed by: SURGERY

## 2025-07-10 PROCEDURE — 999N000141 HC STATISTIC PRE-PROCEDURE NURSING ASSESSMENT: Performed by: SURGERY

## 2025-07-10 PROCEDURE — 250N000025 HC SEVOFLURANE, PER MIN: Performed by: SURGERY

## 2025-07-10 PROCEDURE — 370N000017 HC ANESTHESIA TECHNICAL FEE, PER MIN: Performed by: SURGERY

## 2025-07-10 PROCEDURE — C1781 MESH (IMPLANTABLE): HCPCS | Performed by: SURGERY

## 2025-07-10 PROCEDURE — 49593 RPR AA HRN 1ST 3-10 RDC: CPT | Performed by: SURGERY

## 2025-07-10 PROCEDURE — 82962 GLUCOSE BLOOD TEST: CPT

## 2025-07-10 PROCEDURE — 360N000080 HC SURGERY LEVEL 7, PER MIN: Performed by: SURGERY

## 2025-07-10 PROCEDURE — 250N000011 HC RX IP 250 OP 636

## 2025-07-10 PROCEDURE — 250N000013 HC RX MED GY IP 250 OP 250 PS 637: Performed by: SURGERY

## 2025-07-10 PROCEDURE — 272N000001 HC OR GENERAL SUPPLY STERILE: Performed by: SURGERY

## 2025-07-10 PROCEDURE — 49593 RPR AA HRN 1ST 3-10 RDC: CPT | Mod: AS | Performed by: PHYSICIAN ASSISTANT

## 2025-07-10 PROCEDURE — 710N000010 HC RECOVERY PHASE 1, LEVEL 2, PER MIN: Performed by: SURGERY

## 2025-07-10 PROCEDURE — 250N000011 HC RX IP 250 OP 636: Performed by: SURGERY

## 2025-07-10 PROCEDURE — 250N000009 HC RX 250: Performed by: NURSE ANESTHETIST, CERTIFIED REGISTERED

## 2025-07-10 PROCEDURE — 710N000012 HC RECOVERY PHASE 2, PER MINUTE: Performed by: SURGERY

## 2025-07-10 DEVICE — MESH PROGRIP LAPAROSCOPIC 5.9X3.9" PARIETEX SELF-FIX LPG1510: Type: IMPLANTABLE DEVICE | Site: ABDOMEN | Status: FUNCTIONAL

## 2025-07-10 RX ORDER — DEXAMETHASONE SODIUM PHOSPHATE 4 MG/ML
INJECTION, SOLUTION INTRA-ARTICULAR; INTRALESIONAL; INTRAMUSCULAR; INTRAVENOUS; SOFT TISSUE PRN
Status: DISCONTINUED | OUTPATIENT
Start: 2025-07-10 | End: 2025-07-10

## 2025-07-10 RX ORDER — CEFAZOLIN SODIUM/WATER 2 G/20 ML
2 SYRINGE (ML) INTRAVENOUS SEE ADMIN INSTRUCTIONS
Status: DISCONTINUED | OUTPATIENT
Start: 2025-07-10 | End: 2025-07-10 | Stop reason: HOSPADM

## 2025-07-10 RX ORDER — PROPOFOL 10 MG/ML
INJECTION, EMULSION INTRAVENOUS CONTINUOUS PRN
Status: DISCONTINUED | OUTPATIENT
Start: 2025-07-10 | End: 2025-07-10

## 2025-07-10 RX ORDER — SODIUM CHLORIDE, SODIUM LACTATE, POTASSIUM CHLORIDE, CALCIUM CHLORIDE 600; 310; 30; 20 MG/100ML; MG/100ML; MG/100ML; MG/100ML
INJECTION, SOLUTION INTRAVENOUS CONTINUOUS
Status: DISCONTINUED | OUTPATIENT
Start: 2025-07-10 | End: 2025-07-10 | Stop reason: HOSPADM

## 2025-07-10 RX ORDER — NALOXONE HYDROCHLORIDE 0.4 MG/ML
0.1 INJECTION, SOLUTION INTRAMUSCULAR; INTRAVENOUS; SUBCUTANEOUS
Status: DISCONTINUED | OUTPATIENT
Start: 2025-07-10 | End: 2025-07-10 | Stop reason: HOSPADM

## 2025-07-10 RX ORDER — LIDOCAINE 40 MG/G
CREAM TOPICAL
Status: DISCONTINUED | OUTPATIENT
Start: 2025-07-10 | End: 2025-07-10 | Stop reason: HOSPADM

## 2025-07-10 RX ORDER — HYDROMORPHONE HCL IN WATER/PF 6 MG/30 ML
0.2 PATIENT CONTROLLED ANALGESIA SYRINGE INTRAVENOUS EVERY 5 MIN PRN
Status: DISCONTINUED | OUTPATIENT
Start: 2025-07-10 | End: 2025-07-10 | Stop reason: HOSPADM

## 2025-07-10 RX ORDER — BUPIVACAINE HCL/EPINEPHRINE 0.5-1:200K
VIAL (ML) INJECTION PRN
Status: DISCONTINUED | OUTPATIENT
Start: 2025-07-10 | End: 2025-07-10 | Stop reason: HOSPADM

## 2025-07-10 RX ORDER — ENOXAPARIN SODIUM 100 MG/ML
40 INJECTION SUBCUTANEOUS DAILY
COMMUNITY

## 2025-07-10 RX ORDER — FENTANYL CITRATE-0.9 % NACL/PF 10 MCG/ML
PLASTIC BAG, INJECTION (ML) INTRAVENOUS PRN
Status: DISCONTINUED | OUTPATIENT
Start: 2025-07-10 | End: 2025-07-10

## 2025-07-10 RX ORDER — FENTANYL CITRATE 50 UG/ML
25 INJECTION, SOLUTION INTRAMUSCULAR; INTRAVENOUS EVERY 5 MIN PRN
Status: DISCONTINUED | OUTPATIENT
Start: 2025-07-10 | End: 2025-07-10 | Stop reason: HOSPADM

## 2025-07-10 RX ORDER — OXYCODONE HYDROCHLORIDE 5 MG/1
5 TABLET ORAL
Status: COMPLETED | OUTPATIENT
Start: 2025-07-10 | End: 2025-07-10

## 2025-07-10 RX ORDER — FENTANYL CITRATE 50 UG/ML
INJECTION, SOLUTION INTRAMUSCULAR; INTRAVENOUS PRN
Status: DISCONTINUED | OUTPATIENT
Start: 2025-07-10 | End: 2025-07-10

## 2025-07-10 RX ORDER — DEXAMETHASONE SODIUM PHOSPHATE 10 MG/ML
4 INJECTION, SOLUTION INTRAMUSCULAR; INTRAVENOUS
Status: DISCONTINUED | OUTPATIENT
Start: 2025-07-10 | End: 2025-07-10 | Stop reason: HOSPADM

## 2025-07-10 RX ORDER — HEPARIN SODIUM 5000 [USP'U]/.5ML
5000 INJECTION, SOLUTION INTRAVENOUS; SUBCUTANEOUS
Status: COMPLETED | OUTPATIENT
Start: 2025-07-10 | End: 2025-07-10

## 2025-07-10 RX ORDER — ONDANSETRON 2 MG/ML
INJECTION INTRAMUSCULAR; INTRAVENOUS PRN
Status: DISCONTINUED | OUTPATIENT
Start: 2025-07-10 | End: 2025-07-10

## 2025-07-10 RX ORDER — OXYCODONE HYDROCHLORIDE 5 MG/1
5-10 TABLET ORAL EVERY 4 HOURS PRN
Qty: 10 TABLET | Refills: 0 | Status: SHIPPED | OUTPATIENT
Start: 2025-07-10

## 2025-07-10 RX ORDER — ONDANSETRON 4 MG/1
4 TABLET, ORALLY DISINTEGRATING ORAL EVERY 30 MIN PRN
Status: DISCONTINUED | OUTPATIENT
Start: 2025-07-10 | End: 2025-07-10 | Stop reason: HOSPADM

## 2025-07-10 RX ORDER — PROPOFOL 10 MG/ML
INJECTION, EMULSION INTRAVENOUS PRN
Status: DISCONTINUED | OUTPATIENT
Start: 2025-07-10 | End: 2025-07-10

## 2025-07-10 RX ORDER — FENTANYL CITRATE 50 UG/ML
50 INJECTION, SOLUTION INTRAMUSCULAR; INTRAVENOUS EVERY 5 MIN PRN
Status: DISCONTINUED | OUTPATIENT
Start: 2025-07-10 | End: 2025-07-10 | Stop reason: HOSPADM

## 2025-07-10 RX ORDER — ONDANSETRON 2 MG/ML
4 INJECTION INTRAMUSCULAR; INTRAVENOUS EVERY 30 MIN PRN
Status: DISCONTINUED | OUTPATIENT
Start: 2025-07-10 | End: 2025-07-10 | Stop reason: HOSPADM

## 2025-07-10 RX ORDER — HYDROMORPHONE HCL IN WATER/PF 6 MG/30 ML
0.4 PATIENT CONTROLLED ANALGESIA SYRINGE INTRAVENOUS EVERY 5 MIN PRN
Status: DISCONTINUED | OUTPATIENT
Start: 2025-07-10 | End: 2025-07-10 | Stop reason: HOSPADM

## 2025-07-10 RX ORDER — ONDANSETRON 2 MG/ML
4 INJECTION INTRAMUSCULAR; INTRAVENOUS ONCE
Status: COMPLETED | OUTPATIENT
Start: 2025-07-10 | End: 2025-07-10

## 2025-07-10 RX ORDER — CEFAZOLIN SODIUM/WATER 2 G/20 ML
2 SYRINGE (ML) INTRAVENOUS
Status: COMPLETED | OUTPATIENT
Start: 2025-07-10 | End: 2025-07-10

## 2025-07-10 RX ORDER — LIDOCAINE HYDROCHLORIDE 10 MG/ML
INJECTION, SOLUTION INFILTRATION; PERINEURAL PRN
Status: DISCONTINUED | OUTPATIENT
Start: 2025-07-10 | End: 2025-07-10

## 2025-07-10 RX ADMIN — FENTANYL CITRATE 25 MCG: 50 INJECTION, SOLUTION INTRAMUSCULAR; INTRAVENOUS at 17:13

## 2025-07-10 RX ADMIN — Medication 100 MCG: at 14:42

## 2025-07-10 RX ADMIN — Medication 200 MCG: at 14:50

## 2025-07-10 RX ADMIN — ROCURONIUM BROMIDE 10 MG: 50 INJECTION, SOLUTION INTRAVENOUS at 16:00

## 2025-07-10 RX ADMIN — SODIUM CHLORIDE, SODIUM LACTATE, POTASSIUM CHLORIDE, AND CALCIUM CHLORIDE: .6; .31; .03; .02 INJECTION, SOLUTION INTRAVENOUS at 12:48

## 2025-07-10 RX ADMIN — Medication 200 MG: at 16:41

## 2025-07-10 RX ADMIN — ROCURONIUM BROMIDE 30 MG: 50 INJECTION, SOLUTION INTRAVENOUS at 14:30

## 2025-07-10 RX ADMIN — FENTANYL CITRATE 25 MCG: 50 INJECTION, SOLUTION INTRAMUSCULAR; INTRAVENOUS at 17:27

## 2025-07-10 RX ADMIN — Medication 100 MCG: at 14:30

## 2025-07-10 RX ADMIN — Medication 100 MCG: at 14:17

## 2025-07-10 RX ADMIN — ROCURONIUM BROMIDE 10 MG: 50 INJECTION, SOLUTION INTRAVENOUS at 15:38

## 2025-07-10 RX ADMIN — HEPARIN SODIUM 5000 UNITS: 10000 INJECTION, SOLUTION INTRAVENOUS; SUBCUTANEOUS at 13:48

## 2025-07-10 RX ADMIN — ONDANSETRON 4 MG: 2 INJECTION INTRAMUSCULAR; INTRAVENOUS at 16:29

## 2025-07-10 RX ADMIN — FENTANYL CITRATE 50 MCG: 50 INJECTION, SOLUTION INTRAMUSCULAR; INTRAVENOUS at 17:20

## 2025-07-10 RX ADMIN — PROPOFOL 200 MG: 10 INJECTION, EMULSION INTRAVENOUS at 13:40

## 2025-07-10 RX ADMIN — Medication 100 MCG: at 14:45

## 2025-07-10 RX ADMIN — SODIUM CHLORIDE, SODIUM LACTATE, POTASSIUM CHLORIDE, AND CALCIUM CHLORIDE: .6; .31; .03; .02 INJECTION, SOLUTION INTRAVENOUS at 16:44

## 2025-07-10 RX ADMIN — MIDAZOLAM 2 MG: 1 INJECTION INTRAMUSCULAR; INTRAVENOUS at 13:32

## 2025-07-10 RX ADMIN — OXYCODONE 5 MG: 5 TABLET ORAL at 18:15

## 2025-07-10 RX ADMIN — Medication 20 MCG/MIN: at 14:51

## 2025-07-10 RX ADMIN — DEXAMETHASONE SODIUM PHOSPHATE 4 MG: 4 INJECTION, SOLUTION INTRA-ARTICULAR; INTRALESIONAL; INTRAMUSCULAR; INTRAVENOUS; SOFT TISSUE at 13:54

## 2025-07-10 RX ADMIN — HYDROMORPHONE HYDROCHLORIDE 0.5 MG: 1 INJECTION, SOLUTION INTRAMUSCULAR; INTRAVENOUS; SUBCUTANEOUS at 16:37

## 2025-07-10 RX ADMIN — LIDOCAINE HYDROCHLORIDE 50 MG: 10 INJECTION, SOLUTION INFILTRATION; PERINEURAL at 13:39

## 2025-07-10 RX ADMIN — Medication 2 G: at 13:34

## 2025-07-10 RX ADMIN — ROCURONIUM BROMIDE 50 MG: 50 INJECTION, SOLUTION INTRAVENOUS at 13:41

## 2025-07-10 RX ADMIN — ROCURONIUM BROMIDE 20 MG: 50 INJECTION, SOLUTION INTRAVENOUS at 15:21

## 2025-07-10 RX ADMIN — ONDANSETRON 4 MG: 2 INJECTION, SOLUTION INTRAMUSCULAR; INTRAVENOUS at 18:35

## 2025-07-10 RX ADMIN — DEXMEDETOMIDINE HYDROCHLORIDE 10 MCG: 100 INJECTION, SOLUTION INTRAVENOUS at 16:11

## 2025-07-10 RX ADMIN — FENTANYL CITRATE 100 MCG: 50 INJECTION INTRAMUSCULAR; INTRAVENOUS at 13:39

## 2025-07-10 RX ADMIN — PROPOFOL 200 MCG/KG/MIN: 10 INJECTION, EMULSION INTRAVENOUS at 13:44

## 2025-07-10 ASSESSMENT — LIFESTYLE VARIABLES: TOBACCO_USE: 1

## 2025-07-10 ASSESSMENT — ACTIVITIES OF DAILY LIVING (ADL)
ADLS_ACUITY_SCORE: 54

## 2025-07-10 NOTE — OP NOTE
"Date of Service: 7/10/2025     STAFF SURGEON:  Fred Borja MD     ASSISTANT:  Fabienne Rodriguez PA-C assistance was required for port placement, bedside robotic instrument exchanges, bedside needs for retraction and suction as needed, introduction/removal of sutures or mesh, and closure       PREOPERATIVE DIAGNOSIS: Recurrent ventral incisional hernia modifier 22 due to increased time and difficulty from previous abdominal surgery with scarring, adhesions, recurrent hernia and previous mesh     POSTOPERATIVE DIAGNOSIS:  Same     NAME OF PROCEDURE(S): Robotic repair of recurrent incisional hernia (transabdominal retrorectus)     INDICATIONS FOR PROCEDURE:  The patient is a 49-year-old female with history of laparotomy for SMA thrombosis who developed incisional hernia with previous robotic IPOM repair with extensive adhesiolysis.  She has developed recurrence of hernia.  Risks, benefits and alternatives discussed and consent obtained.     EBL: 30 cc    ANESTHESIA:  general    COMPLICATIONS: None     DRAINS:  None.     SPECIMENS:   * No specimens in log *     IMPLANTS:   Implant Name Type Inv. Item Serial No.  Lot No. LRB No. Used Action   MESH PROGRIP LAPAROSCOPIC 5.9X3.9\" PARIETEX SELF-FIX [id number] - [id number] Mesh MESH PROGRIP LAPAROSCOPIC 5.9X3.9\" PARIETEX SELF-FIX [id number]  [facility] [id number] N/A 1 Implanted        OPERATIVE FINDINGS: I had initially planned attempted extraperitoneal retrorectus approach (ETEP) though preoperative CT scan showed narrowing of bilateral rectus muscles in the area around the hernia to about 4 cm.  This would make area to work tight by thought it might be worth the attempt.  Unfortunately with the narrowing combined with scarring the effective retrorectus space ended up being even less than that in areas and was too constricting for that approach so I changed to a transabdominal approach instead.  Hernia defect measured 6 cm wide and about 8 cm long.  The previous mesh was " overall well incorporated.  The recurrence was in the central portion of her previous incision causing eventration of the mesh in this area.  There were adhesions noted between omentum and the mesh which were overall fairly straightforward to takedown.  There was some other adhesions further down in the left lower quadrant and right abdomen beyond where he needed to clear for the repair which were left alone at this time.     PROCEDURE DETAIL:  Following consent, the patient was brought from the preoperative holding area to the operating suite and laid in supine position.  She underwent general anesthesia with endotracheal intubation without difficulties.  The abdomen was prepped and draped there was a fashion.  Timeout performed.  She received preoperative antibiotics and subcutaneous heparin for DVT prophylaxis along with sequentials on her legs.   I began with a 5 mm Optiview camera axis in the left upper quadrant costal margin into the left retrorectus space.  I began dissecting this area with the scope until I was able to place a robotic port in the left upper quadrant, the Jacobo port in the left midabdomen and another robotic port in the left left lower quadrant.  With the laparoscopic grasper through these ports I tried to further dissect the space as it looked quite tight especially around the middle port where I was not even sure I would be able to have room for robotic instrument.  After some attempt with little headway and obtaining more space I decided to reposition the ports transabdominal and approach the repair that way.  The robot was then docked and I moved to the surgeon console.  I started with taking down adhesions which were predominantly in the upper abdomen though it look like there was some 2 most areas of the previous mesh.  This looked well incorporated throughout and remained fairly flat.  A portion in the center was eventrated with the recurrent hernia.  I then made an incision by the  edge of the mesh under the left rectus through the posterior sheath to access the retrorectus space transabdominally.  I then divided the medial edge to begin dissecting down the peritoneum and hernia sac.  This posterior layer was also reinforced by the previous mesh.  I worked across to the right side and then divided the right posterior sheath to enter the right retrorectus space and combine the 2.  These were opened along the entire length of the flap which was approximately 15 cm long.  There was a fair amount of scarring involving the right rectus muscle in the midportion though uncertain of the cause of this as there was not any other right-sided incisions but she had had multiple previous abdominal surgeries.  Once I had dissected the retrorectus space I used a running #1 strata fix suture to close the hernia defect.  I then placed a 10 x 15 cm ProGrip mesh to cover the length of the repair.  Once the mesh was placed the flap was closed with running 3 oh STRATAFIX sutures.    At this point I returned to patient bedside where I used a grasper to remove the ruler and needles.  The Jacobo port was removed and this fascial defect was closed with 0 Vicryl using the suture passer.  The abdomen was desufflated and the remaining ports removed.  Skin closed with 4-0 Monocryl and skin glue.  We used a total of 30 cc of half percent Marcaine with epinephrine 1-200,000 for local.  Final counts complete.  Patient transported to recovery in a stable condition with later plans to discharge home.           Fred Borja MD

## 2025-07-10 NOTE — INTERVAL H&P NOTE
"I have reviewed the surgical (or preoperative) H&P that is linked to this encounter, and examined the patient. There are no significant changes    Clinical Conditions Present on Arrival:  Clinically Significant Risk Factors Present on Admission                 # Drug Induced Coagulation Defect: home medication list includes an anticoagulant medication  # Drug Induced Platelet Defect: home medication list includes an antiplatelet medication      # Obesity: Estimated body mass index is 30.11 kg/m  as calculated from the following:    Height as of 6/26/25: 1.6 m (5' 3\").    Weight as of this encounter: 77.1 kg (170 lb).       "

## 2025-07-10 NOTE — DISCHARGE INSTRUCTIONS
[facility]   Adult Discharge Orders & Instructions     For 24 hours after surgery    Get plenty of rest.  A responsible adult must stay with you for at least 24 hours after you leave the hospital.   Do not drive or use heavy equipment.  If you have weakness or tingling, don't drive or use heavy equipment until this feeling goes away.  Do not drink alcohol.  Avoid strenuous or risky activities.  Ask for help when climbing stairs.   You may feel lightheaded.  If so, sit for a few minutes before standing.  Have someone help you get up.   You may have a slight fever. Call the doctor if your fever is over 100 F (37.7 C) (taken under the tongue) or lasts longer than 24 hours.  You may have a dry mouth, a sore throat, muscle aches or trouble sleeping.  These should go away after 24 hours.  Do not make important or legal decisions.  We don t expect you to have any problems from the surgery or treatment you had today. Just in case, here s what to do if you have pain, upset stomach (nausea), bleeding or infection:  Pain:  Take medicines your doctor has prescribed or over-the-counter medicine they have suggested. Resting and using ice packs can help, too. For surgery on an arm or leg, raise it on a pillow to ease swelling. Call your doctor if these methods don t work.  Copyright Charli Olmos, Licensed under [employer]  Upset stomach (nausea):  Take anti-nausea medicine approved by your doctor. Drink clear liquids like apple juice, ginger ale, broth or 7-Up. Be sure to drink enough fluids. Rest can help, too. Move to normal foods when you re ready.   Bleeding:  In the first 24 hours, you may see a little blood on your dressing, about the size of a quarter. You don t need to worry about this much blood, but if the blood spot keeps getting bigger:  Put pressure on the wound if you can, AND  Call your doctor.  Copyright [employer], Licensed under [employer]  Fever/Infection: Please call  your doctor if you have any of these signs:  Redness  Swelling  Wound feels warm  Pain gets worse  Bad-smelling fluid leaks from wound  Fever or chills  Call your doctor for any of the followin.  It has been over 8 to 10 hours since surgery and you are still not able to urinate (pass water).    2.  Headache for over 24 hours.    3.  Numbness, tingling or weakness in your legs the day after surgery (if you had spinal anesthesia).    For patient questions :  General Surgery: [phone]

## 2025-07-10 NOTE — ANESTHESIA PROCEDURE NOTES
Airway       Patient location during procedure: OR       Procedure Start/Stop Times: 7/10/2025 1:43 PM  Staff -        CRNA: Marvin Rodney APRN CRNA       Performed By: CRNA  Consent for Airway        Urgency: elective  Indications and Patient Condition       Indications for airway management: jojo-procedural       Induction type:intravenous       Mask difficulty assessment: 2 - vent by mask + OA or adjuvant +/- NMBA    Final Airway Details       Final airway type: endotracheal airway       Successful airway: ETT - single  Endotracheal Airway Details        ETT size (mm): 7.0       Cuffed: yes       Successful intubation technique: direct laryngoscopy       DL Blade Type: MAC 3       Grade View of Cords: 1       Adjucts: stylet       Position: Right       Measured from: gums/teeth       Secured at (cm): 21    Post intubation assessment        Placement verified by: capnometry, equal breath sounds and chest rise        Number of attempts at approach: 1       Secured with: tape       Ease of procedure: easy       Dentition: Intact and Unchanged    Medication(s) Administered   Medication Administration Time: 7/10/2025 1:43 PM

## 2025-07-10 NOTE — ANESTHESIA POSTPROCEDURE EVALUATION
Patient: Bridgette Veras    Procedure: Procedure(s):  HERNIORRHAPHY, RECURRENT VENTRAL INCISIONAL, ROBOT-ASSISTED, LAPAROSCOPIC, USING DA YNES XI       Anesthesia Type:  General    Note:  Disposition: Outpatient   Postop Pain Control: Uneventful            Sign Out: Well controlled pain   PONV: No   Neuro/Psych: Uneventful            Sign Out: Acceptable/Baseline neuro status   Airway/Respiratory: Uneventful            Sign Out: Acceptable/Baseline resp. status   CV/Hemodynamics: Uneventful            Sign Out: Acceptable CV status   Other NRE: NONE   DID A NON-ROUTINE EVENT OCCUR? No    Event details/Postop Comments:  Pt had crepitus making her right eye unable to open. This is resolving and patient is now able to open her right eye. Tolerated anesthesia well. Will follow up if needed.        Last vitals:  Vitals Value Taken Time   /61 07/10/25 17:35   Temp 98.3  F (36.8  C) 07/10/25 17:20   Pulse 80 07/10/25 17:37   Resp 13 07/10/25 17:37   SpO2 94 % 07/10/25 17:33   Vitals shown include unfiled device data.    Electronically Signed By: LOUIE Jones CRNA  July 10, 2025  5:38 PM

## 2025-07-10 NOTE — ANESTHESIA PREPROCEDURE EVALUATION
Anesthesia Pre-Procedure Evaluation    Patient: Bridgette Veras   MRN: [id number] : 1975          Procedure : Procedure(s):  HERNIORRHAPHY, RECURRENT VENTRAL INCISIONAL, ROBOT-ASSISTED, LAPAROSCOPIC, USING DA YNES XI  possible open         Past Medical History:   Diagnosis Date    Cervical high risk HPV (human papillomavirus) test positive 2016    type 16    Chickenpox     Chlamydia trachomatis infection of unspecified site     POONAM 1     Depressive disorder     Depressive disorder, not elsewhere classified     History of blood transfusion Sep 2022    Surgery    History of colposcopy with cervical biopsy 2016    Bx - POONAM 1, ECC - negative    Other abnormal heart sounds as child    Murmur - resolved    Polycystic ovaries     prior to gastic bypass in , pt carried the diagnosis of PCOS/anovuolation    Streptococcus infection in conditions classified elsewhere and of unspecified site, group B     In pregnancy      Past Surgical History:   Procedure Laterality Date     SECTION  1998. Failure to progress     SECTION, TUBAL LIGATION, COMBINED  2013    Procedure: COMBINED  SECTION, TUBAL LIGATION;   Section, Tubal Ligation;  Surgeon: Paul Allen MD;  Location: [facility] OR    COLONOSCOPY N/A 2024    Procedure: COLONOSCOPY, WITH POLYPECTOMY AND BIOPSY;  Surgeon: Hamzah Mcallister DO;  Location: [facility] GI    DAVINCI HERNIORRHAPHY VENTRAL N/A 2023    Procedure: HERNIORRHAPHY, VENTRAL, ROBOT-ASSISTED WITH MESH;  Surgeon: Herminio Becerra MD;  Location: [facility] OR    GASTRIC BYPASS  2005    GI SURGERY  Sep 2022    GYN SURGERY      HC REMOVAL GALLBLADDER  2006    Dr. Inman @ [facility]    HC REPAIR ING HERNIA,5+Y/O,REDUCIBL  age 20 ()    [facility]    LAPAROTOMY EXPLORATORY N/A 2022    Procedure: Exploratory LAPAROTOMY, Superior Mesenteric Artery Thrombectomy, Retrograde Superior Mesenteric Artery Stenting;  Surgeon:  Balta Ernst MD;  Location: [facility] OR    LAPAROTOMY EXPLORATORY N/A 09/10/2022    Procedure: LAPAROTOMY, SMALL BOWEL RESECTION, INCISIONAL WOUND CLOSURE;  Surgeon: Tariq Lancaster MD;  Location: [facility] OR    LAPAROTOMY EXPLORATORY N/A 2022    Procedure: EXPLORATORY LAPAROTOMY, EVACUATION OF RECTUS SHEATH HEMATOMA, ABDOMINAL WASHOUT;  Surgeon: Nathan Barber MD;  Location: [facility] OR    PICC DOUBLE LUMEN PLACEMENT Right 09/10/2022    5FR DL PICC. Basilic vein.    SURGICAL HISTORY OF -       PE tubes    SURGICAL HISTORY OF -   10/2006    removal of pannus ans breast lift    TONSILLECTOMY  1981    VASCULAR SURGERY  Sep 2022    ZZC NONSPECIFIC PROCEDURE  2006    Laser ablation genital condyloma/ Bx labial lesion    Z RAD RESEC TONSIL/PILLARS        Allergies   Allergen Reactions    Codeine Nausea and Vomiting     Pt tolerated oxycodone .    Darvocet [Acetaminophen] Nausea and Vomiting     Pt tolerated oxycodone .    Propoxyphene Nausea      Social History     Tobacco Use    Smoking status: Former     Current packs/day: 0.00     Average packs/day: 0.5 packs/day for 10.0 years (5.0 ttl pk-yrs)     Types: Cigarettes     Quit date: 2/15/2023     Years since quittin.4     Passive exposure: Never    Smokeless tobacco: Never   Substance Use Topics    Alcohol use: Not Currently     Comment: rarely- quit with pregnancy      Wt Readings from Last 1 Encounters:   25 77.1 kg (170 lb)        Anesthesia Evaluation   Pt has had prior anesthetic. Type: General and MAC.    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 will bridge with enoxaparin prior to surgery         (+) Dyslipidemia - -   -  - -                                      METS/Exercise Tolerance:      Hematologic:     (+)      anemia,          Musculoskeletal:  - neg musculoskeletal ROS     GI/Hepatic: Comment: -S/P Gastric bypass      (+) GERD, Asymptomatic on medication,                  Renal/Genitourinary:       Endo:     (+)               Obesity,       Psychiatric/Substance Use:     (+) psychiatric history        Infectious Disease:  - neg infectious disease ROS     Malignancy:  - neg malignancy ROS     Other:              Physical Exam  Airway  Mallampati: II  TM distance: >3 FB  Neck ROM: full  Upper bite lip test: II  Mouth opening: >= 4 cm    Cardiovascular - normal exam Comments: 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    Other Findings       OUTSIDE LABS:  CBC:   Lab Results   Component Value Date    WBC 8.3 06/23/2025    WBC 11.2 (H) 02/20/2025    HGB 13.5 06/23/2025    HGB 15.6 02/20/2025    HCT 41.4 06/23/2025    HCT 48.1 (H) 02/20/2025     06/23/2025     02/20/2025     BMP:   Lab Results   Component Value Date     02/20/2025     04/09/2024    POTASSIUM 4.7 02/20/2025    POTASSIUM 3.9 04/09/2024    CHLORIDE 104 02/20/2025    CHLORIDE 106 04/09/2024    CO2 27 02/20/2025    CO2 22 04/09/2024    BUN 10.1 02/20/2025    BUN 8.3 04/09/2024    CR 0.79 02/20/2025    CR 0.78 04/09/2024    GLC 95 02/20/2025    GLC 80 04/09/2024     COAGS:   Lab Results   Component Value Date    PTT 60 (H) 09/09/2022    INR 1.13 09/12/2022    FIBR 501 (H) 09/09/2022     POC:   Lab Results   Component Value Date    HCG Negative 03/27/2025     HEPATIC:   Lab Results   Component Value Date    ALBUMIN 4.2 02/20/2025    PROTTOTAL 6.7 02/20/2025    ALT 18 02/20/2025    AST 19 02/20/2025    ALKPHOS 78 02/20/2025    BILITOTAL 0.4 02/20/2025     OTHER:   Lab Results   Component Value Date    PH 7.44 09/09/2022    LACT 0.9 09/09/2022    A1C  "5.8 (H) 02/07/2022    MANNY 9.7 02/20/2025    PHOS 4.1 09/15/2022    MAG 2.1 09/15/2022    LIPASE 20 09/07/2022    TSH 1.30 11/02/2010    SED 11 03/07/2011       Anesthesia Plan    ASA Status:  2      NPO Status: NPO Appropriate   Anesthesia Type: General.  Airway: oral.  Induction: intravenous.  Maintenance: Balanced.   Techniques and Equipment:       - Monitoring Plan: standard ASA monitoring     Consents    Anesthesia Plan(s) and associated risks, benefits, and realistic alternatives discussed. Questions answered and patient/representative(s) expressed understanding.     - Discussed: CRNA     - Discussed with:  Patient        - Pt is DNR/DNI Status: no DNR     Blood Consent:      - Discussed with: patient.     Postoperative Care    Pain management: multimodal analgesia.     Comments:    Other Comments: The risks and benefits of anesthesia, and the alternatives where applicable, have been discussed with the patient, and they wish to proceed.                  LOUIE oJnes CRNA    I have reviewed the pertinent notes and labs in the chart from the past 30 days and (re)examined the patient.  Any updates or changes from those notes are reflected in this note.    Clinically Significant Risk Factors Present on Admission                             # Obesity: Estimated body mass index is 30.11 kg/m  as calculated from the following:    Height as of 6/26/25: 1.6 m (5' 3\").    Weight as of 6/26/25: 77.1 kg (170 lb).                    "

## 2025-07-10 NOTE — ANESTHESIA CARE TRANSFER NOTE
Patient: Bridgette Veras    Procedure: Procedure(s):  HERNIORRHAPHY, RECURRENT VENTRAL INCISIONAL, ROBOT-ASSISTED, LAPAROSCOPIC, USING DA YNES XI       Diagnosis: Recurrent ventral incisional hernia [K43.2]  Diagnosis Additional Information: No value filed.    Anesthesia Type:   General     Note:    Oropharynx: oropharynx clear of all foreign objects and spontaneously breathing  Level of Consciousness: drowsy  Oxygen Supplementation: face mask    Independent Airway: airway patency satisfactory and stable  Dentition: dentition unchanged  Vital Signs Stable: post-procedure vital signs reviewed and stable  Report to RN Given: handoff report given  Patient transferred to: PACU    Handoff Report: Identifed the Patient, Identified the Reponsible Provider, Reviewed the pertinent medical history, Discussed the surgical course, Reviewed Intra-OP anesthesia mangement and issues during anesthesia, Set expectations for post-procedure period and Allowed opportunity for questions and acknowledgement of understanding      Vitals:  Vitals Value Taken Time   /61 07/10/25 17:35   Temp 98.3  F (36.8  C) 07/10/25 17:20   Pulse 78 07/10/25 17:36   Resp 6 07/10/25 17:36   SpO2 94 % 07/10/25 17:33   Vitals shown include unfiled device data.    Electronically Signed By: LOUIE Jones CRNA  July 10, 2025  5:37 PM

## 2025-07-12 ENCOUNTER — HEALTH MAINTENANCE LETTER (OUTPATIENT)
Age: 50
End: 2025-07-12

## 2025-07-12 ASSESSMENT — ANXIETY QUESTIONNAIRES
2. NOT BEING ABLE TO STOP OR CONTROL WORRYING: MORE THAN HALF THE DAYS
3. WORRYING TOO MUCH ABOUT DIFFERENT THINGS: MORE THAN HALF THE DAYS
5. BEING SO RESTLESS THAT IT IS HARD TO SIT STILL: SEVERAL DAYS
GAD7 TOTAL SCORE: 11
GAD7 TOTAL SCORE: 11
6. BECOMING EASILY ANNOYED OR IRRITABLE: NOT AT ALL
IF YOU CHECKED OFF ANY PROBLEMS ON THIS QUESTIONNAIRE, HOW DIFFICULT HAVE THESE PROBLEMS MADE IT FOR YOU TO DO YOUR WORK, TAKE CARE OF THINGS AT HOME, OR GET ALONG WITH OTHER PEOPLE: SOMEWHAT DIFFICULT
4. TROUBLE RELAXING: MORE THAN HALF THE DAYS
7. FEELING AFRAID AS IF SOMETHING AWFUL MIGHT HAPPEN: MORE THAN HALF THE DAYS
1. FEELING NERVOUS, ANXIOUS, OR ON EDGE: MORE THAN HALF THE DAYS
8. IF YOU CHECKED OFF ANY PROBLEMS, HOW DIFFICULT HAVE THESE MADE IT FOR YOU TO DO YOUR WORK, TAKE CARE OF THINGS AT HOME, OR GET ALONG WITH OTHER PEOPLE?: SOMEWHAT DIFFICULT

## 2025-07-14 ENCOUNTER — TELEPHONE (OUTPATIENT)
Dept: HEMATOLOGY | Facility: CLINIC | Age: 50
End: 2025-07-14
Payer: COMMERCIAL

## 2025-07-14 DIAGNOSIS — I74.10 AORTIC THROMBUS (H): ICD-10-CM

## 2025-07-14 NOTE — TELEPHONE ENCOUNTER
Incoming fax from [facility] requesting refill of Eliquis 5mg BID.    Per Dr. Cogan's note from 10/22/2024, Bridgette is to remain on Eliquis 5mg BID indefinitely. She should also continue to get iron labs annually, which she is up to date on. She does not need routine follow up with the [facility] and can opt to have her Eliquis Rx managed by her PCP.     Call placed to Bridgette to check in. She would like her Eliquis Rx to be managed by her PCP. Plan made to route this note to Dr. Morillo to inquire about this request.     Should Bridgette need hematological input from our team in the future, she's welcomed to schedule a follow up visit with us.    Page DANIELLEN, RN, PHN   [facility]   Office: [phone]  Fax: [phone]

## 2025-07-14 NOTE — TELEPHONE ENCOUNTER
Does she need a refill right now?  If so, can you please confirm the pharmacy?    Otherwise, route back to Dr. Morillo for review    Thanks    EB

## 2025-07-17 ENCOUNTER — VIRTUAL VISIT (OUTPATIENT)
Dept: PSYCHOLOGY | Facility: CLINIC | Age: 50
End: 2025-07-17
Payer: COMMERCIAL

## 2025-07-17 DIAGNOSIS — F41.1 GAD (GENERALIZED ANXIETY DISORDER): Primary | ICD-10-CM

## 2025-07-17 NOTE — PROGRESS NOTES
[facility]                                     Progress Note    Patient Name: Bridgette Veras  Date: 7/17/2025         Service Type: Individual      Session Start Time: 12.30  Session End Time: 1.20     Session Length: 38-52    Session #: 02    Attendees: Client attended alone    Service Modality:  Video Visit:      Provider verified identity through the following two step process.  Patient provided:  Patient is known previously to provider    Telemedicine Visit: The patient's condition can be safely assessed and treated via synchronous audio and visual telemedicine encounter.      Reason for Telemedicine Visit: Patient has requested telehealth visit    Originating Site (Patient Location): Patient's home    Distant Site (Provider Location): Provider Remote Setting- Home Office    Consent:  The patient/guardian has verbally consented to: the potential risks and benefits of telemedicine (video visit) versus in person care; bill my insurance or make self-payment for services provided; and responsibility for payment of non-covered services.     Patient would like the video invitation sent by:  My Chart    Mode of Communication:  Video Conference via Amwell    Distant Location (Provider):  Off-site    As the provider I attest to compliance with applicable laws and regulations related to telemedicine.    DATA  Interactive Complexity: No  Crisis: No        Progress Since Last Session (Related to Symptoms / Goals / Homework):   Symptoms: No change from last session    Homework: Completed in session review patient's  goal for treatment and collaboratively develop treatment plan       Episode of Care Goals: Minimal progress - PREPARATION (Decided to change - considering how); Intervened by negotiating a change plan and determining options / strategies for behavior change, identifying triggers, exploring social supports, and working towards setting a date to begin behavior change     Current / Ongoing  Stressors and Concerns:  Patient reported anxiety and grief. Lost her dad years back and her mom 2022. Still grieving the loss.Struggles with the guilt of giving away their items that they left behind.  Noted she is currently on medical leave and out of work for a while following a recent surgery. Been feeling really bored at home since being on leave.      Treatment Objective(s) Addressed in This Session:   Patient will identify specific areas of cognitive distortion and challenge irrational thoughts with reality.        Intervention:   Mindfulness: Discussion today about boredom as a sign that we've become habituated, feeling stuck on an auto  life, and losing touch with actual experiences daily. Explore ways to mitigate boredom by seeking new experience and inviting different perspectives:    -Take an unfamiliar route  to a regular appointment.  - Follow a recipe for a meal you've never tried, with at least one ingredient you've not before cooked with.  -Start a conversation with someone you've never really talked with.  -Go to the cinema without checking what films are showing. Watch the first one that starts after your arrival.  -Read a different news site to the one you're used to--perhaps one that doesn't confirm your usual political views.  - Be open to alternative means of finding out information and connecting with others and the world.      Assessments completed prior to visit:  The following assessments were completed by patient for this visit:  PHQ9:       8/8/2013     1:15 PM 10/24/2023     5:16 PM 1/5/2025     2:08 PM   PHQ-9 SCORE   PHQ-9 Total Score 2     PHQ-9 Total Score MyChart  3 (Minimal depression) 3 (Minimal depression)   PHQ-9 Total Score  3 3        Patient-reported     GAD7:       10/24/2023     5:15 PM 1/6/2025    10:05 AM 7/12/2025     2:44 PM   DESMOND-7 SCORE   Total Score 5 (mild anxiety) 7 (mild anxiety) 11 (moderate anxiety)   Total Score 5 7  11        Patient-reported          ASSESSMENT: Current Emotional / Mental Status (status of significant symptoms):   Risk status (Self / Other harm or suicidal ideation)   Patient denies current fears or concerns for personal safety.   Patient denies current or recent suicidal ideation or behaviors.   Patient denies current or recent homicidal ideation or behaviors.   Patient denies current or recent self injurious behavior or ideation.   Patient denies other safety concerns.   Patient reports there has been no change in risk factors since their last session.     Patient reports there has been no change in protective factors since their last session.     Recommended that patient call 911 or go to the local ED should there be a change in any of these risk factors     Appearance:   Appropriate    Eye Contact:   Good    Psychomotor Behavior: Normal    Attitude:   Cooperative  Interested Friendly   Orientation:   Person Place Time Situation   Speech    Rate / Production: Normal/ Responsive    Volume:  Normal    Mood:    Anxious    Affect:    Appropriate    Thought Content:  Clear    Thought Form:  Coherent  Goal Directed    Insight:    Good      Medication Review:   No changes to current psychiatric medication(s)     Medication Compliance:   Yes     Changes in Health Issues:   None reported     Chemical Use Review:   Substance Use: Chemical use reviewed, no active concerns identified      Tobacco Use: No current tobacco use.      Diagnosis:  No diagnosis found.    Collateral Reports Completed:   Not Applicable    PLAN: (Patient Tasks / Therapist Tasks / Other)    Anxiety symptoms can often be grouped into 4 categories. 1) ( Physiological)  2) cognitive type ,The third category of symptoms is to do with your  feelings, ( affective). Finally, the last category is behaviours or actions, for example: not going out, avoiding people,  etc. Use provided anxiety symptoms Worksheet and note down what you are experiencing under the 4 heading:  Physiological, cognitive, affective, behavioral and process next session.          Deacon Meraz, LICSW                                                         ______________________________________________________________________    Individual Treatment Plan    Patient's Name: Bridgette Veras  YOB: 1975    Date of Creation: 07/17/2025  Date Treatment Plan Last Reviewed/Revised:     DSM5 Diagnoses: 300.02 (F41.1) Generalized Anxiety Disorder  Psychosocial / Contextual Factors:   PROMIS (reviewed every 90 days):     Referral / Collaboration:  Referral to another professional/service is not indicated at this time..    Anticipated number of session for this episode of care: 15-30  Anticipation frequency of session: Weekly  Anticipated Duration of each session: 38-52 minutes  Treatment plan will be reviewed in 90 days or when goals have been changed.       MeasurableTreatment Goal(s) related to diagnosis / functional impairment(s)    Goal 1: Patient will identify and address specific triggers to feelings of anxiety and improve coping as evident by a decrease of 4-6 point on the DESMOND 7 scores within the next three months.    I will know I've met my goal when I am using more of my logical brain      Objective #A (Patient Action)    Patient will  identify three distraction and diversion activities and use those activities weekly  to decrease level of anxiety.    Status: Continued - Date(s): 10/19/2025    Intervention(s)  Therapist will provide psychoeducation, behavioral activation, and cognitive restructuring.    Objective #B  Patient will identify specific areas of cognitive distortion and challenge irrational thoughts with reality.   Status: Continued - Date(s): 10/19/2025       Intervention(s)  Therapist will provide psychoeducation, behavioral activation, and cognitive restructuring.    Objective #C  Patient will learn and practice relaxation techniques to manage anxiety daily.  Status: Continued -  Date(s):  10/19/2025    Intervention(s)  Therapist will provide psychoeducation, behavioral activation, and cognitive restructuring.         Patient has reviewed and agreed to the above plan.      DANIE Lam  July 17, 2025    Answers submitted by the patient for this visit:  Patient Health Questionnaire (G7) (Submitted on 7/12/2025)  DESMOND 7 TOTAL SCORE: 11

## 2025-07-21 ENCOUNTER — OFFICE VISIT (OUTPATIENT)
Dept: SURGERY | Facility: CLINIC | Age: 50
End: 2025-07-21
Payer: COMMERCIAL

## 2025-07-21 VITALS
HEIGHT: 63 IN | DIASTOLIC BLOOD PRESSURE: 77 MMHG | SYSTOLIC BLOOD PRESSURE: 119 MMHG | BODY MASS INDEX: 30.26 KG/M2 | WEIGHT: 170.8 LBS | HEART RATE: 64 BPM

## 2025-07-21 DIAGNOSIS — Z87.19 S/P REPAIR OF RECURRENT VENTRAL HERNIA: Primary | ICD-10-CM

## 2025-07-21 DIAGNOSIS — Z98.890 S/P REPAIR OF RECURRENT VENTRAL HERNIA: Primary | ICD-10-CM

## 2025-07-21 PROCEDURE — 99212 OFFICE O/P EST SF 10 MIN: CPT | Performed by: SURGERY

## 2025-07-21 PROCEDURE — 3078F DIAST BP <80 MM HG: CPT | Performed by: SURGERY

## 2025-07-21 PROCEDURE — 3074F SYST BP LT 130 MM HG: CPT | Performed by: SURGERY

## 2025-07-21 NOTE — PROGRESS NOTES
"General Surgery Post Op    Pt returns for follow up visit s/p transabdominal retrorectus incisional hernia repair on 7/10/2025.    Patient has been doing well, tolerating diet. Bowels moving well. Pain controlled. No issues with wound healing/redness/drainage. No fevers.  Just using Tylenol as needed.  Only used the oxycodone for a day after surgery.  Has been very pleased with how things have been going    Physical exam: Vitals: /77   Pulse 64   Ht 1.6 m (5' 3\")   Wt 77.5 kg (170 lb 12.8 oz)   BMI 30.26 kg/m    BMI= Body mass index is 30.26 kg/m .    Exam:  Constitutional: healthy, alert, and no distress  Gastrointestinal: Abdomen soft, non-tender. BS normal. No masses, organomegaly  Some bruising around the port sites in the left abdomen otherwise healing well, glue starting to loosen and fall off.  A bit of a palpable ridge along the area of repair with midline incision, no palpable recurrence.       Assessment:     ICD-10-CM    1. S/P repair of recurrent ventral hernia  Z98.890     Z87.19         Plan: Recovering well from her recurrent incisional hernia repair.  Would like to keep lifting light for another month and then she can start increasing activities as tolerated.  Will leave follow-up clinic appointment to as needed.    Fred Borja MD    "

## 2025-07-21 NOTE — LETTER
"7/21/2025      Bridgette Veras  [address]  [place] [address]      Dear Colleague,    Thank you for referring your patient, Bridgette Veras, to the [facility]. Please see a copy of my visit note below.    General Surgery Post Op    Pt returns for follow up visit s/p transabdominal retrorectus incisional hernia repair on 7/10/2025.    Patient has been doing well, tolerating diet. Bowels moving well. Pain controlled. No issues with wound healing/redness/drainage. No fevers.  Just using Tylenol as needed.  Only used the oxycodone for a day after surgery.  Has been very pleased with how things have been going    Physical exam: Vitals: /77   Pulse 64   Ht 1.6 m (5' 3\")   Wt 77.5 kg (170 lb 12.8 oz)   BMI 30.26 kg/m    BMI= Body mass index is 30.26 kg/m .    Exam:  Constitutional: healthy, alert, and no distress  Gastrointestinal: Abdomen soft, non-tender. BS normal. No masses, organomegaly  Some bruising around the port sites in the left abdomen otherwise healing well, glue starting to loosen and fall off.  A bit of a palpable ridge along the area of repair with midline incision, no palpable recurrence.       Assessment:     ICD-10-CM    1. S/P repair of recurrent ventral hernia  Z98.890     Z87.19         Plan: Recovering well from her recurrent incisional hernia repair.  Would like to keep lifting light for another month and then she can start increasing activities as tolerated.  Will leave follow-up clinic appointment to as needed.    Fred Borja MD      Again, thank you for allowing me to participate in the care of your patient.        Sincerely,        Fred Borja MD    Electronically signed"

## 2025-07-23 ENCOUNTER — VIRTUAL VISIT (OUTPATIENT)
Dept: PSYCHOLOGY | Facility: CLINIC | Age: 50
End: 2025-07-23
Payer: COMMERCIAL

## 2025-07-23 DIAGNOSIS — F41.1 GAD (GENERALIZED ANXIETY DISORDER): Primary | ICD-10-CM

## 2025-07-23 PROCEDURE — 90834 PSYTX W PT 45 MINUTES: CPT | Mod: 95 | Performed by: STUDENT IN AN ORGANIZED HEALTH CARE EDUCATION/TRAINING PROGRAM

## 2025-07-23 NOTE — PROGRESS NOTES
[facility]                                     Progress Note    Patient Name: Bridgette Veras  Date: 7/23/2025         Service Type: Individual      Session Start Time: 10.00  Session End Time: 10.52     Session Length: 38-52    Session #: 04    Attendees: Client attended alone    Service Modality:  Video Visit:      Provider verified identity through the following two step process.  Patient provided:  Patient is known previously to provider    Telemedicine Visit: The patient's condition can be safely assessed and treated via synchronous audio and visual telemedicine encounter.      Reason for Telemedicine Visit: Patient has requested telehealth visit    Originating Site (Patient Location): Patient's home    Distant Site (Provider Location): Provider Remote Setting- Home Office    Consent:  The patient/guardian has verbally consented to: the potential risks and benefits of telemedicine (video visit) versus in person care; bill my insurance or make self-payment for services provided; and responsibility for payment of non-covered services.     Patient would like the video invitation sent by:  My Chart    Mode of Communication:  Video Conference via Amwell    Distant Location (Provider):  Off-site    As the provider I attest to compliance with applicable laws and regulations related to telemedicine.    DATA  Interactive Complexity: No  Crisis: No        Progress Since Last Session (Related to Symptoms / Goals / Homework):   Symptoms: No change from last session    Homework: Achieved / completed to satisfaction Review anxiety symptoms checklist in session.       Episode of Care Goals: Minimal progress - PREPARATION (Decided to change - considering how); Intervened by negotiating a change plan and determining options / strategies for behavior change, identifying triggers, exploring social supports, and working towards setting a date to begin behavior change     Current / Ongoing Stressors and  "Concerns:    Patient reported anxiety and grief. Lost her dad years back and her mom 2025. Had a medical issue in 2022 that has affected how she thinks about her health. Noted recurrent health anxiety. Worries a lot \"about little things\" , daughter's wellbeing in camp, possible future health issues, fear of crossing some bridges etc. Reported she will be returning to work  next week and is having mixed feelings about going back to work.      Treatment Objective(s) Addressed in This Session:   Patient will identify specific areas of cognitive distortion and challenge irrational thoughts with reality.      Intervention:    CBT: Introduced patient today to CBT as a modality that  involves understanding and working on the links between our cognitions; thinking patterns and processes, our behaviors; the things we do and avoid doing, our feelings, such as anxiety, and physiological reactions like tension. Discussion on how CBT helps us identify and alter the interconnections between negative thinking patterns, unhelpful behaviors, distressing feelings, and physiological symptoms. Handout provided.       Assessments completed prior to visit:  The following assessments were completed by patient for this visit:  PHQ9:       8/8/2013     1:15 PM 10/24/2023     5:16 PM 1/5/2025     2:08 PM   PHQ-9 SCORE   PHQ-9 Total Score 2     PHQ-9 Total Score MyChart  3 (Minimal depression) 3 (Minimal depression)   PHQ-9 Total Score  3 3        Patient-reported     GAD7:       10/24/2023     5:15 PM 1/6/2025    10:05 AM 7/12/2025     2:44 PM   DESMOND-7 SCORE   Total Score 5 (mild anxiety) 7 (mild anxiety) 11 (moderate anxiety)   Total Score 5 7  11        Patient-reported         ASSESSMENT: Current Emotional / Mental Status (status of significant symptoms):   Risk status (Self / Other harm or suicidal ideation)   Patient denies current fears or concerns for personal safety.   Patient denies current or recent suicidal ideation or " behaviors.   Patient denies current or recent homicidal ideation or behaviors.   Patient denies current or recent self injurious behavior or ideation.   Patient denies other safety concerns.   Patient reports there has been no change in risk factors since their last session.     Patient reports there has been no change in protective factors since their last session.     Recommended that patient call 911 or go to the local ED should there be a change in any of these risk factors     Appearance:   Appropriate    Eye Contact:   Good    Psychomotor Behavior: Normal    Attitude:   Cooperative  Interested Friendly   Orientation:   Person Place Time Situation   Speech    Rate / Production: Normal/ Responsive    Volume:  Normal    Mood:    Anxious    Affect:    Appropriate    Thought Content:  Clear    Thought Form:  Coherent  Goal Directed    Insight:    Good      Medication Review:   No changes to current psychiatric medication(s)     Medication Compliance:   Yes     Changes in Health Issues:   None reported     Chemical Use Review:   Substance Use: Chemical use reviewed, no active concerns identified      Tobacco Use: No current tobacco use.      Diagnosis:  300.02 (F41.1) Generalized Anxiety Disorder    Collateral Reports Completed:   Not Applicable    PLAN: (Patient Tasks / Therapist Tasks / Other)    1) Read provided intro to CBT.   2) Record your breathing rate per minutes when relax and bring data  to process next session.        DANIE Lam                                                         ______________________________________________________________________    Individual Treatment Plan    Patient's Name: Bridgette Veras  YOB: 1975    Date of Creation: 07/17/2025  Date Treatment Plan Last Reviewed/Revised:     DSM5 Diagnoses: 300.02 (F41.1) Generalized Anxiety Disorder  Psychosocial / Contextual Factors:   PROMIS (reviewed every 90 days):     Referral / Collaboration:  Referral to  another professional/service is not indicated at this time..    Anticipated number of session for this episode of care: 15-30  Anticipation frequency of session: Weekly  Anticipated Duration of each session: 38-52 minutes  Treatment plan will be reviewed in 90 days or when goals have been changed.       MeasurableTreatment Goal(s) related to diagnosis / functional impairment(s)    Goal 1: Patient will identify and address specific triggers to feelings of anxiety and improve coping as evident by a decrease of 4-6 point on the DESMOND 7 scores within the next three months.    I will know I've met my goal when I am using more of my logical brain      Objective #A (Patient Action)    Patient will  identify three distraction and diversion activities and use those activities weekly  to decrease level of anxiety.    Status: Continued - Date(s): 10/19/2025    Intervention(s)  Therapist will provide psychoeducation, behavioral activation, and cognitive restructuring.    Objective #B  Patient will identify specific areas of cognitive distortion and challenge irrational thoughts with reality.   Status: Continued - Date(s): 10/19/2025       Intervention(s)  Therapist will provide psychoeducation, behavioral activation, and cognitive restructuring.    Objective #C  Patient will learn and practice relaxation techniques to manage anxiety daily.  Status: Continued - Date(s):  10/19/2025    Intervention(s)  Therapist will provide psychoeducation, behavioral activation, and cognitive restructuring.         Patient has reviewed and agreed to the above plan.      DANIE Lam  July 17, 2025    Answers submitted by the patient for this visit:  Patient Health Questionnaire (G7) (Submitted on 7/12/2025)  DESMOND 7 TOTAL SCORE: 11

## 2025-07-30 ENCOUNTER — VIRTUAL VISIT (OUTPATIENT)
Dept: PSYCHOLOGY | Facility: CLINIC | Age: 50
End: 2025-07-30
Payer: COMMERCIAL

## 2025-07-30 DIAGNOSIS — F41.1 GAD (GENERALIZED ANXIETY DISORDER): Primary | ICD-10-CM

## 2025-07-30 PROCEDURE — 90834 PSYTX W PT 45 MINUTES: CPT | Mod: 95 | Performed by: STUDENT IN AN ORGANIZED HEALTH CARE EDUCATION/TRAINING PROGRAM

## 2025-07-30 NOTE — PROGRESS NOTES
[facility]                                     Progress Note    Patient Name: Bridgette Veras  Date: 7/30/2025         Service Type: Individual      Session Start Time: 3.30  Session End Time: 4.20     Session Length: 38-52    Session #: 05    Attendees: Client attended alone    Service Modality:  Video Visit:      Provider verified identity through the following two step process.  Patient provided:  Patient is known previously to provider    Telemedicine Visit: The patient's condition can be safely assessed and treated via synchronous audio and visual telemedicine encounter.      Reason for Telemedicine Visit: Patient has requested telehealth visit    Originating Site (Patient Location): Patient's home    Distant Site (Provider Location): Provider Remote Setting- Home Office    Consent:  The patient/guardian has verbally consented to: the potential risks and benefits of telemedicine (video visit) versus in person care; bill my insurance or make self-payment for services provided; and responsibility for payment of non-covered services.     Patient would like the video invitation sent by:  My Chart    Mode of Communication:  Video Conference via [employer]    Distant Location (Provider):  Off-site    As the provider I attest to compliance with applicable laws and regulations related to telemedicine.    DATA  Interactive Complexity: No  Crisis: No        Progress Since Last Session (Related to Symptoms / Goals / Homework):   Symptoms: No change from last session    Homework: Achieved / completed to satisfaction Review breathing rate per minute.       Episode of Care Goals: Minimal progress - ACTION (Actively working towards change); Intervened by reinforcing change plan / affirming steps taken     Current / Ongoing Stressors and Concerns:  Patient reported anxiety and grief. Lost her dad years back and her mom 2025. Had a medical issue in 2022 that has affected how she thinks about her health.        Current: Today patient reported she is back at work, gradually adjusting to work having been out for about a week on medical leave. Anxiety is fairly manageable. Struggling with cigarette cravings as she stopped smoking to prepare for the medical procedure. Daily walks have been helpful alternative behaviors to manage urges.        Treatment Objective(s) Addressed in This Session:   Patient will identify specific areas of cognitive distortion and challenge irrational thoughts with reality.      Intervention:    Walk patient through a 4-2-6 breathing  skill to help stimulate the  rest and digest  response, promoting relaxation and mitigate stress and anxiety. Couched patient that longer exhales compared to inhales help activate the vagus nerve, a critical part of the  parasympathetic nervous system and that stimulating this nerve lowers the heart rate and blood pressure, leading to a state of relaxation. Provided handout and encourage patient to practice daily.     Assessments completed prior to visit:  The following assessments were completed by patient for this visit:  PHQ9:       8/8/2013     1:15 PM 10/24/2023     5:16 PM 1/5/2025     2:08 PM   PHQ-9 SCORE   PHQ-9 Total Score 2     PHQ-9 Total Score MyChart  3 (Minimal depression) 3 (Minimal depression)   PHQ-9 Total Score  3 3        Patient-reported     GAD7:       10/24/2023     5:15 PM 1/6/2025    10:05 AM 7/12/2025     2:44 PM   DESMOND-7 SCORE   Total Score 5 (mild anxiety) 7 (mild anxiety) 11 (moderate anxiety)   Total Score 5 7  11        Patient-reported         ASSESSMENT: Current Emotional / Mental Status (status of significant symptoms):   Risk status (Self / Other harm or suicidal ideation)   Patient denies current fears or concerns for personal safety.   Patient denies current or recent suicidal ideation or behaviors.   Patient denies current or recent homicidal ideation or behaviors.   Patient denies current or recent self injurious behavior or  ideation.   Patient denies other safety concerns.   Patient reports there has been no change in risk factors since their last session.     Patient reports there has been no change in protective factors since their last session.     Recommended that patient call 911 or go to the local ED should there be a change in any of these risk factors     Appearance:   Appropriate    Eye Contact:   Good    Psychomotor Behavior: Normal    Attitude:   Cooperative  Interested Friendly   Orientation:   Person Place Time Situation   Speech    Rate / Production: Normal/ Responsive    Volume:  Normal    Mood:    Anxious    Affect:    Appropriate    Thought Content:  Clear    Thought Form:  Coherent  Goal Directed    Insight:    Good      Medication Review:   No changes to current psychiatric medication(s)     Medication Compliance:   Yes     Changes in Health Issues:   None reported     Chemical Use Review:   Substance Use: Chemical use reviewed, no active concerns identified      Tobacco Use: No current tobacco use.      Diagnosis:  300.02 (F41.1) Generalized Anxiety Disorder    Collateral Reports Completed:   Not Applicable    PLAN: (Patient Tasks / Therapist Tasks / Other)    Put your writing hand on your stomach and the other hand on your chest,b) breathe in through your nose and out  through your mouth. Remember jaw relaxed, breathe low and slow c) Do this for approximately 5 minutes  three times per day.     Remember to:   1) monitor your breathing  rate,   2) practise the breathing exercise,  and   3) monitor your breathing rate again.         DANIE Lam                                                         ______________________________________________________________________    Individual Treatment Plan    Patient's Name: Bridgette Veras  YOB: 1975    Date of Creation: 07/17/2025  Date Treatment Plan Last Reviewed/Revised:     DSM5 Diagnoses: 300.02 (F41.1) Generalized Anxiety Disorder  Psychosocial /  Contextual Factors:   PROMIS (reviewed every 90 days):     Referral / Collaboration:  Referral to another professional/service is not indicated at this time..    Anticipated number of session for this episode of care: 15-30  Anticipation frequency of session: Weekly  Anticipated Duration of each session: 38-52 minutes  Treatment plan will be reviewed in 90 days or when goals have been changed.       MeasurableTreatment Goal(s) related to diagnosis / functional impairment(s)    Goal 1: Patient will identify and address specific triggers to feelings of anxiety and improve coping as evident by a decrease of 4-6 point on the DESMOND 7 scores within the next three months.    I will know I've met my goal when I am using more of my logical brain      Objective #A (Patient Action)    Patient will  identify three distraction and diversion activities and use those activities weekly  to decrease level of anxiety.    Status: Continued - Date(s): 10/19/2025    Intervention(s)  Therapist will provide psychoeducation, behavioral activation, and cognitive restructuring.    Objective #B  Patient will identify specific areas of cognitive distortion and challenge irrational thoughts with reality.   Status: Continued - Date(s): 10/19/2025       Intervention(s)  Therapist will provide psychoeducation, behavioral activation, and cognitive restructuring.    Objective #C  Patient will learn and practice relaxation techniques to manage anxiety daily.  Status: Continued - Date(s):  10/19/2025    Intervention(s)  Therapist will provide psychoeducation, behavioral activation, and cognitive restructuring.         Patient has reviewed and agreed to the above plan.      DANIE Lam  July 17, 2025    Answers submitted by the patient for this visit:  Patient Health Questionnaire (G7) (Submitted on 7/12/2025)  DESMOND 7 TOTAL SCORE: 11

## 2025-08-01 ENCOUNTER — ANCILLARY PROCEDURE (OUTPATIENT)
Dept: MAMMOGRAPHY | Facility: CLINIC | Age: 50
End: 2025-08-01
Attending: FAMILY MEDICINE
Payer: COMMERCIAL

## 2025-08-01 DIAGNOSIS — Z12.31 VISIT FOR SCREENING MAMMOGRAM: ICD-10-CM

## 2025-08-01 PROCEDURE — 77063 BREAST TOMOSYNTHESIS BI: CPT | Mod: TC | Performed by: RADIOLOGY

## 2025-08-01 PROCEDURE — 77067 SCR MAMMO BI INCL CAD: CPT | Mod: TC | Performed by: RADIOLOGY

## 2025-08-20 ENCOUNTER — VIRTUAL VISIT (OUTPATIENT)
Dept: PSYCHOLOGY | Facility: CLINIC | Age: 50
End: 2025-08-20
Payer: COMMERCIAL

## 2025-08-20 DIAGNOSIS — F41.1 GAD (GENERALIZED ANXIETY DISORDER): Primary | ICD-10-CM

## 2025-08-20 PROCEDURE — 90834 PSYTX W PT 45 MINUTES: CPT | Mod: 95 | Performed by: STUDENT IN AN ORGANIZED HEALTH CARE EDUCATION/TRAINING PROGRAM

## 2025-08-28 ENCOUNTER — ALLIED HEALTH/NURSE VISIT (OUTPATIENT)
Dept: FAMILY MEDICINE | Facility: CLINIC | Age: 50
End: 2025-08-28
Payer: COMMERCIAL

## 2025-08-28 DIAGNOSIS — Z23 NEED FOR HEPATITIS B BOOSTER VACCINATION: Primary | ICD-10-CM

## (undated) DEVICE — CATH TRAY FOLEY SURESTEP 16FR W/TMP PRB STLK LATEX A319416AM

## (undated) DEVICE — CLIP HORIZON MED BLUE 002200

## (undated) DEVICE — SUCTION MANIFOLD NEPTUNE 2 SYS 4 PORT 0702-020-000

## (undated) DEVICE — ESU ELEC BLADE E-SEP INSULATED NEPTUNE 70MM 0703-070-002

## (undated) DEVICE — SPONGE LAP 18X18" X8435

## (undated) DEVICE — GLOVE PROTEXIS MICRO 7.5  2D73PM75

## (undated) DEVICE — SYR 50ML SLIP TIP W/O NDL 309654

## (undated) DEVICE — SOL NACL 0.9% IRRIG 1000ML BOTTLE 2F7124

## (undated) DEVICE — SU SILK 4-0 TIE 12X30" A303H

## (undated) DEVICE — CATH ANGIO C2 5FRX90CM 2 SIDEHOLES 10719705

## (undated) DEVICE — SU MONOCRYL 4-0 PS-2 27" UND Y426H

## (undated) DEVICE — PACK GENERAL LAPAOSCOPY

## (undated) DEVICE — CLIP APPLIER 11" MED LIGACLIP MCM30

## (undated) DEVICE — SU STRATAFIX PDS PLUS 0 CT-1 30CM SXPP1B450

## (undated) DEVICE — SYSTEM LAPAROVUE VISIBILITY LAPVUE10

## (undated) DEVICE — ESU PENCIL SMOKE EVAC W/ROCKER SWITCH 0703-047-000

## (undated) DEVICE — ENDO TROCAR BLUNT TIP KII BALLOON 12X100MM C0R47

## (undated) DEVICE — DAVINCI HOT SHEARS TIP COVER  400180

## (undated) DEVICE — DAVINCI XI MONOPOLAR SCISSORS HOT SHEARS 8MM 470179

## (undated) DEVICE — ANTIFOG SOLUTION W/FOAM PAD 31142527

## (undated) DEVICE — SU VICRYL 3-0 SH 27" J316H

## (undated) DEVICE — Device

## (undated) DEVICE — STPL LINEAR CUT 75MM TLC75

## (undated) DEVICE — GLOVE PROTEXIS W/NEU-THERA 7.5  2D73TE75

## (undated) DEVICE — SU SILK 0 TIE 6X30" A306H

## (undated) DEVICE — SU SILK 2-0 TIE 12X30" A305H

## (undated) DEVICE — DRAPE SHEET REV FOLD 3/4 9349

## (undated) DEVICE — GLOVE PROTEXIS POWDER FREE SMT 7.5  2D72PT75X

## (undated) DEVICE — DRSG MEDIPORE 3 1/2X13 3/4" 3573

## (undated) DEVICE — SU PROLENE 2-0 SHDA 36" 8523H

## (undated) DEVICE — SURGICEL HEMOSTAT 4X8" 1952

## (undated) DEVICE — STPL SKIN PROXIMATE 35 WIDE PMW35

## (undated) DEVICE — BAG URIMETER FOLEY KIT W/16FR FOLEY

## (undated) DEVICE — ENDO FORCEP ENDOJAW BIOPSY 2.8MMX230CM FB-220U

## (undated) DEVICE — DAVINCI XI DRAPE COLUMN 470341

## (undated) DEVICE — SU SILK 3-0 TIE 12X30" A304H

## (undated) DEVICE — BLADE KNIFE SURG 11 371111

## (undated) DEVICE — SU STRATAFIX PDS PLUS 3-0 SPIRAL SH 15CM SXPP1B420

## (undated) DEVICE — BLADE CLIPPER SGL USE 9680

## (undated) DEVICE — INTRO SHEATH BRITE TIP 8FRX11CM 401-811M

## (undated) DEVICE — SU MONOCRYL 4-0 PS-2 18" UND Y496G

## (undated) DEVICE — VESSEL LOOPS DEV-O-LOOP WHITE MINI 31145728

## (undated) DEVICE — SYR 30ML LL W/O NDL 302832

## (undated) DEVICE — VESSEL LOOPS YELLOW MAXI 31145694

## (undated) DEVICE — ESU PENCIL W/HOLSTER

## (undated) DEVICE — TUBING INSUFFLATION W/FILTER CPC TO LUER 620-030-301

## (undated) DEVICE — PREP CHLORAPREP 26ML TINTED ORANGE  260815

## (undated) DEVICE — SU SILK 3-0 SH CR 8X18" C013D

## (undated) DEVICE — CATH ANGIO ANG GLIDE 5FRX65CM CG507

## (undated) DEVICE — GLOVE PROTEXIS BLUE W/NEU-THERA 8.0  2D73EB80

## (undated) DEVICE — STPL SKIN 35W ROTATING HEAD PRW35

## (undated) DEVICE — CATH FOGARTY EMBOLECTOMY 4FR 80CM LATEX 120804FP

## (undated) DEVICE — SU PDS II 3-0 SH 27" Z316H

## (undated) DEVICE — SU PROLENE 1 CTX 30" 8455H

## (undated) DEVICE — GLOVE BIOGEL INDICATOR 7.5 LF 41675

## (undated) DEVICE — SU PDS II 1 CT-1 MONOFIL Z347H

## (undated) DEVICE — SU VICRYL+ 3-0 27IN SH UND VCP416H

## (undated) DEVICE — GOWN XLG DISP 9545

## (undated) DEVICE — DAVINCI XI DRAPE ARM 470015

## (undated) DEVICE — DAVINCI XI SEAL UNIVERSAL 5-8MM 470361

## (undated) DEVICE — GLOVE PROTEXIS BLUE W/NEU-THERA 7.5  2D73EB75

## (undated) DEVICE — SU VICRYL 0 CT-2 27" J334H

## (undated) DEVICE — SOLUTION WATER 1000ML BOTTLE R5000-01

## (undated) DEVICE — ESU GROUND PAD UNIVERSAL W/O CORD

## (undated) DEVICE — PACK AB HYST II

## (undated) DEVICE — LINEN TOWEL PACK X5 5464

## (undated) DEVICE — SYR 01ML LL W/O NDL LATEX FREE 309628

## (undated) DEVICE — CATH FOGARTY EMBOLECTOMY 5FR 80CM LATEX 120805FP

## (undated) DEVICE — PEN MARKING W/RULER DYNJSM04

## (undated) DEVICE — SPONGE KITTNER 30-101

## (undated) DEVICE — CANISTER WOUND VAC W/GEL 1000ML M8275093/5

## (undated) DEVICE — CLIP HORIZON LG ORANGE 004200

## (undated) DEVICE — DEVICE MULTI TORQUE TD01

## (undated) DEVICE — DAVINCI XI OBTURATOR BLADELESS 8MM 470359

## (undated) DEVICE — GLOVE BIOGEL PI ULTRATOUCH G SZ 7.5 42175

## (undated) DEVICE — SOL WATER IRRIG 1000ML BOTTLE 2F7114

## (undated) DEVICE — ESU LIGASURE IMPACT OPEN SEALER/DVDR CVD LG JAW LF4418

## (undated) DEVICE — SU PDS II 0 TP-1 60" Z991G

## (undated) DEVICE — INFLATION DEVICE ENCORE  26 PRESSURE GAUGE M001151050

## (undated) DEVICE — DRAPE C-ARM W/STRAPS 42X72" 07-CA104

## (undated) DEVICE — DRSG ABTHERA NEG PRESSURE VAC ABD SENSAT M8275026/5.S

## (undated) DEVICE — SOL WATER IRRIG 500ML BOTTLE 2F7113

## (undated) DEVICE — LINEN TOWEL PACK X30 5481

## (undated) DEVICE — SU VICRYL 0 UR-6 27" J603H

## (undated) DEVICE — DRAPE U SPLIT 74X120" 29440

## (undated) DEVICE — STPL RELOAD LINEAR 60X3.5MM XR60B

## (undated) DEVICE — SU PROLENE 6-0 C-1DA 4X24" M8726

## (undated) DEVICE — SU DERMABOND ADVANCED .7ML DNX12

## (undated) DEVICE — VESSEL LOOPS RED MINI 24000-01R

## (undated) DEVICE — DRAPE IOBAN INCISE 23X17" 6650EZ

## (undated) DEVICE — DEVICE SUTURE GRASPER TROCAR CLOSURE 14GA PMITCSG

## (undated) DEVICE — PACK GOWN 3/PK DISP XL SBA32GPFCB

## (undated) DEVICE — DRAPE MAYO STAND 23X54 8337

## (undated) DEVICE — STPL RELOAD LINEAR CUT 75MM TCR75

## (undated) DEVICE — ENDO TROCAR FIRST ENTRY KII FIOS ADV FIX 05X100MM CFF03

## (undated) DEVICE — DECANTER BAG 2002S

## (undated) DEVICE — DRSG TELFA 3X8" 1238

## (undated) DEVICE — DRAPE IOBAN INCISE 13X13" 6640EZ

## (undated) DEVICE — NDL INSUFFLATION 13GA 150MM C2202

## (undated) DEVICE — ESU GROUND PAD ADULT W/CORD E7507

## (undated) DEVICE — DAVINCI XI SEAL UNIVERSAL 5-12MM 470500

## (undated) DEVICE — DAVINCI XI FCP BIPOLAR FENESTRATED 470205

## (undated) DEVICE — KIT POSITIONER TRENDELENBURG NUBLUE TP3000-NB

## (undated) DEVICE — ENDO TROCAR FIRST ENTRY KII FIOS Z-THRD 05X100MM CTF03

## (undated) DEVICE — GUIDEWIRE ROSEN CVD .035X180CM J-TIP G01264

## (undated) DEVICE — SU STRATAFIX MONOCRYL 3-0 SPIRAL PS-2 45CM SXMP1B107

## (undated) DEVICE — SU PDS II 4-0 SH 27" Z315H

## (undated) DEVICE — TUBING MEDRAD 48" HIGH PRESSURE MX694

## (undated) DEVICE — KIT MICRO-INTRODUCER STIFFEN 4FR 7274V

## (undated) DEVICE — GLIDEWIRE TERUMO .035X180 ANG STIFF GS3508

## (undated) DEVICE — DAVINCI XI NDL DRIVER MEGA SUTURE CUT 8MM 470309

## (undated) DEVICE — VESSEL LOOPS BLUE SUPERMAXI 011022PBX

## (undated) DEVICE — PACK LAP CHOLE CUSTOM ASC

## (undated) DEVICE — DRSG STERI STRIP 1/2X4" R1547

## (undated) DEVICE — WIPES FOLEY CARE SURESTEP PROVON DFC100

## (undated) DEVICE — LINEN TOWEL PACK X6 WHITE 5487

## (undated) DEVICE — CLIP HORIZON SM RED WIDE SLOT 001201

## (undated) DEVICE — PREP CHLORAPREP 26ML TINTED HI-LITE ORANGE 930815

## (undated) DEVICE — SU STRATAFIX PDS PLUS 1 CT-1 18" SXPP1A404

## (undated) RX ORDER — LIDOCAINE HYDROCHLORIDE 10 MG/ML
INJECTION, SOLUTION EPIDURAL; INFILTRATION; INTRACAUDAL; PERINEURAL
Status: DISPENSED
Start: 2025-07-10

## (undated) RX ORDER — GABAPENTIN 300 MG/1
CAPSULE ORAL
Status: DISPENSED
Start: 2023-06-09

## (undated) RX ORDER — FENTANYL CITRATE 50 UG/ML
INJECTION, SOLUTION INTRAMUSCULAR; INTRAVENOUS
Status: DISPENSED
Start: 2022-09-10

## (undated) RX ORDER — ALBUTEROL SULFATE 90 UG/1
AEROSOL, METERED RESPIRATORY (INHALATION)
Status: DISPENSED
Start: 2022-09-10

## (undated) RX ORDER — HYDROMORPHONE HYDROCHLORIDE 1 MG/ML
INJECTION, SOLUTION INTRAMUSCULAR; INTRAVENOUS; SUBCUTANEOUS
Status: DISPENSED
Start: 2022-09-10

## (undated) RX ORDER — LIDOCAINE HYDROCHLORIDE 20 MG/ML
INJECTION, SOLUTION EPIDURAL; INFILTRATION; INTRACAUDAL; PERINEURAL
Status: DISPENSED
Start: 2022-09-08

## (undated) RX ORDER — ONDANSETRON 2 MG/ML
INJECTION INTRAMUSCULAR; INTRAVENOUS
Status: DISPENSED
Start: 2022-09-10

## (undated) RX ORDER — FENTANYL CITRATE 50 UG/ML
INJECTION, SOLUTION INTRAMUSCULAR; INTRAVENOUS
Status: DISPENSED
Start: 2025-07-10

## (undated) RX ORDER — PROPOFOL 10 MG/ML
INJECTION, EMULSION INTRAVENOUS
Status: DISPENSED
Start: 2024-03-04

## (undated) RX ORDER — FENTANYL CITRATE 50 UG/ML
INJECTION, SOLUTION INTRAMUSCULAR; INTRAVENOUS
Status: DISPENSED
Start: 2023-06-09

## (undated) RX ORDER — DEXAMETHASONE SODIUM PHOSPHATE 4 MG/ML
INJECTION, SOLUTION INTRA-ARTICULAR; INTRALESIONAL; INTRAMUSCULAR; INTRAVENOUS; SOFT TISSUE
Status: DISPENSED
Start: 2022-09-10

## (undated) RX ORDER — HYDROMORPHONE HYDROCHLORIDE 1 MG/ML
INJECTION, SOLUTION INTRAMUSCULAR; INTRAVENOUS; SUBCUTANEOUS
Status: DISPENSED
Start: 2022-09-08

## (undated) RX ORDER — ONDANSETRON 2 MG/ML
INJECTION INTRAMUSCULAR; INTRAVENOUS
Status: DISPENSED
Start: 2022-09-09

## (undated) RX ORDER — FENTANYL CITRATE-0.9 % NACL/PF 10 MCG/ML
PLASTIC BAG, INJECTION (ML) INTRAVENOUS
Status: DISPENSED
Start: 2022-09-09

## (undated) RX ORDER — DEXAMETHASONE SODIUM PHOSPHATE 4 MG/ML
INJECTION, SOLUTION INTRA-ARTICULAR; INTRALESIONAL; INTRAMUSCULAR; INTRAVENOUS; SOFT TISSUE
Status: DISPENSED
Start: 2022-09-09

## (undated) RX ORDER — FENTANYL CITRATE-0.9 % NACL/PF 10 MCG/ML
PLASTIC BAG, INJECTION (ML) INTRAVENOUS
Status: DISPENSED
Start: 2023-06-09

## (undated) RX ORDER — PROPOFOL 10 MG/ML
INJECTION, EMULSION INTRAVENOUS
Status: DISPENSED
Start: 2023-06-09

## (undated) RX ORDER — IODIXANOL 320 MG/ML
INJECTION, SOLUTION INTRAVASCULAR
Status: DISPENSED
Start: 2022-09-08

## (undated) RX ORDER — FENTANYL CITRATE 50 UG/ML
INJECTION, SOLUTION INTRAMUSCULAR; INTRAVENOUS
Status: DISPENSED
Start: 2022-09-08

## (undated) RX ORDER — FENTANYL CITRATE 50 UG/ML
INJECTION, SOLUTION INTRAMUSCULAR; INTRAVENOUS
Status: DISPENSED
Start: 2022-09-09

## (undated) RX ORDER — OXYCODONE HYDROCHLORIDE 5 MG/1
TABLET ORAL
Status: DISPENSED
Start: 2023-06-09

## (undated) RX ORDER — HYDROMORPHONE HYDROCHLORIDE 1 MG/ML
INJECTION, SOLUTION INTRAMUSCULAR; INTRAVENOUS; SUBCUTANEOUS
Status: DISPENSED
Start: 2025-07-10

## (undated) RX ORDER — HYDROMORPHONE HYDROCHLORIDE 1 MG/ML
INJECTION, SOLUTION INTRAMUSCULAR; INTRAVENOUS; SUBCUTANEOUS
Status: DISPENSED
Start: 2023-06-09

## (undated) RX ORDER — PROPOFOL 10 MG/ML
INJECTION, EMULSION INTRAVENOUS
Status: DISPENSED
Start: 2025-07-10

## (undated) RX ORDER — FENTANYL CITRATE-0.9 % NACL/PF 10 MCG/ML
PLASTIC BAG, INJECTION (ML) INTRAVENOUS
Status: DISPENSED
Start: 2022-09-10

## (undated) RX ORDER — ONDANSETRON 2 MG/ML
INJECTION INTRAMUSCULAR; INTRAVENOUS
Status: DISPENSED
Start: 2023-06-09

## (undated) RX ORDER — ACETAMINOPHEN 325 MG/1
TABLET ORAL
Status: DISPENSED
Start: 2023-06-09

## (undated) RX ORDER — LIDOCAINE HYDROCHLORIDE 20 MG/ML
INJECTION, SOLUTION EPIDURAL; INFILTRATION; INTRACAUDAL; PERINEURAL
Status: DISPENSED
Start: 2022-09-10

## (undated) RX ORDER — PROPOFOL 10 MG/ML
INJECTION, EMULSION INTRAVENOUS
Status: DISPENSED
Start: 2022-09-08

## (undated) RX ORDER — BUPIVACAINE HYDROCHLORIDE 5 MG/ML
INJECTION, SOLUTION EPIDURAL; INTRACAUDAL
Status: DISPENSED
Start: 2023-06-09

## (undated) RX ORDER — PHENYLEPHRINE HYDROCHLORIDE 10 MG/ML
INJECTION INTRAVENOUS
Status: DISPENSED
Start: 2025-07-10

## (undated) RX ORDER — HEPARIN SODIUM 1000 [USP'U]/ML
INJECTION, SOLUTION INTRAVENOUS; SUBCUTANEOUS
Status: DISPENSED
Start: 2022-09-08

## (undated) RX ORDER — PROPOFOL 10 MG/ML
INJECTION, EMULSION INTRAVENOUS
Status: DISPENSED
Start: 2022-09-10

## (undated) RX ORDER — CEFAZOLIN SODIUM 2 G/50ML
SOLUTION INTRAVENOUS
Status: DISPENSED
Start: 2023-06-09

## (undated) RX ORDER — ESMOLOL HYDROCHLORIDE 10 MG/ML
INJECTION INTRAVENOUS
Status: DISPENSED
Start: 2022-09-09

## (undated) RX ORDER — CEFAZOLIN SODIUM 1 G/3ML
INJECTION, POWDER, FOR SOLUTION INTRAMUSCULAR; INTRAVENOUS
Status: DISPENSED
Start: 2023-06-09

## (undated) RX ORDER — DEXAMETHASONE SODIUM PHOSPHATE 4 MG/ML
INJECTION, SOLUTION INTRA-ARTICULAR; INTRALESIONAL; INTRAMUSCULAR; INTRAVENOUS; SOFT TISSUE
Status: DISPENSED
Start: 2023-06-09

## (undated) RX ORDER — INDOCYANINE GREEN AND WATER 25 MG
KIT INJECTION
Status: DISPENSED
Start: 2022-09-10

## (undated) RX ORDER — DEXMEDETOMIDINE HYDROCHLORIDE 100 UG/ML
INJECTION, SOLUTION INTRAVENOUS
Status: DISPENSED
Start: 2025-07-10

## (undated) RX ORDER — BUPIVACAINE HYDROCHLORIDE AND EPINEPHRINE 5; 5 MG/ML; UG/ML
INJECTION, SOLUTION EPIDURAL; INTRACAUDAL; PERINEURAL
Status: DISPENSED
Start: 2025-07-10

## (undated) RX ORDER — HYDROMORPHONE HYDROCHLORIDE 1 MG/ML
INJECTION, SOLUTION INTRAMUSCULAR; INTRAVENOUS; SUBCUTANEOUS
Status: DISPENSED
Start: 2022-09-09